# Patient Record
Sex: MALE | Race: WHITE | Employment: OTHER | URBAN - METROPOLITAN AREA
[De-identification: names, ages, dates, MRNs, and addresses within clinical notes are randomized per-mention and may not be internally consistent; named-entity substitution may affect disease eponyms.]

---

## 2021-10-01 ENCOUNTER — HOSPITAL ENCOUNTER (INPATIENT)
Facility: CLINIC | Age: 56
LOS: 31 days | Discharge: HOME | DRG: 177 | End: 2021-11-01
Attending: FAMILY MEDICINE | Admitting: FAMILY MEDICINE
Payer: OTHER GOVERNMENT

## 2021-10-01 ENCOUNTER — APPOINTMENT (OUTPATIENT)
Dept: CT IMAGING | Facility: CLINIC | Age: 56
DRG: 177 | End: 2021-10-01
Attending: FAMILY MEDICINE
Payer: OTHER GOVERNMENT

## 2021-10-01 DIAGNOSIS — I10 BENIGN ESSENTIAL HYPERTENSION: ICD-10-CM

## 2021-10-01 DIAGNOSIS — U07.1 PNEUMONIA DUE TO 2019 NOVEL CORONAVIRUS: ICD-10-CM

## 2021-10-01 DIAGNOSIS — J12.82 PNEUMONIA DUE TO 2019 NOVEL CORONAVIRUS: ICD-10-CM

## 2021-10-01 DIAGNOSIS — J96.01 ACUTE RESPIRATORY FAILURE WITH HYPOXIA (H): ICD-10-CM

## 2021-10-01 DIAGNOSIS — R12 HEART BURN: Primary | ICD-10-CM

## 2021-10-01 DIAGNOSIS — Z87.891 PERSONAL HISTORY OF TOBACCO USE, PRESENTING HAZARDS TO HEALTH: ICD-10-CM

## 2021-10-01 LAB
ABO/RH(D): NORMAL
ALBUMIN SERPL-MCNC: 2.4 G/DL (ref 3.4–5)
ALP SERPL-CCNC: 63 U/L (ref 40–150)
ALT SERPL W P-5'-P-CCNC: 49 U/L (ref 0–70)
ANION GAP SERPL CALCULATED.3IONS-SCNC: 11 MMOL/L (ref 3–14)
APTT PPP: 34 SECONDS (ref 22–38)
AST SERPL W P-5'-P-CCNC: 68 U/L (ref 0–45)
BASOPHILS # BLD MANUAL: 0 10E3/UL (ref 0–0.2)
BASOPHILS NFR BLD MANUAL: 0 %
BILIRUB SERPL-MCNC: 0.5 MG/DL (ref 0.2–1.3)
BUN SERPL-MCNC: 44 MG/DL (ref 7–30)
CALCIUM SERPL-MCNC: 8.6 MG/DL (ref 8.5–10.1)
CHLORIDE BLD-SCNC: 95 MMOL/L (ref 94–109)
CO2 SERPL-SCNC: 23 MMOL/L (ref 20–32)
CREAT SERPL-MCNC: 2.2 MG/DL (ref 0.66–1.25)
CRP SERPL-MCNC: 141 MG/L (ref 0–8)
D DIMER PPP FEU-MCNC: 2.11 UG/ML FEU (ref 0–0.5)
D DIMER PPP FEU-MCNC: 2.19 UG/ML FEU (ref 0–0.5)
EOSINOPHIL # BLD MANUAL: 0 10E3/UL (ref 0–0.7)
EOSINOPHIL NFR BLD MANUAL: 0 %
ERYTHROCYTE [DISTWIDTH] IN BLOOD BY AUTOMATED COUNT: 12.2 % (ref 10–15)
FIBRINOGEN PPP-MCNC: 476 MG/DL (ref 170–490)
GFR SERPL CREATININE-BSD FRML MDRD: 32 ML/MIN/1.73M2
GLUCOSE BLD-MCNC: 96 MG/DL (ref 70–99)
HCT VFR BLD AUTO: 45.6 % (ref 40–53)
HGB BLD-MCNC: 16.3 G/DL (ref 13.3–17.7)
INR PPP: 0.86 (ref 0.85–1.15)
LDH SERPL L TO P-CCNC: 669 U/L (ref 85–227)
LYMPHOCYTES # BLD MANUAL: 0.5 10E3/UL (ref 0.8–5.3)
LYMPHOCYTES NFR BLD MANUAL: 6 %
MCH RBC QN AUTO: 29.6 PG (ref 26.5–33)
MCHC RBC AUTO-ENTMCNC: 35.7 G/DL (ref 31.5–36.5)
MCV RBC AUTO: 83 FL (ref 78–100)
MONOCYTES # BLD MANUAL: 0.2 10E3/UL (ref 0–1.3)
MONOCYTES NFR BLD MANUAL: 2 %
NEUTROPHILS # BLD MANUAL: 7 10E3/UL (ref 1.6–8.3)
NEUTROPHILS NFR BLD MANUAL: 92 %
PLAT MORPH BLD: ABNORMAL
PLATELET # BLD AUTO: 194 10E3/UL (ref 150–450)
POTASSIUM BLD-SCNC: 3.4 MMOL/L (ref 3.4–5.3)
PROT SERPL-MCNC: 7.9 G/DL (ref 6.8–8.8)
RBC # BLD AUTO: 5.5 10E6/UL (ref 4.4–5.9)
RBC MORPH BLD: ABNORMAL
SARS-COV-2 RNA RESP QL NAA+PROBE: POSITIVE
SODIUM SERPL-SCNC: 129 MMOL/L (ref 133–144)
SPECIMEN EXPIRATION DATE: NORMAL
TROPONIN I SERPL-MCNC: <0.015 UG/L (ref 0–0.04)
TROPONIN I SERPL-MCNC: <0.015 UG/L (ref 0–0.04)
WBC # BLD AUTO: 7.6 10E3/UL (ref 4–11)

## 2021-10-01 PROCEDURE — 258N000003 HC RX IP 258 OP 636: Performed by: FAMILY MEDICINE

## 2021-10-01 PROCEDURE — 93010 ELECTROCARDIOGRAM REPORT: CPT | Performed by: FAMILY MEDICINE

## 2021-10-01 PROCEDURE — 85379 FIBRIN DEGRADATION QUANT: CPT | Performed by: FAMILY MEDICINE

## 2021-10-01 PROCEDURE — 250N000011 HC RX IP 250 OP 636: Performed by: FAMILY MEDICINE

## 2021-10-01 PROCEDURE — 71275 CT ANGIOGRAPHY CHEST: CPT

## 2021-10-01 PROCEDURE — 83615 LACTATE (LD) (LDH) ENZYME: CPT | Performed by: FAMILY MEDICINE

## 2021-10-01 PROCEDURE — 85384 FIBRINOGEN ACTIVITY: CPT | Performed by: FAMILY MEDICINE

## 2021-10-01 PROCEDURE — 36415 COLL VENOUS BLD VENIPUNCTURE: CPT | Performed by: FAMILY MEDICINE

## 2021-10-01 PROCEDURE — 93005 ELECTROCARDIOGRAM TRACING: CPT | Performed by: FAMILY MEDICINE

## 2021-10-01 PROCEDURE — 85610 PROTHROMBIN TIME: CPT | Performed by: FAMILY MEDICINE

## 2021-10-01 PROCEDURE — 250N000009 HC RX 250: Performed by: FAMILY MEDICINE

## 2021-10-01 PROCEDURE — 85027 COMPLETE CBC AUTOMATED: CPT | Performed by: FAMILY MEDICINE

## 2021-10-01 PROCEDURE — 87635 SARS-COV-2 COVID-19 AMP PRB: CPT | Performed by: FAMILY MEDICINE

## 2021-10-01 PROCEDURE — 99285 EMERGENCY DEPT VISIT HI MDM: CPT | Mod: 25 | Performed by: FAMILY MEDICINE

## 2021-10-01 PROCEDURE — 96361 HYDRATE IV INFUSION ADD-ON: CPT | Performed by: FAMILY MEDICINE

## 2021-10-01 PROCEDURE — XW033E5 INTRODUCTION OF REMDESIVIR ANTI-INFECTIVE INTO PERIPHERAL VEIN, PERCUTANEOUS APPROACH, NEW TECHNOLOGY GROUP 5: ICD-10-PCS | Performed by: FAMILY MEDICINE

## 2021-10-01 PROCEDURE — 96374 THER/PROPH/DIAG INJ IV PUSH: CPT | Mod: 59 | Performed by: FAMILY MEDICINE

## 2021-10-01 PROCEDURE — 99223 1ST HOSP IP/OBS HIGH 75: CPT | Mod: AI | Performed by: FAMILY MEDICINE

## 2021-10-01 PROCEDURE — 84484 ASSAY OF TROPONIN QUANT: CPT | Performed by: FAMILY MEDICINE

## 2021-10-01 PROCEDURE — 80053 COMPREHEN METABOLIC PANEL: CPT | Performed by: FAMILY MEDICINE

## 2021-10-01 PROCEDURE — 250N000013 HC RX MED GY IP 250 OP 250 PS 637: Performed by: FAMILY MEDICINE

## 2021-10-01 PROCEDURE — 85730 THROMBOPLASTIN TIME PARTIAL: CPT | Performed by: FAMILY MEDICINE

## 2021-10-01 PROCEDURE — 86140 C-REACTIVE PROTEIN: CPT | Performed by: FAMILY MEDICINE

## 2021-10-01 PROCEDURE — 120N000001 HC R&B MED SURG/OB

## 2021-10-01 PROCEDURE — 86901 BLOOD TYPING SEROLOGIC RH(D): CPT | Performed by: FAMILY MEDICINE

## 2021-10-01 PROCEDURE — C9803 HOPD COVID-19 SPEC COLLECT: HCPCS | Performed by: FAMILY MEDICINE

## 2021-10-01 RX ORDER — ACETAMINOPHEN 500 MG
500-1000 TABLET ORAL EVERY 6 HOURS PRN
COMMUNITY
End: 2022-05-26

## 2021-10-01 RX ORDER — DEXAMETHASONE SODIUM PHOSPHATE 10 MG/ML
6 INJECTION, SOLUTION INTRAMUSCULAR; INTRAVENOUS ONCE
Status: COMPLETED | OUTPATIENT
Start: 2021-10-01 | End: 2021-10-01

## 2021-10-01 RX ORDER — PANTOPRAZOLE SODIUM 40 MG/1
40 TABLET, DELAYED RELEASE ORAL EVERY MORNING
Status: DISCONTINUED | OUTPATIENT
Start: 2021-10-02 | End: 2021-11-01 | Stop reason: HOSPADM

## 2021-10-01 RX ORDER — ONDANSETRON 2 MG/ML
4 INJECTION INTRAMUSCULAR; INTRAVENOUS EVERY 6 HOURS PRN
Status: DISCONTINUED | OUTPATIENT
Start: 2021-10-01 | End: 2021-11-01 | Stop reason: HOSPADM

## 2021-10-01 RX ORDER — SODIUM CHLORIDE, SODIUM LACTATE, POTASSIUM CHLORIDE, CALCIUM CHLORIDE 600; 310; 30; 20 MG/100ML; MG/100ML; MG/100ML; MG/100ML
125 INJECTION, SOLUTION INTRAVENOUS CONTINUOUS
Status: DISCONTINUED | OUTPATIENT
Start: 2021-10-01 | End: 2021-10-01

## 2021-10-01 RX ORDER — ONDANSETRON 4 MG/1
4 TABLET, ORALLY DISINTEGRATING ORAL EVERY 6 HOURS PRN
Status: DISCONTINUED | OUTPATIENT
Start: 2021-10-01 | End: 2021-11-01 | Stop reason: HOSPADM

## 2021-10-01 RX ORDER — IOPAMIDOL 755 MG/ML
83 INJECTION, SOLUTION INTRAVASCULAR ONCE
Status: COMPLETED | OUTPATIENT
Start: 2021-10-01 | End: 2021-10-01

## 2021-10-01 RX ORDER — SODIUM CHLORIDE 9 MG/ML
INJECTION, SOLUTION INTRAVENOUS CONTINUOUS
Status: DISCONTINUED | OUTPATIENT
Start: 2021-10-01 | End: 2021-10-02

## 2021-10-01 RX ORDER — ACETAMINOPHEN 325 MG/1
650 TABLET ORAL EVERY 6 HOURS PRN
Status: DISCONTINUED | OUTPATIENT
Start: 2021-10-01 | End: 2021-11-01 | Stop reason: HOSPADM

## 2021-10-01 RX ORDER — LIDOCAINE 40 MG/G
CREAM TOPICAL
Status: DISCONTINUED | OUTPATIENT
Start: 2021-10-01 | End: 2021-11-01 | Stop reason: HOSPADM

## 2021-10-01 RX ORDER — ACETAMINOPHEN 650 MG/1
650 SUPPOSITORY RECTAL EVERY 6 HOURS PRN
Status: DISCONTINUED | OUTPATIENT
Start: 2021-10-01 | End: 2021-11-01 | Stop reason: HOSPADM

## 2021-10-01 RX ADMIN — ENOXAPARIN SODIUM 40 MG: 100 INJECTION SUBCUTANEOUS at 16:58

## 2021-10-01 RX ADMIN — ACETAMINOPHEN 650 MG: 325 TABLET, FILM COATED ORAL at 16:58

## 2021-10-01 RX ADMIN — DEXAMETHASONE SODIUM PHOSPHATE 6 MG: 10 INJECTION, SOLUTION INTRAMUSCULAR; INTRAVENOUS at 12:49

## 2021-10-01 RX ADMIN — REMDESIVIR 200 MG: 100 INJECTION, POWDER, LYOPHILIZED, FOR SOLUTION INTRAVENOUS at 17:51

## 2021-10-01 RX ADMIN — SODIUM CHLORIDE: 9 INJECTION, SOLUTION INTRAVENOUS at 16:57

## 2021-10-01 RX ADMIN — SODIUM CHLORIDE, POTASSIUM CHLORIDE, SODIUM LACTATE AND CALCIUM CHLORIDE 1000 ML: 600; 310; 30; 20 INJECTION, SOLUTION INTRAVENOUS at 12:48

## 2021-10-01 RX ADMIN — ACETAMINOPHEN 650 MG: 325 TABLET, FILM COATED ORAL at 22:25

## 2021-10-01 RX ADMIN — SODIUM CHLORIDE 100 ML: 9 INJECTION, SOLUTION INTRAVENOUS at 14:44

## 2021-10-01 RX ADMIN — SODIUM CHLORIDE 50 ML: 9 INJECTION, SOLUTION INTRAVENOUS at 17:52

## 2021-10-01 RX ADMIN — IOPAMIDOL 83 ML: 755 INJECTION, SOLUTION INTRAVENOUS at 14:44

## 2021-10-01 ASSESSMENT — MIFFLIN-ST. JEOR
SCORE: 1717.57
SCORE: 1681.38

## 2021-10-01 ASSESSMENT — ACTIVITIES OF DAILY LIVING (ADL)
TOILETING_ISSUES: NO
DRESSING/BATHING_DIFFICULTY: NO
PATIENT_/_FAMILY_COMMUNICATION_STYLE: SPOKEN LANGUAGE (ENGLISH OR BILINGUAL)
DIFFICULTY_COMMUNICATING: NO
DIFFICULTY_EATING/SWALLOWING: NO
ADLS_ACUITY_SCORE: 3

## 2021-10-01 NOTE — ED PROVIDER NOTES
"  History     Chief Complaint   Patient presents with     Covid Concern     wife and son tested (+) here today with sx - shortness of breath and difficulty breathing     HPI    Liborio Barajas is a 56 year old male who comes in with concerns for Covid infection.  He became ill 9 days ago.  Couple of days later his wife who is also ill with similar symptoms was Covid tested and was positive.  His son also had similar symptoms and was tested and was Covid positive.  He has been having fevers, muscle aches, headaches, shortness of breath, chest pain, and diarrhea.  He has myalgias.  Today he got home O2 sat monitor but has not had a chance to monitor his sats.  He is not Covid vaccinated.  He has a history of hypertension but ran out of his lisinopril and has not taken it in quite some time.  He quit smoking years ago.  He is not diagnosed with asthma.  He has no other identified chronic medical problems.    Allergies:  Allergies   Allergen Reactions     Nka [No Known Allergies]        Problem List:    Patient Active Problem List    Diagnosis Date Noted     Acute respiratory failure with hypoxia (H) 10/01/2021     Priority: Medium     Pneumonia due to 2019 novel coronavirus 10/01/2021     Priority: Medium        Past Medical History:    No past medical history on file.    Past Surgical History:    Past Surgical History:   Procedure Laterality Date     COLONOSCOPY         Family History:    No family history on file.    Social History:  Marital Status:   [2]  Social History     Tobacco Use     Smoking status: Not on file   Substance Use Topics     Alcohol use: Not on file     Drug use: Not on file        Medications:    IBUPROFEN PO  LISINOPRIL PO  Omeprazole (PRILOSEC PO)      Review of Systems    All other systems are reviewed and are negative    Physical Exam   BP: 118/69  Pulse: 99  Temp: 99.6  F (37.6  C)  Resp: 20  Height: 175.3 cm (5' 9\")  Weight: 90.7 kg (200 lb)  SpO2: (!) 88 %      Physical " Exam    Nursing note and vitals were reviewed.  Constitutional: Awake and alert, adequately nourished and developed appearing 56-year-old in no apparent discomfort, who appears moderately ill, and who answers questions appropriately and cooperates with examination.  Without oxygen he is clearly dyspneic and has difficulty speaking in full sentences.  He improves on 6 L of O2 by nasal cannula.  HEENT: EOMI.   Neck: Freely mobile.  Cardiovascular: Cardiac examination reveals normal heart rate and regular rhythm without murmur.  Pulmonary/Chest: Breathing is unlabored without oxygen but improved with supplemental oxygen therapy.  O2 sats drop below 86% on room air and continue to descend.  The aysha is uncertain because we restarted his oxygen at 6 L.  At 6 L he is at 92%.  Breath sounds are clear and equal bilaterally.  There no retractions, but there are coarse rales present throughout the lungs..  Abdomen: Soft, nontender, no HSM or masses rebound or guarding.  Musculoskeletal: Extremities are warm and well-perfused and without edema  Neurological: Alert, oriented, thought content logical, coherent   Skin: Warm, dry, no rashes.  Psychiatric: Affect congruent.    ED Course        Procedures              EKG Interpretation:      Interpreted by Edwardo Harmon MD  Time reviewed: 12:53  Symptoms at time of EKG: dyspnea   Rhythm: normal sinus   Rate: normal  Axis: normal  Ectopy: none  Conduction: normal  ST Segments/ T Waves: No ST-T wave changes  Q Waves: none  Comparison to prior: No old EKG available    Clinical Impression: normal EKG    Critical Care time:  none               Results for orders placed or performed during the hospital encounter of 10/01/21 (from the past 24 hour(s))   D dimer quantitative   Result Value Ref Range    D-Dimer Quantitative 2.11 (H) 0.00 - 0.50 ug/mL FEU    Narrative    This D-dimer assay is intended for use in conjunction with a clinical pretest probability assessment model to exclude  pulmonary embolism (PE) and deep venous thrombosis (DVT) in outpatients suspected of PE or DVT. The cut-off value is 0.50 ug/mL FEU.   Troponin I   Result Value Ref Range    Troponin I <0.015 0.000 - 0.045 ug/L   INR   Result Value Ref Range    INR 0.86 0.85 - 1.15   Fibrinogen activity   Result Value Ref Range    Fibrinogen Activity 476 170 - 490 mg/dL   Symptomatic COVID-19 Virus (Coronavirus) by PCR Nasopharyngeal    Specimen: Nasopharyngeal; Swab   Result Value Ref Range    SARS CoV2 PCR Positive (A) Negative    Narrative    Testing was performed using the [employer]  SARS-CoV-2 & Influenza A/B Assay on the [employer]  [employer]  System.  This test should be ordered for the detection of SARS-COV-2 in individuals who meet SARS-CoV-2 clinical and/or epidemiological criteria. Test performance is unknown in asymptomatic patients.  This test is for in vitro diagnostic use under the FDA EUA for laboratories certified under CLIA to perform moderate and/or high complexity testing. This test has not been FDA cleared or approved.  A negative test does not rule out the presence of PCR inhibitors in the specimen or target RNA in concentration below the limit of detection for the assay. The possibility of a false negative should be considered if the patient's recent exposure or clinical presentation suggests COVID-19.  [facility] are certified under the Clinical Laboratory Improvement Amendments of 1988 (CLIA-88) as qualified to perform moderate and/or high complexity laboratory testing.   CBC with platelets differential    Narrative    The following orders were created for panel order CBC with platelets differential.  Procedure                               Abnormality         Status                     ---------                               -----------         ------                     CBC with platelets and d...[[id number]]  Normal              Final result               Manual Differential[[id number]]           Abnormal            Final result                 Please view results for these tests on the individual orders.   Comprehensive metabolic panel   Result Value Ref Range    Sodium 129 (L) 133 - 144 mmol/L    Potassium 3.4 3.4 - 5.3 mmol/L    Chloride 95 94 - 109 mmol/L    Carbon Dioxide (CO2) 23 20 - 32 mmol/L    Anion Gap 11 3 - 14 mmol/L    Urea Nitrogen 44 (H) 7 - 30 mg/dL    Creatinine 2.20 (H) 0.66 - 1.25 mg/dL    Calcium 8.6 8.5 - 10.1 mg/dL    Glucose 96 70 - 99 mg/dL    Alkaline Phosphatase 63 40 - 150 U/L    AST 68 (H) 0 - 45 U/L    ALT 49 0 - 70 U/L    Protein Total 7.9 6.8 - 8.8 g/dL    Albumin 2.4 (L) 3.4 - 5.0 g/dL    Bilirubin Total 0.5 0.2 - 1.3 mg/dL    GFR Estimate 32 (L) >60 mL/min/1.73m2   CBC with platelets and differential   Result Value Ref Range    WBC Count 7.6 4.0 - 11.0 10e3/uL    RBC Count 5.50 4.40 - 5.90 10e6/uL    Hemoglobin 16.3 13.3 - 17.7 g/dL    Hematocrit 45.6 40.0 - 53.0 %    MCV 83 78 - 100 fL    MCH 29.6 26.5 - 33.0 pg    MCHC 35.7 31.5 - 36.5 g/dL    RDW 12.2 10.0 - 15.0 %    Platelet Count 194 150 - 450 10e3/uL   Manual Differential   Result Value Ref Range    % Neutrophils 92 %    % Lymphocytes 6 %    % Monocytes 2 %    % Eosinophils 0 %    % Basophils 0 %    Absolute Neutrophils 7.0 1.6 - 8.3 10e3/uL    Absolute Lymphocytes 0.5 (L) 0.8 - 5.3 10e3/uL    Absolute Monocytes 0.2 0.0 - 1.3 10e3/uL    Absolute Eosinophils 0.0 0.0 - 0.7 10e3/uL    Absolute Basophils 0.0 0.0 - 0.2 10e3/uL    RBC Morphology Confirmed RBC Indices     Platelet Assessment  Automated Count Confirmed. Platelet morphology is normal.     Automated Count Confirmed. Platelet morphology is normal.   Troponin I   Result Value Ref Range    Troponin I <0.015 0.000 - 0.045 ug/L   CRP inflammation   Result Value Ref Range    CRP Inflammation 141.0 (H) 0.0 - 8.0 mg/L   ABO and Rh   Result Value Ref Range    ABO/RH(D) O NEG     SPECIMEN EXPIRATION DATE [id number]    CT Chest Pulmonary Embolism w Contrast     Narrative    CT CHEST PULMONARY EMBOLISM WITH CONTRAST October 1, 2021 2:57 PM    CLINICAL HISTORY: Dyspnea, COVID, elevated D-dimer.    TECHNIQUE: CT angiogram chest during arterial phase injection IV  contrast. 2D and 3D MIP reconstructions were performed by the CT  technologist. Dose reduction techniques were used.   CONTRAST: 83 mL Isovue 370.    COMPARISON: None.    FINDINGS:  ANGIOGRAM CHEST: Pulmonary arteries are normal caliber and negative  for pulmonary emboli. Thoracic aorta is negative for dissection.     LUNGS AND PLEURA: Extensive bilateral ground-glass consolidation with  more dense bibasilar consolidation identified involving all lobes of  both lungs. No effusions. No pneumothorax. No central airway  abnormality.    MEDIASTINUM/AXILLAE: Small hiatal hernia. Enlarged subcarinal lymph  node is 1.5 cm series 4 image 72. There are a few other borderline  prominent mediastinal lymph nodes bilaterally. Mildly prominent  bilateral hilar lymph nodes. Small pericardial fluid.    CORONARY ARTERY CALCIFICATION: Mild.    UPPER ABDOMEN: No acute abnormality. Incidental hepatic and renal  cysts not requiring specific follow-up.    MUSCULOSKELETAL: Unremarkable.      Impression    IMPRESSION:  1.  Extensive bilateral ground-glass and patchy regions of dense  consolidation involving all lobes of both lungs worrisome for  multifocal pneumonia versus severe inflammatory disease.  2.  No evidence for pulmonary embolism.  3.  Several prominent thoracic lymph nodes.  4.  Small pericardial fluid.    LANRE BARRON MD         SYSTEM ID:  [id number]   Lactate Dehydrogenase   Result Value Ref Range    Lactate Dehydrogenase 669 (H) 85 - 227 U/L   Partial thromboplastin time   Result Value Ref Range    aPTT 34 22 - 38 Seconds   D dimer quantitative   Result Value Ref Range    D-Dimer Quantitative 2.19 (H) 0.00 - 0.50 ug/mL FEU    Narrative    This D-dimer assay is intended for use in conjunction with a clinical pretest probability  assessment model to exclude pulmonary embolism (PE) and deep venous thrombosis (DVT) in outpatients suspected of PE or DVT. The cut-off value is 0.50 ug/mL FEU.       Medications   lactated ringers BOLUS 1,000 mL (1,000 mLs Intravenous New Bag 10/1/21 5068)     Followed by   lactated ringers infusion (has no administration in time range)   dexamethasone PF (DECADRON) injection 6 mg (6 mg Intravenous Given 10/1/21 1245)       Assessments & Plan (with Medical Decision Making)     56-year-old male not immunized against COVID-19 came in with Covid symptoms and a positive Covid test.  He was hypoxic with sats in the mid 80% range on room air but responded to supplemental O2 at 6 L of O2 by nasal cannula with sats in the mid 90% range.  Laboratory studies were as above with no unexpected findings.  Because of the elevated D-dimer he underwent CT scan.  No PE was noted but there was extensive pneumonia consistent with severe COVID-19 infection.  Dexamethasone therapy was initiated.  Discussed the findings with the patient and need for hospitalization.  Discussed his case with Lucas Melo of the hospital service and they will assume care on admission.    I have reviewed the nursing notes.    I have reviewed the findings, diagnosis, plan and need for follow up with the patient.       New Prescriptions    No medications on file       Final diagnoses:   Acute respiratory failure with hypoxia (H)   Pneumonia due to 2019 novel coronavirus       10/1/2021   [facility] EMERGENCY DEPT     Edwardo Harmon MD  10/01/21 0770

## 2021-10-01 NOTE — ED NOTES
Patient has low sat in triage - wheeled to room and placed on 2L NC - patient is 83% after getting up and moving to stretcher - recovered to 88% on 2 L - increased delivery to 4L - patient is 94% - primary RN in to assess

## 2021-10-01 NOTE — PROGRESS NOTES
"[facility] [facility] ADMISSION NOTE    Patient admitted to room 2211 at approximately 1620 via cart from emergency room. Patient was accompanied by transport tech.     Verbal SBAR report received from RN prior to patient arrival.     Patient ambulated to bed independently. Patient alert and oriented X 3. The patient is not having any pain.  . Admission vital signs: Blood pressure (!) 149/78, pulse 87, temperature 98.2  F (36.8  C), temperature source Oral, resp. rate 20, height 1.753 m (5' 9\"), weight 90.7 kg (200 lb), SpO2 92 %. Patient was oriented to plan of care, call light, bed controls, tv, telephone, bathroom and visiting hours.     Risk Assessment    The following safety risks were identified during admission: none. Yellow risk band applied: NO.     Skin Initial Assessment    This writer admitted this patient and completed a full skin assessment and Hany score in the Adult PCS flowsheet. Appropriate interventions initiated as needed.     Secondary skin check deferred.         Education    Patient has a [place] to Observation order: No  Observation education completed and documented: N/A      Gilma Elam RN    "

## 2021-10-01 NOTE — H&P
[facility]    History and Physical - Hospitalist Service       Date of Admission:  10/1/2021    Assessment & Plan      Liborio Barajas is a 56 year old male admitted on 10/1/2021. He presents with shortness of breath and was found to be Covid positive.      # Confirmed COVID-19 infection    # Acute Hypoxic Respiratory Failure secondary to COVID-19 infection  # Viral Pneumonia secondary to COVID-19 infection     Symptom Onset  September 22, 2021   Date of 1st Positive Test 10/01/21     Vaccination Status Not Vaccinated, but interested/contemplative       - Admit to medical floor with continuous pulse ox, COVID-19 special precautions  - Oxygen: continue current support with nasal cannula at 6 L/min; titrate to keep SpO2 between 90-96%  - Labs: Covid labs ordered  - Imaging: Chest CT--IMPRESSION:  1.  Extensive bilateral ground-glass and patchy regions of dense  consolidation involving all lobes of both lungs worrisome for  multifocal pneumonia versus severe inflammatory disease.  2.  No evidence for pulmonary embolism.  3.  Several prominent thoracic lymph nodes.  4.  Small pericardial fluid  - Breathing treatments: no inhalers needed; avoid nebulizers in favor of MDIs   - IV fluids: Saline 50 cc an hour for 12 hours.  - Antibiotics: not indicated   - COVID-Focused Medications: - DEXAMETHASONE: consider if new O2 requirement or acute on chronic hypoxic respiratory failure and - REMDESIVIR: consider if GFR > 30, AST & ALT < 5x ULN, and at least one of the following   - DVT Prophylaxis: at high risk of thrombotic complications due to COVID-19 (DDimer = 2.11 ug/mL FEU (Ref range: 0.00 - 0.50 ug/mL FEU) ).          - PROPHYLACTIC dosing: lovenox 40mg daily        - consider anticoag on discharge for 30 days & until return to normal mobility    #Hyponatremia  Probably related to volume depletion.  Saline 50 cc an hour for 12 hours.    #Possible acute kidney injury superimposed on chronic kidney  disease  Last creatinine I can find in his records was 1.28 on March 2, 2016.  Creatinine today 2.20 with a BUN of 44  Suspect some volume depletion-use saline 50 cc an hour for 12 hours.    #AST elevation  AST 68-suspect Covid related      #Hypertension  Previously on lisinopril-will not restart due to renal function.    #GERD  Continue omeprazole     Diet:  Regular  DVT Prophylaxis: Enoxaparin (Lovenox) SQ  Neal Catheter: Not present  Central Lines: None  Code Status:  Full            Disposition Plan   Expected discharge: 3-4 days recommended to prior living arrangement once respiratory status is improved..     The patient's care was discussed with the Patient.    Marcelo Edge MD, MD  [facility]  Securely message with the Vocera Web Console (learn more here)  Text page via [employer] Paging/Directory      ______________________________________________________________________    Chief Complaint   Shortness of breath    History is obtained from the patient    History of Present Illness   Liborio Barajas is a 56 year old male who has a history of previously treated hypertension (currently not on meds) and GERD.      He developed symptoms on approximately September 22, 2021.  He has had fevers, myalgias, headaches, shortness of breath, chest pain and diarrhea.  Patient also tells me he had some hallucinations where he awoke from sleep and thought he was laying in the field the bones. He is not Covid vaccinated.  He has history of hypertension but ran out of lisinopril and has not taken it for some time.  He smoked in the remote past.  He does not have asthma or known chronic lung disease.    His wife and son were tested positive for Covid today.  He presented because of increasing shortness of breath.    Patient was evaluated in the emergency room and found to be Covid positive.  His D-dimer was 2.11.  His EKG was normal.  A chest CT was done-see above..  He required 6 L of oxygen and  ER.    Review of Systems    CONSTITUTIONAL: Weak  INTEGUMENTARY/SKIN: NEGATIVE for worrisome rashes, moles or lesions  EYES: NEGATIVE for vision changes or irritation  ENT/MOUTH: NEGATIVE for ear, mouth and throat problems  RESP: Short of breath, cough  BREAST: NEGATIVE for masses, tenderness or discharge  CV: NEGATIVE for , palpitations or peripheral edema-some chest discomforts  GI: Diarrhea  : NEGATIVE for frequency, dysuria, or hematuria  MUSCULOSKELETAL: Myalgia  NEURO: NEGATIVE for weakness, dizziness or paresthesias  ENDOCRINE: NEGATIVE for temperature intolerance, skin/hair changes  HEME: NEGATIVE for bleeding problems  PSYCHIATRIC: NEGATIVE for changes in mood or affect    Past Medical History    I have reviewed this patient's medical history and updated it with pertinent information if needed.   No past medical history on file.    Past Surgical History   I have reviewed this patient's surgical history and updated it with pertinent information if needed.  Past Surgical History:   Procedure Laterality Date     COLONOSCOPY         Social History   I have reviewed this patient's social history and updated it with pertinent information if needed.  Social History     Tobacco Use     Smoking status: Not on file   Substance Use Topics     Alcohol use: Not on file     Drug use: Not on file       Family History     Father  recently of renal failure.  Prior to Admission Medications   Prior to Admission Medications   Prescriptions Last Dose Informant Patient Reported? Taking?   IBUPROFEN PO  Self Yes No   Sig: Take 400 mg by mouth every 6 hours as needed for moderate pain   LISINOPRIL PO  Self Yes No   Sig: Take 10 mg by mouth daily   Omeprazole (PRILOSEC PO)  Self Yes No   Sig: Take 20 mg by mouth every morning      Facility-Administered Medications: None     Allergies   Allergies   Allergen Reactions     Nka [No Known Allergies]        Physical Exam   Vital Signs: Temp: 99.6  F (37.6  C) Temp src: Oral BP:  125/84 Pulse: 88   Resp: 20 SpO2: 93 % O2 Device: Nasal cannula Oxygen Delivery: 6 LPM  Weight: 200 lbs 0 oz    General Appearance: Alert, coughing mildly tachypneic  Eyes: Pupils round reactive to light  HEENT: Oral negative, ears negative  Respiratory: Bibasilar crackles, some expiratory airway noise  Cardiovascular: S1-S2 regular  GI: Soft, benign, nontender, no adenopathy or mass, bowel sounds present  Lymph/Hematologic: No adenopathy  Genitourinary: Normal male  Skin: No rash  Musculoskeletal: No edema  Neurologic: Cranial nerves, orientation, motor and reflexes grossly normal  Psychiatric: Mood is normal    Data   Data reviewed today: I reviewed all medications, new labs and imaging results over the last 24 hours. I personally reviewed lab and CT.    Recent Labs   Lab 10/01/21  1400 10/01/21  1247   WBC 7.6  --    HGB 16.3  --    MCV 83  --      --    *  --    POTASSIUM 3.4  --    CHLORIDE 95  --    CO2 23  --    BUN 44*  --    CR 2.20*  --    ANIONGAP 11  --    JANET 8.6  --    GLC 96  --    ALBUMIN 2.4*  --    PROTTOTAL 7.9  --    BILITOTAL 0.5  --    ALKPHOS 63  --    ALT 49  --    AST 68*  --    TROPONIN  --  <0.015     Recent Results (from the past 24 hour(s))   CT Chest Pulmonary Embolism w Contrast    Narrative    CT CHEST PULMONARY EMBOLISM WITH CONTRAST October 1, 2021 2:57 PM    CLINICAL HISTORY: Dyspnea, COVID, elevated D-dimer.    TECHNIQUE: CT angiogram chest during arterial phase injection IV  contrast. 2D and 3D MIP reconstructions were performed by the CT  technologist. Dose reduction techniques were used.   CONTRAST: 83 mL Isovue 370.    COMPARISON: None.    FINDINGS:  ANGIOGRAM CHEST: Pulmonary arteries are normal caliber and negative  for pulmonary emboli. Thoracic aorta is negative for dissection.     LUNGS AND PLEURA: Extensive bilateral ground-glass consolidation with  more dense bibasilar consolidation identified involving all lobes of  both lungs. No effusions. No  pneumothorax. No central airway  abnormality.    MEDIASTINUM/AXILLAE: Small hiatal hernia. Enlarged subcarinal lymph  node is 1.5 cm series 4 image 72. There are a few other borderline  prominent mediastinal lymph nodes bilaterally. Mildly prominent  bilateral hilar lymph nodes. Small pericardial fluid.    CORONARY ARTERY CALCIFICATION: Mild.    UPPER ABDOMEN: No acute abnormality. Incidental hepatic and renal  cysts not requiring specific follow-up.    MUSCULOSKELETAL: Unremarkable.      Impression    IMPRESSION:  1.  Extensive bilateral ground-glass and patchy regions of dense  consolidation involving all lobes of both lungs worrisome for  multifocal pneumonia versus severe inflammatory disease.  2.  No evidence for pulmonary embolism.  3.  Several prominent thoracic lymph nodes.  4.  Small pericardial fluid.

## 2021-10-02 LAB
ANION GAP SERPL CALCULATED.3IONS-SCNC: 6 MMOL/L (ref 3–14)
BUN SERPL-MCNC: 37 MG/DL (ref 7–30)
CALCIUM SERPL-MCNC: 9 MG/DL (ref 8.5–10.1)
CHLORIDE BLD-SCNC: 104 MMOL/L (ref 94–109)
CO2 SERPL-SCNC: 25 MMOL/L (ref 20–32)
CREAT SERPL-MCNC: 1.29 MG/DL (ref 0.66–1.25)
CRP SERPL-MCNC: 135 MG/L (ref 0–8)
D DIMER PPP FEU-MCNC: 1.62 UG/ML FEU (ref 0–0.5)
ERYTHROCYTE [DISTWIDTH] IN BLOOD BY AUTOMATED COUNT: 12.4 % (ref 10–15)
FIBRINOGEN PPP-MCNC: 852 MG/DL (ref 170–490)
GFR SERPL CREATININE-BSD FRML MDRD: 61 ML/MIN/1.73M2
GLUCOSE BLD-MCNC: 126 MG/DL (ref 70–99)
HCT VFR BLD AUTO: 44.4 % (ref 40–53)
HGB BLD-MCNC: 15.8 G/DL (ref 13.3–17.7)
MCH RBC QN AUTO: 29.7 PG (ref 26.5–33)
MCHC RBC AUTO-ENTMCNC: 35.6 G/DL (ref 31.5–36.5)
MCV RBC AUTO: 84 FL (ref 78–100)
PLATELET # BLD AUTO: 247 10E3/UL (ref 150–450)
POTASSIUM BLD-SCNC: 3.5 MMOL/L (ref 3.4–5.3)
RBC # BLD AUTO: 5.32 10E6/UL (ref 4.4–5.9)
SODIUM SERPL-SCNC: 135 MMOL/L (ref 133–144)
WBC # BLD AUTO: 6.7 10E3/UL (ref 4–11)

## 2021-10-02 PROCEDURE — 250N000009 HC RX 250: Performed by: FAMILY MEDICINE

## 2021-10-02 PROCEDURE — 85027 COMPLETE CBC AUTOMATED: CPT | Performed by: FAMILY MEDICINE

## 2021-10-02 PROCEDURE — 99207 PR NO CHARGE CURBSIDE PS: CPT | Performed by: INTERNAL MEDICINE

## 2021-10-02 PROCEDURE — 36415 COLL VENOUS BLD VENIPUNCTURE: CPT | Performed by: FAMILY MEDICINE

## 2021-10-02 PROCEDURE — 80048 BASIC METABOLIC PNL TOTAL CA: CPT | Performed by: FAMILY MEDICINE

## 2021-10-02 PROCEDURE — 86140 C-REACTIVE PROTEIN: CPT | Performed by: FAMILY MEDICINE

## 2021-10-02 PROCEDURE — 999N000215 HC STATISTIC HFNC ADULT NON-CPAP

## 2021-10-02 PROCEDURE — 258N000003 HC RX IP 258 OP 636: Performed by: FAMILY MEDICINE

## 2021-10-02 PROCEDURE — 85384 FIBRINOGEN ACTIVITY: CPT | Performed by: FAMILY MEDICINE

## 2021-10-02 PROCEDURE — 250N000011 HC RX IP 250 OP 636: Performed by: FAMILY MEDICINE

## 2021-10-02 PROCEDURE — 250N000012 HC RX MED GY IP 250 OP 636 PS 637: Performed by: FAMILY MEDICINE

## 2021-10-02 PROCEDURE — 250N000013 HC RX MED GY IP 250 OP 250 PS 637: Performed by: FAMILY MEDICINE

## 2021-10-02 PROCEDURE — 99233 SBSQ HOSP IP/OBS HIGH 50: CPT | Performed by: FAMILY MEDICINE

## 2021-10-02 PROCEDURE — 85379 FIBRIN DEGRADATION QUANT: CPT | Performed by: FAMILY MEDICINE

## 2021-10-02 PROCEDURE — 200N000001 HC R&B ICU

## 2021-10-02 RX ORDER — MORPHINE SULFATE 2 MG/ML
2 INJECTION, SOLUTION INTRAMUSCULAR; INTRAVENOUS
Status: DISCONTINUED | OUTPATIENT
Start: 2021-10-02 | End: 2021-10-14

## 2021-10-02 RX ORDER — NALOXONE HYDROCHLORIDE 0.4 MG/ML
0.4 INJECTION, SOLUTION INTRAMUSCULAR; INTRAVENOUS; SUBCUTANEOUS
Status: DISCONTINUED | OUTPATIENT
Start: 2021-10-02 | End: 2021-11-01 | Stop reason: HOSPADM

## 2021-10-02 RX ORDER — NALOXONE HYDROCHLORIDE 0.4 MG/ML
0.2 INJECTION, SOLUTION INTRAMUSCULAR; INTRAVENOUS; SUBCUTANEOUS
Status: DISCONTINUED | OUTPATIENT
Start: 2021-10-02 | End: 2021-11-01 | Stop reason: HOSPADM

## 2021-10-02 RX ORDER — MICONAZOLE NITRATE 20 MG/G
CREAM TOPICAL 2 TIMES DAILY
Status: DISCONTINUED | OUTPATIENT
Start: 2021-10-02 | End: 2021-11-01 | Stop reason: HOSPADM

## 2021-10-02 RX ORDER — OXYCODONE HYDROCHLORIDE 5 MG/1
5 TABLET ORAL EVERY 4 HOURS PRN
Status: DISCONTINUED | OUTPATIENT
Start: 2021-10-02 | End: 2021-10-14

## 2021-10-02 RX ADMIN — DEXAMETHASONE 6 MG: 2 TABLET ORAL at 07:55

## 2021-10-02 RX ADMIN — SODIUM CHLORIDE 50 ML: 9 INJECTION, SOLUTION INTRAVENOUS at 19:55

## 2021-10-02 RX ADMIN — PANTOPRAZOLE SODIUM 40 MG: 40 TABLET, DELAYED RELEASE ORAL at 07:55

## 2021-10-02 RX ADMIN — MICONAZOLE NITRATE: 20 CREAM TOPICAL at 19:56

## 2021-10-02 RX ADMIN — ENOXAPARIN SODIUM 40 MG: 100 INJECTION SUBCUTANEOUS at 17:52

## 2021-10-02 RX ADMIN — REMDESIVIR 100 MG: 100 INJECTION, POWDER, LYOPHILIZED, FOR SOLUTION INTRAVENOUS at 19:55

## 2021-10-02 RX ADMIN — OXYCODONE 5 MG: 5 TABLET ORAL at 19:55

## 2021-10-02 RX ADMIN — TOCILIZUMAB 700 MG: 20 INJECTION, SOLUTION, CONCENTRATE INTRAVENOUS at 10:20

## 2021-10-02 RX ADMIN — ACETAMINOPHEN 650 MG: 325 TABLET, FILM COATED ORAL at 05:30

## 2021-10-02 ASSESSMENT — ACTIVITIES OF DAILY LIVING (ADL)
ADLS_ACUITY_SCORE: 5
ADLS_ACUITY_SCORE: 3
ADLS_ACUITY_SCORE: 5
ADLS_ACUITY_SCORE: 3

## 2021-10-02 NOTE — PROGRESS NOTES
Patient has reported a lack of sleep which he relates to profuse sweating during the night, requiring linen changes. He reports that the onset of this began around the same time he started feeling ill. He has remained afebrile tonight.     Increased high flow oxygen from 90% to 100% as patient desats to mid 80's during the night. Updated respiratory therapy with change and will continue to monitor.

## 2021-10-02 NOTE — PROGRESS NOTES
[facility]      Subjective:  Sweats overnight.  Hard to sleep.  Some shortness of breath.  Required increase to high flow 60 L 90% FiO2.    Review of Systems:  CONSTITUTIONAL:weak  INTEGUMENTARY/SKIN: NEGATIVE for worrisome rashes, moles or lesions  EYES: NEGATIVE for vision changes or irritation  ENT/MOUTH: NEGATIVE for ear, mouth and throat problems  RESP:cough, sob  BREAST: NEGATIVE for masses, tenderness or discharge  CV: NEGATIVE for chest pain, palpitations or peripheral edema  GI: NEGATIVE for nausea, abdominal pain, heartburn, or change in bowel habits  : NEGATIVE for frequency, dysuria, or hematuria  MUSCULOSKELETAL:shoulder pain  NEURO: NEGATIVE for weakness, dizziness or paresthesias  ENDOCRINE: NEGATIVE for temperature intolerance, skin/hair changes  HEME: NEGATIVE for bleeding problems  PSYCHIATRIC: NEGATIVE for changes in mood or affect    Physical Exam:  Vitals Were Reviewed    Patient Vitals for the past 16 hrs:   BP Temp Temp src Pulse Resp SpO2 Weight   10/02/21 0231 122/69 97.5  F (36.4  C) Oral 78 24 -- --   10/01/21 2346 -- -- -- -- -- 91 % --   10/01/21 2344 -- -- -- -- -- 90 % --   10/01/21 2341 -- -- -- -- -- (!) 88 % --   10/01/21 2257 120/76 98.2  F (36.8  C) Oral 79 18 90 % --   10/01/21 2211 -- -- -- -- -- 92 % --   10/01/21 2204 127/80 -- -- -- -- -- --   10/01/21 1950 128/81 97.6  F (36.4  C) Oral 91 16 90 % --   10/01/21 1753 -- -- -- -- -- 92 % --   10/01/21 1625 (!) 149/78 98.2  F (36.8  C) Oral 87 20 92 % 87.1 kg (192 lb 0.3 oz)         Intake/Output Summary (Last 24 hours) at 10/2/2021 0711  Last data filed at 10/2/2021 0530  Gross per 24 hour   Intake --   Output 750 ml   Net -750 ml       GENERAL APPEARANCE: mild increase wob  EYES: conjunctiva clear, eyes grossly normal  RESP:normal bs  CV: regular rate and rhythm, normal S1 S2, no S3 or S4 and no murmur, click or rub   ABDOMEN: soft, nontender, no HSM or masses and bowel sounds normal  MS: no clubbing,  cyanosis; no edema  SKIN: clear without significant rashes or lesions    Lab:  Recent Labs   Lab Test 10/02/21  0546 10/01/21  1400    129*   POTASSIUM 3.5 3.4   CHLORIDE 104 95   CO2 25 23   ANIONGAP 6 11   * 96   BUN 37* 44*   CR 1.29* 2.20*   JANET 9.0 8.6     CBC RESULTS:   Recent Labs   Lab Test 10/02/21  0546 10/01/21  1400 10/01/21  1400   WBC 6.7  --  7.6   RBC 5.32  --  5.50   HGB 15.8  --  16.3   HCT 44.4   < > 45.6     --  194    < > = values in this interval not displayed.       Results for orders placed or performed during the hospital encounter of 10/01/21 (from the past 24 hour(s))   D dimer quantitative   Result Value Ref Range    D-Dimer Quantitative 2.11 (H) 0.00 - 0.50 ug/mL FEU    Narrative    This D-dimer assay is intended for use in conjunction with a clinical pretest probability assessment model to exclude pulmonary embolism (PE) and deep venous thrombosis (DVT) in outpatients suspected of PE or DVT. The cut-off value is 0.50 ug/mL FEU.   Troponin I   Result Value Ref Range    Troponin I <0.015 0.000 - 0.045 ug/L   INR   Result Value Ref Range    INR 0.86 0.85 - 1.15   Fibrinogen activity   Result Value Ref Range    Fibrinogen Activity 476 170 - 490 mg/dL   Symptomatic COVID-19 Virus (Coronavirus) by PCR Nasopharyngeal    Specimen: Nasopharyngeal; Swab   Result Value Ref Range    SARS CoV2 PCR Positive (A) Negative    Narrative    Testing was performed using the [employer]  SARS-CoV-2 & Influenza A/B Assay on the [employer]  [employer]  System.  This test should be ordered for the detection of SARS-COV-2 in individuals who meet SARS-CoV-2 clinical and/or epidemiological criteria. Test performance is unknown in asymptomatic patients.  This test is for in vitro diagnostic use under the FDA EUA for laboratories certified under CLIA to perform moderate and/or high complexity testing. This test has not been FDA cleared or approved.  A negative test does not rule out the presence of PCR inhibitors  in the specimen or target RNA in concentration below the limit of detection for the assay. The possibility of a false negative should be considered if the patient's recent exposure or clinical presentation suggests COVID-19.  [facility] are certified under the Clinical Laboratory Improvement Amendments of 1988 (CLIA-88) as qualified to perform moderate and/or high complexity laboratory testing.   CBC with platelets differential    Narrative    The following orders were created for panel order CBC with platelets differential.  Procedure                               Abnormality         Status                     ---------                               -----------         ------                     CBC with platelets and d...[[id number]]  Normal              Final result               Manual Differential[[id number]]          Abnormal            Final result                 Please view results for these tests on the individual orders.   Comprehensive metabolic panel   Result Value Ref Range    Sodium 129 (L) 133 - 144 mmol/L    Potassium 3.4 3.4 - 5.3 mmol/L    Chloride 95 94 - 109 mmol/L    Carbon Dioxide (CO2) 23 20 - 32 mmol/L    Anion Gap 11 3 - 14 mmol/L    Urea Nitrogen 44 (H) 7 - 30 mg/dL    Creatinine 2.20 (H) 0.66 - 1.25 mg/dL    Calcium 8.6 8.5 - 10.1 mg/dL    Glucose 96 70 - 99 mg/dL    Alkaline Phosphatase 63 40 - 150 U/L    AST 68 (H) 0 - 45 U/L    ALT 49 0 - 70 U/L    Protein Total 7.9 6.8 - 8.8 g/dL    Albumin 2.4 (L) 3.4 - 5.0 g/dL    Bilirubin Total 0.5 0.2 - 1.3 mg/dL    GFR Estimate 32 (L) >60 mL/min/1.73m2   CBC with platelets and differential   Result Value Ref Range    WBC Count 7.6 4.0 - 11.0 10e3/uL    RBC Count 5.50 4.40 - 5.90 10e6/uL    Hemoglobin 16.3 13.3 - 17.7 g/dL    Hematocrit 45.6 40.0 - 53.0 %    MCV 83 78 - 100 fL    MCH 29.6 26.5 - 33.0 pg    MCHC 35.7 31.5 - 36.5 g/dL    RDW 12.2 10.0 - 15.0 %    Platelet Count 194 150 - 450 10e3/uL   Manual Differential   Result  Value Ref Range    % Neutrophils 92 %    % Lymphocytes 6 %    % Monocytes 2 %    % Eosinophils 0 %    % Basophils 0 %    Absolute Neutrophils 7.0 1.6 - 8.3 10e3/uL    Absolute Lymphocytes 0.5 (L) 0.8 - 5.3 10e3/uL    Absolute Monocytes 0.2 0.0 - 1.3 10e3/uL    Absolute Eosinophils 0.0 0.0 - 0.7 10e3/uL    Absolute Basophils 0.0 0.0 - 0.2 10e3/uL    RBC Morphology Confirmed RBC Indices     Platelet Assessment  Automated Count Confirmed. Platelet morphology is normal.     Automated Count Confirmed. Platelet morphology is normal.   Troponin I   Result Value Ref Range    Troponin I <0.015 0.000 - 0.045 ug/L   CRP inflammation   Result Value Ref Range    CRP Inflammation 141.0 (H) 0.0 - 8.0 mg/L   ABO and Rh   Result Value Ref Range    ABO/RH(D) O NEG     SPECIMEN EXPIRATION DATE [id number]    CT Chest Pulmonary Embolism w Contrast    Narrative    CT CHEST PULMONARY EMBOLISM WITH CONTRAST October 1, 2021 2:57 PM    CLINICAL HISTORY: Dyspnea, COVID, elevated D-dimer.    TECHNIQUE: CT angiogram chest during arterial phase injection IV  contrast. 2D and 3D MIP reconstructions were performed by the CT  technologist. Dose reduction techniques were used.   CONTRAST: 83 mL Isovue 370.    COMPARISON: None.    FINDINGS:  ANGIOGRAM CHEST: Pulmonary arteries are normal caliber and negative  for pulmonary emboli. Thoracic aorta is negative for dissection.     LUNGS AND PLEURA: Extensive bilateral ground-glass consolidation with  more dense bibasilar consolidation identified involving all lobes of  both lungs. No effusions. No pneumothorax. No central airway  abnormality.    MEDIASTINUM/AXILLAE: Small hiatal hernia. Enlarged subcarinal lymph  node is 1.5 cm series 4 image 72. There are a few other borderline  prominent mediastinal lymph nodes bilaterally. Mildly prominent  bilateral hilar lymph nodes. Small pericardial fluid.    CORONARY ARTERY CALCIFICATION: Mild.    UPPER ABDOMEN: No acute abnormality. Incidental hepatic and  renal  cysts not requiring specific follow-up.    MUSCULOSKELETAL: Unremarkable.      Impression    IMPRESSION:  1.  Extensive bilateral ground-glass and patchy regions of dense  consolidation involving all lobes of both lungs worrisome for  multifocal pneumonia versus severe inflammatory disease.  2.  No evidence for pulmonary embolism.  3.  Several prominent thoracic lymph nodes.  4.  Small pericardial fluid.    LANRE BARRON MD         SYSTEM ID:  [id number]   Lactate Dehydrogenase   Result Value Ref Range    Lactate Dehydrogenase 669 (H) 85 - 227 U/L   Partial thromboplastin time   Result Value Ref Range    aPTT 34 22 - 38 Seconds   D dimer quantitative   Result Value Ref Range    D-Dimer Quantitative 2.19 (H) 0.00 - 0.50 ug/mL FEU    Narrative    This D-dimer assay is intended for use in conjunction with a clinical pretest probability assessment model to exclude pulmonary embolism (PE) and deep venous thrombosis (DVT) in outpatients suspected of PE or DVT. The cut-off value is 0.50 ug/mL FEU.   CBC with platelets   Result Value Ref Range    WBC Count 6.7 4.0 - 11.0 10e3/uL    RBC Count 5.32 4.40 - 5.90 10e6/uL    Hemoglobin 15.8 13.3 - 17.7 g/dL    Hematocrit 44.4 40.0 - 53.0 %    MCV 84 78 - 100 fL    MCH 29.7 26.5 - 33.0 pg    MCHC 35.6 31.5 - 36.5 g/dL    RDW 12.4 10.0 - 15.0 %    Platelet Count 247 150 - 450 10e3/uL   Basic metabolic panel   Result Value Ref Range    Sodium 135 133 - 144 mmol/L    Potassium 3.5 3.4 - 5.3 mmol/L    Chloride 104 94 - 109 mmol/L    Carbon Dioxide (CO2) 25 20 - 32 mmol/L    Anion Gap 6 3 - 14 mmol/L    Urea Nitrogen 37 (H) 7 - 30 mg/dL    Creatinine 1.29 (H) 0.66 - 1.25 mg/dL    Calcium 9.0 8.5 - 10.1 mg/dL    Glucose 126 (H) 70 - 99 mg/dL    GFR Estimate 61 >60 mL/min/1.73m2   Fibrinogen activity   Result Value Ref Range    Fibrinogen Activity 852 (H) 170 - 490 mg/dL   CRP inflammation   Result Value Ref Range    CRP Inflammation 135.0 (H) 0.0 - 8.0 mg/L   D dimer quantitative    Result Value Ref Range    D-Dimer Quantitative 1.62 (H) 0.00 - 0.50 ug/mL FEU    Narrative    This D-dimer assay is intended for use in conjunction with a clinical pretest probability assessment model to exclude pulmonary embolism (PE) and deep venous thrombosis (DVT) in outpatients suspected of PE or DVT. The cut-off value is 0.50 ug/mL FEU.       Assessment and Plan:      Liborio Barajas is a 56 year old male admitted on 10/1/2021. He presents with shortness of breath and was found to be Covid positive.        # Confirmed COVID-19 infection    # Acute Hypoxic Respiratory Failure secondary to COVID-19 infection  # Viral Pneumonia secondary to COVID-19 infection     Symptom Onset  September 22, 2021   Date of 1st Positive Test 10/01/21      Vaccination Status Not Vaccinated, but interested/contemplative         - Admit to medical floor with continuous pulse ox, COVID-19 special precautions  - Oxygen: continue current support with nasal cannula at 6 L/min; titrate to keep SpO2 between 90-96%  - Labs: Covid labs ordered  - Imaging: Chest CT--IMPRESSION:  1.  Extensive bilateral ground-glass and patchy regions of dense  consolidation involving all lobes of both lungs worrisome for  multifocal pneumonia versus severe inflammatory disease.  2.  No evidence for pulmonary embolism.  3.  Several prominent thoracic lymph nodes.  4.  Small pericardial fluid  - Breathing treatments: no inhalers needed; avoid nebulizers in favor of MDIs   - IV fluids: Saline 50 cc an hour for 12 hours-now stopped.  - Antibiotics: not indicated   - COVID-Focused Medications: - DEXAMETHASONE: consider if new O2 requirement or acute on chronic hypoxic respiratory failure and - REMDESIVIR: consider if GFR > 30, AST & ALT < 5x ULN, and at least one of the following   - DVT Prophylaxis: at high risk of thrombotic complications due to COVID-19 (DDimer = 2.11 ug/mL FEU (Ref range: 0.00 - 0.50 ug/mL FEU) ).          - PROPHYLACTIC dosing: lovenox 40mg  daily        - consider anticoag on discharge for 30 days & until return to normal mobility    --135  D-dimer 2.19--1.62  Fibrinogen 476--852  WBC 7.6--6.7     #Hyponatremia  Probably related to volume depletion.    Sodium 129--135 after 600 cc of saline.     #Possible acute kidney injury superimposed on chronic kidney disease  Last creatinine I can find in his records was 1.28 on March 2, 2016.  Creatinine upon admit 2.20 with a BUN of 44  Suspect some volume depletion-used saline 50 cc an hour for 12 hours.    Creatinine 2.20--1.29 with GFR of 61     #AST elevation  AST 68-suspect Covid related        #Hypertension  Previously on lisinopril-will not restart due to renal function.     #GERD  Continue omeprazole        Diet:  Regular  DVT Prophylaxis: Enoxaparin (Lovenox) SQ  Neal Catheter: Not present  Central Lines: None  Code Status:  Full     Plan:  Transfer to ICU-pt is over our typical limits for oxygen use on floor  Will call ID re toci or boricitnab   Ordered MS iv or oxy for pain in shoulder that prevents him from proning much.    8:45 AM  Discussed with wife Chikis   [phone]    8:55 AM   Discussed with ID Dr Whitlock  She is ok with toci or baricitinib depending on upon availability    Pharm has toci and will order.

## 2021-10-02 NOTE — PROGRESS NOTES
Patient resting quietly in bed.  Wearing HFNC @ 60L/100%.  Satting in low 90's at rest.  Sats drop to low 80's when using urinal.  Refusing to lie on side or prone at this time.  Appetite poor, encouraged to rest.  Occasional nonproductive cough.  States when he falls asleep he wakes up sweating profusely.  Is afebrile.  Will continue present plan of care.

## 2021-10-02 NOTE — PROGRESS NOTES
[facility] [facility] TRANSPORT NOTE  Data:   Reason for Transport:  Higher level of care    Liborio Barajas was transported from [facility] via cart at 0900.  Patient was accompanied by Registered Nurse, Nursing Assistant and Respiratory Care Practitioner. Equipment used for transport: Oxygen  High Flow Mask, Cardiac monitor , Pulse oximeter and Blood pressure monitor. Family was aware of reason for transport: yes    Action:  Report: received from Michaela DENISE    Response:  Patient's condition was stable.    Lisa Mello RN

## 2021-10-02 NOTE — PROGRESS NOTES
[facility] TRANSPORT NOTE  Data:   Reason for Transport:  ICU status    Liborio Barajas was transported to ICU  via bed at 0854.  Patient was accompanied by Registered Nurse and Nursing Assistant. Equipment used for transport: Oxygen via Oxymask to High Flow Mask, Cardiac monitor and Pulse oximeter. Family was aware of reason for transport: yes, patient asked for niece to be notified (MD spoke to her) and states he spoke with spouse.    Action:  Report: given to Mariaelena    Response:  Patient's condition when transferred off unit was stable.    Michaela Castaneda RN

## 2021-10-02 NOTE — PROGRESS NOTES
Patient's sats dropped to 84% while coughing.  Assisted to prone position, sats improved to 95 - 97%.  Encouraged to stay proned as long as possible.  Continue present plan of care.

## 2021-10-02 NOTE — PROGRESS NOTES
Lung sounds are clear and diminished. Has occasional nonproductive cough. Switched pt over to oxymask and has been progressively requiring more oxygen tonight. Discussed with RT and obtained an order to initiate high flow oxygen.

## 2021-10-02 NOTE — PROGRESS NOTES
Patient is alert and oriented, up independently.  Oxygen currently at 8L/nc, sat low 90's.  Discussed trying oxymask if needed, patient agreeable.  Patient received first dose of remdesivir.  Normal saline infusing at 50cc/hr.  Poor appetite, ate a few bites of dinner, ensure drink given.

## 2021-10-02 NOTE — PROGRESS NOTES
Patient started on HFNC  60 liters/ min @90% for increased hypoxia.  Patient o2 sats currently 92%.  Will cont to monitor.

## 2021-10-02 NOTE — PROGRESS NOTES
Patient awake and assisted to supine position.  Sats drop with activity to low to mid 80's.  Patient used urinal and applied cream to dori area and sats stayed @ 84%.  Took 10 minutes for sats to recover to 90%.  Remains on HFNC @ 100%/60L.  Has occasional dry cough.  Will monitor closely.

## 2021-10-02 NOTE — PROGRESS NOTES
Infectious Disease Curbside Note  I was called by Dr. Marcelo Edge on 10/2/2021 at 8:49 AM to provide input for Liborio Barajas MRN [id number]. The patient is located at [facility]. The nature of this request for a curbside consultation does not permit me to perform a comprehensive review of health care records, patient/family interview, nor an examination of the patient. I obtained limited patient information from the provider on the phone call and a limited chart review. Dr. Edge had a question about using tocilizumab versus baricitinib for this patient with rapidly progressive respiratory distress from COVID-19 infection. Patient was admitted 10/1/2021 with COVID-19 now he has had progressive hypoxia and needed to be placed on high flow O2 and is being transferred to the ICU. He has a high CRP.     Based on only the information I was provided today, I make the following recommendations to the treating provider/team for their review and consideration: This patient would be a candidate for tocilizumab therapy in combination with the IV remdesivir and also dexamathsone which were started on admission.   If tocilizumab is unavailable due to drug shortage than baricitinib would be an acceptable alternative.     These recommendations are not intended to take the place of the care team's clinical judgement, which should always be utilized to provide the most appropriate care to meet the unique needs of each patient. The recommendations offered were based on the limited scope of information provided as today's date. Should additional guidance be needed or required a formal consultation with infectious diseases is recommended.    Reina Whitlock MD

## 2021-10-03 LAB
ALBUMIN SERPL-MCNC: 2 G/DL (ref 3.4–5)
ALP SERPL-CCNC: 53 U/L (ref 40–150)
ALT SERPL W P-5'-P-CCNC: 43 U/L (ref 0–70)
ANION GAP SERPL CALCULATED.3IONS-SCNC: 7 MMOL/L (ref 3–14)
AST SERPL W P-5'-P-CCNC: 41 U/L (ref 0–45)
BILIRUB DIRECT SERPL-MCNC: 0.2 MG/DL (ref 0–0.2)
BILIRUB SERPL-MCNC: 0.5 MG/DL (ref 0.2–1.3)
BUN SERPL-MCNC: 41 MG/DL (ref 7–30)
CALCIUM SERPL-MCNC: 8.9 MG/DL (ref 8.5–10.1)
CHLORIDE BLD-SCNC: 106 MMOL/L (ref 94–109)
CO2 SERPL-SCNC: 24 MMOL/L (ref 20–32)
CREAT SERPL-MCNC: 1.03 MG/DL (ref 0.66–1.25)
CRP SERPL-MCNC: 55.9 MG/L (ref 0–8)
D DIMER PPP FEU-MCNC: 1.09 UG/ML FEU (ref 0–0.5)
ERYTHROCYTE [DISTWIDTH] IN BLOOD BY AUTOMATED COUNT: 12.5 % (ref 10–15)
FIBRINOGEN PPP-MCNC: 716 MG/DL (ref 170–490)
GFR SERPL CREATININE-BSD FRML MDRD: 80 ML/MIN/1.73M2
GLUCOSE BLD-MCNC: 132 MG/DL (ref 70–99)
HCT VFR BLD AUTO: 42 % (ref 40–53)
HGB BLD-MCNC: 14.7 G/DL (ref 13.3–17.7)
MCH RBC QN AUTO: 29.3 PG (ref 26.5–33)
MCHC RBC AUTO-ENTMCNC: 35 G/DL (ref 31.5–36.5)
MCV RBC AUTO: 84 FL (ref 78–100)
PLATELET # BLD AUTO: 318 10E3/UL (ref 150–450)
POTASSIUM BLD-SCNC: 3.7 MMOL/L (ref 3.4–5.3)
PROT SERPL-MCNC: 6.7 G/DL (ref 6.8–8.8)
RBC # BLD AUTO: 5.02 10E6/UL (ref 4.4–5.9)
SODIUM SERPL-SCNC: 137 MMOL/L (ref 133–144)
WBC # BLD AUTO: 8.9 10E3/UL (ref 4–11)

## 2021-10-03 PROCEDURE — 250N000011 HC RX IP 250 OP 636: Performed by: FAMILY MEDICINE

## 2021-10-03 PROCEDURE — 258N000003 HC RX IP 258 OP 636: Performed by: FAMILY MEDICINE

## 2021-10-03 PROCEDURE — 85384 FIBRINOGEN ACTIVITY: CPT | Performed by: FAMILY MEDICINE

## 2021-10-03 PROCEDURE — 5A09557 ASSISTANCE WITH RESPIRATORY VENTILATION, GREATER THAN 96 CONSECUTIVE HOURS, CONTINUOUS POSITIVE AIRWAY PRESSURE: ICD-10-PCS | Performed by: FAMILY MEDICINE

## 2021-10-03 PROCEDURE — 250N000009 HC RX 250: Performed by: FAMILY MEDICINE

## 2021-10-03 PROCEDURE — 36415 COLL VENOUS BLD VENIPUNCTURE: CPT | Performed by: FAMILY MEDICINE

## 2021-10-03 PROCEDURE — 999N000157 HC STATISTIC RCP TIME EA 10 MIN

## 2021-10-03 PROCEDURE — 99233 SBSQ HOSP IP/OBS HIGH 50: CPT | Performed by: FAMILY MEDICINE

## 2021-10-03 PROCEDURE — 80048 BASIC METABOLIC PNL TOTAL CA: CPT | Performed by: FAMILY MEDICINE

## 2021-10-03 PROCEDURE — 85379 FIBRIN DEGRADATION QUANT: CPT | Performed by: FAMILY MEDICINE

## 2021-10-03 PROCEDURE — 250N000013 HC RX MED GY IP 250 OP 250 PS 637: Performed by: FAMILY MEDICINE

## 2021-10-03 PROCEDURE — 250N000012 HC RX MED GY IP 250 OP 636 PS 637: Performed by: FAMILY MEDICINE

## 2021-10-03 PROCEDURE — 86140 C-REACTIVE PROTEIN: CPT | Performed by: FAMILY MEDICINE

## 2021-10-03 PROCEDURE — 94660 CPAP INITIATION&MGMT: CPT

## 2021-10-03 PROCEDURE — 200N000001 HC R&B ICU

## 2021-10-03 PROCEDURE — 85027 COMPLETE CBC AUTOMATED: CPT | Performed by: FAMILY MEDICINE

## 2021-10-03 PROCEDURE — 82248 BILIRUBIN DIRECT: CPT | Performed by: FAMILY MEDICINE

## 2021-10-03 RX ORDER — CARBOXYMETHYLCELLULOSE SODIUM 5 MG/ML
1 SOLUTION/ DROPS OPHTHALMIC
Status: DISCONTINUED | OUTPATIENT
Start: 2021-10-03 | End: 2021-10-17

## 2021-10-03 RX ADMIN — SODIUM CHLORIDE 50 ML: 9 INJECTION, SOLUTION INTRAVENOUS at 20:06

## 2021-10-03 RX ADMIN — MICONAZOLE NITRATE: 20 CREAM TOPICAL at 20:06

## 2021-10-03 RX ADMIN — OXYCODONE 5 MG: 5 TABLET ORAL at 05:26

## 2021-10-03 RX ADMIN — ENOXAPARIN SODIUM 40 MG: 100 INJECTION SUBCUTANEOUS at 18:07

## 2021-10-03 RX ADMIN — DEXAMETHASONE 6 MG: 2 TABLET ORAL at 09:06

## 2021-10-03 RX ADMIN — PANTOPRAZOLE SODIUM 40 MG: 40 TABLET, DELAYED RELEASE ORAL at 09:06

## 2021-10-03 RX ADMIN — ENOXAPARIN SODIUM 40 MG: 100 INJECTION SUBCUTANEOUS at 09:04

## 2021-10-03 RX ADMIN — OXYCODONE 5 MG: 5 TABLET ORAL at 20:05

## 2021-10-03 RX ADMIN — REMDESIVIR 100 MG: 100 INJECTION, POWDER, LYOPHILIZED, FOR SOLUTION INTRAVENOUS at 20:05

## 2021-10-03 RX ADMIN — MICONAZOLE NITRATE: 20 CREAM TOPICAL at 09:06

## 2021-10-03 RX ADMIN — CARBOXYMETHYLCELLULOSE SODIUM 1 DROP: 5 SOLUTION/ DROPS OPHTHALMIC at 18:08

## 2021-10-03 ASSESSMENT — MIFFLIN-ST. JEOR: SCORE: 1585.38

## 2021-10-03 ASSESSMENT — ACTIVITIES OF DAILY LIVING (ADL)
ADLS_ACUITY_SCORE: 5

## 2021-10-03 NOTE — PROGRESS NOTES
Patient remains prone, sats 88%.  Respiratory therapy called, BiPap applied.  Patient tolerating well.  Sats immediately improved to 99 - 100%.  Will monitor.

## 2021-10-03 NOTE — PLAN OF CARE
Pt proned twice this shift which improved O2 sats. Remains on 100% HFNC. Tires easily and desats w/minimal movement or talking. A&O X4, able to make needs known. Using urinal at the bedside. Oxy PRN to help pt relax while proned. Maria Elenatalita Leslie called and was updated w/pt's permission.   Rafaela Ribeiro RN on 10/3/2021 at 5:39 AM

## 2021-10-03 NOTE — PROGRESS NOTES
Patient lying prone, requesting to change to supine to take meds and fluids.  Assisted to supine.  O2 sats drop to mid 80's with any movement or effort.  RR 33-39, dyspneic at rest.  Assisted back to prone position.  Sats slowly went up to 90 - 91%.  RT and MD consulted, will apply BiPap after proning.  Patient aware.

## 2021-10-03 NOTE — PROGRESS NOTES
[facility]      Subjective:  Feeling weak  desats with movement  proning    Review of Systems:  CONSTITUTIONAL: NEGATIVE for fever, chills, change in weight  INTEGUMENTARY/SKIN: NEGATIVE for worrisome rashes, moles or lesions  EYES: NEGATIVE for vision changes or irritation  ENT/MOUTH: NEGATIVE for ear, mouth and throat problems  RESP:sob ,cough  BREAST: NEGATIVE for masses, tenderness or discharge  CV: NEGATIVE for chest pain, palpitations or peripheral edema  GI: NEGATIVE for nausea, abdominal pain, heartburn, or change in bowel habits  : NEGATIVE for frequency, dysuria, or hematuria  MUSCULOSKELETAL: NEGATIVE for significant arthralgias or myalgia  NEURO: NEGATIVE for weakness, dizziness or paresthesias  ENDOCRINE: NEGATIVE for temperature intolerance, skin/hair changes  HEME: NEGATIVE for bleeding problems  PSYCHIATRIC: NEGATIVE for changes in mood or affect    Physical Exam:  Vitals Were Reviewed    Patient Vitals for the past 16 hrs:   BP Temp Temp src Pulse Resp SpO2 Weight   10/03/21 0524 -- 97  F (36.1  C) Oral 71 30 (!) 88 % 77.5 kg (170 lb 13.7 oz)   10/03/21 0400 104/75 -- Oral 65 24 91 % --   10/03/21 0245 -- -- -- 71 27 92 % --   10/03/21 0215 -- -- -- -- -- 91 % --   10/03/21 0200 98/62 -- -- 68 22 (!) 85 % --   10/03/21 0000 94/69 -- -- 65 28 95 % --   10/02/21 2315 107/62 97.8  F (36.6  C) Oral 67 24 93 % --   10/02/21 2000 135/84 -- -- 78 -- 90 % --   10/02/21 1900 133/82 -- -- 90 -- (!) 86 % --   10/02/21 1800 131/85 -- -- 91 -- (!) 85 % --   10/02/21 1700 114/68 -- -- 79 -- 95 % --   10/02/21 1600 107/62 98.2  F (36.8  C) Oral 69 -- 94 % --   10/02/21 1500 124/78 -- -- 73 -- 97 % --         Intake/Output Summary (Last 24 hours) at 10/3/2021 0641  Last data filed at 10/3/2021 0526  Gross per 24 hour   Intake 270 ml   Output 1125 ml   Net -855 ml       GENERAL APPEARANCE: proned, alert, nad  EYES: conjunctiva clear, eyes grossly normal  RESP: clear , no tachypnea  CV:  regular rate and rhythm, normal S1 S2, no S3 or S4 and no murmur, click or rub   ABDOMEN: soft, nontender, no HSM or masses and bowel sounds normal  MS: no clubbing, cyanosis; no edema  SKIN: clear without significant rashes or lesions    Lab:  Recent Labs   Lab Test 10/03/21  0506 10/02/21  0546    135   POTASSIUM 3.7 3.5   CHLORIDE 106 104   CO2 24 25   ANIONGAP 7 6   * 126*   BUN 41* 37*   CR 1.03 1.29*   JANET 8.9 9.0     CBC RESULTS:   Recent Labs   Lab Test 10/03/21  0506 10/02/21  0546 10/02/21  0546   WBC 8.9  --  6.7   RBC 5.02  --  5.32   HGB 14.7  --  15.8   HCT 42.0   < > 44.4     --  247    < > = values in this interval not displayed.       Results for orders placed or performed during the hospital encounter of 10/01/21 (from the past 24 hour(s))   CBC with platelets   Result Value Ref Range    WBC Count 8.9 4.0 - 11.0 10e3/uL    RBC Count 5.02 4.40 - 5.90 10e6/uL    Hemoglobin 14.7 13.3 - 17.7 g/dL    Hematocrit 42.0 40.0 - 53.0 %    MCV 84 78 - 100 fL    MCH 29.3 26.5 - 33.0 pg    MCHC 35.0 31.5 - 36.5 g/dL    RDW 12.5 10.0 - 15.0 %    Platelet Count 318 150 - 450 10e3/uL   Basic metabolic panel   Result Value Ref Range    Sodium 137 133 - 144 mmol/L    Potassium 3.7 3.4 - 5.3 mmol/L    Chloride 106 94 - 109 mmol/L    Carbon Dioxide (CO2) 24 20 - 32 mmol/L    Anion Gap 7 3 - 14 mmol/L    Urea Nitrogen 41 (H) 7 - 30 mg/dL    Creatinine 1.03 0.66 - 1.25 mg/dL    Calcium 8.9 8.5 - 10.1 mg/dL    Glucose 132 (H) 70 - 99 mg/dL    GFR Estimate 80 >60 mL/min/1.73m2   Fibrinogen activity   Result Value Ref Range    Fibrinogen Activity 716 (H) 170 - 490 mg/dL   CRP inflammation   Result Value Ref Range    CRP Inflammation 55.9 (H) 0.0 - 8.0 mg/L   Hepatic panel   Result Value Ref Range    Bilirubin Total 0.5 0.2 - 1.3 mg/dL    Bilirubin Direct 0.2 0.0 - 0.2 mg/dL    Protein Total 6.7 (L) 6.8 - 8.8 g/dL    Albumin 2.0 (L) 3.4 - 5.0 g/dL    Alkaline Phosphatase 53 40 - 150 U/L    AST 41 0 - 45  U/L    ALT 43 0 - 70 U/L       Assessment and Plan:      Liborio Barajas is a 56 year old male admitted on 10/1/2021. He presents with shortness of breath and was found to be Covid positive.        # Confirmed COVID-19 infection    # Acute Hypoxic Respiratory Failure secondary to COVID-19 infection  # Viral Pneumonia secondary to COVID-19 infection     Symptom Onset  September 22, 2021   Date of 1st Positive Test 10/01/21      Vaccination Status Not Vaccinated, but interested/contemplative         - Admit to medical floor with continuous pulse ox, COVID-19 special precautions  - Oxygen: high flow NC 60% at 60 liters.  - Labs: Covid labs ordered  - Imaging: Chest CT--IMPRESSION:  1.  Extensive bilateral ground-glass and patchy regions of dense  consolidation involving all lobes of both lungs worrisome for  multifocal pneumonia versus severe inflammatory disease.  2.  No evidence for pulmonary embolism.  3.  Several prominent thoracic lymph nodes.  4.  Small pericardial fluid  - Breathing treatments: no inhalers needed; avoid nebulizers in favor of MDIs   - IV fluids: none.  - Antibiotics: not indicated   - COVID-Focused Medications: - DEXAMETHASONE: consider if new O2 requirement or acute on chronic hypoxic respiratory failure and - REMDESIVIR: consider if GFR > 30, AST & ALT < 5x ULN, and at least one of the following   - DVT Prophylaxis: q 12 hour lovenox        - consider anticoag on discharge for 30 days & until return to normal mobility    tocilizimab given 10/2/21.     --135--55.9  D-dimer 2.19--1.62-- pend  Fibrinogen 476--852--716  WBC 7.6--6.7     #Hyponatremia  Probably related to volume depletion.    Sodium 129--135 after 600 cc of saline.     #Possible acute kidney injury superimposed on chronic kidney disease  Last creatinine I can find in his records was 1.28 on March 2, 2016.  Creatinine upon admit 2.20 with a BUN of 44  Suspect some volume depletion-used saline 50 cc an hour for 12  hours.     Creatinine 2.20--1.29 --1.03     #AST elevation  AST 68-suspect Covid related        #Hypertension  Previously on lisinopril-not restarted due to renal function.  Normotensive on October 3-continue to hold lisinopril.     #GERD  Continue omeprazole        Diet:  Regular  DVT Prophylaxis: Enoxaparin (Lovenox) SQ  Neal Catheter: Not present  Central Lines: None  Code Status:  Full     Plan:  Continue current cares, use bipap if necessary.   Intubate if necessary.     Discussed with wife Chikis [phone]

## 2021-10-03 NOTE — PROGRESS NOTES
Briefly removed BiPap to eat and drink and applied HFNC.  Sats hover in the low to mid 80's when on HFNC and drop lower with any movement or exertion in bed.  Was able to eat ice cream and a few bits of a meatball.  Will drink water with encouragement.  Continues with occasional cough.  Also c/o dry, irritated eyes.  PRN eye drops given.  BiPap reapplied at same settings.  Sats low 90's while resting on BiPap.  Will continue with plan of care.

## 2021-10-04 LAB
ALBUMIN SERPL-MCNC: 2 G/DL (ref 3.4–5)
ALP SERPL-CCNC: 53 U/L (ref 40–150)
ALT SERPL W P-5'-P-CCNC: 74 U/L (ref 0–70)
ANION GAP SERPL CALCULATED.3IONS-SCNC: 7 MMOL/L (ref 3–14)
AST SERPL W P-5'-P-CCNC: 80 U/L (ref 0–45)
BILIRUB DIRECT SERPL-MCNC: 0.2 MG/DL (ref 0–0.2)
BILIRUB SERPL-MCNC: 0.5 MG/DL (ref 0.2–1.3)
BUN SERPL-MCNC: 40 MG/DL (ref 7–30)
CALCIUM SERPL-MCNC: 8.2 MG/DL (ref 8.5–10.1)
CHLORIDE BLD-SCNC: 106 MMOL/L (ref 94–109)
CO2 SERPL-SCNC: 26 MMOL/L (ref 20–32)
CREAT SERPL-MCNC: 0.99 MG/DL (ref 0.66–1.25)
CRP SERPL-MCNC: 20.6 MG/L (ref 0–8)
D DIMER PPP FEU-MCNC: 1.44 UG/ML FEU (ref 0–0.5)
ERYTHROCYTE [DISTWIDTH] IN BLOOD BY AUTOMATED COUNT: 12.6 % (ref 10–15)
FIBRINOGEN PPP-MCNC: 650 MG/DL (ref 170–490)
GFR SERPL CREATININE-BSD FRML MDRD: 84 ML/MIN/1.73M2
GLUCOSE BLD-MCNC: 104 MG/DL (ref 70–99)
HCT VFR BLD AUTO: 42.2 % (ref 40–53)
HGB BLD-MCNC: 14.7 G/DL (ref 13.3–17.7)
MCH RBC QN AUTO: 29.5 PG (ref 26.5–33)
MCHC RBC AUTO-ENTMCNC: 34.8 G/DL (ref 31.5–36.5)
MCV RBC AUTO: 85 FL (ref 78–100)
PLATELET # BLD AUTO: 384 10E3/UL (ref 150–450)
POTASSIUM BLD-SCNC: 3.7 MMOL/L (ref 3.4–5.3)
PROT SERPL-MCNC: 6.4 G/DL (ref 6.8–8.8)
RBC # BLD AUTO: 4.98 10E6/UL (ref 4.4–5.9)
SODIUM SERPL-SCNC: 139 MMOL/L (ref 133–144)
WBC # BLD AUTO: 8.7 10E3/UL (ref 4–11)

## 2021-10-04 PROCEDURE — 36415 COLL VENOUS BLD VENIPUNCTURE: CPT | Performed by: FAMILY MEDICINE

## 2021-10-04 PROCEDURE — 250N000009 HC RX 250: Performed by: FAMILY MEDICINE

## 2021-10-04 PROCEDURE — 250N000011 HC RX IP 250 OP 636: Performed by: FAMILY MEDICINE

## 2021-10-04 PROCEDURE — 99207 PR CDG-MDM COMPONENT: MEETS HIGH - UP CODED: CPT | Performed by: INTERNAL MEDICINE

## 2021-10-04 PROCEDURE — 999N000157 HC STATISTIC RCP TIME EA 10 MIN

## 2021-10-04 PROCEDURE — 258N000003 HC RX IP 258 OP 636: Performed by: FAMILY MEDICINE

## 2021-10-04 PROCEDURE — 86140 C-REACTIVE PROTEIN: CPT | Performed by: FAMILY MEDICINE

## 2021-10-04 PROCEDURE — 80048 BASIC METABOLIC PNL TOTAL CA: CPT | Performed by: FAMILY MEDICINE

## 2021-10-04 PROCEDURE — 250N000013 HC RX MED GY IP 250 OP 250 PS 637: Performed by: FAMILY MEDICINE

## 2021-10-04 PROCEDURE — 85384 FIBRINOGEN ACTIVITY: CPT | Performed by: FAMILY MEDICINE

## 2021-10-04 PROCEDURE — 94660 CPAP INITIATION&MGMT: CPT

## 2021-10-04 PROCEDURE — 250N000012 HC RX MED GY IP 250 OP 636 PS 637: Performed by: FAMILY MEDICINE

## 2021-10-04 PROCEDURE — 999N000105 HC STATISTIC NO DOCUMENTATION TO SUPPORT CHARGE

## 2021-10-04 PROCEDURE — 99233 SBSQ HOSP IP/OBS HIGH 50: CPT | Performed by: INTERNAL MEDICINE

## 2021-10-04 PROCEDURE — 85027 COMPLETE CBC AUTOMATED: CPT | Performed by: FAMILY MEDICINE

## 2021-10-04 PROCEDURE — 200N000001 HC R&B ICU

## 2021-10-04 PROCEDURE — 82248 BILIRUBIN DIRECT: CPT | Performed by: FAMILY MEDICINE

## 2021-10-04 PROCEDURE — 85379 FIBRIN DEGRADATION QUANT: CPT | Performed by: FAMILY MEDICINE

## 2021-10-04 RX ADMIN — SODIUM CHLORIDE 100 ML: 9 INJECTION, SOLUTION INTRAVENOUS at 20:48

## 2021-10-04 RX ADMIN — REMDESIVIR 100 MG: 100 INJECTION, POWDER, LYOPHILIZED, FOR SOLUTION INTRAVENOUS at 20:46

## 2021-10-04 RX ADMIN — PANTOPRAZOLE SODIUM 40 MG: 40 TABLET, DELAYED RELEASE ORAL at 08:15

## 2021-10-04 RX ADMIN — ENOXAPARIN SODIUM 40 MG: 100 INJECTION SUBCUTANEOUS at 08:15

## 2021-10-04 RX ADMIN — DEXAMETHASONE 6 MG: 2 TABLET ORAL at 08:15

## 2021-10-04 RX ADMIN — ACETAMINOPHEN 650 MG: 325 TABLET, FILM COATED ORAL at 12:11

## 2021-10-04 RX ADMIN — ENOXAPARIN SODIUM 40 MG: 100 INJECTION SUBCUTANEOUS at 20:51

## 2021-10-04 RX ADMIN — MICONAZOLE NITRATE: 20 CREAM TOPICAL at 20:51

## 2021-10-04 RX ADMIN — MICONAZOLE NITRATE: 20 CREAM TOPICAL at 08:19

## 2021-10-04 ASSESSMENT — ACTIVITIES OF DAILY LIVING (ADL)
ADLS_ACUITY_SCORE: 5

## 2021-10-04 ASSESSMENT — MIFFLIN-ST. JEOR: SCORE: 1610.38

## 2021-10-04 NOTE — PROGRESS NOTES
At dinner time the pt was switched back to hiflo 100%, 60 Lpm and he was frequently destating to around 84%, without eating or drinking the whole time. Bipap placed back on the pt at 65% O2, sats stayed 90% or greater with this on. Pt up to commode with bipap on O2 sats initially dropped to 88%, but recovered quickly.   Pt switched to small bipap face mask, fits better, pt stated its more comfortable.

## 2021-10-04 NOTE — PLAN OF CARE
Maintained sats on bipap at 65%. Sats drops immediately when bipap is removed, or with any movement/talking. HFNC 100% applied with bipap breaks- sats 70-85% on the high-flow. Pt proned for several hours tonight. Using call light appropriately and able to express needs. Using urinal at bedside- anti-fungal cream applied to rash. Mg updated this AM.   Rafaela Ribeiro RN on 10/4/2021 at 6:26 AM

## 2021-10-04 NOTE — PROGRESS NOTES
Pt on HiFlo 100% 60 Lpm at breakfast till 1600. His O2 sats decreased to 86% off and on while eating and talking, but recovered quickly with rest. Pt did not eat very well this shift, he stated that he didn't have much of an appetite and that he did not want to have to go to the bathroom. Writer educated the patient on the importance of a healthy diet and that it is normal to have BM's and that we would help accommodate him.

## 2021-10-04 NOTE — PROGRESS NOTES
[facility]    Hospitalist Progress Note    Date of Service (when I saw the patient): 10/04/2021    Assessment & Plan   Liborio Barajas is a 56 year old male admitted on 10/1/2021. He presents with shortness of breath and was found to be Covid positive.    Confirmed COVID-19 infection    Acute Hypoxic Respiratory Failure secondary to COVID-19 infection  Viral Pneumonia secondary to COVID-19 infection     Symptom Onset September 22, 2021   Date of 1st Positive Test 10/01/21      Vaccination Status Not Vaccinated, but interested/contemplative         - Admit to medical floor with continuous pulse ox, COVID-19 special precautions  - Oxygen: HFNC 100% at 60 liters.  - Labs: Covid labs ordered  - Imaging: Chest CT--IMPRESSION:  1.  Extensive bilateral ground-glass and patchy regions of dense  consolidation involving all lobes of both lungs worrisome for  multifocal pneumonia versus severe inflammatory disease.  2.  No evidence for pulmonary embolism.  3.  Several prominent thoracic lymph nodes.  4.  Small pericardial fluid  - Breathing treatments: no inhalers needed; avoid nebulizers in favor of MDIs   - IV fluids: none.  - Antibiotics: not indicated   - COVID-Focused Medications: - DEXAMETHASONE: consider if new O2 requirement or acute on chronic hypoxic respiratory failure and - REMDESIVIR: consider if GFR > 30, AST & ALT < 5x ULN, and at least one of the following   - DVT Prophylaxis: q 12 hour lovenox        - consider anticoag on discharge for 30 days & until return to normal mobility  5.  Tocilizimab given 10/2/21.     --135--55.9--20.6  D-dimer 2.19--1.62--1.09--1.44  Fibrinogen 476--852--716--650  WBC 7.6--6.7--8.7     Hyponatremia  Probably related to volume depletion.    Sodium 129--135 after 600 cc of saline.  - Resolved: Na 135 on 10/2     Possible acute kidney injury superimposed on chronic kidney disease  Last creatinine I can find in his records was 1.28 on March 2,  2016.  Creatinine upon admit 2.20 with a BUN of 44  Suspect some volume depletion-used saline 50 cc an hour for 12 hours.  - Creatinine 2.20--1.29 --1.03--0.99     Transaminitis  AST 68-suspect Covid related  - ALT 49--43--74  - AST 68--41--80    Hypertension  Previously on lisinopril-not restarted due to renal function.  Normotensive on October 3-continue to hold lisinopril.     GERD  Continue omeprazole    Diet:  Regular  DVT Prophylaxis: Enoxaparin (Lovenox) SQ  Neal Catheter: Not present  Central Lines: None  Code Status:  Full    Disposition: Expected discharge in 3+ days once hypoxia improved.    Alen Andrea    Interval History   The patient is sitting in bed.  He has no acute complaints    -Data reviewed today: I reviewed all new labs and imaging results over the last 24 hours. I personally reviewed no images or EKG's today.    Physical Exam   Temp: 97.9  F (36.6  C) Temp src: Oral BP: 130/83 Pulse: 75   Resp: 30 SpO2: 94 % O2 Device: High Flow Nasal Cannula (HFNC) Oxygen Delivery: 60 LPM  Vitals:    10/01/21 1625 10/03/21 0524 10/04/21 0420   Weight: 87.1 kg (192 lb 0.3 oz) 77.5 kg (170 lb 13.7 oz) 80 kg (176 lb 5.9 oz)     Vital Signs with Ranges  Temp:  [96.8  F (36  C)-98  F (36.7  C)] 97.9  F (36.6  C)  Pulse:  [51-85] 75  Resp:  [18-45] 30  BP: (107-138)/(70-95) 130/83  FiO2 (%):  [60 %-100 %] 100 %  SpO2:  [88 %-99 %] 94 %  I/O last 3 completed shifts:  In: 480 [P.O.:480]  Out: 750 [Urine:750]    Gen: Well nourished, well developed, alert and oriented x 3, no acute distressed  HEENT: Atraumatic, normocephalic; sclera non-injected, anicterric; oral mucosa moist, no lesion, no exudate  Lungs: Diffuse bilateral rales primarily in bases, no wheezes, no rhonchi  Heart: Regular rate, regular rhythm, no gallops, no rubs, no murmurs  GI: Bowel sound normal, no hepatosplenomegaly, no masses, non-tender, non-distended, no guarding, no rebound tenderness  Lymph: No lymphadenopathy, no edema  Skin: No  rashes, no chronic venous stasis     Medications     - MEDICATION INSTRUCTIONS -         remdesivir  100 mg Intravenous Q24H    And     sodium chloride 0.9%  50 mL Intravenous Q24H     dexamethasone  6 mg Oral Daily     enoxaparin ANTICOAGULANT  40 mg Subcutaneous Q12H     influenza recomb quadrivalent PF  0.5 mL Intramuscular Prior to discharge     miconazole   Topical BID     pantoprazole  40 mg Oral QAM     sodium chloride (PF)  3 mL Intracatheter Q8H       Data   Recent Labs   Lab 10/04/21  0628 10/03/21  0506 10/02/21  0546 10/01/21  1400 10/01/21  1400 10/01/21  1247   WBC 8.7 8.9 6.7   < > 7.6  --    HGB 14.7 14.7 15.8   < > 16.3  --    MCV 85 84 84   < > 83  --     318 247   < > 194  --    INR  --   --   --   --   --  0.86    137 135   < > 129*  --    POTASSIUM 3.7 3.7 3.5   < > 3.4  --    CHLORIDE 106 106 104   < > 95  --    CO2 26 24 25   < > 23  --    BUN 40* 41* 37*   < > 44*  --    CR 0.99 1.03 1.29*   < > 2.20*  --    ANIONGAP 7 7 6   < > 11  --    JANET 8.2* 8.9 9.0   < > 8.6  --    * 132* 126*   < > 96  --    ALBUMIN 2.0* 2.0*  --    < > 2.4*  --    PROTTOTAL 6.4* 6.7*  --    < > 7.9  --    BILITOTAL 0.5 0.5  --    < > 0.5  --    ALKPHOS 53 53  --    < > 63  --    ALT 74* 43  --    < > 49  --    AST 80* 41  --    < > 68*  --    TROPONIN  --   --   --   --  <0.015 <0.015    < > = values in this interval not displayed.       No results found for this or any previous visit (from the past 24 hour(s)).

## 2021-10-05 LAB
ALBUMIN SERPL-MCNC: 2.1 G/DL (ref 3.4–5)
ALP SERPL-CCNC: 53 U/L (ref 40–150)
ALT SERPL W P-5'-P-CCNC: 109 U/L (ref 0–70)
ANION GAP SERPL CALCULATED.3IONS-SCNC: 7 MMOL/L (ref 3–14)
AST SERPL W P-5'-P-CCNC: 91 U/L (ref 0–45)
BILIRUB DIRECT SERPL-MCNC: 0.2 MG/DL (ref 0–0.2)
BILIRUB SERPL-MCNC: 0.6 MG/DL (ref 0.2–1.3)
BUN SERPL-MCNC: 33 MG/DL (ref 7–30)
CALCIUM SERPL-MCNC: 8.6 MG/DL (ref 8.5–10.1)
CHLORIDE BLD-SCNC: 109 MMOL/L (ref 94–109)
CO2 SERPL-SCNC: 26 MMOL/L (ref 20–32)
CREAT SERPL-MCNC: 0.94 MG/DL (ref 0.66–1.25)
CRP SERPL-MCNC: 10.4 MG/L (ref 0–8)
D DIMER PPP FEU-MCNC: 1.85 UG/ML FEU (ref 0–0.5)
ERYTHROCYTE [DISTWIDTH] IN BLOOD BY AUTOMATED COUNT: 12.5 % (ref 10–15)
FIBRINOGEN PPP-MCNC: 538 MG/DL (ref 170–490)
GFR SERPL CREATININE-BSD FRML MDRD: 89 ML/MIN/1.73M2
GLUCOSE BLD-MCNC: 96 MG/DL (ref 70–99)
HCT VFR BLD AUTO: 44 % (ref 40–53)
HGB BLD-MCNC: 15.1 G/DL (ref 13.3–17.7)
MCH RBC QN AUTO: 29.4 PG (ref 26.5–33)
MCHC RBC AUTO-ENTMCNC: 34.3 G/DL (ref 31.5–36.5)
MCV RBC AUTO: 86 FL (ref 78–100)
PLATELET # BLD AUTO: 405 10E3/UL (ref 150–450)
POTASSIUM BLD-SCNC: 3.8 MMOL/L (ref 3.4–5.3)
PROT SERPL-MCNC: 6.4 G/DL (ref 6.8–8.8)
RBC # BLD AUTO: 5.14 10E6/UL (ref 4.4–5.9)
SODIUM SERPL-SCNC: 142 MMOL/L (ref 133–144)
WBC # BLD AUTO: 8 10E3/UL (ref 4–11)

## 2021-10-05 PROCEDURE — 999N000157 HC STATISTIC RCP TIME EA 10 MIN

## 2021-10-05 PROCEDURE — 85379 FIBRIN DEGRADATION QUANT: CPT | Performed by: FAMILY MEDICINE

## 2021-10-05 PROCEDURE — 258N000003 HC RX IP 258 OP 636: Performed by: FAMILY MEDICINE

## 2021-10-05 PROCEDURE — 250N000012 HC RX MED GY IP 250 OP 636 PS 637: Performed by: FAMILY MEDICINE

## 2021-10-05 PROCEDURE — 80048 BASIC METABOLIC PNL TOTAL CA: CPT | Performed by: FAMILY MEDICINE

## 2021-10-05 PROCEDURE — 999N000123 HC STATISTIC OXYGEN O2DAILY TECH TIME

## 2021-10-05 PROCEDURE — 85384 FIBRINOGEN ACTIVITY: CPT | Performed by: FAMILY MEDICINE

## 2021-10-05 PROCEDURE — 200N000001 HC R&B ICU

## 2021-10-05 PROCEDURE — 999N000215 HC STATISTIC HFNC ADULT NON-CPAP

## 2021-10-05 PROCEDURE — 99207 PR CDG-MDM COMPONENT: MEETS HIGH - UP CODED: CPT | Performed by: INTERNAL MEDICINE

## 2021-10-05 PROCEDURE — 94660 CPAP INITIATION&MGMT: CPT

## 2021-10-05 PROCEDURE — 250N000011 HC RX IP 250 OP 636: Performed by: FAMILY MEDICINE

## 2021-10-05 PROCEDURE — 85027 COMPLETE CBC AUTOMATED: CPT | Performed by: FAMILY MEDICINE

## 2021-10-05 PROCEDURE — 82248 BILIRUBIN DIRECT: CPT | Performed by: FAMILY MEDICINE

## 2021-10-05 PROCEDURE — 86140 C-REACTIVE PROTEIN: CPT | Performed by: FAMILY MEDICINE

## 2021-10-05 PROCEDURE — 99233 SBSQ HOSP IP/OBS HIGH 50: CPT | Performed by: INTERNAL MEDICINE

## 2021-10-05 PROCEDURE — 250N000013 HC RX MED GY IP 250 OP 250 PS 637: Performed by: FAMILY MEDICINE

## 2021-10-05 PROCEDURE — 250N000009 HC RX 250: Performed by: FAMILY MEDICINE

## 2021-10-05 PROCEDURE — 36415 COLL VENOUS BLD VENIPUNCTURE: CPT | Performed by: FAMILY MEDICINE

## 2021-10-05 RX ADMIN — SODIUM CHLORIDE 50 ML: 9 INJECTION, SOLUTION INTRAVENOUS at 20:09

## 2021-10-05 RX ADMIN — DEXAMETHASONE 6 MG: 2 TABLET ORAL at 08:37

## 2021-10-05 RX ADMIN — ENOXAPARIN SODIUM 40 MG: 100 INJECTION SUBCUTANEOUS at 20:07

## 2021-10-05 RX ADMIN — ENOXAPARIN SODIUM 40 MG: 100 INJECTION SUBCUTANEOUS at 08:37

## 2021-10-05 RX ADMIN — REMDESIVIR 100 MG: 100 INJECTION, POWDER, LYOPHILIZED, FOR SOLUTION INTRAVENOUS at 20:07

## 2021-10-05 RX ADMIN — MICONAZOLE NITRATE: 20 CREAM TOPICAL at 20:09

## 2021-10-05 RX ADMIN — PANTOPRAZOLE SODIUM 40 MG: 40 TABLET, DELAYED RELEASE ORAL at 08:36

## 2021-10-05 RX ADMIN — OXYCODONE 5 MG: 5 TABLET ORAL at 22:56

## 2021-10-05 RX ADMIN — MICONAZOLE NITRATE: 20 CREAM TOPICAL at 08:37

## 2021-10-05 ASSESSMENT — ACTIVITIES OF DAILY LIVING (ADL)
ADLS_ACUITY_SCORE: 5

## 2021-10-05 NOTE — PROGRESS NOTES
Alert and oriented. VSS. Oxygenating well on BiPAP 65%. Oxygenated on HFNC 60L 100% while off BiPAP to take sips of water. Desats to mid-80's when on HFNC for a few minutes, recovers well when placed back on BiPAP. Used urinal independently in bed. Denies pain. Updated family.

## 2021-10-05 NOTE — PROGRESS NOTES
[facility]    Hospitalist Progress Note    Date of Service (when I saw the patient): 10/05/2021    Assessment & Plan   Liborio Barajas is a 56 year old male admitted on 10/1/2021. He presents with shortness of breath and was found to be Covid positive.    Confirmed COVID-19 infection    Acute Hypoxic Respiratory Failure secondary to COVID-19 infection  Viral Pneumonia secondary to COVID-19 infection     Symptom Onset September 22, 2021   Date of 1st Positive Test 10/01/21      Vaccination Status Not Vaccinated, but interested/contemplative         - Admit to medical floor with continuous pulse ox, COVID-19 special precautions  - Oxygen: HFNC 100% at 60 liters.  - Labs: Covid labs ordered  - Imaging: Chest CT--IMPRESSION:  1.  Extensive bilateral ground-glass and patchy regions of dense  consolidation involving all lobes of both lungs worrisome for  multifocal pneumonia versus severe inflammatory disease.  2.  No evidence for pulmonary embolism.  3.  Several prominent thoracic lymph nodes.  4.  Small pericardial fluid  - Breathing treatments: no inhalers needed; avoid nebulizers in favor of MDIs   - IV fluids: none.  - Antibiotics: not indicated   - COVID-Focused Medications: - DEXAMETHASONE: consider if new O2 requirement or acute on chronic hypoxic respiratory failure and - REMDESIVIR: consider if GFR > 30, AST & ALT < 5x ULN, and at least one of the following   - DVT Prophylaxis: q 12 hour lovenox        - consider anticoag on discharge for 30 days & until return to normal mobility  5.  Tocilizimab given 10/2/21.     --135--55.9--20.6--10.4  D-dimer 2.19--1.62--1.09--1.44--1.85  Fibrinogen 476--852--716--650--538  WBC 7.6--6.7--8.7--8.0     Hyponatremia  Probably related to volume depletion.    Sodium 129--135 after 600 cc of saline.  - Resolved: Na 135 on 10/2     Possible acute kidney injury superimposed on chronic kidney disease  Last creatinine I can find in his records was  1.28 on March 2, 2016.  Creatinine upon admit 2.20 with a BUN of 44  Suspect some volume depletion-used saline 50 cc an hour for 12 hours.  - Creatinine 2.20--1.29 --1.03--0.99     Transaminitis  AST 68-suspect Covid related  - ALT 49--43--74--109  - AST 68--41--80--91    Hypertension  Previously on lisinopril-not restarted due to renal function.  Normotensive on October 3-continue to hold lisinopril.     GERD  Continue omeprazole    Diet:  Regular  DVT Prophylaxis: Enoxaparin (Lovenox) SQ  Neal Catheter: Not present  Central Lines: None  Code Status:  Full    Disposition: Expected discharge in 3+ days once hypoxia improved.    Alen Andrea    Interval History   The patient is sitting in bed.  He complains of constipation and malaise    -Data reviewed today: I reviewed all new labs and imaging results over the last 24 hours. I personally reviewed no images or EKG's today.    Physical Exam   Temp: 98.6  F (37  C) Temp src: Axillary BP: (!) 142/90 Pulse: 77   Resp: 25 SpO2: 97 % O2 Device: High Flow Nasal Cannula (HFNC) Oxygen Delivery: 60 LPM  Vitals:    10/01/21 1625 10/03/21 0524 10/04/21 0420   Weight: 87.1 kg (192 lb 0.3 oz) 77.5 kg (170 lb 13.7 oz) 80 kg (176 lb 5.9 oz)     Vital Signs with Ranges  Temp:  [97.9  F (36.6  C)-99.2  F (37.3  C)] 98.6  F (37  C)  Pulse:  [54-82] 77  Resp:  [17-46] 25  BP: (118-144)/(77-94) 142/90  FiO2 (%):  [65 %-100 %] 100 %  SpO2:  [85 %-97 %] 97 %  I/O last 3 completed shifts:  In: 920 [P.O.:920]  Out: 1315 [Urine:1315]    Gen: Well nourished, well developed, alert and oriented x 3, no acute distressed  HEENT: Atraumatic, normocephalic; sclera non-injected, anicterric; oral mucosa moist, no lesion, no exudate  Lungs: Diffuse bilateral rales primarily in bases, no wheezes, no rhonchi  Heart: Regular rate, regular rhythm, no gallops, no rubs, no murmurs  GI: Bowel sound normal, no hepatosplenomegaly, no masses, non-tender, non-distended, no guarding, no rebound  tenderness  Lymph: No lymphadenopathy, no edema  Skin: No rashes, no chronic venous stasis     Medications     - MEDICATION INSTRUCTIONS -         remdesivir  100 mg Intravenous Q24H    And     sodium chloride 0.9%  50 mL Intravenous Q24H     dexamethasone  6 mg Oral Daily     enoxaparin ANTICOAGULANT  40 mg Subcutaneous Q12H     influenza recomb quadrivalent PF  0.5 mL Intramuscular Prior to discharge     miconazole   Topical BID     pantoprazole  40 mg Oral QAM     sodium chloride (PF)  3 mL Intracatheter Q8H       Data   Recent Labs   Lab 10/05/21  0442 10/04/21  0628 10/03/21  0506 10/03/21  0506 10/02/21  0546 10/01/21  1400 10/01/21  1247   0000   WBC 8.0 8.7  --  8.9   < > 7.6  --   --    HGB 15.1 14.7  --  14.7   < > 16.3  --   --    MCV 86 85  --  84   < > 83  --   --     384  --  318   < > 194  --   --    INR  --   --   --   --   --   --  0.86  --     139  --  137   < > 129*  --   --    POTASSIUM 3.8 3.7  --  3.7   < > 3.4  --   --    CHLORIDE 109 106  --  106   < > 95  --   --    CO2 26 26  --  24   < > 23  --   --    BUN 33* 40*  --  41*   < > 44*  --   --    CR 0.94 0.99  --  1.03   < > 2.20*  --   --    ANIONGAP 7 7  --  7   < > 11  --   --    JANET 8.6 8.2*  --  8.9   < > 8.6  --   --    GLC 96 104*  --  132*   < > 96  --   --    ALBUMIN 2.1* 2.0*   < > 2.0*  --  2.4*  --    < >   PROTTOTAL 6.4* 6.4*   < > 6.7*  --  7.9  --    < >   BILITOTAL 0.6 0.5   < > 0.5  --  0.5  --    < >   ALKPHOS 53 53   < > 53  --  63  --    < >   * 74*   < > 43  --  49  --    < >   AST 91* 80*   < > 41  --  68*  --    < >   TROPONIN  --   --   --   --   --  <0.015 <0.015  --     < > = values in this interval not displayed.       No results found for this or any previous visit (from the past 24 hour(s)).

## 2021-10-05 NOTE — PROGRESS NOTES
Pt currently on BiPAP, S/T mode, IPAP 16, EPAP 8, Rate 16, FiO2 65%.  Pt occassionally placed on HFNC, 60 lpm, FiO2 100%. While on HFNC pt desats into the mid 80s.  Breath sounds diminished  RT to monitor and assess as needed.    Kasia Larsen, RRT

## 2021-10-05 NOTE — PLAN OF CARE
Pt stable on hi-flow %/60L & bipap 65-90% during shift - was on bipap for about 45 mins in the afternoon after sats decreased to upper 80s on 100% hiflow, o/w on hi-flow the whole shift.

## 2021-10-06 LAB
ALBUMIN SERPL-MCNC: 2.2 G/DL (ref 3.4–5)
ALP SERPL-CCNC: 61 U/L (ref 40–150)
ALT SERPL W P-5'-P-CCNC: 114 U/L (ref 0–70)
AST SERPL W P-5'-P-CCNC: 74 U/L (ref 0–45)
BILIRUB DIRECT SERPL-MCNC: 0.2 MG/DL (ref 0–0.2)
BILIRUB SERPL-MCNC: 0.5 MG/DL (ref 0.2–1.3)
ERYTHROCYTE [DISTWIDTH] IN BLOOD BY AUTOMATED COUNT: 12.3 % (ref 10–15)
HCT VFR BLD AUTO: 44.3 % (ref 40–53)
HGB BLD-MCNC: 15.1 G/DL (ref 13.3–17.7)
MCH RBC QN AUTO: 29.2 PG (ref 26.5–33)
MCHC RBC AUTO-ENTMCNC: 34.1 G/DL (ref 31.5–36.5)
MCV RBC AUTO: 86 FL (ref 78–100)
PLATELET # BLD AUTO: 431 10E3/UL (ref 150–450)
PROT SERPL-MCNC: 6.2 G/DL (ref 6.8–8.8)
RBC # BLD AUTO: 5.17 10E6/UL (ref 4.4–5.9)
WBC # BLD AUTO: 7.3 10E3/UL (ref 4–11)

## 2021-10-06 PROCEDURE — 250N000013 HC RX MED GY IP 250 OP 250 PS 637: Performed by: FAMILY MEDICINE

## 2021-10-06 PROCEDURE — 99233 SBSQ HOSP IP/OBS HIGH 50: CPT | Performed by: INTERNAL MEDICINE

## 2021-10-06 PROCEDURE — 36415 COLL VENOUS BLD VENIPUNCTURE: CPT | Performed by: FAMILY MEDICINE

## 2021-10-06 PROCEDURE — 85027 COMPLETE CBC AUTOMATED: CPT | Performed by: FAMILY MEDICINE

## 2021-10-06 PROCEDURE — 250N000011 HC RX IP 250 OP 636: Performed by: FAMILY MEDICINE

## 2021-10-06 PROCEDURE — 82040 ASSAY OF SERUM ALBUMIN: CPT | Performed by: FAMILY MEDICINE

## 2021-10-06 PROCEDURE — 999N000157 HC STATISTIC RCP TIME EA 10 MIN

## 2021-10-06 PROCEDURE — 200N000001 HC R&B ICU

## 2021-10-06 PROCEDURE — 99207 PR CDG-MDM COMPONENT: MEETS HIGH - UP CODED: CPT | Performed by: INTERNAL MEDICINE

## 2021-10-06 PROCEDURE — 250N000012 HC RX MED GY IP 250 OP 636 PS 637: Performed by: FAMILY MEDICINE

## 2021-10-06 RX ADMIN — ENOXAPARIN SODIUM 40 MG: 100 INJECTION SUBCUTANEOUS at 08:28

## 2021-10-06 RX ADMIN — PANTOPRAZOLE SODIUM 40 MG: 40 TABLET, DELAYED RELEASE ORAL at 08:25

## 2021-10-06 RX ADMIN — DEXAMETHASONE 6 MG: 2 TABLET ORAL at 08:25

## 2021-10-06 RX ADMIN — MICONAZOLE NITRATE: 20 CREAM TOPICAL at 08:29

## 2021-10-06 RX ADMIN — OXYCODONE 5 MG: 5 TABLET ORAL at 17:47

## 2021-10-06 RX ADMIN — ENOXAPARIN SODIUM 40 MG: 100 INJECTION SUBCUTANEOUS at 19:42

## 2021-10-06 RX ADMIN — MICONAZOLE NITRATE: 20 CREAM TOPICAL at 19:43

## 2021-10-06 ASSESSMENT — ACTIVITIES OF DAILY LIVING (ADL)
ADLS_ACUITY_SCORE: 5
ADLS_ACUITY_SCORE: 7
ADLS_ACUITY_SCORE: 5

## 2021-10-06 ASSESSMENT — MIFFLIN-ST. JEOR: SCORE: 1595.38

## 2021-10-06 NOTE — PROGRESS NOTES
Pt has remained on HiFlo NC at 60Lpm and 100% FiO2, with O2 sat mainly in the low to mid 90%. Unable to titrate the FiO2 down d/t he occasionally drops to the mid 80% for his O2 sats, with encouragement to take deep breaths, his O2 sats recover quickly. Pt is able to move independently in bed, but requires encouragement to do so. Planned to have pt prone after lunch, but he stated that the pillows are too uncomfortable, will attempt again after dinner. He stated that his family may be bringing in smaller/softer pillows for him. He received 1 prn oxy for 7/10 back pain prior to dinner.

## 2021-10-06 NOTE — PROGRESS NOTES
Pt remains on HFNC, 60 lpm, FiO2 100%. Pt maintaining saturation in the mid 90's.  Breath sounds diminished.  RT to monitor and assess as needed.     Kasia Larsen, RRT

## 2021-10-06 NOTE — PLAN OF CARE
Switched back and forth between bipap and HFNC. Up to commode which wore him out, took a while for sats to return to normal limits. Refused proning until 0600 this AM. Sats quickly improved to 100% when proned. No appetite, sipping water w/prompting.   Rafaela Ribeiro, RN on 10/6/2021 at 6:30 AM

## 2021-10-06 NOTE — PROGRESS NOTES
[facility]    Hospitalist Progress Note    Date of Service (when I saw the patient): 10/06/2021    Assessment & Plan   Liborio Barajas is a 56 year old male admitted on 10/1/2021. He presents with shortness of breath and was found to be Covid positive.    Confirmed COVID-19 infection    Acute Hypoxic Respiratory Failure secondary to COVID-19 infection  Viral Pneumonia secondary to COVID-19 infection     Symptom Onset September 22, 2021   Date of 1st Positive Test 10/01/21      Vaccination Status Not Vaccinated, but interested/contemplative         - Admit to medical floor with continuous pulse ox, COVID-19 special precautions.  Transferred to ICU on 10/2 due to need for HFNC.  Stable on HFNC.  - Oxygen: HFNC 100% at 60 liters.  - Labs: Covid labs ordered  - Imaging: Chest CT--IMPRESSION:  1.  Extensive bilateral ground-glass and patchy regions of dense  consolidation involving all lobes of both lungs worrisome for  multifocal pneumonia versus severe inflammatory disease.  2.  No evidence for pulmonary embolism.  3.  Several prominent thoracic lymph nodes.  4.  Small pericardial fluid  - Breathing treatments: no inhalers needed; avoid nebulizers in favor of MDIs   - IV fluids: none.  - Antibiotics: not indicated   - COVID-Focused Medications: - DEXAMETHASONE: consider if new O2 requirement or acute on chronic hypoxic respiratory failure and - REMDESIVIR: consider if GFR > 30, AST & ALT < 5x ULN, and at least one of the following   - DVT Prophylaxis: q 12 hour lovenox        - consider anticoag on discharge for 30 days & until return to normal mobility  5.  Tocilizimab given 10/2/21.     --135--55.9--20.6--10.4  D-dimer 2.19--1.62--1.09--1.44--1.85  Fibrinogen 476--852--716--650--538  WBC 7.6--6.7--8.7--8.0--7.3     Hyponatremia  Probably related to volume depletion.    Sodium 129--135 after 600 cc of saline.  - Resolved: Na 135 on 10/2     Possible acute kidney injury superimposed on  chronic kidney disease  Last creatinine I can find in his records was 1.28 on March 2, 2016.  Creatinine upon admit 2.20 with a BUN of 44  Suspect some volume depletion-used saline 50 cc an hour for 12 hours.  - Creatinine 2.20--1.29 --1.03--0.99     Transaminitis  AST 68-suspect Covid related  - ALT 49--43--74--109--14  - AST 68--41--80--91--74  - Continue to follow    Hypertension  Previously on lisinopril-not restarted due to renal function.  Normotensive on October 3-continue to hold lisinopril.     GERD  Continue omeprazole    Diet:  Regular  DVT Prophylaxis: Enoxaparin (Lovenox) SQ  Neal Catheter: Not present  Central Lines: None  Code Status:  Full    Disposition: Expected discharge in 3+ days once hypoxia improved.    Alen Andrea    Interval History   The patient is sitting in bed.  He complains of malaise but has no acute complaints.    -Data reviewed today: I reviewed all new labs and imaging results over the last 24 hours. I personally reviewed no images or EKG's today.    Physical Exam   Temp: 97.7  F (36.5  C) Temp src: Oral BP: 132/87 Pulse: 87   Resp: (!) 31 SpO2: 91 % O2 Device: High Flow Nasal Cannula (HFNC) Oxygen Delivery: 60 LPM  Vitals:    10/03/21 0524 10/04/21 0420 10/06/21 0559   Weight: 77.5 kg (170 lb 13.7 oz) 80 kg (176 lb 5.9 oz) 78.5 kg (173 lb 1 oz)     Vital Signs with Ranges  Temp:  [97.4  F (36.3  C)-99  F (37.2  C)] 97.7  F (36.5  C)  Pulse:  [51-95] 87  Resp:  [16-37] 31  BP: (123-149)/(74-99) 132/87  FiO2 (%):  [70 %-100 %] 100 %  SpO2:  [85 %-100 %] 91 %  I/O last 3 completed shifts:  In: 600 [P.O.:600]  Out: 1350 [Urine:1350]    Gen: Well nourished, well developed, alert and oriented x 3, no acute distressed  HEENT: Atraumatic, normocephalic; sclera non-injected, anicterric; oral mucosa moist, no lesion, no exudate  Lungs: Diffuse bilateral rales primarily in bases, no wheezes, no rhonchi  Heart: Regular rate, regular rhythm, no gallops, no rubs, no murmurs  GI: Bowel  sound normal, no hepatosplenomegaly, no masses, non-tender, non-distended, no guarding, no rebound tenderness  Lymph: No lymphadenopathy, no edema  Skin: No rashes, no chronic venous stasis     Medications     - MEDICATION INSTRUCTIONS -         dexamethasone  6 mg Oral Daily     enoxaparin ANTICOAGULANT  40 mg Subcutaneous Q12H     influenza recomb quadrivalent PF  0.5 mL Intramuscular Prior to discharge     miconazole   Topical BID     pantoprazole  40 mg Oral QAM     sodium chloride (PF)  3 mL Intracatheter Q8H       Data   Recent Labs   Lab 10/06/21  0459 10/05/21  0442 10/04/21  0628 10/04/21  0628 10/03/21  0506 10/03/21  0506 10/02/21  0546 10/01/21  1400 10/01/21  1247   0000   WBC 7.3 8.0  --  8.7   < > 8.9   < > 7.6  --   --    HGB 15.1 15.1  --  14.7   < > 14.7   < > 16.3  --   --    MCV 86 86  --  85   < > 84   < > 83  --   --     405  --  384   < > 318   < > 194  --   --    INR  --   --   --   --   --   --   --   --  0.86  --    NA  --  142  --  139  --  137   < > 129*  --   --    POTASSIUM  --  3.8  --  3.7  --  3.7   < > 3.4  --   --    CHLORIDE  --  109  --  106  --  106   < > 95  --   --    CO2  --  26  --  26  --  24   < > 23  --   --    BUN  --  33*  --  40*  --  41*   < > 44*  --   --    CR  --  0.94  --  0.99  --  1.03   < > 2.20*  --   --    ANIONGAP  --  7  --  7  --  7   < > 11  --   --    JANTE  --  8.6  --  8.2*  --  8.9   < > 8.6  --   --    GLC  --  96  --  104*  --  132*   < > 96  --   --    ALBUMIN 2.2* 2.1*   < > 2.0*   < > 2.0*  --  2.4*  --    < >   PROTTOTAL 6.2* 6.4*   < > 6.4*   < > 6.7*  --  7.9  --    < >   BILITOTAL 0.5 0.6   < > 0.5   < > 0.5  --  0.5  --    < >   ALKPHOS 61 53   < > 53   < > 53  --  63  --    < >   * 109*   < > 74*   < > 43  --  49  --    < >   AST 74* 91*   < > 80*   < > 41  --  68*  --    < >   TROPONIN  --   --   --   --   --   --   --  <0.015 <0.015  --     < > = values in this interval not displayed.       No results found for this or any  previous visit (from the past 24 hour(s)).

## 2021-10-07 LAB
ALBUMIN SERPL-MCNC: 2.1 G/DL (ref 3.4–5)
ALP SERPL-CCNC: 65 U/L (ref 40–150)
ALT SERPL W P-5'-P-CCNC: 123 U/L (ref 0–70)
ANION GAP SERPL CALCULATED.3IONS-SCNC: 7 MMOL/L (ref 3–14)
AST SERPL W P-5'-P-CCNC: 76 U/L (ref 0–45)
BILIRUB SERPL-MCNC: 0.7 MG/DL (ref 0.2–1.3)
BUN SERPL-MCNC: 25 MG/DL (ref 7–30)
CALCIUM SERPL-MCNC: 8.4 MG/DL (ref 8.5–10.1)
CHLORIDE BLD-SCNC: 107 MMOL/L (ref 94–109)
CO2 SERPL-SCNC: 23 MMOL/L (ref 20–32)
CREAT SERPL-MCNC: 0.85 MG/DL (ref 0.66–1.25)
ERYTHROCYTE [DISTWIDTH] IN BLOOD BY AUTOMATED COUNT: 12.3 % (ref 10–15)
GFR SERPL CREATININE-BSD FRML MDRD: >90 ML/MIN/1.73M2
GLUCOSE BLD-MCNC: 97 MG/DL (ref 70–99)
HCT VFR BLD AUTO: 46.6 % (ref 40–53)
HGB BLD-MCNC: 15.7 G/DL (ref 13.3–17.7)
MCH RBC QN AUTO: 29.2 PG (ref 26.5–33)
MCHC RBC AUTO-ENTMCNC: 33.7 G/DL (ref 31.5–36.5)
MCV RBC AUTO: 87 FL (ref 78–100)
PLATELET # BLD AUTO: 472 10E3/UL (ref 150–450)
POTASSIUM BLD-SCNC: 3.7 MMOL/L (ref 3.4–5.3)
PROT SERPL-MCNC: 5.9 G/DL (ref 6.8–8.8)
RBC # BLD AUTO: 5.38 10E6/UL (ref 4.4–5.9)
SODIUM SERPL-SCNC: 137 MMOL/L (ref 133–144)
WBC # BLD AUTO: 8.8 10E3/UL (ref 4–11)

## 2021-10-07 PROCEDURE — 94660 CPAP INITIATION&MGMT: CPT

## 2021-10-07 PROCEDURE — 99232 SBSQ HOSP IP/OBS MODERATE 35: CPT | Performed by: INTERNAL MEDICINE

## 2021-10-07 PROCEDURE — 250N000011 HC RX IP 250 OP 636: Performed by: FAMILY MEDICINE

## 2021-10-07 PROCEDURE — 250N000013 HC RX MED GY IP 250 OP 250 PS 637: Performed by: FAMILY MEDICINE

## 2021-10-07 PROCEDURE — 999N000157 HC STATISTIC RCP TIME EA 10 MIN

## 2021-10-07 PROCEDURE — 200N000001 HC R&B ICU

## 2021-10-07 PROCEDURE — 250N000012 HC RX MED GY IP 250 OP 636 PS 637: Performed by: FAMILY MEDICINE

## 2021-10-07 PROCEDURE — 80053 COMPREHEN METABOLIC PANEL: CPT | Performed by: INTERNAL MEDICINE

## 2021-10-07 PROCEDURE — 36415 COLL VENOUS BLD VENIPUNCTURE: CPT | Performed by: FAMILY MEDICINE

## 2021-10-07 PROCEDURE — 85027 COMPLETE CBC AUTOMATED: CPT | Performed by: FAMILY MEDICINE

## 2021-10-07 RX ADMIN — ENOXAPARIN SODIUM 40 MG: 100 INJECTION SUBCUTANEOUS at 08:34

## 2021-10-07 RX ADMIN — OXYCODONE 5 MG: 5 TABLET ORAL at 21:16

## 2021-10-07 RX ADMIN — MICONAZOLE NITRATE: 20 CREAM TOPICAL at 20:00

## 2021-10-07 RX ADMIN — DEXAMETHASONE 6 MG: 2 TABLET ORAL at 08:35

## 2021-10-07 RX ADMIN — MICONAZOLE NITRATE: 20 CREAM TOPICAL at 08:35

## 2021-10-07 RX ADMIN — MORPHINE SULFATE 2 MG: 2 INJECTION, SOLUTION INTRAMUSCULAR; INTRAVENOUS at 21:15

## 2021-10-07 RX ADMIN — PANTOPRAZOLE SODIUM 40 MG: 40 TABLET, DELAYED RELEASE ORAL at 08:34

## 2021-10-07 RX ADMIN — ENOXAPARIN SODIUM 40 MG: 100 INJECTION SUBCUTANEOUS at 21:14

## 2021-10-07 ASSESSMENT — MIFFLIN-ST. JEOR: SCORE: 1592.38

## 2021-10-07 ASSESSMENT — ACTIVITIES OF DAILY LIVING (ADL)
ADLS_ACUITY_SCORE: 7
ADLS_ACUITY_SCORE: 6
ADLS_ACUITY_SCORE: 8
ADLS_ACUITY_SCORE: 6
ADLS_ACUITY_SCORE: 7
ADLS_ACUITY_SCORE: 9

## 2021-10-07 NOTE — PLAN OF CARE
Remains on HFNC @ 100%, 60L.  Sats in low 90's at rest.  Appetite poor.  Assisted to prone position, had to turn over once to catch breath with his sats dropping to the 80's.  Encouraged to breath slowly and deep.  Retrying proning at this time.  Sat 93% while proning.  Breath sounds diminished, occasional cough.  Will continue present plan of care.

## 2021-10-07 NOTE — PLAN OF CARE
Continues at present settings to HF NC, 100% FiO2 and 60 LPM flow, maintaining saturations in the mid 90's.  Plan of care unchanged, continue to monitor.

## 2021-10-07 NOTE — PROGRESS NOTES
Proned times 2 hours, tolerated well.  Assisted to supine per request.  Sats drop to 80% with repositioning, takes approximately 10 minutes for patient to recover to high 80's-90%.  Will encourage more proning throughout day.

## 2021-10-07 NOTE — PROGRESS NOTES
"Proned for 2 hours this afternoon.  Remains on HFNC at same settings with sats low to mid 90's when proned and 88 - 90 when supine.  Expressed feelings of frustruation re: illness.  Feels like there should be someone in the room at all times trying to cure him.  \"Maybe I should be somewhere else for this.\"   Also stated he missed his family, asking about his son's condition who is also hospitalized.  Allowed patient to vent feelings, reassured him that Covid is a long, day to day process and we are doing everything we can for him.  Reinforced that proning is the best thing he can do right now for his breathing and we would do anything possible to make this comfortable for him.  Encouraged patient to call his son, which he did.  Also face-timed with spouse.  Will continue with present plan and encouragement.  "

## 2021-10-07 NOTE — PROGRESS NOTES
[facility]    Hospitalist Progress Note    Date of Service (when I saw the patient): 10/07/2021    Assessment & Plan   Liborio Barajas is a 56 year old male admitted on 10/1/2021. He presents with shortness of breath and was found to be Covid positive.    Confirmed COVID-19 infection    Acute Hypoxic Respiratory Failure secondary to COVID-19 infection  Viral Pneumonia secondary to COVID-19 infection     Symptom Onset September 22, 2021   Date of 1st Positive Test 10/01/21      Vaccination Status Not Vaccinated, but interested/contemplative         - Admit to medical floor with continuous pulse ox, COVID-19 special precautions.  Transferred to ICU on 10/2 due to need for HFNC.    - Oxygen: HFNC 100% at 60 liters, occasionally needing BiPAP to recover after exertion.   - encouraged prone position as tolerated  - Labs: Covid labs ordered  - Imaging: Chest CT--IMPRESSION:  1.  Extensive bilateral ground-glass and patchy regions of dense  consolidation involving all lobes of both lungs worrisome for  multifocal pneumonia versus severe inflammatory disease.  2.  No evidence for pulmonary embolism.  3.  Several prominent thoracic lymph nodes.  4.  Small pericardial fluid  Breathing treatments: no inhalers needed; avoid nebulizers in favor of MDIs   IV fluids: none.  Antibiotics: not indicated   COVID-Focused Medications:    - DEXAMETHASONE: started 6 mg daily 10/1    - REMDESIVIR: Started 10/1 and completed 10/5   - Tocilizumab: given 10/2/21  - DVT Prophylaxis: q 12 hour lovenox        - consider anticoag on discharge for 30 days & until return to normal mobility     --135--55.9--20.6--10.4  D-dimer 2.19--1.62--1.09--1.44--1.85  Fibrinogen 476--852--716--650--538  WBC 7.6--6.7--8.7--8.0--7.3     Hyponatremia  Probably related to volume depletion.    Sodium 129--135 after 600 cc of saline.  - Resolved: Na 135 on 10/2     Possible acute kidney injury superimposed on chronic kidney  "disease  Last creatinine I can find in his records was 1.28 on March 2, 2016.  Creatinine upon admit 2.20 with a BUN of 44  Suspect some volume depletion-used saline 50 cc an hour for 12 hours.  - Creatinine 2.20--1.29 --1.03--0.99     Transaminitis  AST 68-suspect Covid related  - ALT 49--43--74--109--14  - AST 68--41--80--91--74  - Continue to follow    Hypertension  Previously on lisinopril-not restarted due to renal function.  Normotensive on October 3-continue to hold lisinopril.     GERD  Continue omeprazole    Diet:  Regular  DVT Prophylaxis: Enoxaparin (Lovenox) SQ  Neal Catheter: Not present  Central Lines: None  Code Status:  Full      Discussion: Patient requiring near maximal noninvasive respiratory support.  Seems to have plateaued clinically.  Hopefully will turn the corner soon.    Disposition Plan   Expected discharge: Not yet able to determine.    Attestation:  Total time: 30 minutes    Richi Garza MD  Hospital Medicine        Interval History   Gets very short of breath with exertion.  Feels \"not too bad\" at rest.  No chest pain.  No nausea vomiting.  No diarrhea.    Physical Exam   Temp:  [97.7  F (36.5  C)-98.6  F (37  C)] 98  F (36.7  C)  Pulse:  [] 88  Resp:  [9-70] 9  BP: (112-150)/(69-98) 125/83  FiO2 (%):  [75 %-100 %] 100 %  SpO2:  [82 %-97 %] 93 %    Weights:   Vitals:    10/04/21 0420 10/06/21 0559 10/07/21 0430   Weight: 80 kg (176 lb 5.9 oz) 78.5 kg (173 lb 1 oz) 78.2 kg (172 lb 6.4 oz)    Body mass index is 25.46 kg/m .    Constitutional: Alert and oriented, moderately ill-appearing, mild to moderate increased work of breathing at rest  CV: Regular, no edema  Respiratory: Diffuse fine crackles bilaterally right greater than left CTA bilaterally  GI: Soft, non-tender, bowel sounds present  Skin: Warm and dry    Data   Recent Labs   Lab 10/07/21  0530 10/06/21  0459 10/05/21  0442 10/05/21  0442 10/04/21  0628 10/04/21  0628 10/02/21  0546 10/01/21  1400 10/01/21  1247   WBC 8.8 " 7.3  --  8.0   < > 8.7   < > 7.6  --    HGB 15.7 15.1  --  15.1   < > 14.7   < > 16.3  --    MCV 87 86  --  86   < > 85   < > 83  --    * 431  --  405   < > 384   < > 194  --    INR  --   --   --   --   --   --   --   --  0.86     --   --  142  --  139   < > 129*  --    POTASSIUM 3.7  --   --  3.8  --  3.7   < > 3.4  --    CHLORIDE 107  --   --  109  --  106   < > 95  --    CO2 23  --   --  26  --  26   < > 23  --    BUN 25  --   --  33*  --  40*   < > 44*  --    CR 0.85  --   --  0.94  --  0.99   < > 2.20*  --    ANIONGAP 7  --   --  7  --  7   < > 11  --    JANET 8.4*  --   --  8.6  --  8.2*   < > 8.6  --    GLC 97  --   --  96  --  104*   < > 96  --    ALBUMIN 2.1* 2.2*   < > 2.1*   < > 2.0*   < > 2.4*  --    PROTTOTAL 5.9* 6.2*   < > 6.4*   < > 6.4*   < > 7.9  --    BILITOTAL 0.7 0.5   < > 0.6   < > 0.5   < > 0.5  --    ALKPHOS 65 61   < > 53   < > 53   < > 63  --    * 114*   < > 109*   < > 74*   < > 49  --    AST 76* 74*   < > 91*   < > 80*   < > 68*  --    TROPONIN  --   --   --   --   --   --   --  <0.015 <0.015    < > = values in this interval not displayed.       Recent Labs   Lab 10/07/21  0530 10/05/21  0442 10/04/21  0628 10/03/21  0506 10/02/21  0546 10/01/21  1400   GLC 97 96 104* 132* 126* 96        Unresulted Labs Ordered in the Past 30 Days of this Admission     No orders found from 9/1/2021 to 10/2/2021.           Imaging: No results found for this or any previous visit (from the past 24 hour(s)).     I reviewed all new labs and imaging results over the last 24 hours. I personally reviewed no images or EKG's today.    Medications     - MEDICATION INSTRUCTIONS -         dexamethasone  6 mg Oral Daily     enoxaparin ANTICOAGULANT  40 mg Subcutaneous Q12H     influenza recomb quadrivalent PF  0.5 mL Intramuscular Prior to discharge     miconazole   Topical BID     pantoprazole  40 mg Oral QAM     sodium chloride (PF)  3 mL Intracatheter Q8H   Reviewed    Richi Garza MD  [facility]  Medicine

## 2021-10-08 LAB
ANION GAP SERPL CALCULATED.3IONS-SCNC: 6 MMOL/L (ref 3–14)
BUN SERPL-MCNC: 25 MG/DL (ref 7–30)
CALCIUM SERPL-MCNC: 8.2 MG/DL (ref 8.5–10.1)
CHLORIDE BLD-SCNC: 107 MMOL/L (ref 94–109)
CO2 SERPL-SCNC: 26 MMOL/L (ref 20–32)
CREAT SERPL-MCNC: 0.95 MG/DL (ref 0.66–1.25)
D DIMER PPP FEU-MCNC: 3.52 UG/ML FEU (ref 0–0.5)
ERYTHROCYTE [DISTWIDTH] IN BLOOD BY AUTOMATED COUNT: 12.1 % (ref 10–15)
GFR SERPL CREATININE-BSD FRML MDRD: 88 ML/MIN/1.73M2
GLUCOSE BLD-MCNC: 96 MG/DL (ref 70–99)
HCT VFR BLD AUTO: 44.9 % (ref 40–53)
HGB BLD-MCNC: 15.3 G/DL (ref 13.3–17.7)
MCH RBC QN AUTO: 29.3 PG (ref 26.5–33)
MCHC RBC AUTO-ENTMCNC: 34.1 G/DL (ref 31.5–36.5)
MCV RBC AUTO: 86 FL (ref 78–100)
PLATELET # BLD AUTO: 462 10E3/UL (ref 150–450)
POTASSIUM BLD-SCNC: 3.9 MMOL/L (ref 3.4–5.3)
RBC # BLD AUTO: 5.23 10E6/UL (ref 4.4–5.9)
SODIUM SERPL-SCNC: 139 MMOL/L (ref 133–144)
WBC # BLD AUTO: 11.7 10E3/UL (ref 4–11)

## 2021-10-08 PROCEDURE — 99232 SBSQ HOSP IP/OBS MODERATE 35: CPT | Performed by: INTERNAL MEDICINE

## 2021-10-08 PROCEDURE — 85379 FIBRIN DEGRADATION QUANT: CPT | Performed by: INTERNAL MEDICINE

## 2021-10-08 PROCEDURE — 250N000013 HC RX MED GY IP 250 OP 250 PS 637: Performed by: FAMILY MEDICINE

## 2021-10-08 PROCEDURE — 250N000013 HC RX MED GY IP 250 OP 250 PS 637: Performed by: INTERNAL MEDICINE

## 2021-10-08 PROCEDURE — 85027 COMPLETE CBC AUTOMATED: CPT | Performed by: FAMILY MEDICINE

## 2021-10-08 PROCEDURE — 200N000001 HC R&B ICU

## 2021-10-08 PROCEDURE — 250N000012 HC RX MED GY IP 250 OP 636 PS 637: Performed by: FAMILY MEDICINE

## 2021-10-08 PROCEDURE — 36415 COLL VENOUS BLD VENIPUNCTURE: CPT | Performed by: INTERNAL MEDICINE

## 2021-10-08 PROCEDURE — 80048 BASIC METABOLIC PNL TOTAL CA: CPT | Performed by: INTERNAL MEDICINE

## 2021-10-08 PROCEDURE — 250N000011 HC RX IP 250 OP 636: Performed by: FAMILY MEDICINE

## 2021-10-08 RX ORDER — MULTIPLE VITAMINS W/ MINERALS TAB 9MG-400MCG
1 TAB ORAL DAILY
Status: DISCONTINUED | OUTPATIENT
Start: 2021-10-08 | End: 2021-10-18 | Stop reason: ALTCHOICE

## 2021-10-08 RX ORDER — LISINOPRIL 5 MG/1
5 TABLET ORAL DAILY
Status: DISCONTINUED | OUTPATIENT
Start: 2021-10-08 | End: 2021-10-21

## 2021-10-08 RX ADMIN — OXYCODONE 5 MG: 5 TABLET ORAL at 03:56

## 2021-10-08 RX ADMIN — PANTOPRAZOLE SODIUM 40 MG: 40 TABLET, DELAYED RELEASE ORAL at 07:56

## 2021-10-08 RX ADMIN — Medication 1 TABLET: at 15:16

## 2021-10-08 RX ADMIN — MORPHINE SULFATE 2 MG: 2 INJECTION, SOLUTION INTRAMUSCULAR; INTRAVENOUS at 03:55

## 2021-10-08 RX ADMIN — MICONAZOLE NITRATE: 20 CREAM TOPICAL at 08:00

## 2021-10-08 RX ADMIN — ENOXAPARIN SODIUM 40 MG: 100 INJECTION SUBCUTANEOUS at 19:59

## 2021-10-08 RX ADMIN — LISINOPRIL 5 MG: 5 TABLET ORAL at 20:00

## 2021-10-08 RX ADMIN — ENOXAPARIN SODIUM 40 MG: 100 INJECTION SUBCUTANEOUS at 07:56

## 2021-10-08 RX ADMIN — DEXAMETHASONE 6 MG: 2 TABLET ORAL at 07:56

## 2021-10-08 RX ADMIN — OXYCODONE 5 MG: 5 TABLET ORAL at 20:00

## 2021-10-08 ASSESSMENT — ACTIVITIES OF DAILY LIVING (ADL)
ADLS_ACUITY_SCORE: 8

## 2021-10-08 ASSESSMENT — MIFFLIN-ST. JEOR: SCORE: 1626.38

## 2021-10-08 NOTE — PROGRESS NOTES
Pt had talked with day nurse on a plan to take Morphine IV with his other med's when he was ready to go to sleep in which he would ly prone with the bed flat.  Pt's sat's remained 90-94% on HFNC 100%/60 L, he slept for over 4 hr's. Pt was surprised/pleased he slept that long.  At about 0130 he had turned to his back lying flat, his sat's dropped into the upper 70's to 80's.  Once his head of the bed was elevated to 35 degree's his sat's returned to the low 90's.  Pt agreed to go back to prone around 03-04 after he gets Oxycodone and Morphine IV.  Will continue to monitor close for changes.

## 2021-10-08 NOTE — PLAN OF CARE
End Of Shift Note    Situation: 57 yo male in Resp Distress with COVID 19    Plan: Resp Support, Steroids    Subjective/Objective: VSS, Afebrile. Denies pain. Increased WOB and dim lung sounds. Currently on HFNC 60L 100% FiO2. Pt appears to have more energy today. He self prones and is using the IS to 750mL.

## 2021-10-08 NOTE — PROGRESS NOTES
[facility] [place]    Hospitalist Progress Note  Date of Service (when I saw the patient): 10/08/2021    Assessment & Plan   Liborio Barajas is a 56 year old male admitted on 10/1/2021. He presents with shortness of breath and was found to be Covid positive.    Confirmed COVID-19 infection    Acute Hypoxic Respiratory Failure secondary to COVID-19 infection  Viral Pneumonia secondary to COVID-19 infection     Symptom Onset September 22, 2021   Date of 1st Positive Test 10/01/21      Vaccination Status Not Vaccinated, but interested/contemplative         - Admit to medical floor with continuous pulse ox, COVID-19 special precautions.  Transferred to ICU on 10/2 due to need for HFNC.    - Oxygen: HFNC 100% at 60 liters, occasionally needing BiPAP to recover after exertion.   - encouraged prone position as tolerated and patient doing a few hours per day  - Labs: Covid labs ordered  - Imaging: Chest CT--IMPRESSION:  1.  Extensive bilateral ground-glass and patchy regions of dense  consolidation involving all lobes of both lungs worrisome for  multifocal pneumonia versus severe inflammatory disease.  2.  No evidence for pulmonary embolism.  3.  Several prominent thoracic lymph nodes.  4.  Small pericardial fluid  Breathing treatments: no inhalers needed; avoid nebulizers in favor of MDIs   IV fluids: none.  Antibiotics: not indicated   COVID-Focused Medications:    - DEXAMETHASONE: started 6 mg daily 10/1    - REMDESIVIR: Started 10/1 and completed 10/5   - Tocilizumab: given 10/2/21  - DVT Prophylaxis: q 12 hour lovenox        - consider anticoag on discharge for 30 days & until return to normal mobility     --135--55.9--20.6--10.4  D-dimer 2.19--1.62--1.09--1.44--1.85  Fibrinogen 476--852--716--650--538  WBC 7.6--6.7--8.7--8.0--7.3--8.8--11.7   - follow WBC and CRP as patient at increased risk for bacterial or opportunistic infection     Hyponatremia  Probably related to volume  "depletion.    Sodium 129--135 after 600 cc of saline.  - Resolved: Na 135 on 10/2     Possible acute kidney injury superimposed on chronic kidney disease  Last creatinine I can find in his records was 1.28 on March 2, 2016.  Creatinine upon admit 2.20 with a BUN of 44  Suspect some volume depletion-used saline 50 cc an hour for 12 hours.  - Creatinine 2.20--1.29 --1.03--0.99     Transaminitis  AST 68-suspect Covid related  - ALT 49--43--74--109--14  - AST 68--41--80--91--74  - Continue to follow    Hypertension  Previously on lisinopril-not restarted due to renal function.   - resume lisinopril at half dose     GERD  Continue omeprazole    Malnutrition:    - Level of malnutrition: Severe   - Based on: weight loss, reduced intake     Diet:  Regular  DVT Prophylaxis: Enoxaparin (Lovenox) SQ  Neal Catheter: Not present  Central Lines: None  Code Status:  Full    Discussion: O2 needs are unchanged but clinically appears a little improved today.  Increasing white count is concern this patient is at increased risk of bacterial superinfection or opportunistic infection.  Will check procalcitonin tomorrow.  We will continue to follow CRP and white blood cell count.  Continue ICU    Disposition Plan   Expected discharge: 10/13/2021     Attestation:  Total time: 25 minutes    Richi Garza MD  [facility] Medicine        Interval History   Feels \"good\".  Is very short of breath with a little bit of exertion but does not feel short of breath at rest.  No chest pain.  He tolerated being prone a couple of hours last night and again hours today per his report.  Appetite is okay.  No diarrhea.    Physical Exam   Temp:  [97.8  F (36.6  C)-98.5  F (36.9  C)] 98  F (36.7  C)  Pulse:  [] 76  Resp:  [8-79] 26  BP: (116-148)/() 132/76  FiO2 (%):  [100 %] 100 %  SpO2:  [77 %-94 %] 94 %    Weights:   Vitals:    10/06/21 0559 10/07/21 0430 10/08/21 0500   Weight: 78.5 kg (173 lb 1 oz) 78.2 kg (172 lb 6.4 oz) 81.6 kg (179 lb 14.3 " oz)    Body mass index is 26.57 kg/m .    Constitutional: Alert and oriented x4, appears a little flushed otherwise nontoxic and in no acute distress at rest  CV: Regular, no edema  Respiratory: Mild occasional fine crackle scattered in the bases otherwise CTA bilaterally  GI: Soft, non-tender, bowel sounds normal  Skin: Warm and dry    Data   Recent Labs   Lab 10/08/21  0530 10/07/21  0530 10/06/21  0459 10/05/21  0442 10/05/21  0442   WBC 11.7* 8.8 7.3   < > 8.0   HGB 15.3 15.7 15.1   < > 15.1   MCV 86 87 86   < > 86   * 472* 431   < > 405    137  --   --  142   POTASSIUM 3.9 3.7  --   --  3.8   CHLORIDE 107 107  --   --  109   CO2 26 23  --   --  26   BUN 25 25  --   --  33*   CR 0.95 0.85  --   --  0.94   ANIONGAP 6 7  --   --  7   JANET 8.2* 8.4*  --   --  8.6   GLC 96 97  --   --  96   ALBUMIN  --  2.1* 2.2*   < > 2.1*   PROTTOTAL  --  5.9* 6.2*   < > 6.4*   BILITOTAL  --  0.7 0.5   < > 0.6   ALKPHOS  --  65 61   < > 53   ALT  --  123* 114*   < > 109*   AST  --  76* 74*   < > 91*    < > = values in this interval not displayed.       Recent Labs   Lab 10/08/21  0530 10/07/21  0530 10/05/21  0442 10/04/21  0628 10/03/21  0506 10/02/21  0546   GLC 96 97 96 104* 132* 126*        Unresulted Labs Ordered in the Past 30 Days of this Admission     No orders found from 9/1/2021 to 10/2/2021.           Imaging: No results found for this or any previous visit (from the past 24 hour(s)).     I reviewed all new labs and imaging results over the last 24 hours. I personally reviewed no images or EKG's today.    Medications     - MEDICATION INSTRUCTIONS -         dexamethasone  6 mg Oral Daily     enoxaparin ANTICOAGULANT  40 mg Subcutaneous Q12H     influenza recomb quadrivalent PF  0.5 mL Intramuscular Prior to discharge     miconazole   Topical BID     multivitamin w/minerals  1 tablet Oral Daily     pantoprazole  40 mg Oral QAM     sodium chloride (PF)  3 mL Intracatheter Q8H   Reviewed    Richi Garza,  [facility]

## 2021-10-08 NOTE — PROGRESS NOTES
"CLINICAL NUTRITION SERVICES - ASSESSMENT NOTE     Nutrition Prescription    RECOMMENDATIONS FOR MDs/PROVIDERS TO ORDER:  -Recommend daily MVITM due to poor oral intake and weight loss over the last week. RD to order, but MD to sign per staff recommendation.    Malnutrition Status:    Severe malnutrition in the context of acute illness.    Recommendations already ordered by Registered Dietitian (RD):  -Daily weights.  -Ensure Enlive any flavor w/ breakfast daily.    Future/Additional Recommendations:  -Continue to monitor and encourage oral intake.  -Add/adjust ONS as tolerated.     REASON FOR ASSESSMENT  Liborio Barajas is a/an 56 year old male assessed by the dietitian for [facility]    NUTRITION HISTORY  Obtained from electronic medical record:  -Admitted 10/1/21 w/ COVID-19. Transferred to ICU 10/2 d/t need for HFNC. Currently on HFNC 60 LPM but occasionally needing BiPAP to recover after exertion. Encouraged prone position as tolerated.  -PMH: HTN, GERD.  -GI: last BM 10/6/21.      RD's multiple attempts to reach patient via telephone were unsuccessful. Spoke to RN earlier this morning and the phone was placed next to patient. Will re-attempt at a later date.      CURRENT NUTRITION ORDERS  Diet: Orders Placed This Encounter      Combination Diet Regular Diet Adult      Intake/Tolerance:   -Poor appetite. Averaging 0-25% of meals per flowsheets. Noted patient was given an Ensure evening of admit after eating bites of dinner, but amount taken is unknown.  -RD to add Ensure to breakfast tray daily.    LABS  Labs reviewed    MEDICATIONS  Dexamethasone  Pantoprazole      ANTHROPOMETRICS  Height: 175.3 cm (5' 9\")  Most Recent Weight: 81.6 kg (179 lb 14.3 oz)    IBW: 72.7 kg (112% IBW)  BMI: 26.57 kg/m2 Overweight BMI 25-29.9  Weight History:   Wt Readings from Last 10 Encounters:   10/08/21 81.6 kg (179 lb 14.3 oz)   06/02/14 87.1 kg (192 lb)   -No additional information in care everywhere.    Current encounter:  Vitals:    " 10/01/21 1141 10/01/21 1625 10/03/21 0524 10/04/21 0420   Weight: 90.7 kg (200 lb) 87.1 kg (192 lb 0.3 oz) admit weight on med/surg. 77.5 kg (170 lb 13.7 oz) obtained in the ICU. 80 kg (176 lb 5.9 oz)    10/06/21 0559 10/07/21 0430 10/08/21 0500   Weight: 78.5 kg (173 lb 1 oz) 78.2 kg (172 lb 6.4 oz) 81.6 kg (179 lb 14.3 oz)   -Weight is trending down. He has lost 13 lb (7% body weight) in the last 7 days, which is clinically significant. Noted bedscale differences between med/surg and ICU may be playing a role in drastic weight changes, but patient is also not eating well and is hypermetabolic secondary to COVID-19 infection.  -Net IO Since Admission: -3,615 mL [10/08/21 0823]      Dosing Weight: 78 kg (lowest weight since admit)    ASSESSED NUTRITION NEEDS  Estimated Energy Needs: 1440-2918 kcals/day (30 - 35 kcals/kg )  Justification: Increased needs  Estimated Protein Needs:  grams protein/day (1.2 - 1.5 grams of pro/kg)  Justification: Hypercatabolism with critical illness  Estimated Fluid Needs: (1 mL/kcal)   Justification: Per provider pending fluid status    PHYSICAL FINDINGS  Unable to assess due to isolation precautions secondary to COVID-19 infection. RDs not entering rooms to preserve PPE and minimize exposure, per nutrition department guidelines.      MALNUTRITION  % Intake: </= 50% for >/= 5 days (severe)  % Weight Loss: > 2% in 1 week (severe)  Subcutaneous Fat Loss: Unable to assess  Muscle Loss: Unable to assess  Fluid Accumulation/Edema: None noted  Malnutrition Diagnosis: Severe malnutrition in the context of acute illness.    NUTRITION DIAGNOSIS  Inadequate oral intake related to poor appetite secondary to acute illness as evidenced by chart review indicating patient is eating 0-25% of meals over the last 7 days and weight loss of 13 lb (7% body weight) in the last 7 days.      INTERVENTIONS  Implementation  Nutrition Education: Unable to complete due to unable to reach patient via  telephone at this time.     Collaboration with other providers: patient plan of care discussed during IDT rounds this morning.    Medical food supplement therapy    Multivitamin/mineral supplement therapy: RD recommending daily MVITM due to poor oral intake and weight loss over the last week. RD to order, but MD to sign per staff recommendation.      Goals  Patient to consume 50-75% of nutritionally adequate meal trays TID, or the equivalent with supplements/snacks.     Monitoring/Evaluation  Progress toward goals will be monitored and evaluated per protocol.    Bertha Velez RDN, LD  Clinical Dietitian  Office (Monday-Friday): [phone]  Weekend/Holiday Pager: [phone]

## 2021-10-09 ENCOUNTER — APPOINTMENT (OUTPATIENT)
Dept: GENERAL RADIOLOGY | Facility: CLINIC | Age: 56
DRG: 177 | End: 2021-10-09
Attending: INTERNAL MEDICINE
Payer: OTHER GOVERNMENT

## 2021-10-09 LAB
BASOPHILS # BLD AUTO: 0 10E3/UL (ref 0–0.2)
BASOPHILS NFR BLD AUTO: 0 %
CRP SERPL-MCNC: 5.2 MG/L (ref 0–8)
EOSINOPHIL # BLD AUTO: 0.3 10E3/UL (ref 0–0.7)
EOSINOPHIL NFR BLD AUTO: 3 %
ERYTHROCYTE [DISTWIDTH] IN BLOOD BY AUTOMATED COUNT: 12.3 % (ref 10–15)
HCT VFR BLD AUTO: 45.3 % (ref 40–53)
HGB BLD-MCNC: 15.6 G/DL (ref 13.3–17.7)
IMM GRANULOCYTES # BLD: 0.1 10E3/UL
IMM GRANULOCYTES NFR BLD: 1 %
LYMPHOCYTES # BLD AUTO: 1.1 10E3/UL (ref 0.8–5.3)
LYMPHOCYTES NFR BLD AUTO: 9 %
MCH RBC QN AUTO: 29.4 PG (ref 26.5–33)
MCHC RBC AUTO-ENTMCNC: 34.4 G/DL (ref 31.5–36.5)
MCV RBC AUTO: 86 FL (ref 78–100)
MONOCYTES # BLD AUTO: 0.7 10E3/UL (ref 0–1.3)
MONOCYTES NFR BLD AUTO: 7 %
MRSA DNA SPEC QL NAA+PROBE: NEGATIVE
NEUTROPHILS # BLD AUTO: 9.1 10E3/UL (ref 1.6–8.3)
NEUTROPHILS NFR BLD AUTO: 80 %
PLATELET # BLD AUTO: 412 10E3/UL (ref 150–450)
PROCALCITONIN SERPL-MCNC: <0.05 NG/ML
RBC # BLD AUTO: 5.3 10E6/UL (ref 4.4–5.9)
SA TARGET DNA: NEGATIVE
WBC # BLD AUTO: 11.3 10E3/UL (ref 4–11)
WBC # BLD AUTO: ABNORMAL 10*3/UL

## 2021-10-09 PROCEDURE — 99233 SBSQ HOSP IP/OBS HIGH 50: CPT | Performed by: INTERNAL MEDICINE

## 2021-10-09 PROCEDURE — 250N000012 HC RX MED GY IP 250 OP 636 PS 637: Performed by: FAMILY MEDICINE

## 2021-10-09 PROCEDURE — 87040 BLOOD CULTURE FOR BACTERIA: CPT | Performed by: INTERNAL MEDICINE

## 2021-10-09 PROCEDURE — 250N000013 HC RX MED GY IP 250 OP 250 PS 637: Performed by: INTERNAL MEDICINE

## 2021-10-09 PROCEDURE — 36415 COLL VENOUS BLD VENIPUNCTURE: CPT | Performed by: INTERNAL MEDICINE

## 2021-10-09 PROCEDURE — 71045 X-RAY EXAM CHEST 1 VIEW: CPT

## 2021-10-09 PROCEDURE — 87641 MR-STAPH DNA AMP PROBE: CPT | Performed by: INTERNAL MEDICINE

## 2021-10-09 PROCEDURE — 94660 CPAP INITIATION&MGMT: CPT

## 2021-10-09 PROCEDURE — 250N000011 HC RX IP 250 OP 636: Performed by: FAMILY MEDICINE

## 2021-10-09 PROCEDURE — 250N000013 HC RX MED GY IP 250 OP 250 PS 637: Performed by: FAMILY MEDICINE

## 2021-10-09 PROCEDURE — 999N000157 HC STATISTIC RCP TIME EA 10 MIN

## 2021-10-09 PROCEDURE — 999N000215 HC STATISTIC HFNC ADULT NON-CPAP

## 2021-10-09 PROCEDURE — 86140 C-REACTIVE PROTEIN: CPT | Performed by: INTERNAL MEDICINE

## 2021-10-09 PROCEDURE — 200N000001 HC R&B ICU

## 2021-10-09 PROCEDURE — 87640 STAPH A DNA AMP PROBE: CPT | Performed by: INTERNAL MEDICINE

## 2021-10-09 PROCEDURE — 85025 COMPLETE CBC W/AUTO DIFF WBC: CPT | Performed by: FAMILY MEDICINE

## 2021-10-09 PROCEDURE — 999N000105 HC STATISTIC NO DOCUMENTATION TO SUPPORT CHARGE

## 2021-10-09 PROCEDURE — 84145 PROCALCITONIN (PCT): CPT | Performed by: INTERNAL MEDICINE

## 2021-10-09 RX ADMIN — ENOXAPARIN SODIUM 40 MG: 100 INJECTION SUBCUTANEOUS at 07:48

## 2021-10-09 RX ADMIN — OXYCODONE 5 MG: 5 TABLET ORAL at 06:43

## 2021-10-09 RX ADMIN — ENOXAPARIN SODIUM 40 MG: 100 INJECTION SUBCUTANEOUS at 19:48

## 2021-10-09 RX ADMIN — LISINOPRIL 5 MG: 5 TABLET ORAL at 19:48

## 2021-10-09 RX ADMIN — PANTOPRAZOLE SODIUM 40 MG: 40 TABLET, DELAYED RELEASE ORAL at 07:48

## 2021-10-09 RX ADMIN — ACETAMINOPHEN 650 MG: 325 TABLET, FILM COATED ORAL at 07:47

## 2021-10-09 RX ADMIN — OXYCODONE 5 MG: 5 TABLET ORAL at 23:56

## 2021-10-09 RX ADMIN — DEXAMETHASONE 6 MG: 2 TABLET ORAL at 07:48

## 2021-10-09 RX ADMIN — Medication 1 TABLET: at 07:48

## 2021-10-09 RX ADMIN — MORPHINE SULFATE 2 MG: 2 INJECTION, SOLUTION INTRAMUSCULAR; INTRAVENOUS at 20:17

## 2021-10-09 RX ADMIN — OXYCODONE 5 MG: 5 TABLET ORAL at 00:35

## 2021-10-09 RX ADMIN — MORPHINE SULFATE 2 MG: 2 INJECTION, SOLUTION INTRAMUSCULAR; INTRAVENOUS at 23:56

## 2021-10-09 ASSESSMENT — ACTIVITIES OF DAILY LIVING (ADL)
ADLS_ACUITY_SCORE: 8
ADLS_ACUITY_SCORE: 10
ADLS_ACUITY_SCORE: 8
ADLS_ACUITY_SCORE: 8
ADLS_ACUITY_SCORE: 9
ADLS_ACUITY_SCORE: 9

## 2021-10-09 ASSESSMENT — MIFFLIN-ST. JEOR
SCORE: 1636.38
SCORE: 1633.38

## 2021-10-09 NOTE — PROGRESS NOTES
[facility]    Hospitalist Progress Note  Date of Service (when I saw the patient): 10/09/2021    Assessment & Plan   Liborio Barajas is a 56 year old male admitted on 10/1/2021. He presents with shortness of breath and was found to be Covid positive.    Confirmed COVID-19 infection    Acute Hypoxic Respiratory Failure secondary to COVID-19 infection  Viral Pneumonia secondary to COVID-19 infection     Symptom Onset September 22, 2021   Date of 1st Positive Test 10/01/21      Vaccination Status Not Vaccinated, but interested/contemplative         - Admit to medical floor with continuous pulse ox, COVID-19 special precautions.  Transferred to ICU on 10/2 due to need for HFNC.    - Oxygen: HFNC 100% at 60 liters, occasionally needing BiPAP to recover after exertion.   - encouraged prone position as tolerated and patient doing a few hours per day  - Labs: Covid labs ordered  - Imaging: Chest CT--IMPRESSION:  1.  Extensive bilateral ground-glass and patchy regions of dense  consolidation involving all lobes of both lungs worrisome for  multifocal pneumonia versus severe inflammatory disease.  2.  No evidence for pulmonary embolism.  3.  Several prominent thoracic lymph nodes.  4.  Small pericardial fluid  Breathing treatments: no inhalers needed; avoid nebulizers in favor of MDIs   IV fluids: none.  Antibiotics: not indicated   COVID-Focused Medications:    - DEXAMETHASONE: started 6 mg daily 10/1    - REMDESIVIR: Started 10/1 and completed 10/5   - Tocilizumab: given 10/2/21  - DVT Prophylaxis: q 12 hour lovenox        - consider anticoag on discharge for 30 days & until return to normal mobility     --135--55.9--20.6--10.4 -- 5.2  D-dimer 2.19--1.62--1.09--1.44--1.85  Fibrinogen 476--852--716--650--538  WBC 7.6--6.7--8.7--8.0--7.3--8.8--11.7--11.3   - following WBC and CRP as patient at increased risk for bacterial or opportunistic infection   - procalcitonin today  Addendum:  "procalcitonin < 0.05. No fever. CXR with increased bibasilar infiltrates compared to admission CT.  Curbside discussion with ID given his prior tocilizumab treatment makes him more susceptible for secondary infection and appears clinically a little worse today. Overall, my suspicion of new infection is not high. ID suggested to check blood cultures and consider CT chest tomorrow if not improving.      Hyponatremia    Probably related to volume depletion.    Sodium 129--135 after 600 cc of saline.  - Resolved: Na 135 on 10/2     Possible acute kidney injury superimposed on chronic kidney disease    Last creatinine I can find in his records was 1.28 on March 2, 2016.  Creatinine upon admit 2.20 with a BUN of 44  Suspect some volume depletion-used saline 50 cc an hour for 12 hours.  - Creatinine 2.20--1.29 --1.03--0.99 Resolved 10/4     Transaminitis    Suspect Covid related. ALT trending up. Alk phos and bili normal.   - ALT 49--43--74--109--114--123  - AST 68--41--80--91--74--76  - Continue to follow    Hypertension  Previously on lisinopril-not restarted due to renal function.   - resume lisinopril at half dose     GERD  Continue omeprazole    Malnutrition:    - Level of malnutrition: Severe   - Based on: weight loss, reduced intake     Diet:  Regular  DVT Prophylaxis: Enoxaparin (Lovenox) SQ  Neal Catheter: Not present  Central Lines: None  Code Status:  Full    Discussion: A little worse clinically today than yesterday. Discussed with wife by phone this afternoon.    Disposition Plan   Expected discharge: not yet determined. Continue ICU for severe hypoxic respiratory failure    Attestation:  Total time: 45 minutes over two visits    Richi Garza MD  [facility]        Interval History   Did not have a good night.  Complains of poor sleep.  Required going back on BiPAP.  Now feeling \"calm\" and wants to sleep this morning.  Having some cough but not severe.  Denies increased shortness of breath.  Denies " chest pain.  Complains of his night being interrupted by staff.    Physical Exam   Temp:  [97.7  F (36.5  C)-98.2  F (36.8  C)] 98.2  F (36.8  C)  Pulse:  [] 96  Resp:  [8-30] 28  BP: (110-147)/() 127/86  FiO2 (%):  [75 %-100 %] 80 %  SpO2:  [84 %-94 %] 93 %    Weights:   Vitals:    10/08/21 0500 10/09/21 0600 10/09/21 0747   Weight: 81.6 kg (179 lb 14.3 oz) 82.3 kg (181 lb 7 oz) 82.6 kg (182 lb 1.6 oz)    Body mass index is 26.89 kg/m .    Constitutional: alert and oriented, mild toxic appearing, mild increased work of breathing   CV: Regular, no edema  Respiratory: bibasilar coarse crackles and scattered rhonchi, no wheezes  GI: Soft, non-tender, bowel sounds normal  Skin: Warm and dry    Data   Recent Labs   Lab 10/09/21  0550 10/08/21  0530 10/07/21  0530 10/06/21  0459 10/06/21  0459 10/05/21  0442 10/05/21  0442   WBC 11.3* 11.7* 8.8   < > 7.3   < > 8.0   HGB 15.6 15.3 15.7   < > 15.1   < > 15.1   MCV 86 86 87   < > 86   < > 86    462* 472*   < > 431   < > 405   NA  --  139 137  --   --   --  142   POTASSIUM  --  3.9 3.7  --   --   --  3.8   CHLORIDE  --  107 107  --   --   --  109   CO2  --  26 23  --   --   --  26   BUN  --  25 25  --   --   --  33*   CR  --  0.95 0.85  --   --   --  0.94   ANIONGAP  --  6 7  --   --   --  7   JANET  --  8.2* 8.4*  --   --   --  8.6   GLC  --  96 97  --   --   --  96   ALBUMIN  --   --  2.1*  --  2.2*   < > 2.1*   PROTTOTAL  --   --  5.9*  --  6.2*   < > 6.4*   BILITOTAL  --   --  0.7  --  0.5   < > 0.6   ALKPHOS  --   --  65  --  61   < > 53   ALT  --   --  123*  --  114*   < > 109*   AST  --   --  76*  --  74*   < > 91*    < > = values in this interval not displayed.       Recent Labs   Lab 10/08/21  0530 10/07/21  0530 10/05/21  0442 10/04/21  0628 10/03/21  0506   GLC 96 97 96 104* 132*        Unresulted Labs Ordered in the Past 30 Days of this Admission     Date and Time Order Name Status Description    10/9/2021 12:01 AM Procalcitonin In process             Imaging: No results found for this or any previous visit (from the past 24 hour(s)).     I reviewed all new labs and imaging results over the last 24 hours. I personally reviewed the chest x-ray image(s) showing diffuse bilateral infiltrates with sparing of apices, no effusions.    Medications     - MEDICATION INSTRUCTIONS -         dexamethasone  6 mg Oral Daily     enoxaparin ANTICOAGULANT  40 mg Subcutaneous Q12H     influenza recomb quadrivalent PF  0.5 mL Intramuscular Prior to discharge     lisinopril  5 mg Oral Daily     miconazole   Topical BID     multivitamin w/minerals  1 tablet Oral Daily     pantoprazole  40 mg Oral QAM     sodium chloride (PF)  3 mL Intracatheter Q8H   Reviewed joby Garza MD  [facility]

## 2021-10-09 NOTE — PROGRESS NOTES
VSS. Pt has been self proning since 2030 after taking Oxycodone 5 mg po for back discomfort while he is prone. Will continue to monitor.

## 2021-10-09 NOTE — PROGRESS NOTES
Patient remains on bipap-16/8-FI02-80% at this time, respiratory tx completed assessments.Patient alittle less anxious than reported earlier,will continue to watch for increased oxygen needs.

## 2021-10-09 NOTE — PROGRESS NOTES
"Pt had asked for a break from BIPAP 75% and placed on HFNC 100%/60 liters. After about 20 \" pt asking to go back on BIPAP 75% for easier work of breathing. Pt repositioned in bed for comfort. VSS. Pt will try to sleep again. Will continue to monitor.  "

## 2021-10-09 NOTE — PROGRESS NOTES
Pt restless, uncomfortable in bed, cannot stay prone. O2 sats low 80's, RR increased. Pt placed on BIPAP 75%. Pt not happy about going on BIPAP, but is willing to try it for respiratory improvement. Pt also offered and took  Oxycodone 5 mg po for discomforts and to help tolerate BIPAP 75%. Will continue to monitor.

## 2021-10-09 NOTE — PROGRESS NOTES
"Pt put on call light stating \"No one helps me, no one comes in here to see .\" Pt asking to take off BIPAP again, \"I can't take it anymore!\" BIPAP removed and HFNC 100%/60L applied. Sats immediately starting to decline, with pt saying \"Put me back on the machine.\" Pt given oxycodone 5 mg po again for his back pain. Pt RR 40-60's. Pt encouraged to slow down his breathing. Plan to discuss with MD pt'sanxiety and continued O2 needs.  "

## 2021-10-09 NOTE — PROGRESS NOTES
Patient off Bipap since 12noon and remains on Hi flow oxygen at 100% and 60L , patient asking to stay on the hiflow oxygen for now.   Patient tolerates so far. Oxygen saturations 90% on this and patient does'nt appear in any distress.instructed patient to call nurse if feeling S O A.

## 2021-10-10 ENCOUNTER — APPOINTMENT (OUTPATIENT)
Dept: CT IMAGING | Facility: CLINIC | Age: 56
DRG: 177 | End: 2021-10-10
Attending: INTERNAL MEDICINE
Payer: OTHER GOVERNMENT

## 2021-10-10 LAB
ALBUMIN SERPL-MCNC: 2.3 G/DL (ref 3.4–5)
ALP SERPL-CCNC: 79 U/L (ref 40–150)
ALT SERPL W P-5'-P-CCNC: 104 U/L (ref 0–70)
ANION GAP SERPL CALCULATED.3IONS-SCNC: 5 MMOL/L (ref 3–14)
AST SERPL W P-5'-P-CCNC: 44 U/L (ref 0–45)
BILIRUB SERPL-MCNC: 0.5 MG/DL (ref 0.2–1.3)
BUN SERPL-MCNC: 26 MG/DL (ref 7–30)
CALCIUM SERPL-MCNC: 8.8 MG/DL (ref 8.5–10.1)
CHLORIDE BLD-SCNC: 107 MMOL/L (ref 94–109)
CO2 SERPL-SCNC: 28 MMOL/L (ref 20–32)
CREAT SERPL-MCNC: 1.05 MG/DL (ref 0.66–1.25)
CRP SERPL-MCNC: 5.4 MG/L (ref 0–8)
D DIMER PPP FEU-MCNC: 5.9 UG/ML FEU (ref 0–0.5)
ERYTHROCYTE [DISTWIDTH] IN BLOOD BY AUTOMATED COUNT: 12.6 % (ref 10–15)
GFR SERPL CREATININE-BSD FRML MDRD: 78 ML/MIN/1.73M2
GLUCOSE BLD-MCNC: 93 MG/DL (ref 70–99)
HCT VFR BLD AUTO: 44.8 % (ref 40–53)
HGB BLD-MCNC: 15.4 G/DL (ref 13.3–17.7)
MCH RBC QN AUTO: 29.6 PG (ref 26.5–33)
MCHC RBC AUTO-ENTMCNC: 34.4 G/DL (ref 31.5–36.5)
MCV RBC AUTO: 86 FL (ref 78–100)
NT-PROBNP SERPL-MCNC: 65 PG/ML (ref 0–900)
PLATELET # BLD AUTO: 386 10E3/UL (ref 150–450)
POTASSIUM BLD-SCNC: 4.1 MMOL/L (ref 3.4–5.3)
PROT SERPL-MCNC: 6.2 G/DL (ref 6.8–8.8)
RBC # BLD AUTO: 5.2 10E6/UL (ref 4.4–5.9)
SODIUM SERPL-SCNC: 140 MMOL/L (ref 133–144)
TROPONIN I SERPL-MCNC: <0.015 UG/L (ref 0–0.04)
WBC # BLD AUTO: 12.1 10E3/UL (ref 4–11)

## 2021-10-10 PROCEDURE — 200N000001 HC R&B ICU

## 2021-10-10 PROCEDURE — 999N000157 HC STATISTIC RCP TIME EA 10 MIN

## 2021-10-10 PROCEDURE — 85379 FIBRIN DEGRADATION QUANT: CPT | Performed by: INTERNAL MEDICINE

## 2021-10-10 PROCEDURE — 999N000215 HC STATISTIC HFNC ADULT NON-CPAP

## 2021-10-10 PROCEDURE — 94660 CPAP INITIATION&MGMT: CPT

## 2021-10-10 PROCEDURE — 250N000009 HC RX 250: Performed by: INTERNAL MEDICINE

## 2021-10-10 PROCEDURE — 83880 ASSAY OF NATRIURETIC PEPTIDE: CPT | Performed by: INTERNAL MEDICINE

## 2021-10-10 PROCEDURE — 999N000105 HC STATISTIC NO DOCUMENTATION TO SUPPORT CHARGE

## 2021-10-10 PROCEDURE — 250N000013 HC RX MED GY IP 250 OP 250 PS 637: Performed by: INTERNAL MEDICINE

## 2021-10-10 PROCEDURE — 999N000185 HC STATISTIC TRANSPORT TIME EA 15 MIN

## 2021-10-10 PROCEDURE — 84484 ASSAY OF TROPONIN QUANT: CPT | Performed by: INTERNAL MEDICINE

## 2021-10-10 PROCEDURE — 36415 COLL VENOUS BLD VENIPUNCTURE: CPT | Performed by: INTERNAL MEDICINE

## 2021-10-10 PROCEDURE — 85027 COMPLETE CBC AUTOMATED: CPT | Performed by: FAMILY MEDICINE

## 2021-10-10 PROCEDURE — 250N000011 HC RX IP 250 OP 636: Performed by: INTERNAL MEDICINE

## 2021-10-10 PROCEDURE — 250N000013 HC RX MED GY IP 250 OP 250 PS 637: Performed by: FAMILY MEDICINE

## 2021-10-10 PROCEDURE — 82040 ASSAY OF SERUM ALBUMIN: CPT | Performed by: INTERNAL MEDICINE

## 2021-10-10 PROCEDURE — 71275 CT ANGIOGRAPHY CHEST: CPT

## 2021-10-10 PROCEDURE — 250N000011 HC RX IP 250 OP 636: Performed by: FAMILY MEDICINE

## 2021-10-10 PROCEDURE — 86140 C-REACTIVE PROTEIN: CPT | Performed by: INTERNAL MEDICINE

## 2021-10-10 PROCEDURE — 250N000012 HC RX MED GY IP 250 OP 636 PS 637: Performed by: FAMILY MEDICINE

## 2021-10-10 PROCEDURE — 99233 SBSQ HOSP IP/OBS HIGH 50: CPT | Performed by: INTERNAL MEDICINE

## 2021-10-10 RX ORDER — IOPAMIDOL 755 MG/ML
78 INJECTION, SOLUTION INTRAVASCULAR ONCE
Status: COMPLETED | OUTPATIENT
Start: 2021-10-10 | End: 2021-10-10

## 2021-10-10 RX ADMIN — ENOXAPARIN SODIUM 40 MG: 100 INJECTION SUBCUTANEOUS at 19:21

## 2021-10-10 RX ADMIN — PANTOPRAZOLE SODIUM 40 MG: 40 TABLET, DELAYED RELEASE ORAL at 08:06

## 2021-10-10 RX ADMIN — OXYCODONE 5 MG: 5 TABLET ORAL at 06:23

## 2021-10-10 RX ADMIN — ENOXAPARIN SODIUM 40 MG: 100 INJECTION SUBCUTANEOUS at 08:06

## 2021-10-10 RX ADMIN — MORPHINE SULFATE 2 MG: 2 INJECTION, SOLUTION INTRAMUSCULAR; INTRAVENOUS at 19:35

## 2021-10-10 RX ADMIN — Medication 1 TABLET: at 08:06

## 2021-10-10 RX ADMIN — SODIUM CHLORIDE 100 ML: 9 INJECTION, SOLUTION INTRAVENOUS at 15:14

## 2021-10-10 RX ADMIN — DEXAMETHASONE 6 MG: 2 TABLET ORAL at 08:06

## 2021-10-10 RX ADMIN — IOPAMIDOL 78 ML: 755 INJECTION, SOLUTION INTRAVENOUS at 15:21

## 2021-10-10 RX ADMIN — LISINOPRIL 5 MG: 5 TABLET ORAL at 19:21

## 2021-10-10 ASSESSMENT — MIFFLIN-ST. JEOR: SCORE: 1623.38

## 2021-10-10 ASSESSMENT — ACTIVITIES OF DAILY LIVING (ADL)
ADLS_ACUITY_SCORE: 7
ADLS_ACUITY_SCORE: 9
ADLS_ACUITY_SCORE: 7
ADLS_ACUITY_SCORE: 7

## 2021-10-10 NOTE — PROGRESS NOTES
Patient has remained on BIPAP overnight with sats 90-94%.  Patient has been visually rounded on hourly and appeared to sleep well overnight.

## 2021-10-10 NOTE — PROVIDER NOTIFICATION
Patient removed mask while NST was in room and wanted to be placed on the HFNC. NST advised patient to leave mask on, but he began to have a panic attach.  Sats on room air were as low as 42 % before BIPAP mask was able to be placed back on him.  He is also refusing to wear the thick skin barrier over his nose as it bothers him despite his nose bridge being red/purple.  Did attempt to educate him on this and make adjustments, but patient continues to refuse it.  Thin skin barrier remains in place however.

## 2021-10-10 NOTE — PROGRESS NOTES
Patient unable to continue to maintain sats on HFNC, given morphine for his work of breathing and placed back on BIPAP.  Sats prior to BIPAP were 78-84%, once on BIPAP sats 90-94%.  Discussed plan of care for the evening and patient had no additional needs at this time.

## 2021-10-10 NOTE — PROGRESS NOTES
Current BiPAP parameters 16/8 and 75%. Patient offered 's mask, declined at this time. Continue to monitor.

## 2021-10-10 NOTE — PROGRESS NOTES
[facility]    Hospitalist Progress Note  Date of Service (when I saw the patient): 10/10/2021    Assessment & Plan   Liborio Barajas is a 56 year old male admitted on 10/1/2021. He presents with shortness of breath and was found to be Covid positive.    Confirmed COVID-19 infection    Acute Hypoxic Respiratory Failure secondary to COVID-19 infection  Viral Pneumonia secondary to COVID-19 infection     Symptom Onset September 22, 2021   Date of 1st Positive Test 10/01/21      Vaccination Status Not Vaccinated, but interested/contemplative         - Admit to medical floor with continuous pulse ox, COVID-19 special precautions.  Transferred to ICU on 10/2 due to need for HFNC.    - Oxygen: Needing BiPAP more consistently 10/10.   - encouraged prone position as tolerated and patient doing a few hours per day  - Labs: Covid labs ordered  - Imaging: Chest CT--IMPRESSION:  1.  Extensive bilateral ground-glass and patchy regions of dense  consolidation involving all lobes of both lungs worrisome for  multifocal pneumonia versus severe inflammatory disease.  2.  No evidence for pulmonary embolism.  3.  Several prominent thoracic lymph nodes.  4.  Small pericardial fluid  Breathing treatments: no inhalers needed; avoid nebulizers in favor of MDIs   IV fluids: none.  Antibiotics: not indicated   COVID-Focused Medications:    - DEXAMETHASONE: started 6 mg daily 10/1    - REMDESIVIR: Started 10/1 and completed 10/5   - Tocilizumab: given 10/2/21  - DVT Prophylaxis: q 12 hour lovenox        - consider anticoag on discharge for 30 days & until return to normal mobility     --135--55.9--20.6--10.4 -- 5.2--5.4  D-dimer 2.19--1.62--1.09--1.44--1.85--3.52--5.90  Fibrinogen 476--852--716--650--538  WBC 7.6--6.7--8.7--8.0--7.3--8.8--11.7--11.3--12.1  Procalcitonin 10/9 <0.05  Blood culture 10/9 NGTD  Nares swab negative for MRSA or SA by PCR    D-dimer trending markedly up and WBC still a little up,  otherwise inflammatory markers are improved.    - chest CT today  Addendum: CT chest negative for PE. Some areas of infiltrate are more fibrotic appearing and some areas are improved from admission. No lobar appearing infiltrates.     No clear evidence of infection at this point.      Hyponatremia    Probably related to volume depletion.    Sodium 129--135 after 600 cc of saline.  - Resolved: Na 135 on 10/2     Possible acute kidney injury superimposed on chronic kidney disease    Last creatinine I can find in his records was 1.28 on March 2, 2016.  Creatinine upon admit 2.20 with a BUN of 44  Suspect some volume depletion-used saline 50 cc an hour for 12 hours.  - Creatinine 2.20--1.29 --1.03--0.99 Resolved 10/4     Transaminitis    Suspect Covid related. ALT trending up. Alk phos and bili normal.   - ALT 49--43--74--109--114--123  - AST 68--41--80--91--74--76  - Continue to follow    Hypertension  Previously on lisinopril-not restarted due to renal function.   - resume lisinopril at half dose     GERD  Continue omeprazole    Malnutrition:  - Level of malnutrition: Severe   - Based on: weight loss, reduced intake     Diet:  Regular  DVT Prophylaxis: Enoxaparin (Lovenox) SQ  Neal Catheter: Not present  Central Lines: None  Code Status:  Full      Discussion: Patient's oxygen needs are to slow it up otherwise he appears stable.  Discussed with niece by phone and questions answered    Disposition Plan   Expected discharge: Uncertain     Attestation:  Total time: 45 minutes    Richi Garza MD  Hospital Medicine        Interval History   Feels better. BiPAP mask is not quite fitting right and different ones can be tried otherwise no complaints.  Denies any chest pain.  Denies any leg pain.  No nausea vomiting or diarrhea.    Physical Exam   Temp:  [97.7  F (36.5  C)-98.3  F (36.8  C)] 98.3  F (36.8  C)  Pulse:  [] 85  Resp:  [12-54] 20  BP: (108-139)/() 118/32  FiO2 (%):  [80 %-100 %] 100 %  SpO2:  [42 %-96  %] 93 %    Weights:   Vitals:    10/09/21 0600 10/09/21 0747 10/10/21 0623   Weight: 82.3 kg (181 lb 7 oz) 82.6 kg (182 lb 1.6 oz) 81.3 kg (179 lb 3.7 oz)    Body mass index is 26.47 kg/m .    Constitutional: Alert and oriented x3, nontoxic appearing, cooperative with BiPAP mask  CV: Regular, no edema, no jugular venous distention.  Good dorsalis pedis pulses bilaterally  Respiratory: Coarse crackles and a few rhonchi more in the bases than apices bilaterally  GI: Soft, non-tender, bowel sounds normal  Skin: Warm and dry  Musculoskeletal: No calf tenderness or swelling    Data   Recent Labs   Lab 10/10/21  0534 10/09/21  0550 10/08/21  0530 10/07/21  0530 10/07/21  0530   WBC 12.1* 11.3* 11.7*   < > 8.8   HGB 15.4 15.6 15.3   < > 15.7   MCV 86 86 86   < > 87    412 462*   < > 472*     --  139  --  137   POTASSIUM 4.1  --  3.9  --  3.7   CHLORIDE 107  --  107  --  107   CO2 28  --  26  --  23   BUN 26  --  25  --  25   CR 1.05  --  0.95  --  0.85   ANIONGAP 5  --  6  --  7   JANET 8.8  --  8.2*  --  8.4*   GLC 93  --  96  --  97   ALBUMIN 2.3*  --   --   --  2.1*   PROTTOTAL 6.2*  --   --   --  5.9*   BILITOTAL 0.5  --   --   --  0.7   ALKPHOS 79  --   --   --  65   *  --   --   --  123*   AST 44  --   --   --  76*   TROPONIN <0.015  --   --   --   --     < > = values in this interval not displayed.       Recent Labs   Lab 10/10/21  0534 10/08/21  0530 10/07/21  0530 10/05/21  0442 10/04/21  0628   GLC 93 96 97 96 104*        Unresulted Labs Ordered in the Past 30 Days of this Admission     Date and Time Order Name Status Description    10/9/2021  6:04 PM Blood Culture Arm, Right In process     10/9/2021  6:04 PM Blood Culture Arm, Left In process            Imaging:   Recent Results (from the past 24 hour(s))   XR Chest Port 1 View    Narrative    EXAM: XR CHEST PORT 1 VIEW  LOCATION: [facility]  DATE/TIME: 10/9/2021 12:18 PM    INDICATION: No pneumonia with increasing  hypoxemia.  COMPARISON: Chest CTA 10/1/2021      Impression    IMPRESSION: Heart size magnified in AP projection. Increasing mid and lower lung consolidative opacities with some apical sparing. Findings compatible with worsening pneumonia. No pneumothorax nor gross pleural effusion.        I reviewed all new labs and imaging results over the last 24 hours. I personally reviewed the chest CT image(s) showing No PE, diffuse bilateral infiltrates that compared to CT on admission show a few areas in the bases that do seem a little better in some areas seen little more like there are fibrotic but no definitive lobar infiltrate, no effusions.    Medications     - MEDICATION INSTRUCTIONS -         enoxaparin ANTICOAGULANT  40 mg Subcutaneous Q12H     influenza recomb quadrivalent PF  0.5 mL Intramuscular Prior to discharge     lisinopril  5 mg Oral Daily     miconazole   Topical BID     multivitamin w/minerals  1 tablet Oral Daily     pantoprazole  40 mg Oral QAM     sodium chloride (PF)  3 mL Intracatheter Q8H   Reviewed joby Garza MD  [facility]

## 2021-10-11 ENCOUNTER — APPOINTMENT (OUTPATIENT)
Dept: CT IMAGING | Facility: CLINIC | Age: 56
DRG: 177 | End: 2021-10-11
Attending: INTERNAL MEDICINE
Payer: OTHER GOVERNMENT

## 2021-10-11 LAB
ANION GAP SERPL CALCULATED.3IONS-SCNC: 5 MMOL/L (ref 3–14)
BUN SERPL-MCNC: 27 MG/DL (ref 7–30)
CALCIUM SERPL-MCNC: 8.6 MG/DL (ref 8.5–10.1)
CHLORIDE BLD-SCNC: 108 MMOL/L (ref 94–109)
CO2 SERPL-SCNC: 26 MMOL/L (ref 20–32)
CREAT SERPL-MCNC: 1.15 MG/DL (ref 0.66–1.25)
CRP SERPL-MCNC: <2.9 MG/L (ref 0–8)
D DIMER PPP FEU-MCNC: 3.89 UG/ML FEU (ref 0–0.5)
ERYTHROCYTE [DISTWIDTH] IN BLOOD BY AUTOMATED COUNT: 12.5 % (ref 10–15)
ERYTHROCYTE [DISTWIDTH] IN BLOOD BY AUTOMATED COUNT: 12.5 % (ref 10–15)
FIBRINOGEN PPP-MCNC: 417 MG/DL (ref 170–490)
GFR SERPL CREATININE-BSD FRML MDRD: 70 ML/MIN/1.73M2
GLUCOSE BLD-MCNC: 155 MG/DL (ref 70–99)
HCT VFR BLD AUTO: 42.4 % (ref 40–53)
HCT VFR BLD AUTO: 44.8 % (ref 40–53)
HGB BLD-MCNC: 14.4 G/DL (ref 13.3–17.7)
HGB BLD-MCNC: 15 G/DL (ref 13.3–17.7)
INR PPP: 1.06 (ref 0.85–1.15)
MCH RBC QN AUTO: 29.2 PG (ref 26.5–33)
MCH RBC QN AUTO: 29.4 PG (ref 26.5–33)
MCHC RBC AUTO-ENTMCNC: 33.5 G/DL (ref 31.5–36.5)
MCHC RBC AUTO-ENTMCNC: 34 G/DL (ref 31.5–36.5)
MCV RBC AUTO: 87 FL (ref 78–100)
MCV RBC AUTO: 87 FL (ref 78–100)
PLATELET # BLD AUTO: 326 10E3/UL (ref 150–450)
PLATELET # BLD AUTO: 334 10E3/UL (ref 150–450)
POTASSIUM BLD-SCNC: 4 MMOL/L (ref 3.4–5.3)
RBC # BLD AUTO: 4.89 10E6/UL (ref 4.4–5.9)
RBC # BLD AUTO: 5.13 10E6/UL (ref 4.4–5.9)
SODIUM SERPL-SCNC: 139 MMOL/L (ref 133–144)
WBC # BLD AUTO: 10.5 10E3/UL (ref 4–11)
WBC # BLD AUTO: 10.8 10E3/UL (ref 4–11)

## 2021-10-11 PROCEDURE — 80048 BASIC METABOLIC PNL TOTAL CA: CPT | Performed by: INTERNAL MEDICINE

## 2021-10-11 PROCEDURE — 200N000001 HC R&B ICU

## 2021-10-11 PROCEDURE — 36415 COLL VENOUS BLD VENIPUNCTURE: CPT | Performed by: FAMILY MEDICINE

## 2021-10-11 PROCEDURE — 999N000157 HC STATISTIC RCP TIME EA 10 MIN

## 2021-10-11 PROCEDURE — 250N000011 HC RX IP 250 OP 636: Performed by: INTERNAL MEDICINE

## 2021-10-11 PROCEDURE — 94660 CPAP INITIATION&MGMT: CPT

## 2021-10-11 PROCEDURE — 99233 SBSQ HOSP IP/OBS HIGH 50: CPT | Performed by: INTERNAL MEDICINE

## 2021-10-11 PROCEDURE — 85384 FIBRINOGEN ACTIVITY: CPT | Performed by: INTERNAL MEDICINE

## 2021-10-11 PROCEDURE — 85027 COMPLETE CBC AUTOMATED: CPT | Performed by: INTERNAL MEDICINE

## 2021-10-11 PROCEDURE — 999N000123 HC STATISTIC OXYGEN O2DAILY TECH TIME

## 2021-10-11 PROCEDURE — 250N000009 HC RX 250: Performed by: INTERNAL MEDICINE

## 2021-10-11 PROCEDURE — 250N000011 HC RX IP 250 OP 636: Performed by: FAMILY MEDICINE

## 2021-10-11 PROCEDURE — 86140 C-REACTIVE PROTEIN: CPT | Performed by: INTERNAL MEDICINE

## 2021-10-11 PROCEDURE — 82248 BILIRUBIN DIRECT: CPT | Performed by: INTERNAL MEDICINE

## 2021-10-11 PROCEDURE — 85730 THROMBOPLASTIN TIME PARTIAL: CPT | Performed by: INTERNAL MEDICINE

## 2021-10-11 PROCEDURE — 250N000013 HC RX MED GY IP 250 OP 250 PS 637: Performed by: FAMILY MEDICINE

## 2021-10-11 PROCEDURE — 36415 COLL VENOUS BLD VENIPUNCTURE: CPT | Performed by: INTERNAL MEDICINE

## 2021-10-11 PROCEDURE — 85379 FIBRIN DEGRADATION QUANT: CPT | Performed by: INTERNAL MEDICINE

## 2021-10-11 PROCEDURE — 999N000105 HC STATISTIC NO DOCUMENTATION TO SUPPORT CHARGE

## 2021-10-11 PROCEDURE — 85027 COMPLETE CBC AUTOMATED: CPT | Performed by: FAMILY MEDICINE

## 2021-10-11 PROCEDURE — 75635 CT ANGIO ABDOMINAL ARTERIES: CPT

## 2021-10-11 PROCEDURE — 250N000013 HC RX MED GY IP 250 OP 250 PS 637: Performed by: INTERNAL MEDICINE

## 2021-10-11 PROCEDURE — 999N000185 HC STATISTIC TRANSPORT TIME EA 15 MIN

## 2021-10-11 PROCEDURE — 999N000215 HC STATISTIC HFNC ADULT NON-CPAP

## 2021-10-11 PROCEDURE — 85610 PROTHROMBIN TIME: CPT | Performed by: INTERNAL MEDICINE

## 2021-10-11 RX ORDER — HEPARIN SODIUM 10000 [USP'U]/100ML
0-5000 INJECTION, SOLUTION INTRAVENOUS CONTINUOUS
Status: DISCONTINUED | OUTPATIENT
Start: 2021-10-11 | End: 2021-10-11

## 2021-10-11 RX ORDER — IOPAMIDOL 755 MG/ML
100 INJECTION, SOLUTION INTRAVASCULAR ONCE
Status: COMPLETED | OUTPATIENT
Start: 2021-10-11 | End: 2021-10-11

## 2021-10-11 RX ADMIN — IOPAMIDOL 100 ML: 755 INJECTION, SOLUTION INTRAVENOUS at 20:09

## 2021-10-11 RX ADMIN — PANTOPRAZOLE SODIUM 40 MG: 40 TABLET, DELAYED RELEASE ORAL at 08:06

## 2021-10-11 RX ADMIN — MORPHINE SULFATE 2 MG: 2 INJECTION, SOLUTION INTRAMUSCULAR; INTRAVENOUS at 00:04

## 2021-10-11 RX ADMIN — LISINOPRIL 5 MG: 5 TABLET ORAL at 19:53

## 2021-10-11 RX ADMIN — MICONAZOLE NITRATE: 20 CREAM TOPICAL at 19:53

## 2021-10-11 RX ADMIN — MORPHINE SULFATE 2 MG: 2 INJECTION, SOLUTION INTRAMUSCULAR; INTRAVENOUS at 21:18

## 2021-10-11 RX ADMIN — OXYCODONE 5 MG: 5 TABLET ORAL at 06:08

## 2021-10-11 RX ADMIN — SODIUM CHLORIDE 100 ML: 9 INJECTION, SOLUTION INTRAVENOUS at 20:09

## 2021-10-11 RX ADMIN — ENOXAPARIN SODIUM 40 MG: 100 INJECTION SUBCUTANEOUS at 19:53

## 2021-10-11 RX ADMIN — ARGATROBAN 1 MCG/KG/MIN: 50 INJECTION INTRAVENOUS at 23:49

## 2021-10-11 RX ADMIN — ENOXAPARIN SODIUM 40 MG: 100 INJECTION SUBCUTANEOUS at 08:05

## 2021-10-11 RX ADMIN — OXYCODONE 5 MG: 5 TABLET ORAL at 00:04

## 2021-10-11 RX ADMIN — Medication 1 TABLET: at 08:05

## 2021-10-11 ASSESSMENT — MIFFLIN-ST. JEOR: SCORE: 1614.38

## 2021-10-11 ASSESSMENT — ACTIVITIES OF DAILY LIVING (ADL)
ADLS_ACUITY_SCORE: 7

## 2021-10-11 NOTE — PROGRESS NOTES
Patient assisted up to commode with HFNC, tolerated well.  Became anxious when back in bed and put BiPap on.  Very anxious about hockey mask position.  Reassured that it was on correctly and he was getting air.  Very talkative today, stated he feels better and happy about being able to have a visitor soon.

## 2021-10-11 NOTE — PROGRESS NOTES
Patient slept well overnight on BIPAP with sats maintained >90%.  Anxiety and work of breathing controlled with oxycodone and morphine.  No other needs expressed this am.

## 2021-10-11 NOTE — PROGRESS NOTES
Patient c/o right great toe pain that radiates to foot.  Stated it started last night.  Has a nonraised, red rash on top of foot.  Right great toe appears normal, no swelling or redness of toe.  Foot is cool, unable to palpate pedal pulse.  Obtained dopler, pulse audible with dopler.  Dr. Garza here and aware.

## 2021-10-11 NOTE — PROGRESS NOTES
"Patient has been taking morphine and oxycodone PRN to help with his work of breathing.  He has wore BIPAP thus far overnight just taking breaks on HFNC to take medication and get drinks. Patient continues to decline thicker nose barrier as he feels the leak and \"flapping\" cause him anxiety.  Will try facemask on BIPAP to see if he can tolerate that while eliminating pressure on the bridge of his nose.  Patient not sure he will be able to tolerate this, but he will try,  RT currently in the room assisting patient.   "

## 2021-10-11 NOTE — PLAN OF CARE
AO x 4, denies pain, Lung sounds dim, requiring BiPAP to maintain O2 saturations. Chest CT completed today. BM x 1, voiding in urinal. Assist x 1 for pivot to commode.

## 2021-10-12 LAB
ALBUMIN SERPL-MCNC: 2.2 G/DL (ref 3.4–5)
ALP SERPL-CCNC: 79 U/L (ref 40–150)
ALT SERPL W P-5'-P-CCNC: 103 U/L (ref 0–70)
ANION GAP SERPL CALCULATED.3IONS-SCNC: 3 MMOL/L (ref 3–14)
APTT PPP: 33 SECONDS (ref 22–38)
APTT PPP: 49 SECONDS (ref 22–38)
APTT PPP: 56 SECONDS (ref 22–38)
AST SERPL W P-5'-P-CCNC: 46 U/L (ref 0–45)
BILIRUB DIRECT SERPL-MCNC: 0.1 MG/DL (ref 0–0.2)
BILIRUB SERPL-MCNC: 0.4 MG/DL (ref 0.2–1.3)
BUN SERPL-MCNC: 24 MG/DL (ref 7–30)
CALCIUM SERPL-MCNC: 7.9 MG/DL (ref 8.5–10.1)
CHLORIDE BLD-SCNC: 106 MMOL/L (ref 94–109)
CO2 SERPL-SCNC: 30 MMOL/L (ref 20–32)
CREAT SERPL-MCNC: 1.07 MG/DL (ref 0.66–1.25)
D DIMER PPP FEU-MCNC: 3.67 UG/ML FEU (ref 0–0.5)
ERYTHROCYTE [DISTWIDTH] IN BLOOD BY AUTOMATED COUNT: 12.6 % (ref 10–15)
GFR SERPL CREATININE-BSD FRML MDRD: 76 ML/MIN/1.73M2
GLUCOSE BLD-MCNC: 97 MG/DL (ref 70–99)
HCT VFR BLD AUTO: 42.7 % (ref 40–53)
HGB BLD-MCNC: 14.7 G/DL (ref 13.3–17.7)
LACTATE SERPL-SCNC: 0.8 MMOL/L (ref 0.7–2)
MCH RBC QN AUTO: 29.7 PG (ref 26.5–33)
MCHC RBC AUTO-ENTMCNC: 34.4 G/DL (ref 31.5–36.5)
MCV RBC AUTO: 86 FL (ref 78–100)
PLATELET # BLD AUTO: 308 10E3/UL (ref 150–450)
POTASSIUM BLD-SCNC: 4.2 MMOL/L (ref 3.4–5.3)
PROCALCITONIN SERPL-MCNC: <0.05 NG/ML
PROT SERPL-MCNC: 5.9 G/DL (ref 6.8–8.8)
RBC # BLD AUTO: 4.95 10E6/UL (ref 4.4–5.9)
SODIUM SERPL-SCNC: 139 MMOL/L (ref 133–144)
WBC # BLD AUTO: 9.6 10E3/UL (ref 4–11)

## 2021-10-12 PROCEDURE — 999N000105 HC STATISTIC NO DOCUMENTATION TO SUPPORT CHARGE

## 2021-10-12 PROCEDURE — 80048 BASIC METABOLIC PNL TOTAL CA: CPT | Performed by: INTERNAL MEDICINE

## 2021-10-12 PROCEDURE — 83605 ASSAY OF LACTIC ACID: CPT | Performed by: HOSPITALIST

## 2021-10-12 PROCEDURE — 94660 CPAP INITIATION&MGMT: CPT

## 2021-10-12 PROCEDURE — 999N000157 HC STATISTIC RCP TIME EA 10 MIN

## 2021-10-12 PROCEDURE — 200N000001 HC R&B ICU

## 2021-10-12 PROCEDURE — 84145 PROCALCITONIN (PCT): CPT | Performed by: INTERNAL MEDICINE

## 2021-10-12 PROCEDURE — 85379 FIBRIN DEGRADATION QUANT: CPT | Performed by: INTERNAL MEDICINE

## 2021-10-12 PROCEDURE — 99233 SBSQ HOSP IP/OBS HIGH 50: CPT | Performed by: HOSPITALIST

## 2021-10-12 PROCEDURE — 36415 COLL VENOUS BLD VENIPUNCTURE: CPT | Performed by: FAMILY MEDICINE

## 2021-10-12 PROCEDURE — 250N000011 HC RX IP 250 OP 636: Performed by: HOSPITALIST

## 2021-10-12 PROCEDURE — 85730 THROMBOPLASTIN TIME PARTIAL: CPT | Performed by: INTERNAL MEDICINE

## 2021-10-12 PROCEDURE — 36415 COLL VENOUS BLD VENIPUNCTURE: CPT | Performed by: HOSPITALIST

## 2021-10-12 PROCEDURE — 85730 THROMBOPLASTIN TIME PARTIAL: CPT | Performed by: HOSPITALIST

## 2021-10-12 PROCEDURE — 250N000013 HC RX MED GY IP 250 OP 250 PS 637: Performed by: FAMILY MEDICINE

## 2021-10-12 PROCEDURE — 250N000011 HC RX IP 250 OP 636: Performed by: FAMILY MEDICINE

## 2021-10-12 PROCEDURE — 999N000215 HC STATISTIC HFNC ADULT NON-CPAP

## 2021-10-12 PROCEDURE — 85027 COMPLETE CBC AUTOMATED: CPT | Performed by: FAMILY MEDICINE

## 2021-10-12 PROCEDURE — 250N000013 HC RX MED GY IP 250 OP 250 PS 637: Performed by: INTERNAL MEDICINE

## 2021-10-12 PROCEDURE — 999N000123 HC STATISTIC OXYGEN O2DAILY TECH TIME

## 2021-10-12 RX ADMIN — MORPHINE SULFATE 2 MG: 2 INJECTION, SOLUTION INTRAMUSCULAR; INTRAVENOUS at 20:28

## 2021-10-12 RX ADMIN — MICONAZOLE NITRATE: 20 CREAM TOPICAL at 08:51

## 2021-10-12 RX ADMIN — MORPHINE SULFATE 2 MG: 2 INJECTION, SOLUTION INTRAMUSCULAR; INTRAVENOUS at 06:46

## 2021-10-12 RX ADMIN — ARGATROBAN 1 MCG/KG/MIN: 50 INJECTION INTRAVENOUS at 06:24

## 2021-10-12 RX ADMIN — ARGATROBAN 1 MCG/KG/MIN: 50 INJECTION INTRAVENOUS at 16:44

## 2021-10-12 RX ADMIN — PANTOPRAZOLE SODIUM 40 MG: 40 TABLET, DELAYED RELEASE ORAL at 08:51

## 2021-10-12 RX ADMIN — OXYCODONE 5 MG: 5 TABLET ORAL at 10:43

## 2021-10-12 RX ADMIN — MORPHINE SULFATE 2 MG: 2 INJECTION, SOLUTION INTRAMUSCULAR; INTRAVENOUS at 03:25

## 2021-10-12 RX ADMIN — Medication 1 TABLET: at 08:51

## 2021-10-12 RX ADMIN — MORPHINE SULFATE 2 MG: 2 INJECTION, SOLUTION INTRAMUSCULAR; INTRAVENOUS at 00:28

## 2021-10-12 ASSESSMENT — ACTIVITIES OF DAILY LIVING (ADL)
ADLS_ACUITY_SCORE: 5
ADLS_ACUITY_SCORE: 7
ADLS_ACUITY_SCORE: 9
ADLS_ACUITY_SCORE: 9
ADLS_ACUITY_SCORE: 7
ADLS_ACUITY_SCORE: 9

## 2021-10-12 ASSESSMENT — MIFFLIN-ST. JEOR: SCORE: 1609.38

## 2021-10-12 NOTE — PLAN OF CARE
Neuro: Oriented x3.Follows commands appropriately. Patient able to make needs known.   CV: S.Tachy/NSR. Denies CP. VSS WNL  Respiratory: Remains on Bipap at 80% FiO2/HFNC 95%-100% O2 sats 90%. Morphine given for air hunger.  : urinal, Adequate UOP.   Pain: Denies any pain  Activity: Prone half of the shift.  Lines: Argatroban  Procedure: CTA abdomen, legs completed, MD aware of results.  Family: Niece Updated via phone

## 2021-10-12 NOTE — PROGRESS NOTES
[facility]    Hospitalist Progress Note  Date of Service (when I saw the patient): 10/12/2021    Assessment & Plan   Liborio Barajas is a 56 year old male admitted on 10/1/2021. He presents with shortness of breath and was found to be Covid positive.    Confirmed COVID-19 infection    Acute Hypoxic Respiratory Failure secondary to COVID-19 infection  Viral Pneumonia secondary to COVID-19 infection     Symptom Onset September 22, 2021   Date of 1st Positive Test 10/01/21      Vaccination Status Not Vaccinated, but interested/contemplative     Given that patient has been symptomatic for greater than 20 days now, will discontinue isolation precautions given that patient's oxygenation is trending towards improvement   -Transferred to ICU on 10/2 due to need for HFNC.    - Oxygen: Continue BiPAP 16/8 FIO2 0.8 alternating with HFNC. Seems to be stabilizing last few days, tolerating more HFNC.    - encouraged prone position as tolerated and patient doing a few hours per day   - Would like patient to mobilize more, will consult PT and OT.  - Labs: Covid labs ordered  - Imaging: Chest CT 10/10 with continued multifocal/reticular/GGO PNA as would be expected  Breathing treatments: no inhalers needed; avoid nebulizers in favor of MDIs   IV fluids: none.  Antibiotics: not indicated   COVID-Focused Medications:    - DEXAMETHASONE: started 6 mg daily 10/1 and completed 10 days on 10/10   - REMDESIVIR: Started 10/1 and completed 10/5   - Tocilizumab: given 10/2/21  - DVT Prophylaxis: on therapeutic anticoagulation as of 10/11/21     --135--55.9--20.6--10.4 -- 5.2--5.4 ? <2.9  D-dimer 2.19--1.62--1.09--1.44--1.85--3.52--5.90 ? 3.67  Fibrinogen 476--852--716--650--538 ? 417  WBC 7.6--6.7--8.7--8.0--7.3--8.8--11.7--11.3--12.1  Procalcitonin 10/9 <0.05  Blood culture 10/9 NGTD  Nares swab negative for MRSA or SA by PCR    Infrarenal Aortic Thrombus with thromboembolic obstruction of right  tibial-peroneal trunk  R foot pain and diminished RLE pulses noted 10/11. CT revealed infrarenal aortic wall adhered clot and apparent thromboembolic obstruction of right tibial-peroneal trunk with distal reconstitution of tibial and peroneal arteries. Was discussed with vascular surgery [facility]: COVID-related arterial thrombectomies frequently reclot, so surgery is not recommended. ALso, increased heparin resistance in these pts, so anticoagulation with argobactran is recommended. Symptoms seem to be stable today, pulses are difficult to palpate though - RN to try with doppler   - continue argobatran infusion started 10/11/21, pharmD monitoring     Hyponatremia   Present on admission. Probably related to volume depletion. Sodium 129 ? 600 cc of saline ? 135. Resolved     Possible acute kidney injury superimposed on chronic kidney disease stage 2    Last creatinine in his records was 1.28 on March 2, 2016.  Creatinine upon admit 2.20 with a BUN of 44  Suspect some volume depletion-used saline 50 cc an hour for 12 hours.  - Creatinine 2.20--1.29 --1.03--0.99 Resolved 10/4.     Transaminitis    Suspect Covid related. ALT trending up. Alk phos and bili normal.   - ALT 49--43--74--109--114--123 -- 103  - AST 68--41--80--91--74--76--46  - Continue to follow intermittently.     Hypertension  Previously on lisinopril   - resumed lisinopril at half dose     GERD  Continue omeprazole    Malnutrition:  - Level of malnutrition: Severe   - Based on: weight loss, reduced intake     Diet:  Regular  DVT Prophylaxis: Enoxaparin (Lovenox) SQ  Neal Catheter: Not present  Central Lines: None  Code Status:  Full              Discussion: Respiratory function improving but now has aortic clot    Disposition Plan   Expected discharge: 4-7 days    Attestation:  Reviewed CT scan, labs, and nursing notes. Discussed with RN at bedside.     Total time: 40 minutes with 30 minutes spent with coordination of care and counseling reviewing plan of  care with patient and also nurse at bedside, also discussed with care management and physical therapy regarding need for mobilization, also discussed with wife Chikis via telephone.        Interval History   Patient reports he is afraid to fall asleep on the high flow nasal cannula but so far has been actually able to do okay with this.  He reports that the BiPAP did dry his eyes out    Physical Exam   Temp:  [97.2  F (36.2  C)-98.9  F (37.2  C)] 98.2  F (36.8  C)  Pulse:  [] 92  Resp:  [10-51] 22  BP: ()/(60-99) 137/76  FiO2 (%):  [80 %-100 %] 95 %  SpO2:  [78 %-98 %] 92 %    Weights:   Vitals:    10/10/21 0623 10/11/21 0608 10/12/21 0400   Weight: 81.3 kg (179 lb 3.7 oz) 80.4 kg (177 lb 4 oz) 79.9 kg (176 lb 2.4 oz)    Body mass index is 26.01 kg/m .    Constitutional: alert and oriented, tired but otherwise nontoxic  CV: Regular, tachy, good wrist pulses bilaterally, DP on right is not palpable, DP ln left is present  Respiratory: Faint crackles bilaterally at the bases  GI: Soft, non-tender, bowel sounds normal  Skin: Warm and dry  Musculoskeletal: R foot remains cool to touch.     Data   Recent Labs   Lab 10/12/21  0234 10/11/21  2342 10/11/21  1844 10/11/21  0525 10/10/21  0534 10/10/21  0534   WBC 9.6 10.5  --  10.8   < > 12.1*   HGB 14.7 14.4  --  15.0   < > 15.4   MCV 86 87  --  87   < > 86    326  --  334   < > 386   INR  --   --  1.06  --   --   --      --  139  --   --  140   POTASSIUM 4.2  --  4.0  --   --  4.1   CHLORIDE 106  --  108  --   --  107   CO2 30  --  26  --   --  28   BUN 24  --  27  --   --  26   CR 1.07  --  1.15  --   --  1.05   ANIONGAP 3  --  5  --   --  5   JANET 7.9*  --  8.6  --   --  8.8   GLC 97  --  155*  --   --  93   ALBUMIN  --  2.2*  --   --   --  2.3*   PROTTOTAL  --  5.9*  --   --   --  6.2*   BILITOTAL  --  0.4  --   --   --  0.5   ALKPHOS  --  79  --   --   --  79   ALT  --  103*  --   --   --  104*   AST  --  46*  --   --   --  44   TROPONIN  --   --    --   --   --  <0.015    < > = values in this interval not displayed.       Recent Labs   Lab 10/12/21  0234 10/11/21  1844 10/10/21  0534 10/08/21  0530 10/07/21  0530   GLC 97 155* 93 96 97        Unresulted Labs Ordered in the Past 30 Days of this Admission     Date and Time Order Name Status Description    10/12/2021 12:01 AM Procalcitonin In process     10/9/2021  6:04 PM Blood Culture Arm, Right Preliminary     10/9/2021  6:04 PM Blood Culture Arm, Left Preliminary            I reviewed all new labs and imaging results over the last 24 hours. I personally reviewed abdominal CT from last night with marked aortic clot    Medications     argatroban 1 mcg/kg/min (10/12/21 0624)     - MEDICATION INSTRUCTIONS -       - MEDICATION INSTRUCTIONS -         influenza recomb quadrivalent PF  0.5 mL Intramuscular Prior to discharge     lisinopril  5 mg Oral Daily     miconazole   Topical BID     multivitamin w/minerals  1 tablet Oral Daily     pantoprazole  40 mg Oral QAM     sodium chloride (PF)  3 mL Intracatheter Q8H   Reviewed meds

## 2021-10-12 NOTE — PHARMACY-ANTICOAGULATION SERVICE
Argatroban Dosing Note:    Argatroban continues at 1mcg/kg/min.  This AM aPTT = 56 seconds in goal range of 44-88 sec.  Continue current infusion, recheck aPTT tomorrow AM.    Lacho PeresD

## 2021-10-12 NOTE — PROGRESS NOTES
[facility]    Hospitalist Progress Note  Date of Service (when I saw the patient): 10/11/2021    Assessment & Plan   Liborio Barajas is a 56 year old male admitted on 10/1/2021. He presents with shortness of breath and was found to be Covid positive.    Confirmed COVID-19 infection    Acute Hypoxic Respiratory Failure secondary to COVID-19 infection  Viral Pneumonia secondary to COVID-19 infection     Symptom Onset September 22, 2021   Date of 1st Positive Test 10/01/21      Vaccination Status Not Vaccinated, but interested/contemplative         - Admit to medical floor with continuous pulse ox, COVID-19 special precautions.  Transferred to ICU on 10/2 due to need for HFNC.    - Oxygen: Needing BiPAP more consistently 10/10.   - encouraged prone position as tolerated and patient doing a few hours per day  - Labs: Covid labs ordered  - Imaging: Chest CT--IMPRESSION:  1.  Extensive bilateral ground-glass and patchy regions of dense  consolidation involving all lobes of both lungs worrisome for  multifocal pneumonia versus severe inflammatory disease.  2.  No evidence for pulmonary embolism.  3.  Several prominent thoracic lymph nodes.  4.  Small pericardial fluid  Breathing treatments: no inhalers needed; avoid nebulizers in favor of MDIs   IV fluids: none.  Antibiotics: not indicated   COVID-Focused Medications:    - DEXAMETHASONE: started 6 mg daily 10/1    - REMDESIVIR: Started 10/1 and completed 10/5   - Tocilizumab: given 10/2/21  - DVT Prophylaxis: q 12 hour lovenox        - consider anticoag on discharge for 30 days & until return to normal mobility     --135--55.9--20.6--10.4 -- 5.2--5.4  D-dimer 2.19--1.62--1.09--1.44--1.85--3.52--5.90  Fibrinogen 476--852--716--650--538  WBC 7.6--6.7--8.7--8.0--7.3--8.8--11.7--11.3--12.1  Procalcitonin 10/9 <0.05  Blood culture 10/9 NGTD  Nares swab negative for MRSA or SA by PCR      D-dimer trending markedly up. CT chest negative for PE.  Overall, extensive reticular and GGO infiltrates progressed from admission (as would be expected)     No clear evidence of infection at this point. Seems to be improving.    Right foot pain, cool    Possible vascular compromise right lower extremity. Nursing bedside doppler does show right DP blood flow.    - check renal function given IV dye load yesterday and if creatnine ok, CT angio abdomen/pelvis with lower extremity runoff.   Addendum: CT shows infrarenal aortic wall adhered clot and apparent thromboembolic obstruction of right tibial-peroneal trunk with distal reconstitution of tibial and peroneal arteries.    - discussed with vascular surgery [facility]: COVID-related arterial thrombectomies frequently reclot, so surgery is not recommended. ALso, increased heparin resistance in these pts, so anticoagulation with argobactran is recommended. Discussed with pharmacy.    - start argobatran infusion     Hyponatremia    Probably related to volume depletion.    Sodium 129--135 after 600 cc of saline.  - Resolved: Na 135 on 10/2     Possible acute kidney injury superimposed on chronic kidney disease    Last creatinine I can find in his records was 1.28 on March 2, 2016.  Creatinine upon admit 2.20 with a BUN of 44  Suspect some volume depletion-used saline 50 cc an hour for 12 hours.  - Creatinine 2.20--1.29 --1.03--0.99 Resolved 10/4     Transaminitis    Suspect Covid related. ALT trending up. Alk phos and bili normal.   - ALT 49--43--74--109--114--123  - AST 68--41--80--91--74--76  - Continue to follow    Hypertension  Previously on lisinopril-not restarted due to renal function.   - resume lisinopril at half dose     GERD  Continue omeprazole    Malnutrition:  - Level of malnutrition: Severe   - Based on: weight loss, reduced intake     Diet:  Regular  DVT Prophylaxis: Enoxaparin (Lovenox) SQ  Neal Catheter: Not present  Central Lines: None  Code Status:  Full              Discussion: Respiratory function improving.  Right foot an issue.     Disposition Plan   Expected discharge: 4-7 days    Attestation:  Total time: 60 minutes over two visits    Richi aGrza MD  Hospital Medicine        Interval History   Feels better. Gets tired on HFNC after a while and likes BiPAP. C/o right foot pain into right big toe and c/o pain tip of left big toe and is worried about gout.     Physical Exam   Temp:  [97.7  F (36.5  C)-98.9  F (37.2  C)] 98.9  F (37.2  C)  Pulse:  [] 88  Resp:  [13-51] 25  BP: ()/(60-99) 116/82  FiO2 (%):  [75 %-100 %] 80 %  SpO2:  [82 %-98 %] 93 %    Weights:   Vitals:    10/09/21 0747 10/10/21 0623 10/11/21 0608   Weight: 82.6 kg (182 lb 1.6 oz) 81.3 kg (179 lb 3.7 oz) 80.4 kg (177 lb 4 oz)    Body mass index is 26.18 kg/m .    Constitutional: alert and oriented, tired but otherwise nontoxic  CV: Regular, tachy, good wrist pulses bilaterally, DP on right is not palpable, DP ln left is present  Respiratory: medium crackles in bases right > left and a few mild scattered crackles throughout posterior lung fields   GI: Soft, non-tender, bowel sounds normal  Skin: Warm and dry  Musculoskeletal: right toes cool, decreased capillary refill, sensation grossly intact and moves foot and toes normally, no joint inflammation. Left toes warm, cap refill is a little sluggish     Data   Recent Labs   Lab 10/11/21  1844 10/11/21  0525 10/10/21  0534 10/09/21  0550 10/08/21  0530 10/08/21  0530 10/07/21  0530 10/07/21  0530   WBC  --  10.8 12.1* 11.3*   < > 11.7*   < > 8.8   HGB  --  15.0 15.4 15.6   < > 15.3   < > 15.7   MCV  --  87 86 86   < > 86   < > 87   PLT  --  334 386 412   < > 462*   < > 472*   INR 1.06  --   --   --   --   --   --   --      --  140  --   --  139   < > 137   POTASSIUM 4.0  --  4.1  --   --  3.9   < > 3.7   CHLORIDE 108  --  107  --   --  107   < > 107   CO2 26  --  28  --   --  26   < > 23   BUN 27  --  26  --   --  25   < > 25   CR 1.15  --  1.05  --   --  0.95   < > 0.85   ANIONGAP 5  --   5  --   --  6   < > 7   JANET 8.6  --  8.8  --   --  8.2*   < > 8.4*   *  --  93  --   --  96   < > 97   ALBUMIN  --   --  2.3*  --   --   --   --  2.1*   PROTTOTAL  --   --  6.2*  --   --   --   --  5.9*   BILITOTAL  --   --  0.5  --   --   --   --  0.7   ALKPHOS  --   --  79  --   --   --   --  65   ALT  --   --  104*  --   --   --   --  123*   AST  --   --  44  --   --   --   --  76*   TROPONIN  --   --  <0.015  --   --   --   --   --     < > = values in this interval not displayed.       Recent Labs   Lab 10/11/21  1844 10/10/21  0534 10/08/21  0530 10/07/21  0530 10/05/21  0442   * 93 96 97 96        Unresulted Labs Ordered in the Past 30 Days of this Admission     Date and Time Order Name Status Description    10/9/2021  6:04 PM Blood Culture Arm, Right Preliminary     10/9/2021  6:04 PM Blood Culture Arm, Left Preliminary            I reviewed all new labs and imaging results over the last 24 hours. I personally reviewed the chest CT image(s) showing progression of infiltrates from admission, no evidence of heart failure, reviewed and discussed with radiologist.    Medications     - MEDICATION INSTRUCTIONS -       - MEDICATION INSTRUCTIONS -         influenza recomb quadrivalent PF  0.5 mL Intramuscular Prior to discharge     lisinopril  5 mg Oral Daily     miconazole   Topical BID     multivitamin w/minerals  1 tablet Oral Daily     pantoprazole  40 mg Oral QAM     sodium chloride (PF)  3 mL Intracatheter Q8H   Reviewed meds    Richi Garza MD  [facility] Medicine

## 2021-10-13 LAB
ANION GAP SERPL CALCULATED.3IONS-SCNC: 4 MMOL/L (ref 3–14)
APTT PPP: 47 SECONDS (ref 22–38)
BUN SERPL-MCNC: 22 MG/DL (ref 7–30)
CALCIUM SERPL-MCNC: 8.6 MG/DL (ref 8.5–10.1)
CHLORIDE BLD-SCNC: 106 MMOL/L (ref 94–109)
CO2 SERPL-SCNC: 28 MMOL/L (ref 20–32)
CREAT SERPL-MCNC: 0.97 MG/DL (ref 0.66–1.25)
D DIMER PPP FEU-MCNC: 3.79 UG/ML FEU (ref 0–0.5)
ERYTHROCYTE [DISTWIDTH] IN BLOOD BY AUTOMATED COUNT: 12.5 % (ref 10–15)
GFR SERPL CREATININE-BSD FRML MDRD: 86 ML/MIN/1.73M2
GLUCOSE BLD-MCNC: 107 MG/DL (ref 70–99)
HCT VFR BLD AUTO: 45.3 % (ref 40–53)
HGB BLD-MCNC: 15 G/DL (ref 13.3–17.7)
MCH RBC QN AUTO: 29.2 PG (ref 26.5–33)
MCHC RBC AUTO-ENTMCNC: 33.1 G/DL (ref 31.5–36.5)
MCV RBC AUTO: 88 FL (ref 78–100)
PLATELET # BLD AUTO: 279 10E3/UL (ref 150–450)
POTASSIUM BLD-SCNC: 4.4 MMOL/L (ref 3.4–5.3)
RBC # BLD AUTO: 5.13 10E6/UL (ref 4.4–5.9)
SODIUM SERPL-SCNC: 138 MMOL/L (ref 133–144)
WBC # BLD AUTO: 6.8 10E3/UL (ref 4–11)

## 2021-10-13 PROCEDURE — 85379 FIBRIN DEGRADATION QUANT: CPT | Performed by: INTERNAL MEDICINE

## 2021-10-13 PROCEDURE — 250N000013 HC RX MED GY IP 250 OP 250 PS 637: Performed by: INTERNAL MEDICINE

## 2021-10-13 PROCEDURE — 99233 SBSQ HOSP IP/OBS HIGH 50: CPT | Performed by: HOSPITALIST

## 2021-10-13 PROCEDURE — 94660 CPAP INITIATION&MGMT: CPT

## 2021-10-13 PROCEDURE — 250N000013 HC RX MED GY IP 250 OP 250 PS 637: Performed by: HOSPITALIST

## 2021-10-13 PROCEDURE — 85014 HEMATOCRIT: CPT | Performed by: FAMILY MEDICINE

## 2021-10-13 PROCEDURE — 999N000157 HC STATISTIC RCP TIME EA 10 MIN

## 2021-10-13 PROCEDURE — 36415 COLL VENOUS BLD VENIPUNCTURE: CPT | Performed by: INTERNAL MEDICINE

## 2021-10-13 PROCEDURE — 250N000013 HC RX MED GY IP 250 OP 250 PS 637: Performed by: FAMILY MEDICINE

## 2021-10-13 PROCEDURE — 250N000011 HC RX IP 250 OP 636: Performed by: HOSPITALIST

## 2021-10-13 PROCEDURE — 200N000001 HC R&B ICU

## 2021-10-13 PROCEDURE — 250N000011 HC RX IP 250 OP 636: Performed by: FAMILY MEDICINE

## 2021-10-13 PROCEDURE — 80048 BASIC METABOLIC PNL TOTAL CA: CPT | Performed by: HOSPITALIST

## 2021-10-13 PROCEDURE — 85730 THROMBOPLASTIN TIME PARTIAL: CPT | Performed by: INTERNAL MEDICINE

## 2021-10-13 RX ORDER — LORAZEPAM 0.5 MG/1
0.5 TABLET ORAL EVERY 4 HOURS PRN
Status: DISCONTINUED | OUTPATIENT
Start: 2021-10-13 | End: 2021-10-15

## 2021-10-13 RX ORDER — CITALOPRAM HYDROBROMIDE 20 MG/1
20 TABLET ORAL DAILY
Status: DISCONTINUED | OUTPATIENT
Start: 2021-10-13 | End: 2021-11-01 | Stop reason: HOSPADM

## 2021-10-13 RX ADMIN — MORPHINE SULFATE 2 MG: 2 INJECTION, SOLUTION INTRAMUSCULAR; INTRAVENOUS at 19:34

## 2021-10-13 RX ADMIN — OXYCODONE 5 MG: 5 TABLET ORAL at 00:47

## 2021-10-13 RX ADMIN — MICONAZOLE NITRATE: 20 CREAM TOPICAL at 08:33

## 2021-10-13 RX ADMIN — LORAZEPAM 0.5 MG: 0.5 TABLET ORAL at 19:11

## 2021-10-13 RX ADMIN — LORAZEPAM 0.5 MG: 0.5 TABLET ORAL at 09:51

## 2021-10-13 RX ADMIN — ARGATROBAN 1 MCG/KG/MIN: 50 INJECTION INTRAVENOUS at 13:23

## 2021-10-13 RX ADMIN — CITALOPRAM HYDROBROMIDE 20 MG: 20 TABLET ORAL at 09:51

## 2021-10-13 RX ADMIN — MORPHINE SULFATE 2 MG: 2 INJECTION, SOLUTION INTRAMUSCULAR; INTRAVENOUS at 04:22

## 2021-10-13 RX ADMIN — LORAZEPAM 0.5 MG: 0.5 TABLET ORAL at 14:39

## 2021-10-13 RX ADMIN — ARGATROBAN 1 MCG/KG/MIN: 50 INJECTION INTRAVENOUS at 21:40

## 2021-10-13 RX ADMIN — OXYCODONE 5 MG: 5 TABLET ORAL at 04:20

## 2021-10-13 RX ADMIN — Medication 1 TABLET: at 08:32

## 2021-10-13 RX ADMIN — PANTOPRAZOLE SODIUM 40 MG: 40 TABLET, DELAYED RELEASE ORAL at 08:32

## 2021-10-13 RX ADMIN — LISINOPRIL 5 MG: 5 TABLET ORAL at 19:34

## 2021-10-13 RX ADMIN — ARGATROBAN 1 MCG/KG/MIN: 50 INJECTION INTRAVENOUS at 02:39

## 2021-10-13 ASSESSMENT — ACTIVITIES OF DAILY LIVING (ADL)
ADLS_ACUITY_SCORE: 5
ADLS_ACUITY_SCORE: 7
ADLS_ACUITY_SCORE: 5
ADLS_ACUITY_SCORE: 5

## 2021-10-13 ASSESSMENT — MIFFLIN-ST. JEOR: SCORE: 1626.38

## 2021-10-13 NOTE — PROGRESS NOTES
RT called to adjust and evaluate BIPAP mask/machine. BIPAP settings changed to 16/10 80%. Pt assisted to bed, states he is feeling better now. Will continue to monitor.

## 2021-10-13 NOTE — PLAN OF CARE
"Patient able to sleep for 2 hours after ativan given.  Remains on HFNC 60L 78% with oxygen sats 93%.  Patient sating \"I feel calmer\".  Remains sitting up in the recliner.    "

## 2021-10-13 NOTE — PLAN OF CARE
"Patient has been on HFNC 60L 77-78% since 1107am.  Has been sitting up in the recliner since breakfast.  At 1500 patient asking for the \"bedpan\", bed side commode placed next to the patient and patient started having a \"panic attack about not being able to breathe when on the commode\".  Ativan was given, patient's son in room talk to him, patient given different exercises to help with his anxiety.  Patient did eventually sit on the commode, and quickly got off stating \"I can't breathe\".  Patient's son talking patient down during the panic attack. 1507- Oxygen sats 75%, increased the HFNC to 60L 100%.  1515-patient now sitting back in the recliner stating \"I'm feeling relaxed from that med\".  Remains on HFNC  60L 100% and oxygen sats 92%.  "

## 2021-10-13 NOTE — PHARMACY-ANTICOAGULATION SERVICE
Argatroban Dosing Note:     Argatroban continues at 1mcg/kg/min.  This AM aPTT = 47seconds which is  in goal range of 44-88 sec.  Continue current infusion, recheck aPTT tomorrow AM.     Lila Boyle, LachoD

## 2021-10-13 NOTE — PROGRESS NOTES
[facility]    Hospitalist Progress Note  Date of Service (when I saw the patient): 10/13/2021    Assessment & Plan   Liborio Barajas is a 56 year old male admitted on 10/1/2021. He presents with shortness of breath and was found to be Covid positive.    Confirmed COVID-19 infection    Acute Hypoxic Respiratory Failure secondary to COVID-19 infection  Viral Pneumonia secondary to COVID-19 infection     Symptom Onset September 22, 2021   Date of 1st Positive Test 10/01/21      Vaccination Status Not Vaccinated, but interested/contemplative     discontinued isolation precautions 10/12   -Transferred to ICU on 10/2 due to need for HFNC.    - Oxygen: Continue BiPAP 16/8 FIO2 0.8 alternating with HFNC. Seems to be stabilizing last few days, tolerating more HFNC. 10/12 was down to 15L for most of the day but overnight back on bipap   - encouraged prone position as tolerated and patient doing a few hours per day   - Would like patient to mobilize more, will consult PT and OT.  - Labs: Trending D Dimer, CBC, Bmp daily with his clot. CRP & fibrinogen previously noramlized  - Imaging: Chest CT 10/10 with continued multifocal/reticular/GGO PNA as would be expected  Breathing treatments: no inhalers needed; avoid nebulizers in favor of MDIs   IV fluids: none.  Antibiotics: not indicated   COVID-Focused Medications:    - DEXAMETHASONE: started 6 mg daily 10/1 and completed 10 days on 10/10   - REMDESIVIR: Started 10/1 and completed 10/5   - Tocilizumab: given 10/2/21  - DVT Prophylaxis: on therapeutic anticoagulation as of 10/11/21     --135--55.9--20.6--10.4 -- 5.2--5.4 ? <2.9  D-dimer 2.19--1.62--1.09--1.44--1.85--3.52--5.90 ? 3.67 ? 3.79  Fibrinogen 476--852--716--650--538 ? 417  WBC 7.6--6.7--8.7--8.0--7.3--8.8--11.7--11.3--12.1  Procalcitonin 10/9 <0.05  Blood culture 10/9 NGTD  Nares swab negative for MRSA or SA by PCR    Infrarenal Aortic Thrombus with thromboembolic obstruction of right  tibial-peroneal trunk  R foot pain and diminished RLE pulses noted 10/11. CT revealed infrarenal aortic wall adhered clot and apparent thromboembolic obstruction of right tibial-peroneal trunk with distal reconstitution of tibial and peroneal arteries. Was discussed with vascular surgery [facility]: COVID-related arterial thrombectomies frequently reclot, so surgery is not recommended. ALso, increased heparin resistance in these pts, so anticoagulation with argobactran is recommended. Symptoms seem to be stable today, pulses remain palpable   - continue argobatran infusion started 10/11/21, pharmD monitoring   - If this remains stable, could transition over to a oral DOAC    Anxiety  Patient extremely fearful of being short of breath; this is limiting his ability to work with therapies etc.  -Lorazepam 0.5 mg every 4 hours as needed  -Also initiate citalopram 20 although acknowledge that this will take some time to see effects from.     Hyponatremia   Present on admission. Probably related to volume depletion. Sodium 129 ? 600 cc of saline ? 135. Resolved     Possible acute kidney injury superimposed on chronic kidney disease stage 2    Last creatinine in his records was 1.28 on March 2, 2016.  Creatinine upon admit 2.20 with a BUN of 44  Suspect some volume depletion-used saline 50 cc an hour for 12 hours.  - Creatinine 2.20--1.29 --1.03--0.99 Resolved 10/4.     Transaminitis    Suspect Covid related. ALT trending up. Alk phos and bili normal.   - ALT 49--43--74--109--114--123 -- 103  - AST 68--41--80--91--74--76--46  - Continue to follow intermittently - will check tomorrow    Hypertension  Previously on lisinopril   - resumed lisinopril at half dose     GERD  Continue omeprazole    Malnutrition:  - Level of malnutrition: Severe   - Based on: weight loss, reduced intake     Diet:  Regular  DVT Prophylaxis: Enoxaparin (Lovenox) SQ  Neal Catheter: Not present  Central Lines: None  Code Status:  Full               Discussion: Respiratory function improving but now has aortic clot    Disposition Plan   Expected discharge: 4-7 days    Attestation:  Reviewed labs and nursing notes. Discussed with RN at bedside.     Total time: 40 minutes with 25 minutes spent with coordination of care and counseling reviewing plan of care with patient and also RN and also Care manager. Also will discuss with wife via telephone later this morning.       Lucas Melo MD  [facility] Medicine Service            Interval History   Nurse reports that patient was able to be on regular oxygen during the day yesterday but then was placed back on BiPAP overnight.  He has quite bit of anxiety and is worried that he will be able to get enough air if he is using anything besides the BiPAP mask.    Physical Exam   Temp:  [97.4  F (36.3  C)-98.1  F (36.7  C)] 97.4  F (36.3  C)  Pulse:  [] 85  Resp:  [15-31] 22  BP: ()/(64-96) 127/83  FiO2 (%):  [65 %-100 %] 100 %  SpO2:  [75 %-98 %] 92 %    Weights:   Vitals:    10/11/21 0608 10/12/21 0400 10/13/21 0600   Weight: 80.4 kg (177 lb 4 oz) 79.9 kg (176 lb 2.4 oz) 81.6 kg (179 lb 14.3 oz)    Body mass index is 26.57 kg/m .    Constitutional: alert and oriented, tired but otherwise nontoxic  CV: Regular, tachy, good wrist pulses bilaterally, DP on right is not palpable, DP ln left is present  Respiratory: Faint crackles bilaterally at the bases  GI: Soft, non-tender, bowel sounds normal  Skin: Warm and dry  Musculoskeletal: R foot remains cool to touch.     Data   Recent Labs   Lab 10/13/21  0508 10/12/21  0234 10/11/21  2342 10/11/21  1844 10/11/21  0525 10/10/21  0534   WBC 6.8 9.6 10.5  --    < > 12.1*   HGB 15.0 14.7 14.4  --    < > 15.4   MCV 88 86 87  --    < > 86    308 326  --    < > 386   INR  --   --   --  1.06  --   --     139  --  139  --  140   POTASSIUM 4.4 4.2  --  4.0  --  4.1   CHLORIDE 106 106  --  108  --  107   CO2 28 30  --  26  --  28   BUN 22 24  --  27  --  26    CR 0.97 1.07  --  1.15  --  1.05   ANIONGAP 4 3  --  5  --  5   JANET 8.6 7.9*  --  8.6  --  8.8   * 97  --  155*  --  93   ALBUMIN  --   --  2.2*  --   --  2.3*   PROTTOTAL  --   --  5.9*  --   --  6.2*   BILITOTAL  --   --  0.4  --   --  0.5   ALKPHOS  --   --  79  --   --  79   ALT  --   --  103*  --   --  104*   AST  --   --  46*  --   --  44   TROPONIN  --   --   --   --   --  <0.015    < > = values in this interval not displayed.       Recent Labs   Lab 10/13/21  0508 10/12/21  0234 10/11/21  1844 10/10/21  0534 10/08/21  0530 10/07/21  0530   * 97 155* 93 96 97        Unresulted Labs Ordered in the Past 30 Days of this Admission     Date and Time Order Name Status Description    10/9/2021  6:04 PM Blood Culture Arm, Right Preliminary     10/9/2021  6:04 PM Blood Culture Arm, Left Preliminary            I reviewed all new labs and imaging results over the last 24 hours. I personally reviewed no new imaging today.     Medications     argatroban 1 mcg/kg/min (10/13/21 0900)     - MEDICATION INSTRUCTIONS -       - MEDICATION INSTRUCTIONS -         citalopram  20 mg Oral Daily     influenza recomb quadrivalent PF  0.5 mL Intramuscular Prior to discharge     lisinopril  5 mg Oral Daily     miconazole   Topical BID     multivitamin w/minerals  1 tablet Oral Daily     pantoprazole  40 mg Oral QAM     sodium chloride (PF)  3 mL Intracatheter Q8H   Reviewed meds

## 2021-10-13 NOTE — PROGRESS NOTES
Pt has been on HFNC since 0000 at 80% oxygen/60 L, pt has maintained his sat's >89% at rest but with any activity, especially when getting up to the bedside commode, he de-sat's into the 60's.  Once he returns to his bed, does some deep breathing his sat's improve some.  With his anxiety it's sometimes  difficult to relax.  Pt has had Morphine IV and Oxycodone for air hunger, he also has chronic back pain in which the Oxycodone helps.  Will continue to monitor close for changes.

## 2021-10-13 NOTE — PROGRESS NOTES
Per pharmacy pt's morning aPTT was 47 in which the goal range is 44-88.  Will continue to run the Argatroban at 1mcg/kg/min, pt's weight the dose is calculated at is 80.4 kg=80.4mcg/min or 4.82 ml/hr. Next lab draw will be Thursday morning.  Will continue to monitor close for changes.

## 2021-10-13 NOTE — PROGRESS NOTES
Pt sitting in chair with 15 liters oximyzer cannula on. Pt needing to change his underwear, and this activity, with help, caused him to become very SOA with sats in the 60's, RR 50's. Pt placed on BIPAP which he agreed would be best as he was so severely SOA.

## 2021-10-14 LAB
ALBUMIN SERPL-MCNC: 2.3 G/DL (ref 3.4–5)
ALP SERPL-CCNC: 91 U/L (ref 40–150)
ALT SERPL W P-5'-P-CCNC: 106 U/L (ref 0–70)
ANION GAP SERPL CALCULATED.3IONS-SCNC: 6 MMOL/L (ref 3–14)
APTT PPP: 51 SECONDS (ref 22–38)
AST SERPL W P-5'-P-CCNC: 53 U/L (ref 0–45)
BILIRUB DIRECT SERPL-MCNC: 0.2 MG/DL (ref 0–0.2)
BILIRUB SERPL-MCNC: 0.5 MG/DL (ref 0.2–1.3)
BUN SERPL-MCNC: 19 MG/DL (ref 7–30)
CALCIUM SERPL-MCNC: 8.4 MG/DL (ref 8.5–10.1)
CHLORIDE BLD-SCNC: 106 MMOL/L (ref 94–109)
CO2 SERPL-SCNC: 26 MMOL/L (ref 20–32)
CREAT SERPL-MCNC: 0.89 MG/DL (ref 0.66–1.25)
D DIMER PPP FEU-MCNC: 3.29 UG/ML FEU (ref 0–0.5)
ERYTHROCYTE [DISTWIDTH] IN BLOOD BY AUTOMATED COUNT: 12.3 % (ref 10–15)
GFR SERPL CREATININE-BSD FRML MDRD: >90 ML/MIN/1.73M2
GLUCOSE BLD-MCNC: 101 MG/DL (ref 70–99)
HCT VFR BLD AUTO: 44.1 % (ref 40–53)
HGB BLD-MCNC: 15 G/DL (ref 13.3–17.7)
LACTATE SERPL-SCNC: 0.9 MMOL/L (ref 0.7–2)
MCH RBC QN AUTO: 29.2 PG (ref 26.5–33)
MCHC RBC AUTO-ENTMCNC: 34 G/DL (ref 31.5–36.5)
MCV RBC AUTO: 86 FL (ref 78–100)
PLATELET # BLD AUTO: 261 10E3/UL (ref 150–450)
POTASSIUM BLD-SCNC: 4.4 MMOL/L (ref 3.4–5.3)
PROT SERPL-MCNC: 6.1 G/DL (ref 6.8–8.8)
RBC # BLD AUTO: 5.14 10E6/UL (ref 4.4–5.9)
SODIUM SERPL-SCNC: 138 MMOL/L (ref 133–144)
WBC # BLD AUTO: 6.9 10E3/UL (ref 4–11)

## 2021-10-14 PROCEDURE — 85027 COMPLETE CBC AUTOMATED: CPT | Performed by: FAMILY MEDICINE

## 2021-10-14 PROCEDURE — 250N000013 HC RX MED GY IP 250 OP 250 PS 637: Performed by: HOSPITALIST

## 2021-10-14 PROCEDURE — 250N000013 HC RX MED GY IP 250 OP 250 PS 637: Performed by: INTERNAL MEDICINE

## 2021-10-14 PROCEDURE — 200N000001 HC R&B ICU

## 2021-10-14 PROCEDURE — 85730 THROMBOPLASTIN TIME PARTIAL: CPT | Performed by: INTERNAL MEDICINE

## 2021-10-14 PROCEDURE — 250N000011 HC RX IP 250 OP 636: Performed by: FAMILY MEDICINE

## 2021-10-14 PROCEDURE — 99232 SBSQ HOSP IP/OBS MODERATE 35: CPT | Performed by: INTERNAL MEDICINE

## 2021-10-14 PROCEDURE — 250N000011 HC RX IP 250 OP 636: Performed by: HOSPITALIST

## 2021-10-14 PROCEDURE — 250N000013 HC RX MED GY IP 250 OP 250 PS 637: Performed by: FAMILY MEDICINE

## 2021-10-14 PROCEDURE — 80048 BASIC METABOLIC PNL TOTAL CA: CPT | Performed by: HOSPITALIST

## 2021-10-14 PROCEDURE — 83605 ASSAY OF LACTIC ACID: CPT | Performed by: INTERNAL MEDICINE

## 2021-10-14 PROCEDURE — 82248 BILIRUBIN DIRECT: CPT | Performed by: HOSPITALIST

## 2021-10-14 PROCEDURE — 85379 FIBRIN DEGRADATION QUANT: CPT | Performed by: INTERNAL MEDICINE

## 2021-10-14 PROCEDURE — 36415 COLL VENOUS BLD VENIPUNCTURE: CPT | Performed by: HOSPITALIST

## 2021-10-14 PROCEDURE — 999N000157 HC STATISTIC RCP TIME EA 10 MIN

## 2021-10-14 PROCEDURE — 36415 COLL VENOUS BLD VENIPUNCTURE: CPT | Performed by: INTERNAL MEDICINE

## 2021-10-14 PROCEDURE — 94660 CPAP INITIATION&MGMT: CPT

## 2021-10-14 RX ORDER — MORPHINE SULFATE 2 MG/ML
2 INJECTION, SOLUTION INTRAMUSCULAR; INTRAVENOUS
Status: DISCONTINUED | OUTPATIENT
Start: 2021-10-14 | End: 2021-10-20

## 2021-10-14 RX ORDER — AMOXICILLIN 250 MG
1 CAPSULE ORAL AT BEDTIME
Status: DISCONTINUED | OUTPATIENT
Start: 2021-10-14 | End: 2021-10-23

## 2021-10-14 RX ADMIN — MORPHINE SULFATE 2 MG: 2 INJECTION, SOLUTION INTRAMUSCULAR; INTRAVENOUS at 02:25

## 2021-10-14 RX ADMIN — Medication 5 MG: at 20:58

## 2021-10-14 RX ADMIN — CITALOPRAM HYDROBROMIDE 20 MG: 20 TABLET ORAL at 08:16

## 2021-10-14 RX ADMIN — PANTOPRAZOLE SODIUM 40 MG: 40 TABLET, DELAYED RELEASE ORAL at 08:16

## 2021-10-14 RX ADMIN — LISINOPRIL 5 MG: 5 TABLET ORAL at 19:21

## 2021-10-14 RX ADMIN — MICONAZOLE NITRATE: 20 CREAM TOPICAL at 19:21

## 2021-10-14 RX ADMIN — ARGATROBAN 1 MCG/KG/MIN: 50 INJECTION INTRAVENOUS at 08:12

## 2021-10-14 RX ADMIN — Medication 1 TABLET: at 08:16

## 2021-10-14 RX ADMIN — ARGATROBAN 1 MCG/KG/MIN: 50 INJECTION INTRAVENOUS at 18:40

## 2021-10-14 RX ADMIN — OXYCODONE 5 MG: 5 TABLET ORAL at 05:59

## 2021-10-14 RX ADMIN — LORAZEPAM 0.5 MG: 0.5 TABLET ORAL at 02:25

## 2021-10-14 RX ADMIN — MORPHINE SULFATE 2 MG: 2 INJECTION, SOLUTION INTRAMUSCULAR; INTRAVENOUS at 05:59

## 2021-10-14 ASSESSMENT — ACTIVITIES OF DAILY LIVING (ADL)
ADLS_ACUITY_SCORE: 9
ADLS_ACUITY_SCORE: 9
ADLS_ACUITY_SCORE: 5
ADLS_ACUITY_SCORE: 9

## 2021-10-14 ASSESSMENT — MIFFLIN-ST. JEOR: SCORE: 1610.38

## 2021-10-14 NOTE — PLAN OF CARE
Patient remains HFNC 70% 60lpm O2 sats 90-94%. Patient has been proning most of the shift.With any activity O2 sats are dropping to 70% , SOB and tachypnea present.Relaxation, pursed lip breathing , morphine  are helpful to recover.Patient reported R leg pain, PRN Oxy given with some relief.Patient had couple episodes of increased anxiety, PRN ativan given with the relief.

## 2021-10-14 NOTE — PHARMACY-ANTICOAGULATION SERVICE
Argatroban Dosing Note:    Argatroban infusion continues at 1mcg/kg/min.  The aPTT this AM=51 seconds, within goal range of 44-88 sec.  Continue current rate and recheck aPTT tomorrow AM.    Lacho PeresD

## 2021-10-14 NOTE — PROGRESS NOTES
[facility]    Hospitalist Progress Note  Date of Service (when I saw the patient): 10/14/2021    Assessment & Plan   Liborio Barajas is a 56 year old male admitted on 10/1/2021. He presents with shortness of breath and was found to be Covid positive.    Confirmed COVID-19 infection    Acute Hypoxic Respiratory Failure secondary to COVID-19 infection  Viral Pneumonia secondary to COVID-19 infection     Symptom Onset September 22, 2021   Date of 1st Positive Test 10/01/21      Vaccination Status Not Vaccinated, but interested/contemplative     discontinued isolation precautions 10/12   -Transferred to ICU on 10/2 due to need for HFNC.    - Oxygen: Continue BiPAP 16/8 FIO2 0.8 alternating with HFNC. Seems to be stabilizing last few days, tolerating more HFNC. 10/12 was down to 15L for most of the day but overnight back on bipap   - encouraged prone position as tolerated and patient doing a few hours per day   - Would like patient to mobilize more, will consult PT and OT.  - Labs: Trending D Dimer, CBC, Bmp daily with his clot. CRP & fibrinogen previously noramlized  - Imaging: Chest CT 10/10 with continued multifocal/reticular/GGO PNA as would be expected  Breathing treatments: no inhalers needed; avoid nebulizers in favor of MDIs   IV fluids: none.  Antibiotics: not indicated   COVID-Focused Medications:    - DEXAMETHASONE: started 6 mg daily 10/1 and completed 10 days on 10/10   - REMDESIVIR: Started 10/1 and completed 10/5   - Tocilizumab: given 10/2/21  - DVT Prophylaxis: on therapeutic anticoagulation as of 10/11/21     --135--55.9--20.6--10.4 -- 5.2--5.4 ? <2.9  D-dimer 2.19--1.62--1.09--1.44--1.85--3.52--5.90 ? 3.67 ? 3.79 -> 3.29  Fibrinogen 476--852--716--650--538 ? 417  WBC 7.6--6.7--8.7--8.0--7.3--8.8--11.7--11.3--12.1  Procalcitonin 10/9 <0.05  Blood culture 10/9 NGTD  Nares swab negative for MRSA or SA by PCR    Infrarenal Aortic Thrombus with thromboembolic obstruction of  right tibial-peroneal trunk    Right foot pain and diminished RLE pulses noted 10/11. CT revealed infrarenal aortic wall adhered clot and apparent thromboembolic obstruction of right tibial-peroneal trunk with distal reconstitution of tibial and peroneal arteries. Was discussed with vascular surgery [facility]: COVID-related arterial thrombectomies frequently reclot, so surgery is not recommended. ALso, increased heparin resistance in these pts, so anticoagulation with argobactran is recommended.       Perfusion appears a little improved from 10/11 but not normalized.    - continue argatroban infusion started 10/11/21, pharmD monitoring   - If this remains stable, could transition over to a oral DOAC soon    Anxiety    Patient extremely fearful of being short of breath; this is limiting his ability to work with therapies etc. Suspect due to dyspnea.   - low dose oral oxycodone prn dyspnea  - Initiated citalopram 20 this admission although acknowledge that this will take some time to see effects from.     Hyponatremia   Present on admission. Probably related to volume depletion. Sodium 129 ? 600 cc of saline ? 135. Resolved     Possible acute kidney injury superimposed on chronic kidney disease stage 2    Last creatinine in his records was 1.28 on March 2, 2016.  Creatinine upon admit 2.20 with a BUN of 44  Suspect some volume depletion-used saline 50 cc an hour for 12 hours.  - Creatinine 2.20--1.29 --1.03--0.99 Resolved 10/4.     Transaminitis    Suspect Covid related. ALT trending up. Alk phos and bili normal.   - ALT 49--43--74--109--114--123 -- 103  - AST 68--41--80--91--74--76--46  - Continue to follow intermittently - will check tomorrow    Hypertension  Previously on lisinopril   - resumed lisinopril at half dose     GERD  Continue omeprazole    Malnutrition:  - Level of malnutrition: Severe   - Based on: weight loss, reduced intake     Diet:  Regular  DVT Prophylaxis: Enoxaparin (Lovenox) SQ  Neal Catheter: Not  present  Central Lines: None  Code Status:  Full      Discussion: respiratory status is stable but still needing HFNC and alternating BiPAP.     Disposition Plan   Expected discharge: continue ICU, discharge not year clear. Updated his wife by phone.     Attestation:  Total time: 35 minutes    Richi Garza MD  [facility] Medicine      Interval History   C/o anxiety overnight, woke up and felt he wasn't getting enough air (though he was). Feels meds from yesterday making him groggy today. No foot pain. No chest pain.     Physical Exam   Temp:  [97.7  F (36.5  C)-98.3  F (36.8  C)] 98.3  F (36.8  C)  Pulse:  [] 121  Resp:  [14-38] 24  BP: ()/(63-98) 103/66  FiO2 (%):  [70 %-100 %] 95 %  SpO2:  [71 %-97 %] 90 %    Weights:   Vitals:    10/12/21 0400 10/13/21 0600 10/14/21 0609   Weight: 79.9 kg (176 lb 2.4 oz) 81.6 kg (179 lb 14.3 oz) 80 kg (176 lb 5.9 oz)    Body mass index is 26.05 kg/m .    Constitutional: alert and oriented, mild anxious appearing, nontoxic  CV: Regular, no edema  Respiratory: a few right base crackles otherwise clear to auscultation bilaterally  GI: Soft, non-tender  Skin: Warm and dry  Musculoskeletal: right foot: toes without lesions, cap refill is mild-mod sluggish, toes are cool but foot is warm at midfoot, DP pulse intermittently palpable. Left foot warm.     Data   Recent Labs   Lab 10/14/21  0529 10/13/21  0508 10/12/21  0234 10/11/21  2342 10/11/21  1844 10/11/21  1844 10/11/21  0525 10/10/21  0534 10/10/21  0534   WBC 6.9 6.8 9.6 10.5   < >  --    < >   < > 12.1*   HGB 15.0 15.0 14.7 14.4   < >  --    < >   < > 15.4   MCV 86 88 86 87   < >  --    < >   < > 86    279 308 326   < >  --    < >   < > 386   INR  --   --   --   --   --  1.06  --   --   --     138 139  --    < > 139  --   --  140   POTASSIUM 4.4 4.4 4.2  --    < > 4.0  --   --  4.1   CHLORIDE 106 106 106  --    < > 108  --   --  107   CO2 26 28 30  --    < > 26  --   --  28   BUN 19 22 24  --    < > 27  --    --  26   CR 0.89 0.97 1.07  --    < > 1.15  --   --  1.05   ANIONGAP 6 4 3  --    < > 5  --   --  5   JANET 8.4* 8.6 7.9*  --    < > 8.6  --   --  8.8   * 107* 97  --    < > 155*  --   --  93   ALBUMIN 2.3*  --   --  2.2*  --   --   --    < > 2.3*   PROTTOTAL 6.1*  --   --  5.9*  --   --   --    < > 6.2*   BILITOTAL 0.5  --   --  0.4  --   --   --    < > 0.5   ALKPHOS 91  --   --  79  --   --   --    < > 79   *  --   --  103*  --   --   --    < > 104*   AST 53*  --   --  46*  --   --   --    < > 44   TROPONIN  --   --   --   --   --   --   --   --  <0.015    < > = values in this interval not displayed.       Recent Labs   Lab 10/14/21  0529 10/13/21  0508 10/12/21  0234 10/11/21  1844 10/10/21  0534 10/08/21  0530   * 107* 97 155* 93 96        Unresulted Labs Ordered in the Past 30 Days of this Admission     Date and Time Order Name Status Description    10/9/2021  6:04 PM Blood Culture Arm, Right Preliminary     10/9/2021  6:04 PM Blood Culture Arm, Left Preliminary            Imaging: No results found for this or any previous visit (from the past 24 hour(s)).     I reviewed all new labs and imaging results over the last 24 hours. I personally reviewed no images or EKG's today.    Medications     argatroban 1 mcg/kg/min (10/14/21 0812)     - MEDICATION INSTRUCTIONS -       - MEDICATION INSTRUCTIONS -         citalopram  20 mg Oral Daily     influenza recomb quadrivalent PF  0.5 mL Intramuscular Prior to discharge     lisinopril  5 mg Oral Daily     miconazole   Topical BID     multivitamin w/minerals  1 tablet Oral Daily     pantoprazole  40 mg Oral QAM     sodium chloride (PF)  3 mL Intracatheter Q8H   Reviewed aburees     Richi Garza MD  [facility] Medicine

## 2021-10-14 NOTE — PROGRESS NOTES
Pt sat up in the chair throughout the evening, remains on HFNC 60L/95%. Wife present at dinner time which seemed to boost patients mood. Pt now resting in bed, call light within reach.

## 2021-10-14 NOTE — PLAN OF CARE
Pt stable during shift - sats 86-88% on 70%/60L.  Sats decreased to 67% while using commode - recovered in about 5 mins.  Hi-flow increased to 95%/60L at one point due to sats 85% on 80%/60L.  Complained of feeling like he has been taking too much pain & anxiety medication so trying to not take as much.  Encouraged him to let staff know if he needs oxycodone which is not as strong as ativan/morphine & can still help w/ air hunger.  Heart rates in the 140s when moving.  Up in chair all of shift.

## 2021-10-15 LAB
ANION GAP SERPL CALCULATED.3IONS-SCNC: 5 MMOL/L (ref 3–14)
APTT PPP: 57 SECONDS (ref 22–38)
BACTERIA BLD CULT: NO GROWTH
BACTERIA BLD CULT: NO GROWTH
BUN SERPL-MCNC: 17 MG/DL (ref 7–30)
CALCIUM SERPL-MCNC: 8.6 MG/DL (ref 8.5–10.1)
CHLORIDE BLD-SCNC: 104 MMOL/L (ref 94–109)
CO2 SERPL-SCNC: 27 MMOL/L (ref 20–32)
CREAT SERPL-MCNC: 0.94 MG/DL (ref 0.66–1.25)
D DIMER PPP FEU-MCNC: 2.56 UG/ML FEU (ref 0–0.5)
ERYTHROCYTE [DISTWIDTH] IN BLOOD BY AUTOMATED COUNT: 12.8 % (ref 10–15)
GFR SERPL CREATININE-BSD FRML MDRD: 89 ML/MIN/1.73M2
GLUCOSE BLD-MCNC: 97 MG/DL (ref 70–99)
HCT VFR BLD AUTO: 43.8 % (ref 40–53)
HGB BLD-MCNC: 14.7 G/DL (ref 13.3–17.7)
MCH RBC QN AUTO: 29.3 PG (ref 26.5–33)
MCHC RBC AUTO-ENTMCNC: 33.6 G/DL (ref 31.5–36.5)
MCV RBC AUTO: 87 FL (ref 78–100)
PLATELET # BLD AUTO: 223 10E3/UL (ref 150–450)
POTASSIUM BLD-SCNC: 4.4 MMOL/L (ref 3.4–5.3)
RBC # BLD AUTO: 5.01 10E6/UL (ref 4.4–5.9)
SODIUM SERPL-SCNC: 136 MMOL/L (ref 133–144)
WBC # BLD AUTO: 7 10E3/UL (ref 4–11)

## 2021-10-15 PROCEDURE — 999N000215 HC STATISTIC HFNC ADULT NON-CPAP

## 2021-10-15 PROCEDURE — 85379 FIBRIN DEGRADATION QUANT: CPT | Performed by: INTERNAL MEDICINE

## 2021-10-15 PROCEDURE — 250N000013 HC RX MED GY IP 250 OP 250 PS 637: Performed by: FAMILY MEDICINE

## 2021-10-15 PROCEDURE — 36415 COLL VENOUS BLD VENIPUNCTURE: CPT | Performed by: INTERNAL MEDICINE

## 2021-10-15 PROCEDURE — 85027 COMPLETE CBC AUTOMATED: CPT | Performed by: FAMILY MEDICINE

## 2021-10-15 PROCEDURE — 999N000105 HC STATISTIC NO DOCUMENTATION TO SUPPORT CHARGE

## 2021-10-15 PROCEDURE — 250N000013 HC RX MED GY IP 250 OP 250 PS 637: Performed by: INTERNAL MEDICINE

## 2021-10-15 PROCEDURE — 200N000001 HC R&B ICU

## 2021-10-15 PROCEDURE — 80048 BASIC METABOLIC PNL TOTAL CA: CPT | Performed by: HOSPITALIST

## 2021-10-15 PROCEDURE — 999N000157 HC STATISTIC RCP TIME EA 10 MIN

## 2021-10-15 PROCEDURE — 999N000123 HC STATISTIC OXYGEN O2DAILY TECH TIME

## 2021-10-15 PROCEDURE — 94660 CPAP INITIATION&MGMT: CPT

## 2021-10-15 PROCEDURE — 250N000011 HC RX IP 250 OP 636: Performed by: HOSPITALIST

## 2021-10-15 PROCEDURE — 99233 SBSQ HOSP IP/OBS HIGH 50: CPT | Performed by: INTERNAL MEDICINE

## 2021-10-15 PROCEDURE — 250N000013 HC RX MED GY IP 250 OP 250 PS 637: Performed by: HOSPITALIST

## 2021-10-15 PROCEDURE — 85730 THROMBOPLASTIN TIME PARTIAL: CPT | Performed by: INTERNAL MEDICINE

## 2021-10-15 RX ORDER — LORAZEPAM 0.5 MG/1
.5-1 TABLET ORAL
Status: DISCONTINUED | OUTPATIENT
Start: 2021-10-15 | End: 2021-10-22

## 2021-10-15 RX ADMIN — ARGATROBAN 1 MCG/KG/MIN: 50 INJECTION INTRAVENOUS at 04:27

## 2021-10-15 RX ADMIN — Medication 5 MG: at 06:53

## 2021-10-15 RX ADMIN — LISINOPRIL 5 MG: 5 TABLET ORAL at 21:44

## 2021-10-15 RX ADMIN — PANTOPRAZOLE SODIUM 40 MG: 40 TABLET, DELAYED RELEASE ORAL at 11:49

## 2021-10-15 RX ADMIN — Medication 5 MG: at 11:48

## 2021-10-15 RX ADMIN — ARGATROBAN 1 MCG/KG/MIN: 50 INJECTION INTRAVENOUS at 15:54

## 2021-10-15 RX ADMIN — LORAZEPAM 1 MG: 0.5 TABLET ORAL at 18:32

## 2021-10-15 RX ADMIN — Medication 1 TABLET: at 11:49

## 2021-10-15 RX ADMIN — Medication 5 MG: at 15:15

## 2021-10-15 RX ADMIN — CITALOPRAM HYDROBROMIDE 20 MG: 20 TABLET ORAL at 11:49

## 2021-10-15 RX ADMIN — LORAZEPAM 0.5 MG: 0.5 TABLET ORAL at 03:05

## 2021-10-15 ASSESSMENT — MIFFLIN-ST. JEOR: SCORE: 1633.38

## 2021-10-15 ASSESSMENT — ACTIVITIES OF DAILY LIVING (ADL)
ADLS_ACUITY_SCORE: 7
ADLS_ACUITY_SCORE: 9
ADLS_ACUITY_SCORE: 7
ADLS_ACUITY_SCORE: 7

## 2021-10-15 NOTE — PLAN OF CARE
Pt desats quickly to mid 60s when exerting minimal energy such as standing when on hiflow 95% 60L.  Able to tolerate hiflow 95%/60L when sitting at rest and eating.  Pt up to chair most of shift - used commode x2.  Very anxious to move to commode as it will take him about 1h to psych himself up to move due to anxiety.  Pt agreed to move to chair to bed to prone at 1830 this evening, gave him plenty of time to psych himself up to do so.  Explained to him he needs to prone to see if it can improve his tachypnea/desaturating/anxiety.  Proned as discussed at 1830 - pt prefers to wear bipap while proning, sats immediately at 95% when proned & tolerating well.

## 2021-10-15 NOTE — SIGNIFICANT EVENT
Pt transitioned from bipap 90% to hi-flow 95%/60L - sats 90% at rest.  Up to commode - sats dipped to 54%, only recovered to 65% before bipap 100% reapplied.  Pt extremities purple/blue.  Sitting in chair sats up to 89% on 100% fio2 - tachypneic & tachycardic.  Dr. Garza notified.

## 2021-10-15 NOTE — PLAN OF CARE
Alternated between high flow and bipap. Encouraged to prone but pt refused, proning education provided and pt verbalized understanding. Attempted to use commode but became too hypoxic, bed pan used instead. Using call light appropriately and makes needs known.   Rafaela Ribeiro RN on 10/15/2021 at 6:38 AM

## 2021-10-15 NOTE — PHARMACY-ANTICOAGULATION SERVICE
Argatroban Dosing Note:     Argatroban infusion continues at 1mcg/kg/min.  The aPTT this AM=57 seconds, within goal range of 44-88 sec.  Continue current rate and recheck aPTT tomorrow AM.     Lacho PeresD

## 2021-10-15 NOTE — PROGRESS NOTES
[facility]    Hospitalist Progress Note  Date of Service (when I saw the patient): 10/15/2021    Assessment & Plan   Liborio Barajas is a 56 year old male admitted on 10/1/2021. He presents with shortness of breath and was found to be Covid positive.    Confirmed COVID-19 infection    Acute Hypoxic Respiratory Failure secondary to COVID-19 infection  Viral Pneumonia secondary to COVID-19 infection     Symptom Onset September 22, 2021   Date of 1st Positive Test 10/01/21      Vaccination Status Not Vaccinated, but interested/contemplative     discontinued isolation precautions 10/12   -Transferred to ICU on 10/2 due to need for HFNC.    - Oxygen: Continue BiPAP 16/8 FIO2 0.8 alternating with HFNC. Seems to be stabilizing last few days, tolerating more HFNC. 10/12 was down to 15L for most of the day but overnight back on bipap. Patient does better with proning overnight and this was reinforced with him again 10/15.   - Labs: Trending D Dimer, CBC, Bmp daily with his clot. CRP & fibrinogen previously noramlized  - Imaging: Chest CT 10/10 with continued multifocal/reticular/GGO PNA as would be expected  Breathing treatments: no inhalers needed; avoid nebulizers in favor of MDIs   IV fluids: none.  Antibiotics: not indicated   COVID-Focused Medications:    - DEXAMETHASONE: started 6 mg daily 10/1 and completed 10 days on 10/10   - REMDESIVIR: Started 10/1 and completed 10/5   - Tocilizumab: given 10/2/21  - DVT Prophylaxis: on therapeutic anticoagulation as of 10/11/21     --135--55.9--20.6--10.4 -- 5.2--5.4 ? <2.9  D-dimer 2.19--1.62--1.09--1.44--1.85--3.52--5.90 ? 3.67 ? 3.79 -> 3.29 -> 2.56  Fibrinogen 476--852--716--650--538 ? 417  WBC 7.6--6.7--8.7--8.0--7.3--8.8--11.7--11.3--12.1  Procalcitonin 10/9 <0.05  Blood culture 10/9 NG  Nares swab negative for MRSA or SA by PCR    Infrarenal Aortic Thrombus with thromboembolic obstruction of right tibial-peroneal trunk    Right foot pain  and diminished RLE pulses noted 10/11. CT revealed infrarenal aortic wall adhered clot and apparent thromboembolic obstruction of right tibial-peroneal trunk with distal reconstitution of tibial and peroneal arteries. Was discussed with vascular surgery [facility]: COVID-related arterial thrombectomies frequently reclot, so surgery is not recommended. ALso, increased heparin resistance in these pts, so anticoagulation with argobactran is recommended.       Perfusion appears a little improved from 10/11 but not normalized.    - continue argatroban infusion started 10/11/21, pharmD monitoring   - transition to apixaban in the morning, will use starting dose of 10 mg BID for the first week    Anxiety    Patient extremely fearful of being short of breath; this is limiting his ability to work with therapies etc. Suspect due to dyspnea.   - low dose oral oxycodone prn dyspnea  - Initiated citalopram 20 this admission although acknowledge that this will take some time to see effects from.     Hyponatremia   Present on admission. Probably related to volume depletion. Sodium 129 ? 600 cc of saline ? 135. Resolved     Possible acute kidney injury superimposed on chronic kidney disease stage 2    Last creatinine in his records was 1.28 on March 2, 2016.  Creatinine upon admit 2.20 with a BUN of 44  Suspect some volume depletion-used saline 50 cc an hour for 12 hours.  - Creatinine 2.20--1.29 --1.03--0.99 Resolved 10/4.     Transaminitis    Suspect Covid related. ALT trending up. Alk phos and bili normal.   - ALT 49--43--74--109--114--123 -- 103  - AST 68--41--80--91--74--76--46  - Continue to follow intermittently - will check tomorrow    Hypertension  Previously on lisinopril   - resumed lisinopril at half dose     GERD  Continue omeprazole    Malnutrition:  - Level of malnutrition: Severe   - Based on: weight loss, reduced intake     Diet:  Regular  DVT Prophylaxis: Enoxaparin (Lovenox) SQ  Neal Catheter: Not present  Central  Lines: None  Code Status:  Full      Discussion: Overall, respiratory status appears the same and is somewhat tenuous. Very poor exercise tolerance with prolonged recovery.  Would benefit from proning at night and this was discussed at length with the patient.  If he is in bed for resting today, encouraged him to do some prone positioning there to.    Disposition Plan   Expected discharge: 10/21/2021   recommended to transitional care unit once O2 use less than 4 liters/minute.    Attestation:  Total time: 40 minutes    Richi Garza MD  Hospital Medicine        Interval History   Very short of breath with exertion - was up to commode a few minutes before my visit and is still very short of breath from that. Did not prone overnight. Slept poorly per nursing though patient feels he got some sleep.  Complains of getting anxious.  Oxycodone helps some.  Lorazepam makes him sleepy.  Complains of feeling constipated earlier but then did have bowel movements.  Complains of some pain in the right foot again between the big toe ray and the second toe.  Tips of the toes feel little numb.    Physical Exam   Temp:  [98.1  F (36.7  C)-98.5  F (36.9  C)] 98.1  F (36.7  C)  Pulse:  [] 108  Resp:  [18-54] 18  BP: ()/(65-86) 114/77  FiO2 (%):  [80 %-100 %] 90 %  SpO2:  [84 %-96 %] 93 %    Weights:   Vitals:    10/13/21 0600 10/14/21 0609 10/15/21 0653   Weight: 81.6 kg (179 lb 14.3 oz) 80 kg (176 lb 5.9 oz) 82.3 kg (181 lb 7 oz)    Body mass index is 26.79 kg/m .    Constitutional: Alert and oriented x3, appears very dyspneic with respiratory rate 40 otherwise no acute distress.  No tripod breathing or pursed lip breathing.  Does not appear toxic.  CV: Regular, no edema, JVP appears normal  Respiratory: Rare scattered crackles the bases right more than left, otherwise CTA bilaterally  GI: Soft, non-tender, bowel sounds normal  Skin: Warm and dry  Musculoskeletal: Subjective sensory changes in the toes on the right but  does have light touch intact.  Distal foot is cool.  Capillary reflux is a bit sluggish.  Can move foot normally.  Dorsalis pedis pulses not palpable.  Left foot is a little bit cool but not as much.  No perceived numbness in the toes on the left.    Data   Recent Labs   Lab 10/15/21  0617 10/14/21  0529 10/13/21  0508 10/12/21  0234 10/11/21  2342 10/11/21  1844 10/11/21  0525 10/10/21  0534 10/10/21  0534   WBC 7.0 6.9 6.8   < > 10.5  --    < >   < > 12.1*   HGB 14.7 15.0 15.0   < > 14.4  --    < >   < > 15.4   MCV 87 86 88   < > 87  --    < >   < > 86    261 279   < > 326  --    < >   < > 386   INR  --   --   --   --   --  1.06  --   --   --     138 138   < >  --  139  --   --  140   POTASSIUM 4.4 4.4 4.4   < >  --  4.0  --   --  4.1   CHLORIDE 104 106 106   < >  --  108  --   --  107   CO2 27 26 28   < >  --  26  --   --  28   BUN 17 19 22   < >  --  27  --   --  26   CR 0.94 0.89 0.97   < >  --  1.15  --   --  1.05   ANIONGAP 5 6 4   < >  --  5  --   --  5   JANET 8.6 8.4* 8.6   < >  --  8.6  --   --  8.8   GLC 97 101* 107*   < >  --  155*  --   --  93   ALBUMIN  --  2.3*  --   --  2.2*  --   --    < > 2.3*   PROTTOTAL  --  6.1*  --   --  5.9*  --   --    < > 6.2*   BILITOTAL  --  0.5  --   --  0.4  --   --    < > 0.5   ALKPHOS  --  91  --   --  79  --   --    < > 79   ALT  --  106*  --   --  103*  --   --    < > 104*   AST  --  53*  --   --  46*  --   --    < > 44   TROPONIN  --   --   --   --   --   --   --   --  <0.015    < > = values in this interval not displayed.       Recent Labs   Lab 10/15/21  0617 10/14/21  0529 10/13/21  0508 10/12/21  0234 10/11/21  1844 10/10/21  0534   GLC 97 101* 107* 97 155* 93        Unresulted Labs Ordered in the Past 30 Days of this Admission     No orders found from 9/1/2021 to 10/2/2021.           Imaging: No results found for this or any previous visit (from the past 24 hour(s)).     I reviewed all new labs and imaging results over the last 24 hours. I  personally reviewed no images or EKG's today.    Medications     argatroban 1 mcg/kg/min (10/15/21 5190)     - MEDICATION INSTRUCTIONS -       - MEDICATION INSTRUCTIONS -         citalopram  20 mg Oral Daily     influenza recomb quadrivalent PF  0.5 mL Intramuscular Prior to discharge     lisinopril  5 mg Oral Daily     miconazole   Topical BID     multivitamin w/minerals  1 tablet Oral Daily     pantoprazole  40 mg Oral QAM     senna-docusate  1 tablet Oral At Bedtime     sodium chloride (PF)  3 mL Intracatheter Q8H   Occasions reviewed    Richi Garza MD  [facility] Medicine

## 2021-10-16 LAB
ANION GAP SERPL CALCULATED.3IONS-SCNC: 5 MMOL/L (ref 3–14)
APTT PPP: 59 SECONDS (ref 22–38)
BUN SERPL-MCNC: 18 MG/DL (ref 7–30)
CALCIUM SERPL-MCNC: 8.5 MG/DL (ref 8.5–10.1)
CHLORIDE BLD-SCNC: 104 MMOL/L (ref 94–109)
CO2 SERPL-SCNC: 27 MMOL/L (ref 20–32)
CREAT SERPL-MCNC: 0.81 MG/DL (ref 0.66–1.25)
D DIMER PPP FEU-MCNC: 1.96 UG/ML FEU (ref 0–0.5)
ERYTHROCYTE [DISTWIDTH] IN BLOOD BY AUTOMATED COUNT: 12.7 % (ref 10–15)
GFR SERPL CREATININE-BSD FRML MDRD: >90 ML/MIN/1.73M2
GLUCOSE BLD-MCNC: 94 MG/DL (ref 70–99)
HCT VFR BLD AUTO: 43.3 % (ref 40–53)
HGB BLD-MCNC: 14.8 G/DL (ref 13.3–17.7)
LACTATE SERPL-SCNC: 1 MMOL/L (ref 0.7–2)
MCH RBC QN AUTO: 29.7 PG (ref 26.5–33)
MCHC RBC AUTO-ENTMCNC: 34.2 G/DL (ref 31.5–36.5)
MCV RBC AUTO: 87 FL (ref 78–100)
PLATELET # BLD AUTO: 207 10E3/UL (ref 150–450)
POTASSIUM BLD-SCNC: 4.3 MMOL/L (ref 3.4–5.3)
RBC # BLD AUTO: 4.98 10E6/UL (ref 4.4–5.9)
SODIUM SERPL-SCNC: 136 MMOL/L (ref 133–144)
WBC # BLD AUTO: 6.9 10E3/UL (ref 4–11)

## 2021-10-16 PROCEDURE — 250N000011 HC RX IP 250 OP 636: Performed by: INTERNAL MEDICINE

## 2021-10-16 PROCEDURE — 94660 CPAP INITIATION&MGMT: CPT

## 2021-10-16 PROCEDURE — 250N000013 HC RX MED GY IP 250 OP 250 PS 637: Performed by: FAMILY MEDICINE

## 2021-10-16 PROCEDURE — 85379 FIBRIN DEGRADATION QUANT: CPT | Performed by: INTERNAL MEDICINE

## 2021-10-16 PROCEDURE — 80048 BASIC METABOLIC PNL TOTAL CA: CPT | Performed by: HOSPITALIST

## 2021-10-16 PROCEDURE — 250N000013 HC RX MED GY IP 250 OP 250 PS 637: Performed by: INTERNAL MEDICINE

## 2021-10-16 PROCEDURE — 250N000013 HC RX MED GY IP 250 OP 250 PS 637: Performed by: HOSPITALIST

## 2021-10-16 PROCEDURE — 999N000215 HC STATISTIC HFNC ADULT NON-CPAP

## 2021-10-16 PROCEDURE — 999N000157 HC STATISTIC RCP TIME EA 10 MIN

## 2021-10-16 PROCEDURE — 200N000001 HC R&B ICU

## 2021-10-16 PROCEDURE — 99233 SBSQ HOSP IP/OBS HIGH 50: CPT | Performed by: INTERNAL MEDICINE

## 2021-10-16 PROCEDURE — 85730 THROMBOPLASTIN TIME PARTIAL: CPT | Performed by: INTERNAL MEDICINE

## 2021-10-16 PROCEDURE — 36415 COLL VENOUS BLD VENIPUNCTURE: CPT | Performed by: HOSPITALIST

## 2021-10-16 PROCEDURE — 83605 ASSAY OF LACTIC ACID: CPT | Performed by: INTERNAL MEDICINE

## 2021-10-16 PROCEDURE — 85027 COMPLETE CBC AUTOMATED: CPT | Performed by: FAMILY MEDICINE

## 2021-10-16 RX ADMIN — ARGATROBAN 1 MCG/KG/MIN: 50 INJECTION INTRAVENOUS at 02:32

## 2021-10-16 RX ADMIN — CITALOPRAM HYDROBROMIDE 20 MG: 20 TABLET ORAL at 08:56

## 2021-10-16 RX ADMIN — Medication 5 MG: at 13:39

## 2021-10-16 RX ADMIN — LISINOPRIL 5 MG: 5 TABLET ORAL at 21:50

## 2021-10-16 RX ADMIN — APIXABAN 10 MG: 5 TABLET, FILM COATED ORAL at 21:50

## 2021-10-16 RX ADMIN — LORAZEPAM 1 MG: 0.5 TABLET ORAL at 00:55

## 2021-10-16 RX ADMIN — PANTOPRAZOLE SODIUM 40 MG: 40 TABLET, DELAYED RELEASE ORAL at 08:56

## 2021-10-16 RX ADMIN — Medication 5 MG: at 08:56

## 2021-10-16 RX ADMIN — Medication 5 MG: at 21:56

## 2021-10-16 RX ADMIN — APIXABAN 10 MG: 5 TABLET, FILM COATED ORAL at 08:56

## 2021-10-16 RX ADMIN — Medication 5 MG: at 18:05

## 2021-10-16 RX ADMIN — LORAZEPAM 1 MG: 0.5 TABLET ORAL at 21:50

## 2021-10-16 RX ADMIN — Medication 1 TABLET: at 08:56

## 2021-10-16 ASSESSMENT — ACTIVITIES OF DAILY LIVING (ADL)
ADLS_ACUITY_SCORE: 7
ADLS_ACUITY_SCORE: 9
ADLS_ACUITY_SCORE: 9

## 2021-10-16 NOTE — PROGRESS NOTES
[facility]    Hospitalist Progress Note  Date of Service (when I saw the patient): 10/16/2021    Assessment & Plan   Liborio Barajas is a 56 year old male admitted on 10/1/2021. He presents with shortness of breath and was found to be Covid positive.    Confirmed COVID-19 infection    Acute Hypoxic Respiratory Failure secondary to COVID-19 infection  Viral Pneumonia secondary to COVID-19 infection     Symptom Onset September 22, 2021   Date of 1st Positive Test 10/01/21      Vaccination Status Not Vaccinated, but interested/contemplative     discontinued isolation precautions 10/12   -Transferred to ICU on 10/2 due to need for HFNC.    - Oxygen: Continue BiPAP 16/8 FIO2 0.8 alternating with HFNC. Seems to be stabilizing last few days, tolerating more HFNC. 10/12 was down to 15L for most of the day but overnight back on bipap. Patient does better with proning overnight. He did prone part of last night. SpO2 better today but still on high O2 support as noted.   - Labs: Trending D Dimer, CBC, Bmp daily with his clot. CRP & fibrinogen previously noramlized. D-dimer normalizing gradually.  - Imaging: Chest CT 10/10 with continued multifocal/reticular/GGO PNA as would be expected  Breathing treatments: no inhalers needed; avoid nebulizers in favor of MDIs   IV fluids: none.  Antibiotics: not indicated   COVID-Focused Medications:    - DEXAMETHASONE: started 6 mg daily 10/1 and completed 10 days on 10/10   - REMDESIVIR: Started 10/1 and completed 10/5   - Tocilizumab: given 10/2/21  - DVT Prophylaxis: on therapeutic anticoagulation as of 10/11/21     --135--55.9--20.6--10.4 -- 5.2--5.4 ? <2.9  D-dimer 2.19--1.62--1.09--1.44--1.85--3.52--5.90 ? 3.67 ? 3.79 -> 3.29 -> 2.56 -> 1.96  Fibrinogen 476--852--716--650--538 ? 417  WBC 7.6--6.7--8.7--8.0--7.3--8.8--11.7--11.3--12.1  Procalcitonin 10/9 <0.05  Blood culture 10/9 NG  Nares swab negative for MRSA or SA by PCR    Infrarenal Aortic Thrombus  with thromboembolic obstruction of right tibial-peroneal trunk    Right foot pain and diminished RLE pulses noted 10/11. CT revealed infrarenal aortic wall adhered clot and apparent thromboembolic obstruction of right tibial-peroneal trunk with distal reconstitution of tibial and peroneal arteries. Was discussed with vascular surgery [facility]: COVID-related arterial thrombectomies frequently reclot, so surgery is not recommended. ALso, increased heparin resistance in these pts, so anticoagulation with argobactran is recommended.       More right foot pain but has tenderness and sensation, cap refill better though not normal. DP pulse not dopplerable but overall clinically appears to have some blood flow.    - continue argatroban infusion started 10/11 and ended 10/16   - transitioned to apixaban 10 mg BID today for the first week    Anxiety    Patient extremely fearful of being short of breath; this is limiting his ability to work with therapies etc. Suspect due to dyspnea.   - low dose oral oxycodone prn dyspnea  - Initiated citalopram 20 this admission although acknowledge that this will take some time to see effects from.     Hyponatremia   Present on admission. Probably related to volume depletion. Sodium 129 ? 600 cc of saline ? 135. Resolved     Possible acute kidney injury superimposed on chronic kidney disease stage 2    Last creatinine in his records was 1.28 on March 2, 2016.  Creatinine upon admit 2.20 with a BUN of 44  Suspect some volume depletion-used saline 50 cc an hour for 12 hours.  - Creatinine 2.20--1.29 --1.03--0.99 Resolved 10/4.     Transaminitis    Suspect Covid related. ALT trending up. Alk phos and bili normal.   - ALT 49--43--74--109--114--123 -- 103  - AST 68--41--80--91--74--76--46  - Continue to follow intermittently - will check tomorrow    Hypertension  Previously on lisinopril   - resumed lisinopril at half dose     GERD  Continue omeprazole    Malnutrition:  - Level of malnutrition:  Severe   - Based on: weight loss, reduced intake     Diet:  Regular  DVT Prophylaxis: Enoxaparin (Lovenox) SQ  Neal Catheter: Not present  Central Lines: None  Code Status:  Full      Discussion: Respiratory status about the same. Patient otherwise appears to be feeling better with improved mentation. Discussed with wife at bedside at length. Need to improve nutrition. Consider high res CT chest in the future 1-2 weeks to eval for fibrosis if not improving. Fibrosis would be evidence of irreversible lung damage, and we are hoping that this may be less likely having not been intubated.     Disposition Plan   Expected discharge:Not yeat clear  Attestation:  Total time: 40 minutes with > 50% counseling patient and wife on clinical course.     Richi Garza MD  [facility]        Interval History   Feels a little better. Did do prone position quite a bit last night. Still easily winded. C/o right foot pain is worse in first ray (points to it). No calf or leg pain. No chest pain. Food does not taste good.    Physical Exam   Temp:  [97.4  F (36.3  C)-98.9  F (37.2  C)] 98.8  F (37.1  C)  Pulse:  [] 111  Resp:  [19-38] 19  BP: (103-139)/(76-96) 111/76  FiO2 (%):  [95 %-100 %] 100 %  SpO2:  [87 %-98 %] 91 %    Weights:   Vitals:    10/13/21 0600 10/14/21 0609 10/15/21 0653   Weight: 81.6 kg (179 lb 14.3 oz) 80 kg (176 lb 5.9 oz) 82.3 kg (181 lb 7 oz)    Body mass index is 26.79 kg/m .    Constitutional: alert, ox3, mild increased work of breathing at rest, otherwise nontoxic  CV: Regular, no edema.   Respiratory: coarse bibasilar crackles otherwise CTA bilaterally  GI: Soft, non-tender, bowel sounds normal  Skin: Warm and dry    Data   Recent Labs   Lab 10/16/21  0555 10/15/21  0617 10/14/21  0529 10/12/21  0234 10/11/21  2342 10/11/21  1844 10/11/21  0525 10/10/21  0534 10/10/21  0534   WBC 6.9 7.0 6.9   < > 10.5  --    < >   < > 12.1*   HGB 14.8 14.7 15.0   < > 14.4  --    < >   < > 15.4   MCV 87 87 86   < >  87  --    < >   < > 86    223 261   < > 326  --    < >   < > 386   INR  --   --   --   --   --  1.06  --   --   --     136 138   < >  --  139  --   --  140   POTASSIUM 4.3 4.4 4.4   < >  --  4.0  --   --  4.1   CHLORIDE 104 104 106   < >  --  108  --   --  107   CO2 27 27 26   < >  --  26  --   --  28   BUN 18 17 19   < >  --  27  --   --  26   CR 0.81 0.94 0.89   < >  --  1.15  --   --  1.05   ANIONGAP 5 5 6   < >  --  5  --   --  5   JANET 8.5 8.6 8.4*   < >  --  8.6  --   --  8.8   GLC 94 97 101*   < >  --  155*  --   --  93   ALBUMIN  --   --  2.3*  --  2.2*  --   --    < > 2.3*   PROTTOTAL  --   --  6.1*  --  5.9*  --   --    < > 6.2*   BILITOTAL  --   --  0.5  --  0.4  --   --    < > 0.5   ALKPHOS  --   --  91  --  79  --   --    < > 79   ALT  --   --  106*  --  103*  --   --    < > 104*   AST  --   --  53*  --  46*  --   --    < > 44   TROPONIN  --   --   --   --   --   --   --   --  <0.015    < > = values in this interval not displayed.       Recent Labs   Lab 10/16/21  0555 10/15/21  0617 10/14/21  0529 10/13/21  0508 10/12/21  0234 10/11/21  1844   GLC 94 97 101* 107* 97 155*        Unresulted Labs Ordered in the Past 30 Days of this Admission     No orders found from 9/1/2021 to 10/2/2021.           Imaging: No results found for this or any previous visit (from the past 24 hour(s)).     I reviewed all new labs and imaging results over the last 24 hours. I personally reviewed no images or EKG's today.    Medications     - MEDICATION INSTRUCTIONS -       - MEDICATION INSTRUCTIONS -         apixaban ANTICOAGULANT  10 mg Oral BID    Followed by     [START ON 10/23/2021] apixaban ANTICOAGULANT  5 mg Oral BID     citalopram  20 mg Oral Daily     influenza recomb quadrivalent PF  0.5 mL Intramuscular Prior to discharge     lisinopril  5 mg Oral Daily     miconazole   Topical BID     multivitamin w/minerals  1 tablet Oral Daily     pantoprazole  40 mg Oral QAM     senna-docusate  1 tablet Oral At  Bedtime     sodium chloride (PF)  3 mL Intracatheter Q8H           Richi Garza MD  [facility]

## 2021-10-16 NOTE — SIGNIFICANT EVENT
"Pt has increased right foot pain from yesterday - painful when right toes lightly touched which is a new occurrence & pt received oxy about 1h ago.  States he feels like a \"hammer hit my foot.\"  Was not able to palpate DP or PT pulses bilaterally.  Could only doppler PT pulses bilaterally - not able to doppler DP pulses.  Left foot somewhat uncomfortable - cap refill <3 secs bilaterally, feet warm & flesh/pink colored.  Toenails dusky.  Dr. Garza updated.  "

## 2021-10-16 NOTE — PROGRESS NOTES
Pt has been resting/sleeping since earlier given Ativan for anxiety. Interactions with pt tried to be minimal and grouped when necessary, to provide for adequate rest and reduce interruptions with his sleep needs.

## 2021-10-16 NOTE — PROGRESS NOTES
Pt has been resting on BIPAP !00% and trying to stay in the prone position, after receiving Ativan earlier. Will continue to monitor.

## 2021-10-16 NOTE — PHARMACY-ANTICOAGULATION SERVICE
Argatroban Dosing Note:     Argatroban infusion continues at 1mcg/kg/min.  The aPTT this AM=59 seconds, within goal range of 44-88 sec.  Continue current rate.  Plan is to discontinue Argatroban at 0800 and transition to Apixiban.     Lila Boyle, PharmD

## 2021-10-16 NOTE — PROGRESS NOTES
Pt triggering sepsis protocol due to increased RR. Pt states he is feeling anxious, not quite a panic attack, but would like some medication. Ativan 1 mg po given for anxiety. Pt continues to wear BIPAP 100% keeping O2 sats mid 90's. Will continue to monitor.

## 2021-10-16 NOTE — PROGRESS NOTES
Pt asks to have a break from BIPAP. HFNC applied at 60 L/100%. Pt O2 sats stabilizing to low/mid 90's at rest.

## 2021-10-16 NOTE — CONSULTS
"Care Management Note:    Pt is Covid recovered on 10-12-21.    SW reviewed MD noted, which states, \"Expected discharge:  10/21/2021  recommended to transitional care unit once O2 use less than 4 liters/minute.\"  Per IDT rounds, MD states pt is not medically stable for PT/OT assessments yet & requests that CM not discuss discharge planning with pt yet.      Review of EMR also shows pt lacks medical insurance.  Renae Haynes, Patient , [phone], notified & will be in contact with pt/family early next week to discuss options.    Care Management team to follow for discharge plans.    SERA Crump on 10/16/2021 at 1:12 PM                     "

## 2021-10-17 LAB
ANION GAP SERPL CALCULATED.3IONS-SCNC: 5 MMOL/L (ref 3–14)
BASE EXCESS BLDV CALC-SCNC: 4.8 MMOL/L (ref -7.7–1.9)
BUN SERPL-MCNC: 16 MG/DL (ref 7–30)
CALCIUM SERPL-MCNC: 8.2 MG/DL (ref 8.5–10.1)
CHLORIDE BLD-SCNC: 103 MMOL/L (ref 94–109)
CO2 SERPL-SCNC: 27 MMOL/L (ref 20–32)
CREAT SERPL-MCNC: 0.72 MG/DL (ref 0.66–1.25)
CRP SERPL-MCNC: 9.6 MG/L (ref 0–8)
D DIMER PPP FEU-MCNC: 2.1 UG/ML FEU (ref 0–0.5)
ERYTHROCYTE [DISTWIDTH] IN BLOOD BY AUTOMATED COUNT: 12.6 % (ref 10–15)
GFR SERPL CREATININE-BSD FRML MDRD: >90 ML/MIN/1.73M2
GLUCOSE BLD-MCNC: 91 MG/DL (ref 70–99)
HCO3 BLDV-SCNC: 31 MMOL/L (ref 21–28)
HCT VFR BLD AUTO: 41.1 % (ref 40–53)
HGB BLD-MCNC: 13.7 G/DL (ref 13.3–17.7)
HOLD SPECIMEN: NORMAL
HOLD SPECIMEN: NORMAL
LACTATE SERPL-SCNC: 0.8 MMOL/L (ref 0.7–2)
MCH RBC QN AUTO: 28.9 PG (ref 26.5–33)
MCHC RBC AUTO-ENTMCNC: 33.3 G/DL (ref 31.5–36.5)
MCV RBC AUTO: 87 FL (ref 78–100)
O2/TOTAL GAS SETTING VFR VENT: 90 %
PCO2 BLDV: 48 MM HG (ref 40–50)
PH BLDV: 7.41 [PH] (ref 7.32–7.43)
PLATELET # BLD AUTO: 197 10E3/UL (ref 150–450)
PO2 BLDV: 48 MM HG (ref 25–47)
POTASSIUM BLD-SCNC: 4.2 MMOL/L (ref 3.4–5.3)
RBC # BLD AUTO: 4.74 10E6/UL (ref 4.4–5.9)
SODIUM SERPL-SCNC: 135 MMOL/L (ref 133–144)
WBC # BLD AUTO: 8 10E3/UL (ref 4–11)

## 2021-10-17 PROCEDURE — 80048 BASIC METABOLIC PNL TOTAL CA: CPT | Performed by: FAMILY MEDICINE

## 2021-10-17 PROCEDURE — 36415 COLL VENOUS BLD VENIPUNCTURE: CPT | Performed by: INTERNAL MEDICINE

## 2021-10-17 PROCEDURE — 85027 COMPLETE CBC AUTOMATED: CPT | Performed by: FAMILY MEDICINE

## 2021-10-17 PROCEDURE — 99291 CRITICAL CARE FIRST HOUR: CPT | Performed by: FAMILY MEDICINE

## 2021-10-17 PROCEDURE — 999N000105 HC STATISTIC NO DOCUMENTATION TO SUPPORT CHARGE

## 2021-10-17 PROCEDURE — 999N000157 HC STATISTIC RCP TIME EA 10 MIN

## 2021-10-17 PROCEDURE — 250N000013 HC RX MED GY IP 250 OP 250 PS 637: Performed by: FAMILY MEDICINE

## 2021-10-17 PROCEDURE — 85379 FIBRIN DEGRADATION QUANT: CPT | Performed by: FAMILY MEDICINE

## 2021-10-17 PROCEDURE — 94660 CPAP INITIATION&MGMT: CPT

## 2021-10-17 PROCEDURE — 82803 BLOOD GASES ANY COMBINATION: CPT | Performed by: FAMILY MEDICINE

## 2021-10-17 PROCEDURE — 36415 COLL VENOUS BLD VENIPUNCTURE: CPT | Performed by: FAMILY MEDICINE

## 2021-10-17 PROCEDURE — 250N000013 HC RX MED GY IP 250 OP 250 PS 637: Performed by: INTERNAL MEDICINE

## 2021-10-17 PROCEDURE — 86140 C-REACTIVE PROTEIN: CPT | Performed by: FAMILY MEDICINE

## 2021-10-17 PROCEDURE — 250N000013 HC RX MED GY IP 250 OP 250 PS 637: Performed by: HOSPITALIST

## 2021-10-17 PROCEDURE — 200N000001 HC R&B ICU

## 2021-10-17 PROCEDURE — 83605 ASSAY OF LACTIC ACID: CPT | Performed by: INTERNAL MEDICINE

## 2021-10-17 RX ORDER — CARBOXYMETHYLCELLULOSE SODIUM 5 MG/ML
1 SOLUTION/ DROPS OPHTHALMIC
Status: DISCONTINUED | OUTPATIENT
Start: 2021-10-17 | End: 2021-11-01 | Stop reason: HOSPADM

## 2021-10-17 RX ORDER — LORAZEPAM 0.5 MG/1
0.5 TABLET ORAL EVERY 4 HOURS PRN
Status: DISCONTINUED | OUTPATIENT
Start: 2021-10-17 | End: 2021-10-19

## 2021-10-17 RX ADMIN — Medication 5 MG: at 09:18

## 2021-10-17 RX ADMIN — Medication 5 MG: at 15:44

## 2021-10-17 RX ADMIN — Medication 5 MG: at 01:12

## 2021-10-17 RX ADMIN — APIXABAN 10 MG: 5 TABLET, FILM COATED ORAL at 09:18

## 2021-10-17 RX ADMIN — LORAZEPAM 1 MG: 0.5 TABLET ORAL at 21:30

## 2021-10-17 RX ADMIN — LORAZEPAM 0.5 MG: 0.5 TABLET ORAL at 17:35

## 2021-10-17 RX ADMIN — LISINOPRIL 5 MG: 5 TABLET ORAL at 19:44

## 2021-10-17 RX ADMIN — PANTOPRAZOLE SODIUM 40 MG: 40 TABLET, DELAYED RELEASE ORAL at 09:18

## 2021-10-17 RX ADMIN — Medication 5 MG: at 19:45

## 2021-10-17 RX ADMIN — CITALOPRAM HYDROBROMIDE 20 MG: 20 TABLET ORAL at 09:19

## 2021-10-17 RX ADMIN — APIXABAN 10 MG: 5 TABLET, FILM COATED ORAL at 19:44

## 2021-10-17 RX ADMIN — Medication 1 TABLET: at 09:19

## 2021-10-17 ASSESSMENT — ACTIVITIES OF DAILY LIVING (ADL)
ADLS_ACUITY_SCORE: 9

## 2021-10-17 ASSESSMENT — MIFFLIN-ST. JEOR: SCORE: 1581.38

## 2021-10-17 NOTE — PROGRESS NOTES
Patient has been resting in bed with eyes closed. Rates R toe pain 2/10. Elevated feet on pillow to off load heels. Denies heel pain. Will continue to be on BiPAP for the remainder of the noc shift.

## 2021-10-17 NOTE — PROGRESS NOTES
[facility] Hospitalist Progress Note           Assessment & Plan        Liborio Barajas is a 56 year old male admitted on 10/1/2021. He presents with shortness of breath and was found to be Covid positive.     Confirmed COVID-19 infection    Acute Hypoxic Respiratory Failure secondary to COVID-19 infection  Viral Pneumonia secondary to COVID-19 infection     Symptom Onset September 22, 2021   Date of 1st Positive Test 10/01/21      Vaccination Status Not Vaccinated, but interested/contemplative     discontinued isolation precautions 10/12   -Transferred to ICU on 10/2 due to need for HFNC.    - Oxygen: Continue BiPAP 16/8 FIO2 0.8 alternating with HFNC. Seems to be stabilizing last few days, tolerating more HFNC.  Patient does better with proning overnight, strongly encouraged to continue proning as much as able.     - Labs: Trending D Dimer, CBC, Bmp daily with his clot. CRP & fibrinogen previously noramlized. D-dimer normalizing gradually.  - Imaging: Chest CT 10/10 with continued multifocal/reticular/GGO PNA as would be expected  Breathing treatments: no inhalers needed; avoid nebulizers in favor of MDIs   IV fluids: none.  Antibiotics: not indicated   COVID-Focused Medications:    - DEXAMETHASONE: started 6 mg daily 10/1 and completed 10 days on 10/10, reassess if CRP climbing    - REMDESIVIR: Started 10/1 and completed 10/5   - Tocilizumab: given 10/2/21  - DVT Prophylaxis: on therapeutic anticoagulation as of 10/11/21     --135--55.9--20.6--10.4 -- 5.2--5.4 ? <2.9 --> 9.6  D-dimer 2.19--1.62--1.09--1.44--1.85--3.52--5.90 ? 3.67 ? 3.79 -> 3.29 -> 2.56 -> 1.96 --> 2.1  Fibrinogen 476--852--716--650--538 ? 417  WBC 7.6--6.7--8.7--8.0--7.3--8.8--11.7--11.3--12.1  Procalcitonin 10/9 <0.05  Blood culture 10/9 NG  Nares swab negative for MRSA or SA by PCR     Infrarenal Aortic Thrombus with thromboembolic obstruction of right tibial-peroneal trunk    Right foot pain and diminished RLE pulses noted 10/11. CT  revealed infrarenal aortic wall adhered clot and apparent thromboembolic obstruction of right tibial-peroneal trunk with distal reconstitution of tibial and peroneal arteries. Was discussed with vascular surgery [facility]: COVID-related arterial thrombectomies frequently reclot, so surgery is not recommended. ALso, increased heparin resistance in these pts, so anticoagulation with argobactran is recommended.       More right foot pain but has tenderness and sensation, cap refill better though not normal. DP pulse not dopplerable but overall clinically appears to have some blood flow.    - argatroban infusion started 10/11 and ended 10/16   - transitioned to apixaban, starting with 10 mg BID for the first week starting 10/17 then 5 mg twice daily.       Anxiety    Patient extremely fearful of being short of breath; this is limiting his ability to work with therapies etc. Suspect due to dyspnea.   - low dose oral oxycodone prn dyspnea  - Initiated citalopram 20 this admission although acknowledge that this will take some time to see effects from.  - ordered some prn ativan 10/17.       Hyponatremia   Present on admission. Probably related to volume depletion. Sodium 129 ? 600 cc of saline ? 135. Resolved     Possible acute kidney injury superimposed on chronic kidney disease stage 2    Last creatinine in his records was 1.28 on March 2, 2016.  Creatinine upon admit 2.20 with a BUN of 44  Suspect some volume depletion-used saline 50 cc an hour for 12 hours.  - Creatinine 2.20--1.29 --1.03--0.99 Resolved 10/4.      Transaminitis    Suspect Covid related. ALT trending up. Alk phos and bili normal.   - ALT 49--43--74--109--114--123 -- 103  - AST 68--41--80--91--74--76--46  - Continue to follow intermittently - will check tomorrow     Hypertension  Previously on lisinopril   - resumed lisinopril at half dose     GERD  Continue omeprazole     Malnutrition:  - Level of malnutrition: Severe   - Based on: weight loss, reduced  intake      Diet:  Regular  DVT Prophylaxis: Enoxaparin (Lovenox) SQ  Neal Catheter: Not present  Central Lines: None  Code Status:  Full      Diet  Orders Placed This Encounter      Combination Diet Regular Diet Adult            Disposition  Continue ICU cares, anticipate at least 2-3 more days ICU and at least 7 more days inpatient              Interval History:   No new concerns.  Patient anxious at times, but overall improving.  Feels like breathing is getting better.  Has been mostly off BIPAP now at least. No other changes.    Foot feeling at least better.  No new or worsening symptoms.    No other pain             Review of Systems:    ROS: 10 point ROS neg other than the symptoms noted above in the HPI.           Medications:   Current active medications and PTA medications reviewed, see medication list for details.            Physical Exam:   Vitals were reviewed  Patient Vitals for the past 24 hrs:   BP Temp Temp src Pulse Resp SpO2 Weight   10/17/21 0800 114/72 -- -- 93 (!) 36 95 % --   10/17/21 0655 -- -- -- 105 21 (!) 85 % --   10/17/21 0649 -- 97.9  F (36.6  C) Axillary -- -- -- 77.1 kg (169 lb 15.6 oz)   10/17/21 0630 114/84 -- -- 80 26 96 % --   10/17/21 0622 116/77 98.1  F (36.7  C) Axillary 76 (!) 31 95 % --   10/17/21 0600 106/75 -- -- 74 19 96 % --   10/17/21 0430 -- 98.1  F (36.7  C) Axillary 102 25 90 % --   10/17/21 0400 106/76 -- -- 80 25 -- --   10/17/21 0200 103/72 -- -- 79 30 93 % --   10/17/21 0150 -- -- -- -- -- 95 % --   10/17/21 0145 -- -- -- -- -- 95 % --   10/17/21 0135 -- -- -- -- -- 92 % --   10/17/21 0130 -- -- -- -- -- 92 % --   10/17/21 0125 -- -- -- -- -- 90 % --   10/17/21 0120 -- -- -- -- -- 91 % --   10/17/21 0115 -- -- -- -- -- (!) 82 % --   10/17/21 0110 -- -- -- -- -- (!) 78 % --   10/17/21 0105 -- -- -- -- -- (!) 79 % --   10/17/21 0100 -- -- -- 96 (!) 37 (!) 84 % --   10/17/21 0050 -- -- -- -- -- 94 % --   10/17/21 0045 -- -- -- -- -- 94 % --   10/17/21 0000 106/76  98.4  F (36.9  C) Axillary 75 28 95 % --   10/16/21 2200 127/73 -- -- 90 (!) 37 93 % --   10/16/21 2000 112/77 98.2  F (36.8  C) Axillary 100 26 97 % --   10/16/21 1923 -- -- -- 113 (!) 41 90 % --   10/16/21 1800 -- -- -- 110 (!) 41 (!) 89 % --   10/16/21 1618 -- -- -- 89 (!) 34 96 % --   10/16/21 1600 (!) 144/84 96.8  F (36  C) Axillary 110 (!) 52 (!) 86 % --   10/16/21 1400 -- -- -- 103 (!) 38 91 % --   10/16/21 1200 -- 98  F (36.7  C) Axillary 110 (!) 32 90 % --   10/16/21 1100 -- -- -- 94 (!) 32 96 % --   10/16/21 1000 -- -- -- 105 (!) 36 (!) 87 % --   10/16/21 0855 111/76 -- -- 111 19 91 % --       Temperatures:  Current - Temp: 97.9  F (36.6  C); Max - Temp  Av.9  F (36.6  C)  Min: 96.8  F (36  C)  Max: 98.4  F (36.9  C)  Respiration range: Resp  Av.7  Min: 19  Max: 52  Pulse range: Pulse  Av  Min: 74  Max: 113  Blood pressure range: Systolic (24hrs), Av , Min:103 , Max:144   ; Diastolic (24hrs), Av, Min:72, Max:84    Pulse oximetry range: SpO2  Av.9 %  Min: 78 %  Max: 97 %  I/O last 3 completed shifts:  In: 795.34 [P.O.:780; I.V.:15.34]  Out: 1125 [Urine:1125]    Intake/Output Summary (Last 24 hours) at 10/17/2021 0847  Last data filed at 10/17/2021 0559  Gross per 24 hour   Intake 780 ml   Output 1125 ml   Net -345 ml     EXAM:  General: awake and alert, NAD, oriented x 3  Head: normocephalic  Neck: unremarkable, no lymphadenopathy   HEENT: oropharynx pink and moist    Heart: Regular rate and rhythm, no murmurs, rubs, or gallops  Lungs: slightly coarse breath sounds bilaterally, no distress on high flow oxygen   Abdomen: soft, non-tender, no masses or organomegaly  Extremities: no edema in lower extremities   Skin unremarkable.               Data:     Results for orders placed or performed during the hospital encounter of 10/01/21 (from the past 24 hour(s))   Care Management / Social Work IP Consult    Narrative    Vanessa Parker LSW     10/16/2021  1:12 PM  Care  "Management Note:    Pt is Covid recovered on 10-12-21.    SW reviewed MD noted, which states, \"Expected discharge:    10/21/2021  recommended to transitional care unit once O2 use   less than 4 liters/minute.\"  Per IDT rounds, MD states pt is not   medically stable for PT/OT assessments yet & requests that CM not   discuss discharge planning with pt yet.      Review of EMR also shows pt lacks medical insurance.  Renae Haynes, Patient , [phone], notified   & will be in contact with pt/family early next week to discuss   options.    Care Management team to follow for discharge plans.    SERA Crump on 10/16/2021 at 1:12 PM                      Extra Tube    Narrative    The following orders were created for panel order Extra Tube.  Procedure                               Abnormality         Status                     ---------                               -----------         ------                     Extra Green Top (Lithium...[[id number]]                      Final result                 Please view results for these tests on the individual orders.   Extra Green Top (Lithium Heparin) Tube   Result Value Ref Range    Hold Specimen JI    CBC with platelets   Result Value Ref Range    WBC Count 8.0 4.0 - 11.0 10e3/uL    RBC Count 4.74 4.40 - 5.90 10e6/uL    Hemoglobin 13.7 13.3 - 17.7 g/dL    Hematocrit 41.1 40.0 - 53.0 %    MCV 87 78 - 100 fL    MCH 28.9 26.5 - 33.0 pg    MCHC 33.3 31.5 - 36.5 g/dL    RDW 12.6 10.0 - 15.0 %    Platelet Count 197 150 - 450 10e3/uL   Lactic Acid STAT   Result Value Ref Range    Lactic Acid 0.8 0.7 - 2.0 mmol/L   Basic metabolic panel   Result Value Ref Range    Sodium 135 133 - 144 mmol/L    Potassium 4.2 3.4 - 5.3 mmol/L    Chloride 103 94 - 109 mmol/L    Carbon Dioxide (CO2) 27 20 - 32 mmol/L    Anion Gap 5 3 - 14 mmol/L    Urea Nitrogen 16 7 - 30 mg/dL    Creatinine 0.72 0.66 - 1.25 mg/dL    Calcium 8.2 (L) 8.5 - 10.1 mg/dL    Glucose 91 " 70 - 99 mg/dL    GFR Estimate >90 >60 mL/min/1.73m2   CRP inflammation   Result Value Ref Range    CRP Inflammation 9.6 (H) 0.0 - 8.0 mg/L   D dimer quantitative   Result Value Ref Range    D-Dimer Quantitative 2.10 (H) 0.00 - 0.50 ug/mL FEU    Narrative    This D-dimer assay is intended for use in conjunction with a clinical pretest probability assessment model to exclude pulmonary embolism (PE) and deep venous thrombosis (DVT) in outpatients suspected of PE or DVT. The cut-off value is 0.50 ug/mL FEU.   Blood gas venous   Result Value Ref Range    pH Venous 7.41 7.32 - 7.43    pCO2 Venous 48 40 - 50 mm Hg    pO2 Venous 48 (H) 25 - 47 mm Hg    Bicarbonate Venous 31 (H) 21 - 28 mmol/L    Base Excess/Deficit (+/-) 4.8 (H) -7.7 - 1.9 mmol/L    FIO2 90    Extra Tube    Narrative    The following orders were created for panel order Extra Tube.  Procedure                               Abnormality         Status                     ---------                               -----------         ------                     Extra Green Top (Lithium...[[id number]]                      In process                   Please view results for these tests on the individual orders.           Attestation:  I have reviewed today's vital signs, notes, medications, labs and imaging.  Amount of time spent in direct patient care providing critical care: 35 minutes.     Go Zimmer MD, MD

## 2021-10-17 NOTE — PLAN OF CARE
Pt somewhat improved from yesterday.  He was able to wear hi-flow 100%/60L all shift, would desat to 60s when up to commode & recovered within a few minutes on hi-flow.  Not as tachypneic or anxious about getting to commode today.  Seemed to be in better spirits & less anxious in general, & less fixation/anxiety about bowel movements.  Pt proned for 2h on hi-flow in afternoon w/ no problems.    Main complaint is right foot/big toe pain that improves w/ prn medications - no worsening of bilateral foot circulatory exam during shift.

## 2021-10-17 NOTE — PROGRESS NOTES
Charge nurse reported that patient not wanting to be on BiPAP (full face mask). Patient was put on HFNC 100% 60L. Oxygen saturation dropped to the mid 70's. Writer explained the purpose and need for using the BiPAP. Patient did eventually allow writer to put the BiPAP back on at 90%. Current oxygen saturation is 95%.   C/O R toe pain 10/10. Patient given oxycodone 5 mg PO. Will give dose of ativan if patient can not tolerate the mask.

## 2021-10-17 NOTE — PLAN OF CARE
Pt was able to be off hi-flow until after lunch, had anxious episode previously noted in chart, then was able to sleep proned for 2h on hiflow 100%/60L w/ sats at 96%.  Pt stated he felt better & less anxious after proning - was able to go to chair w/ sats only dropping into mid-80s w/ recovering to 90% in a few minutes.  Pt less tachypneic & anxious when transferring.  PRN ativan given to pt (wife agreed this was a good plan that Dr. Zimmer ordered) to help him continue to be less anxious tonight.

## 2021-10-17 NOTE — SIGNIFICANT EVENT
"Pt on hi-flow watching the football game at 100%/60L - started getting tired/tachypneic/anxious & sats decreasing to upper 80s so requested to go on bipap.  After about 10 mins pt became very anxious on bipap (he usually tolerates well) & stating it was \"too hot\" and wanted off bipap on hi-flow.  Put back on hi-flow & told pt it was time to prone as his breathing was worsening as it has in the past.    Pt started supining - sats decreased to upper 50s quickly as he got very anxious & stated, \"I can't do this!  I have to get up!\"  Pt started sitting up on edge of bed but told pt he had to go supine now due to decreased oxygenation & intolerance to bipap.  I kindly but forcefully made him supine - worked on calm breathing, visualizing the improvements he has made the last few days, visualizing that supining works for him & he has done it well the last few days & he can work through & do it now when he really needs it, & visualizing the [employer] just winning in overtime.    Pt gradually calmed down & sats increased to 94% on hi-flow 100%/50L - stated he felt better & less anxious & more comfortable.  Wife in room - instructed pt to try & prone at least 2 hours if he can as he has done before, but put his light on if he needs to get off stomach sooner.  Call light placed right next to him to decrease anxiety in case wife steps away from bedside.  Event discussed w/ Dr. Zimmer.  "

## 2021-10-18 ENCOUNTER — APPOINTMENT (OUTPATIENT)
Dept: GENERAL RADIOLOGY | Facility: CLINIC | Age: 56
DRG: 177 | End: 2021-10-18
Attending: FAMILY MEDICINE
Payer: OTHER GOVERNMENT

## 2021-10-18 LAB
ALBUMIN SERPL-MCNC: 2.2 G/DL (ref 3.4–5)
ALP SERPL-CCNC: 89 U/L (ref 40–150)
ALT SERPL W P-5'-P-CCNC: 45 U/L (ref 0–70)
ANION GAP SERPL CALCULATED.3IONS-SCNC: 5 MMOL/L (ref 3–14)
AST SERPL W P-5'-P-CCNC: 34 U/L (ref 0–45)
BILIRUB SERPL-MCNC: 0.5 MG/DL (ref 0.2–1.3)
BUN SERPL-MCNC: 15 MG/DL (ref 7–30)
CALCIUM SERPL-MCNC: 8.3 MG/DL (ref 8.5–10.1)
CHLORIDE BLD-SCNC: 104 MMOL/L (ref 94–109)
CO2 SERPL-SCNC: 27 MMOL/L (ref 20–32)
CREAT SERPL-MCNC: 0.65 MG/DL (ref 0.66–1.25)
CRP SERPL-MCNC: 8 MG/L (ref 0–8)
ERYTHROCYTE [DISTWIDTH] IN BLOOD BY AUTOMATED COUNT: 12.5 % (ref 10–15)
GFR SERPL CREATININE-BSD FRML MDRD: >90 ML/MIN/1.73M2
GLUCOSE BLD-MCNC: 110 MG/DL (ref 70–99)
GLUCOSE BLDC GLUCOMTR-MCNC: 111 MG/DL (ref 70–99)
HCT VFR BLD AUTO: 41 % (ref 40–53)
HGB BLD-MCNC: 13.9 G/DL (ref 13.3–17.7)
LACTATE SERPL-SCNC: 1.1 MMOL/L (ref 0.7–2)
MAGNESIUM SERPL-MCNC: 2.2 MG/DL (ref 1.6–2.3)
MCH RBC QN AUTO: 29.1 PG (ref 26.5–33)
MCHC RBC AUTO-ENTMCNC: 33.9 G/DL (ref 31.5–36.5)
MCV RBC AUTO: 86 FL (ref 78–100)
PHOSPHATE SERPL-MCNC: 2.9 MG/DL (ref 2.5–4.5)
PLATELET # BLD AUTO: 190 10E3/UL (ref 150–450)
POTASSIUM BLD-SCNC: 3.9 MMOL/L (ref 3.4–5.3)
PROT SERPL-MCNC: 5.8 G/DL (ref 6.8–8.8)
RBC # BLD AUTO: 4.77 10E6/UL (ref 4.4–5.9)
SODIUM SERPL-SCNC: 136 MMOL/L (ref 133–144)
TRIGL SERPL-MCNC: 110 MG/DL
WBC # BLD AUTO: 10 10E3/UL (ref 4–11)

## 2021-10-18 PROCEDURE — 71045 X-RAY EXAM CHEST 1 VIEW: CPT

## 2021-10-18 PROCEDURE — 84478 ASSAY OF TRIGLYCERIDES: CPT | Performed by: FAMILY MEDICINE

## 2021-10-18 PROCEDURE — 99291 CRITICAL CARE FIRST HOUR: CPT | Performed by: FAMILY MEDICINE

## 2021-10-18 PROCEDURE — 272N000580 HC KIT, 4 OR 5FR DUAL LUMEN POWER PICC

## 2021-10-18 PROCEDURE — 80053 COMPREHEN METABOLIC PANEL: CPT | Performed by: FAMILY MEDICINE

## 2021-10-18 PROCEDURE — 999N000065 XR CHEST PORT 1 VIEW

## 2021-10-18 PROCEDURE — 86140 C-REACTIVE PROTEIN: CPT | Performed by: FAMILY MEDICINE

## 2021-10-18 PROCEDURE — 83735 ASSAY OF MAGNESIUM: CPT | Performed by: FAMILY MEDICINE

## 2021-10-18 PROCEDURE — 36569 INSJ PICC 5 YR+ W/O IMAGING: CPT

## 2021-10-18 PROCEDURE — 200N000001 HC R&B ICU

## 2021-10-18 PROCEDURE — 85027 COMPLETE CBC AUTOMATED: CPT | Performed by: FAMILY MEDICINE

## 2021-10-18 PROCEDURE — 36415 COLL VENOUS BLD VENIPUNCTURE: CPT | Performed by: FAMILY MEDICINE

## 2021-10-18 PROCEDURE — G0463 HOSPITAL OUTPT CLINIC VISIT: HCPCS | Mod: 25

## 2021-10-18 PROCEDURE — 999N000157 HC STATISTIC RCP TIME EA 10 MIN

## 2021-10-18 PROCEDURE — 84100 ASSAY OF PHOSPHORUS: CPT | Performed by: FAMILY MEDICINE

## 2021-10-18 PROCEDURE — 250N000013 HC RX MED GY IP 250 OP 250 PS 637: Performed by: FAMILY MEDICINE

## 2021-10-18 PROCEDURE — 94660 CPAP INITIATION&MGMT: CPT

## 2021-10-18 PROCEDURE — 250N000013 HC RX MED GY IP 250 OP 250 PS 637: Performed by: HOSPITALIST

## 2021-10-18 PROCEDURE — 250N000013 HC RX MED GY IP 250 OP 250 PS 637: Performed by: INTERNAL MEDICINE

## 2021-10-18 PROCEDURE — 999N000105 HC STATISTIC NO DOCUMENTATION TO SUPPORT CHARGE

## 2021-10-18 PROCEDURE — 250N000011 HC RX IP 250 OP 636: Performed by: INTERNAL MEDICINE

## 2021-10-18 PROCEDURE — 83605 ASSAY OF LACTIC ACID: CPT | Performed by: FAMILY MEDICINE

## 2021-10-18 RX ORDER — DEXTROSE MONOHYDRATE 100 MG/ML
INJECTION, SOLUTION INTRAVENOUS CONTINUOUS PRN
Status: DISCONTINUED | OUTPATIENT
Start: 2021-10-18 | End: 2021-11-01 | Stop reason: HOSPADM

## 2021-10-18 RX ORDER — LIDOCAINE 40 MG/G
CREAM TOPICAL
Status: ACTIVE | OUTPATIENT
Start: 2021-10-18 | End: 2021-10-21

## 2021-10-18 RX ADMIN — ACETAMINOPHEN 650 MG: 325 TABLET, FILM COATED ORAL at 17:46

## 2021-10-18 RX ADMIN — SENNOSIDES AND DOCUSATE SODIUM 1 TABLET: 50; 8.6 TABLET ORAL at 20:03

## 2021-10-18 RX ADMIN — APIXABAN 10 MG: 5 TABLET, FILM COATED ORAL at 07:37

## 2021-10-18 RX ADMIN — PANTOPRAZOLE SODIUM 40 MG: 40 TABLET, DELAYED RELEASE ORAL at 07:37

## 2021-10-18 RX ADMIN — MORPHINE SULFATE 2 MG: 2 INJECTION, SOLUTION INTRAMUSCULAR; INTRAVENOUS at 11:08

## 2021-10-18 RX ADMIN — LORAZEPAM 0.5 MG: 0.5 TABLET ORAL at 03:30

## 2021-10-18 RX ADMIN — CITALOPRAM HYDROBROMIDE 20 MG: 20 TABLET ORAL at 07:37

## 2021-10-18 RX ADMIN — LISINOPRIL 5 MG: 5 TABLET ORAL at 20:02

## 2021-10-18 RX ADMIN — Medication 1 TABLET: at 07:37

## 2021-10-18 RX ADMIN — MORPHINE SULFATE 2 MG: 2 INJECTION, SOLUTION INTRAMUSCULAR; INTRAVENOUS at 20:44

## 2021-10-18 RX ADMIN — LORAZEPAM 0.5 MG: 0.5 TABLET ORAL at 10:50

## 2021-10-18 RX ADMIN — LORAZEPAM 0.5 MG: 0.5 TABLET ORAL at 17:47

## 2021-10-18 RX ADMIN — APIXABAN 10 MG: 5 TABLET, FILM COATED ORAL at 20:02

## 2021-10-18 ASSESSMENT — ACTIVITIES OF DAILY LIVING (ADL)
ADLS_ACUITY_SCORE: 10
ADLS_ACUITY_SCORE: 10
ADLS_ACUITY_SCORE: 9
ADLS_ACUITY_SCORE: 10
ADLS_ACUITY_SCORE: 10
ADLS_ACUITY_SCORE: 9
ADLS_ACUITY_SCORE: 10
ADLS_ACUITY_SCORE: 9
ADLS_ACUITY_SCORE: 10
ADLS_ACUITY_SCORE: 9
ADLS_ACUITY_SCORE: 10

## 2021-10-18 NOTE — PLAN OF CARE
Patient currently up in chair, has also been up to bedside commode x1 and had a large stool.  O2 sats drop significantly with activity and he also had a couple of random episodes where he'd wake up struggling to breathe and O2 sats would plummet even into the 50s.  With coaching and time he is able to recover back up to the 90s.  During one of these episodes I did give him a dose of IV morphine which he thinks helped.  Has been on HFNC much of the day but also spent an hour here and there on BiPAP.  Denied pain today when asked until wife got here this evening, then started c/o pain in right great toe 2/2 DVT for which I gave him Tylenol and an Ativan which has helped take the edge off.  TPN ordered, PICC STAT called but stated they are swamped and will call back later this evening to give a time frame for placement.

## 2021-10-18 NOTE — PROGRESS NOTES
Patient had been on BIPAP from 1049-9819. Started having increased anxiety.  Ativan give and patient assisted into prone position and back on HFNC 60 L 100%.  Sats were 67%-77% while turning with HFNC in place.  However recovered within 5 minutes without additional interventions. Currently 97% while in prone position.  Reassurance provided to patient.

## 2021-10-18 NOTE — PROGRESS NOTES
[facility]           Assessment & Plan           Liborio Barajas is a 56 year old male admitted on 10/1/2021. He presents with shortness of breath and was found to be Covid positive.     Confirmed COVID-19 infection    Acute Hypoxic Respiratory Failure secondary to COVID-19 infection  Viral Pneumonia secondary to COVID-19 infection     Symptom Onset September 22, 2021   Date of 1st Positive Test 10/01/21      Vaccination Status Not Vaccinated, but interested/contemplative     discontinued isolation precautions 10/12   -Transferred to ICU on 10/2 due to need for HFNC.    - Oxygen: Continue BiPAP 16/8 FIO2 0.8 alternating with HFNC. Seems to be stabilizing last few days, tolerating more HFNC but overall minimal change past few days.  Patient does better with proning overnight, strongly encouraged to continue proning as much as able.     - Labs: Trending D Dimer, CBC, Bmp daily with his clot. CRP & fibrinogen previously noramlized. D-dimer normalizing gradually.  - Imaging: Chest CT 10/10 with continued multifocal/reticular/GGO PNA as would be expected.  Chest x-ray 10/18 unchanged.    Breathing treatments: no inhalers needed; avoid nebulizers in favor of MDIs   IV fluids: none.  Antibiotics: not indicated   COVID-Focused Medications:    - DEXAMETHASONE: started 6 mg daily 10/1 and completed 10 days on 10/10, reassess if CRP climbing    - REMDESIVIR: Started 10/1 and completed 10/5   - Tocilizumab: given 10/2/21  - DVT Prophylaxis: on therapeutic anticoagulation as of 10/11/21     --135--55.9--20.6--10.4 -- 5.2--5.4 ? <2.9 --> 9.6 -- 8  D-dimer 2.19--1.62--1.09--1.44--1.85--3.52--5.90 ? 3.67 ? 3.79 -> 3.29 -> 2.56 -> 1.96 --> 2.1   Fibrinogen 476--852--716--650--538 ? 417  WBC 7.6--6.7--8.7--8.0--7.3--8.8--11.7--11.3--12.1 -- normal   Procalcitonin 10/9 <0.05  Blood culture 10/9 NG  Nares swab negative for MRSA or SA by PCR     Infrarenal Aortic Thrombus with thromboembolic  obstruction of right tibial-peroneal trunk    Right foot pain and diminished RLE pulses noted 10/11. CT revealed infrarenal aortic wall adhered clot and apparent thromboembolic obstruction of right tibial-peroneal trunk with distal reconstitution of tibial and peroneal arteries. Was discussed with vascular surgery [facility]: COVID-related arterial thrombectomies frequently reclot, so surgery is not recommended. ALso, increased heparin resistance in these pts, so anticoagulation with argobactran is recommended.       More right foot pain but has tenderness and sensation, cap refill better though not normal. DP pulse not dopplerable but overall clinically appears to have some blood flow.    - argatroban infusion started 10/11 and ended 10/16   - transitioned to apixaban, starting with 10 mg BID for the first week starting 10/17 then 5 mg twice daily.       Anxiety    Patient extremely fearful of being short of breath; this is limiting his ability to work with therapies etc. Suspect due to dyspnea.   - low dose oral oxycodone prn dyspnea  - Initiated citalopram 20 this admission although acknowledge that this will take some time to see effects from.  - ordered some prn ativan 10/17 and seems improved with this.       Hyponatremia   Present on admission. Probably related to volume depletion. Sodium 129 ? 600 cc of saline ? 135. Resolved     Possible acute kidney injury superimposed on chronic kidney disease stage 2    Last creatinine in his records was 1.28 on March 2, 2016.  Creatinine upon admit 2.20 with a BUN of 44  Suspect some volume depletion-used saline 50 cc an hour for 12 hours.  - Creatinine 2.20--1.29 --1.03--0.99 Resolved 10/4.      Transaminitis    Suspect Covid related. ALT trending up. Alk phos and bili normal.   - ALT 49--43--74--109--114--123 -- 103  - AST 68--41--80--91--74--76--46  - Continue to follow intermittently - resolved 10/18.      Hypertension  Previously on lisinopril   - resumed lisinopril at  half dose     GERD  Continue omeprazole     Malnutrition:  - Level of malnutrition: Severe   - Based on: weight loss, reduced intake - see nutrition notes - notable weight loss and intake remained poor.  Patient does not want and would not tolerate NG tube given anxiety and on high flow oxygen so placed PICC and started on TPN 10/18     Diet:  Regular  DVT Prophylaxis: Enoxaparin (Lovenox) SQ  Neal Catheter: Not present  Central Lines: None  Code Status:  Full    Diet  Orders Placed This Encounter      Combination Diet Regular Diet Adult              Disposition  Continue ICU cares, anticipate at least 5 more days inpatient.              Interval History:   Feels about the same.  Foot mildly improved, less painful.  No other changes.  No new concerns.  Continues to need intermittent BIPAP/high flow.  Intake slightly improved but still poor.  No new symptoms or changes.    No pain             Review of Systems:    ROS: 10 point ROS neg other than the symptoms noted above in the HPI.           Medications:   Current active medications and PTA medications reviewed, see medication list for details.            Physical Exam:   Vitals were reviewed  Patient Vitals for the past 24 hrs:   BP Temp Temp src Pulse Resp SpO2   10/18/21 1600 (!) 123/93 -- -- 108 (!) 34 93 %   10/18/21 1400 121/80 -- -- 102 (!) 33 95 %   10/18/21 1200 126/88 -- -- 89 (!) 37 97 %   10/18/21 0800 125/82 -- -- 84 30 97 %   10/18/21 0725 -- 98.1  F (36.7  C) Axillary -- -- --   10/18/21 0600 121/86 -- -- 86 30 95 %   10/18/21 0500 -- -- -- 98 (!) 43 92 %   10/18/21 0400 116/89 98.4  F (36.9  C) Axillary 85 29 96 %   10/18/21 0200 104/70 -- -- 101 (!) 33 (!) 89 %   10/18/21 0000 95/50 98.6  F (37  C) Axillary 114 21 (!) 86 %   10/17/21 2345 -- -- -- 81 (!) 31 90 %   10/17/21 2200 112/79 -- -- 85 21 92 %   10/17/21 2000 129/85 -- -- 102 (!) 35 94 %   10/17/21 1945 -- 98.4  F (36.9  C) Axillary 105 (!) 41 94 %       Temperatures:  Current - Temp: 98.1   F (36.7  C); Max - Temp  Av.4  F (36.9  C)  Min: 98.1  F (36.7  C)  Max: 98.6  F (37  C)  Respiration range: Resp  Av.2  Min: 21  Max: 43  Pulse range: Pulse  Av.4  Min: 81  Max: 114  Blood pressure range: Systolic (24hrs), Av , Min:95 , Max:129   ; Diastolic (24hrs), Av, Min:50, Max:93    Pulse oximetry range: SpO2  Av.1 %  Min: 86 %  Max: 97 %  I/O last 3 completed shifts:  In: -   Out: 850 [Urine:850]    Intake/Output Summary (Last 24 hours) at 10/18/2021 181  Last data filed at 10/18/2021 0500  Gross per 24 hour   Intake --   Output 650 ml   Net -650 ml     EXAM:  General: awake and alert, NAD, oriented x 3  Head: normocephalic  Neck: unremarkable, no lymphadenopathy   HEENT: oropharynx pink and moist    Heart: Regular rate and rhythm, no murmurs, rubs, or gallops  Lungs: clear to auscultation bilaterally with good air movement throughout  Abdomen: soft, non-tender, no masses or organomegaly  Extremities: no edema in lower extremities right foot unchanged, warm with decent capillary refill but no palpable pedal pulse on exam still.    Skin unremarkable.               Data:     Results for orders placed or performed during the hospital encounter of 10/01/21 (from the past 24 hour(s))   CBC with platelets   Result Value Ref Range    WBC Count 10.0 4.0 - 11.0 10e3/uL    RBC Count 4.77 4.40 - 5.90 10e6/uL    Hemoglobin 13.9 13.3 - 17.7 g/dL    Hematocrit 41.0 40.0 - 53.0 %    MCV 86 78 - 100 fL    MCH 29.1 26.5 - 33.0 pg    MCHC 33.9 31.5 - 36.5 g/dL    RDW 12.5 10.0 - 15.0 %    Platelet Count 190 150 - 450 10e3/uL   Comprehensive metabolic panel   Result Value Ref Range    Sodium 136 133 - 144 mmol/L    Potassium 3.9 3.4 - 5.3 mmol/L    Chloride 104 94 - 109 mmol/L    Carbon Dioxide (CO2) 27 20 - 32 mmol/L    Anion Gap 5 3 - 14 mmol/L    Urea Nitrogen 15 7 - 30 mg/dL    Creatinine 0.65 (L) 0.66 - 1.25 mg/dL    Calcium 8.3 (L) 8.5 - 10.1 mg/dL    Glucose 110 (H) 70 - 99 mg/dL     Alkaline Phosphatase 89 40 - 150 U/L    AST 34 0 - 45 U/L    ALT 45 0 - 70 U/L    Protein Total 5.8 (L) 6.8 - 8.8 g/dL    Albumin 2.2 (L) 3.4 - 5.0 g/dL    Bilirubin Total 0.5 0.2 - 1.3 mg/dL    GFR Estimate >90 >60 mL/min/1.73m2   CRP inflammation   Result Value Ref Range    CRP Inflammation 8.0 0.0 - 8.0 mg/L   Magnesium   Result Value Ref Range    Magnesium 2.2 1.6 - 2.3 mg/dL   Phosphorus   Result Value Ref Range    Phosphorus 2.9 2.5 - 4.5 mg/dL   Triglycerides   Result Value Ref Range    Triglycerides 110 <150 mg/dL   XR Chest Port 1 View    Narrative    XR CHEST PORT 1 VIEW 10/18/2021 8:47 AM    HISTORY: Persistent hypoxia, COVID-19, please compare to previous,  rule out other contributing etiology    COMPARISON: October 9, 2021    FINDINGS: There are extensive bilateral infiltrates, essentially  unchanged since the previous chest x-ray. No definite pleural  effusions. Normal heart size.    YANELY CHUN MD         SYSTEM ID:  [id number]   Lactic Acid STAT   Result Value Ref Range    Lactic Acid 1.1 0.7 - 2.0 mmol/L           Attestation:  I have reviewed today's vital signs, notes, medications, labs and imaging.  Amount of time spent in direct patient care providing critical care: 45 minutes.     Go Zimmer MD, MD

## 2021-10-18 NOTE — CONSULTS
CLINICAL NUTRITION SERVICES - REASSESSMENT NOTE     Nutrition Prescription    RECOMMENDATIONS FOR MDs/PROVIDERS TO ORDER:  -Vascular consult for TPN access.  -Consult for Pharmacy/Nutrition to start and manage TPN.  -Baseline labs: Mag, Phos and TG labs. RD to order, but MD to sign per staff recommendation.     Malnutrition Status:    Severe malnutrition in the context of acute illness.    Recommendations already ordered by Registered Dietitian (RD):  -Ensure Enlive BID (strawberry) w/ breakfast and supper.  -Small portions.    Future/Additional Recommendations:  -Continue to monitor and encourage oral intake.  -Magic Cup as ordered.    TPN recommendations:  -Obtain cental access. Verify ok to use.  -Replace Mg and Phos if < WNL. Recheck lytes and ensure they are WNL prior to starting TPN. Patient is at-risk for refeeding syndrome.    1) At 2000 tonight, start TPN (Clinimix E, D15 AA5) via PICC @ 40 mL/hr x 24 hrs. Run 20% lipids @ 20.8 mL/hr x 12 hrs, 5 days/week. This provides 960 mL, 1039 kcal (13 kcal/kg, 66% needs), 144 g dextrose, 48 g AA (0.6 g pro/kg, 51% needs), and 250 mL of 20% IV lipids (34% kcal from fat). GIR = 1.3 mg/kg/min    2) Recheck lytes. If WNL, advance TPN to 65 mL/hr x 24 hrs. Continue lipids as ordered. This provides 1560 mL, 1465 kcal (19 kcal/kg, 94% needs), 234 g dextrose, 78 g AA (1.0 g pro/kg, 83% needs), and 250 mL of 20% IV lipids (24% kcal from fat). GIR = 2.1 mg/kg/min    3) Recheck lytes. If WNL, advance TPN to goal of 90 mL/hr x 24 hrs. Run 20% lipids @ 20.8 mL/hr x 12 hrs. This provides 2160 mL, 1891 kcal (24 kcal/kg, 100% needs), 324 g dextrose, 108 g AA (1.4 g pro/kg, 100% needs), and 250 mL of 20% IV lipids (19% kcal from fat). GIR = 2.9 mg/kg/min    -If TG lab >350 mg/dL likely will reduce lipids to 3 days/week.  -Recheck TG lab the day after lipids start (likely tomorrow at 1400).    If enteral nutrition is within plan of care, recommend the following:  -Ensure K+, Mag and  "Phos are WNL prior to initiating nutrition support. Patient is at-risk for refeeding syndrome.    1) Jevity 1.5 via NG at 20 mL/hr x 24 hrs. If K+, Mag and Phos are WNL, advance by 10 mL per hr q 6 to goal of 65 mL/hr x 24 hrs.   -Do not advance if labs are < WNL. Hold at current rate and replace electrolytes. When labs are WNL, may advance as above. At goal rate, formula provides total volume of 1560 mL, 2340 kcal (25 kcal/kg, 100% needs), 99 gm Pro (1.3 gm/kg, 100% needs), 336 gm CHO, 78 gm fat, 32 gm fiber, and 1185 mL free water per DW of 78 kg.    2) Free water flushes of 30 mL q 4 hrs. Total fluid (free water + flushes) = 1365 mL per day.     3) HOB should be elevated at least 30-40 degrees.    -RD/MD to adjust free water flushes per MD recommendation pending fluid status.       Received MD consult for poor oral intake.     Discussed plan of care w/ CAMELIA Horan and Dr. Zimmer. It is recommended to initiate TPN ASAP given weight loss and poor intake. RN and MD in agreement. Awaiting consults to be placed.    EVALUATION OF THE PROGRESS TOWARD GOALS   Diet: Orders Placed This Encounter      Combination Diet Regular Diet Adult      Snacks/Supplements:   -Ensure Enlive w/ breakfast  -Magic Cup between meals    Intake:    -25-50% of meals per flowsheets. This appears to be a little improved compared to 0-25% for the first week of admission.  -Food does not taste good per MD note 10/16.  -Met w/ patient at bedside, however he was on BiPAP so we mostly used \"thumbs up\" or \"thumbs down\" to communicate (yes or no, respectively). He reports eating 50% of meals, but understands he's not eating enough. His appetite is better here than before admission. He thinks small portions will be helpful. He likes the Magic Cup. He likes the Ensure and is willing to increase to BID w/ breakfast and supper (likes the strawberry flavor). He would rather not have a feeding tube, but understands if it came down to it. I then discussed TPN " and he seemed more agreeable to this.      NEW FINDINGS   -Discontinued isolation precautions 10/12. Strongly encouraged to continue proning as much as possible.     -Per MD note 10/16, team will consider high res CT chest in the future 1-2 weeks to evaluate for fibrosis if not improving.     -Chest x-ray this morning.    -O2: mostly off BiPAP and using HFNC. On BiPAP as of 0600 this morning.    -labs: Na 136 (WNL), K+ 3.9 (WNL) Bilirubin total 0.5 (WNL), Alk Phos 89 (WNL), ALT 45 (WNL), AST 34 (WNL), CRP inflammation 8.0 (WNL), . No Mag or Phos since admit.    -meds: decadron completed 10/10/21. Senna-docusate at bedtime added 10/14/21.    -weights: 10/1 (admit): 87.1 kg (192 lb 0.3 oz). 10/8: 81.6 kg (179 lb 14.3 oz). 10/11: 80.4 kg (177 lb 4 oz). 10/15: 82.3 kg (181 lb 7 oz). Overall, weight continues to trend down. Patient has lost 4.2 kg (5% body weight) in the last 7 days, which is clinically significant. He's lost 10 kg (11% body weight) since admit (LOS day 17), which is also clinically significant. Weight loss is multifactorial - hypermetabolism w/ critical illness, poor oral intake, and fluid shifts. Adjusted estimated needs in anticipation of starting TPN.  Vitals:    10/09/21 0747 10/10/21 0623 10/11/21 0608 10/12/21 0400   Weight: 82.6 kg (182 lb 1.6 oz) 81.3 kg (179 lb 3.7 oz) 80.4 kg (177 lb 4 oz) 79.9 kg (176 lb 2.4 oz)    10/13/21 0600 10/14/21 0609 10/15/21 0653 10/17/21 0649   Weight: 81.6 kg (179 lb 14.3 oz) 80 kg (176 lb 5.9 oz) 82.3 kg (181 lb 7 oz) 77.1 kg (169 lb 15.6 oz)     Dosing Weight: 78 kg (10/7/21)     ASSESSED NUTRITION NEEDS  Estimated Energy Needs: 1419-8853 kcals/day (20-25 kcals/kg )  Justification: TPN  Estimated Protein Needs:  grams protein/day (1.2 - 1.5 grams of pro/kg)  Justification: Hypercatabolism with critical illness  Estimated Fluid Needs: (1 mL/kcal)   Justification: Per provider pending fluid status      MALNUTRITION (10/18/21)  % Intake: </= 50% for >/=  5 days (severe)  % Weight Loss: > 2% in 1 week (severe)  Subcutaneous Fat Loss: None observed, although likely present due as seen by weight loss above.  Muscle Loss: Temporal:  mild and Dorsal hand:  Mild. Additional findings are likely given weight loss as above.  Fluid Accumulation/Edema: None noted  Malnutrition Diagnosis: Severe malnutrition in the context of acute illness.    Previous Goals   Patient to consume 50-75% of nutritionally adequate meal trays TID, or the equivalent with supplements/snacks.  Evaluation: Not met    Previous Nutrition Diagnosis  Inadequate oral intake related to poor appetite secondary to acute illness as evidenced by chart review indicating patient is eating 0-25% of meals over the last 7 days and weight loss of 13 lb (7% body weight) in the last 7 days.  Evaluation: Improving, although not by much.    CURRENT NUTRITION DIAGNOSIS  Inadequate oral intake related to poor appetite secondary to acute illness, and dislike of food options as evidenced by chart review indicating patient is eating 25-50% of meals over the last 7 days, patient report of poor appetite, and weight loss of 4.2 kg (5% body weight) in the last 7 days now recommending TPN to help meet estimated needs.    INTERVENTIONS  Implementation  Collaboration with other providers: IDNISHANT conrad, CAMELIA Horan, and Dr. Zimmer.    Medical food supplement therapy  Modify composition of meals/snacks  Parenteral Nutrition/IV Fluids - Initiate TPN and lipids within the next 24 hrs.     Goals  Total avg nutritional intake to meet a minimum of 20 kcal/kg and 1.2 g PRO/kg daily (per dosing wt 78 kg).    Monitoring/Evaluation  Progress toward goals will be monitored and evaluated per protocol.    Bertha Velez RDN, LD  Clinical Dietitian  Office (Monday-Friday): [phone]  Weekend/Holiday Pager: [phone]

## 2021-10-18 NOTE — PROGRESS NOTES
Care Management:    The Patient was admitted with COVID-19, he is COVID recovered 10/12/21.  The Patient is in ICU on BIPAP.       The Patient does not have health insurance.  Per CIARAN Hobson Note:    Acct ID: [id number] DOS 10/1/2021 FC called spouse and screened Pt for MNsure. Pt is self employed, and spouse is employed FT. They are over monthly and yearly eligibility limits for [place] and [place]. FC to send them nikita care manny- they may be close to the limit of eligibility- but he may not have any more income this year depending on his timeline for recovery.   Please bill this to Covid HRSA fund- Pt is uninsured and is not eligible for MNsure programs.  FC to close    Care Management will continue to monitor through daily chart reviews and Interdisciplinary Rounds.     Plan:  KWADWO Ash RN, Care Coordinator [phone]

## 2021-10-19 LAB
ALBUMIN SERPL-MCNC: 2.3 G/DL (ref 3.4–5)
ALP SERPL-CCNC: 94 U/L (ref 40–150)
ALT SERPL W P-5'-P-CCNC: 41 U/L (ref 0–70)
ANION GAP SERPL CALCULATED.3IONS-SCNC: 4 MMOL/L (ref 3–14)
AST SERPL W P-5'-P-CCNC: 32 U/L (ref 0–45)
BASE EXCESS BLDV CALC-SCNC: 4.5 MMOL/L (ref -7.7–1.9)
BILIRUB DIRECT SERPL-MCNC: 0.1 MG/DL (ref 0–0.2)
BILIRUB SERPL-MCNC: 0.5 MG/DL (ref 0.2–1.3)
BUN SERPL-MCNC: 13 MG/DL (ref 7–30)
CALCIUM SERPL-MCNC: 8.5 MG/DL (ref 8.5–10.1)
CHLORIDE BLD-SCNC: 104 MMOL/L (ref 94–109)
CO2 SERPL-SCNC: 29 MMOL/L (ref 20–32)
CREAT SERPL-MCNC: 0.66 MG/DL (ref 0.66–1.25)
CRP SERPL-MCNC: 10.6 MG/L (ref 0–8)
ERYTHROCYTE [DISTWIDTH] IN BLOOD BY AUTOMATED COUNT: 12.6 % (ref 10–15)
GFR SERPL CREATININE-BSD FRML MDRD: >90 ML/MIN/1.73M2
GLUCOSE BLD-MCNC: 134 MG/DL (ref 70–99)
GLUCOSE BLDC GLUCOMTR-MCNC: 111 MG/DL (ref 70–99)
GLUCOSE BLDC GLUCOMTR-MCNC: 133 MG/DL (ref 70–99)
GLUCOSE BLDC GLUCOMTR-MCNC: 139 MG/DL (ref 70–99)
HCO3 BLDV-SCNC: 31 MMOL/L (ref 21–28)
HCT VFR BLD AUTO: 40.9 % (ref 40–53)
HGB BLD-MCNC: 14.1 G/DL (ref 13.3–17.7)
INR PPP: 1.3 (ref 0.85–1.15)
LACTATE SERPL-SCNC: 1.4 MMOL/L (ref 0.7–2)
MAGNESIUM SERPL-MCNC: 2.3 MG/DL (ref 1.6–2.3)
MCH RBC QN AUTO: 29.6 PG (ref 26.5–33)
MCHC RBC AUTO-ENTMCNC: 34.5 G/DL (ref 31.5–36.5)
MCV RBC AUTO: 86 FL (ref 78–100)
O2/TOTAL GAS SETTING VFR VENT: 100 %
PCO2 BLDV: 50 MM HG (ref 40–50)
PH BLDV: 7.39 [PH] (ref 7.32–7.43)
PHOSPHATE SERPL-MCNC: 3.1 MG/DL (ref 2.5–4.5)
PLATELET # BLD AUTO: 192 10E3/UL (ref 150–450)
PO2 BLDV: 36 MM HG (ref 25–47)
POTASSIUM BLD-SCNC: 4 MMOL/L (ref 3.4–5.3)
PREALB SERPL IA-MCNC: 17 MG/DL (ref 15–45)
PROT SERPL-MCNC: 6.4 G/DL (ref 6.8–8.8)
RBC # BLD AUTO: 4.76 10E6/UL (ref 4.4–5.9)
SODIUM SERPL-SCNC: 137 MMOL/L (ref 133–144)
TRIGL SERPL-MCNC: 112 MG/DL
WBC # BLD AUTO: 12.4 10E3/UL (ref 4–11)

## 2021-10-19 PROCEDURE — 83735 ASSAY OF MAGNESIUM: CPT | Performed by: FAMILY MEDICINE

## 2021-10-19 PROCEDURE — 82248 BILIRUBIN DIRECT: CPT | Performed by: FAMILY MEDICINE

## 2021-10-19 PROCEDURE — 250N000009 HC RX 250: Performed by: FAMILY MEDICINE

## 2021-10-19 PROCEDURE — 83605 ASSAY OF LACTIC ACID: CPT | Performed by: FAMILY MEDICINE

## 2021-10-19 PROCEDURE — 85610 PROTHROMBIN TIME: CPT | Performed by: FAMILY MEDICINE

## 2021-10-19 PROCEDURE — 999N000105 HC STATISTIC NO DOCUMENTATION TO SUPPORT CHARGE

## 2021-10-19 PROCEDURE — 999N000123 HC STATISTIC OXYGEN O2DAILY TECH TIME

## 2021-10-19 PROCEDURE — 84134 ASSAY OF PREALBUMIN: CPT | Performed by: FAMILY MEDICINE

## 2021-10-19 PROCEDURE — 84100 ASSAY OF PHOSPHORUS: CPT | Performed by: FAMILY MEDICINE

## 2021-10-19 PROCEDURE — 250N000013 HC RX MED GY IP 250 OP 250 PS 637: Performed by: INTERNAL MEDICINE

## 2021-10-19 PROCEDURE — 250N000013 HC RX MED GY IP 250 OP 250 PS 637: Performed by: FAMILY MEDICINE

## 2021-10-19 PROCEDURE — 250N000013 HC RX MED GY IP 250 OP 250 PS 637: Performed by: HOSPITALIST

## 2021-10-19 PROCEDURE — 999N000215 HC STATISTIC HFNC ADULT NON-CPAP

## 2021-10-19 PROCEDURE — 84478 ASSAY OF TRIGLYCERIDES: CPT | Performed by: FAMILY MEDICINE

## 2021-10-19 PROCEDURE — 82803 BLOOD GASES ANY COMBINATION: CPT | Performed by: INTERNAL MEDICINE

## 2021-10-19 PROCEDURE — 94660 CPAP INITIATION&MGMT: CPT

## 2021-10-19 PROCEDURE — 200N000001 HC R&B ICU

## 2021-10-19 PROCEDURE — 99291 CRITICAL CARE FIRST HOUR: CPT | Performed by: FAMILY MEDICINE

## 2021-10-19 PROCEDURE — 85027 COMPLETE CBC AUTOMATED: CPT | Performed by: FAMILY MEDICINE

## 2021-10-19 PROCEDURE — 86140 C-REACTIVE PROTEIN: CPT | Performed by: FAMILY MEDICINE

## 2021-10-19 PROCEDURE — 250N000011 HC RX IP 250 OP 636: Performed by: INTERNAL MEDICINE

## 2021-10-19 PROCEDURE — 82040 ASSAY OF SERUM ALBUMIN: CPT | Performed by: FAMILY MEDICINE

## 2021-10-19 PROCEDURE — 999N000157 HC STATISTIC RCP TIME EA 10 MIN

## 2021-10-19 PROCEDURE — 250N000011 HC RX IP 250 OP 636: Performed by: FAMILY MEDICINE

## 2021-10-19 RX ORDER — ALBUTEROL SULFATE 90 UG/1
2 AEROSOL, METERED RESPIRATORY (INHALATION)
Status: DISCONTINUED | OUTPATIENT
Start: 2021-10-19 | End: 2021-11-01 | Stop reason: HOSPADM

## 2021-10-19 RX ORDER — LORAZEPAM 2 MG/ML
0.5 INJECTION INTRAMUSCULAR EVERY 4 HOURS PRN
Status: DISCONTINUED | OUTPATIENT
Start: 2021-10-19 | End: 2021-10-22

## 2021-10-19 RX ADMIN — Medication: at 07:53

## 2021-10-19 RX ADMIN — MORPHINE SULFATE 2 MG: 2 INJECTION, SOLUTION INTRAMUSCULAR; INTRAVENOUS at 02:03

## 2021-10-19 RX ADMIN — ACETAMINOPHEN 650 MG: 325 TABLET, FILM COATED ORAL at 20:43

## 2021-10-19 RX ADMIN — CITALOPRAM HYDROBROMIDE 20 MG: 20 TABLET ORAL at 07:52

## 2021-10-19 RX ADMIN — ALBUTEROL SULFATE 2 PUFF: 90 AEROSOL, METERED RESPIRATORY (INHALATION) at 18:22

## 2021-10-19 RX ADMIN — PANTOPRAZOLE SODIUM 40 MG: 40 TABLET, DELAYED RELEASE ORAL at 07:52

## 2021-10-19 RX ADMIN — ACETAMINOPHEN 650 MG: 325 TABLET, FILM COATED ORAL at 07:52

## 2021-10-19 RX ADMIN — APIXABAN 10 MG: 5 TABLET, FILM COATED ORAL at 20:49

## 2021-10-19 RX ADMIN — MORPHINE SULFATE 2 MG: 2 INJECTION, SOLUTION INTRAMUSCULAR; INTRAVENOUS at 03:31

## 2021-10-19 RX ADMIN — ASCORBIC ACID, VITAMIN A PALMITATE, CHOLECALCIFEROL, THIAMINE HYDROCHLORIDE, RIBOFLAVIN-5 PHOSPHATE SODIUM, PYRIDOXINE HYDROCHLORIDE, NIACINAMIDE, DEXPANTHENOL, ALPHA-TOCOPHEROL ACETATE, VITAMIN K1, FOLIC ACID, BIOTIN, CYANOCOBALAMIN: 200; 3300; 200; 6; 3.6; 6; 40; 15; 10; 150; 600; 60; 5 INJECTION, SOLUTION INTRAVENOUS at 21:07

## 2021-10-19 RX ADMIN — SENNOSIDES AND DOCUSATE SODIUM 1 TABLET: 50; 8.6 TABLET ORAL at 21:37

## 2021-10-19 RX ADMIN — LISINOPRIL 5 MG: 5 TABLET ORAL at 20:51

## 2021-10-19 RX ADMIN — LORAZEPAM 0.5 MG: 0.5 TABLET ORAL at 07:52

## 2021-10-19 RX ADMIN — MORPHINE SULFATE 2 MG: 2 INJECTION, SOLUTION INTRAMUSCULAR; INTRAVENOUS at 18:22

## 2021-10-19 RX ADMIN — LORAZEPAM 0.5 MG: 2 INJECTION INTRAMUSCULAR; INTRAVENOUS at 15:46

## 2021-10-19 RX ADMIN — LORAZEPAM 0.5 MG: 2 INJECTION INTRAMUSCULAR; INTRAVENOUS at 21:37

## 2021-10-19 RX ADMIN — ASCORBIC ACID, VITAMIN A PALMITATE, CHOLECALCIFEROL, THIAMINE HYDROCHLORIDE, RIBOFLAVIN-5 PHOSPHATE SODIUM, PYRIDOXINE HYDROCHLORIDE, NIACINAMIDE, DEXPANTHENOL, ALPHA-TOCOPHEROL ACETATE, VITAMIN K1, FOLIC ACID, BIOTIN, CYANOCOBALAMIN: 200; 3300; 200; 6; 3.6; 6; 40; 15; 10; 150; 600; 60; 5 INJECTION, SOLUTION INTRAVENOUS at 00:00

## 2021-10-19 RX ADMIN — ALBUTEROL SULFATE 2 PUFF: 90 AEROSOL, METERED RESPIRATORY (INHALATION) at 14:25

## 2021-10-19 RX ADMIN — MORPHINE SULFATE 2 MG: 2 INJECTION, SOLUTION INTRAMUSCULAR; INTRAVENOUS at 15:46

## 2021-10-19 RX ADMIN — I.V. FAT EMULSION 250 ML: 20 EMULSION INTRAVENOUS at 00:00

## 2021-10-19 RX ADMIN — I.V. FAT EMULSION 250 ML: 20 EMULSION INTRAVENOUS at 21:36

## 2021-10-19 RX ADMIN — ALBUTEROL SULFATE 2 PUFF: 90 AEROSOL, METERED RESPIRATORY (INHALATION) at 20:49

## 2021-10-19 RX ADMIN — APIXABAN 10 MG: 5 TABLET, FILM COATED ORAL at 07:52

## 2021-10-19 ASSESSMENT — ACTIVITIES OF DAILY LIVING (ADL)
ADLS_ACUITY_SCORE: 10

## 2021-10-19 ASSESSMENT — MIFFLIN-ST. JEOR: SCORE: 1604.38

## 2021-10-19 NOTE — PROGRESS NOTES
"TPN and Lipids started at this time. Accucheck is 111. Pt continues on BIPAP 90%, to keep his sats above 89%. Pt tried HFNC 95% for a \"break\", but desats to the 60's quickly, requiring BIPAP 90% to be reapplied.  "

## 2021-10-19 NOTE — PLAN OF CARE
Patient has remained status quo with no real change noted in days.  Continues to vacillate between BiPAP at 16/10 and 100% FiO2 and HFNC @ 60L and 95%.  O2 sats continue to tank into 50s-60s at times although I did notice he seemed to tolerate activity a little better today dropping into 70s mostly and less often to the 50s.  Has been up in chair, complete bed bath done, linen changed.  Had a BM this morning.  TPN running, has had little appetite today.  Voiding adequate amounts.

## 2021-10-19 NOTE — PROGRESS NOTES
"Care Management Note:    SW received notification from Renae Haynes, Patient , who shared the following information, \"Financial Counselor called spouse and screened pt for MNsure.  Pt is self employed, and spouse is employed FT. They are over monthly and yearly eligibility limits for MA and [place]. Financial Counselor to send them nikita care application- they may be close to the limit of eligibility- but he may not have any more income this year depending on his timeline for recovery.  Please bill this to Covid HRSA fund- Pt is uninsured and is not eligible for MNsure programs.\"      Unfortunately, pt will not have insurance coverage for TCU cares, home care or home oxygen at time of discharge.    SW notified that pt will have MNSure insurance coverage on 1-1-2022.  ARCELIA spoke with pt's wife, Chikis, who informed this writer with this information.  ARCELIA explained that with lack of current insurance, there may be discharge needs that care management team will need to assist with cares as needed.      Care Management team to follow for discharge plans as needed.    SERA Deras  Care Transitions   Tele: [phone]      "

## 2021-10-19 NOTE — PROCEDURES
[facility]  Procedure Note         PICC      Liborio Barajas  MRN# [id number]   October 18, 2021, 9:46 PM Indication: Medication administration           Pause for the cause: Consent for catheter placement procedure signed  Time out completed  Patient ID's verified using two distinct indicators  All necessary equipment is present  Site marked if extremity to be used has been predetermined   Type of line to be used: PICC   Full barrier precautions used: Yes   Skin preparation: Chloraprep   Date of insertion: October 18, 2021, 9:46 PM   Device type: Double lumen, valved, 5.0   Catheter brand: [employer]   Lot number: [id number]   Insertion location: Right basilic vein   Method of placement: Venipuncture  MST  Ultrasound   Number of attempts: With ultrasound: 1   Without ultrasound: 0   Difficulty threading: No   Midline IV device: Dressing dry and intact   Arm circumference: Adults 15 cm   Midline extremity circumference: 32 cm   Internal length: 39 cm   Midline visible catheter length: 4 cm   Total catheter length: 43 cm   Tip termination: CAJ   Method of verification: Chest x-ray   Midline patency post placement: Positive blood return  Flushes without difficulty  Saline locked   Line flush: Line flush documented on the eMAR   Placement verified by: Registered Nurse   Catheter placed by: Brian Brooke   Discontinuation form initiated: No   Patient tolerance: Tolerated well   PICC Insertion Education Complete: Yes      Summary:  This procedure was performed without difficulty and he tolerated the procedure well with no immediate complications.  Initial CXR noted tip in RA, retracted 3 cm per MD recommendations awaiting 2nd xray results.      Recorded by Brian Brooke    Attestation:  This patient has been seen and evaluated by Brian mckeon RN.

## 2021-10-19 NOTE — PROGRESS NOTES
Pt continues very SOA with any activity. Pt has been on BIPAP 90%, most of the evening to keep his O2 sats in the 90's. PICC here and placed a line, awaiting confirmation from Radiologist.  Other VSS. Will continue to monitor.

## 2021-10-19 NOTE — PROGRESS NOTES
Verified with Mesha WILLIAMSON that picc line placement is essential in spite of recent hx of arterial thrombus.

## 2021-10-19 NOTE — PROGRESS NOTES
Pt unable to come off of BIPAP this shift due to SOA and low sats while on HFNC. Pt's BIPAP now at 99%, keeping pt's sats low 90's. TPN and Lipids infusing as ordered.

## 2021-10-19 NOTE — PROGRESS NOTES
Patient is anxious, SOB, Tachypneic,cant explain the reason for anxiety. PRN ativan given, Morphine given

## 2021-10-19 NOTE — DISCHARGE INSTRUCTIONS
Discharge Instructions: Caring for Your Central Line  You are going home with a central line. It s also called a central venous access device (CVAD) or central venous catheter (CVC). A small, soft tube (catheter) has been put in a vein that leads to your heart. This provides medicine during your treatment. It's taken out when you no longer need it. At home, you need to take care of your central line to keep it working. A central line has a high infection risk. So you must take extra care washing your hands and preventing the spread of germs. This sheet will help you remember what to do at home.   Understanding your role    A nurse or other healthcare provider will teach you and your caregivers how to care for the central line. Before leaving the hospital, make sure you understand what to do at home, how long you may need the central line, and when to have a follow-up visit.    You will likely be told to flush the central line with saline or heparin solution. You may also be told to change the catheter s injection caps and change the bandage (dressing). Or, a nurse may do this for you during a follow-up visit. Only do these things if you re told to do so. Follow the instructions you were given.    Protecting the central line  If the central line gets damaged, it won t work right and could raise your chance of infection. Call your healthcare team right away if any damage occurs. To protect the central line at home:     Prevent infection. Use good hand hygiene by following the guidelines on this sheet. Don t touch the catheter or dressing unless you need to. And always clean your hands before and after you come in contact with any part of the central line. Your caregivers, family members, and any visitors should use good hand hygiene, too.    Keep the central line dry. The catheter and dressing must stay dry. Don t take baths, go swimming, use a hot tub, or do other activities that could get the central line wet.  Take a sponge bath to avoid getting the central line wet, unless your healthcare provider tells you otherwise. Ask your provider about the best way to keep the line dry when bathing or showering. If the dressing does get wet, change it only if you have been shown how. Otherwise, call your healthcare team right away for help. Have clean or sterile gloves on hand if you will be changing the dressings over the catheter.    Don't damage the catheter. Don t use any sharp or pointy objects around the catheter. This includes scissors, pins, knives, razors, or anything else that could cut it or put a hole in it (puncture it). Also, don t let anything pull or rub on the catheter, such as clothing.    Watch for signs of problems. Pay attention to how much of the catheter sticks out from your skin. If this changes at all, let your healthcare provider know. Also watch for cracks, leaks, or other damage. If the dressing becomes dirty, loose, or wet, change it (if you have been instructed to). Or call your healthcare team right away.    Don't lower your chest below your waist. This includes bending at the waist to do things like tying your shoes. When your chest is below your waist, especially for a long time, the catheter s internal tip could slip out of place in the vein.    Tell your healthcare team if you vomit or have severe coughing. This can also make the catheter slip out of place.  Risk for blood clot  If a blood clot forms it can block blood flow through the vein where the catheter is placed. Signs of a blood clot include pain or swelling in your neck, face, chest or arm. If you have any of these symptoms, call your healthcare provider right away. You may need an ultrasound exam to find the blood clot. You may also be treated with a blood thinner.     Prevent infection with good hand hygiene  A central line can let germs into your body. This can lead to serious and sometimes deadly infections. To prevent infection, it s  very important that you, your caregivers, and others around you use good hand hygiene. This means washing your hands well with soap and water, and cleaning them with alcohol-based hand gel as directed. Never touch the central line or dressing without first using one of these methods.   To wash your hands with soap and water:  1. Wet your hands with clean water. (Don't use hot water. It can cause skin irritation when you wash your hands often.)  2. Apply enough soap to cover the entire surface of your hands, including your fingers.  3. Rub your hands together briskly for at least 15 seconds. Make sure to rub the front and back of each hand up to the wrist, your fingers and fingernails, between the fingers, and each thumb.  4. Rinse your hands with clean water.  5. Dry your hands completely with a new, unused paper towel. Don t use a cloth towel or other reusable towel. These can harbor germs.  6. Use the paper towel to turn off the faucet, then throw it away. If you re in a bathroom, also use a paper towel to open the door instead of touching the handle.  When you don t have access to soap and water: Use alcohol-based hand gel to clean your hands. The gel should have at least 60% alcohol. Follow the instructions on the package. Your healthcare team can answer any questions you have about when to use hand gel, or when it s better to wash with soap and water.   When to call your healthcare provider  Call your healthcare provider right away if you have any of the following:    Pain or burning in your shoulder, chest, back, arm, or leg    Fever of 100.4  F ( 38.0 C) or higher    Chills    Signs of infection at the catheter site (pain, redness, drainage, burning, or stinging)    Coughing, wheezing, or shortness of breath    A racing or irregular heartbeat    Muscle stiffness or trouble moving    Gurgling noises coming from the catheter    The catheter falls out, breaks, cracks, leaks, or has other damage  [employer] last  reviewed this educational content on 6/1/2019 2000-2021 [employer]. All rights reserved. This information is not intended as a substitute for professional medical care. Always follow your healthcare professional's instructions.        Peripherally Inserted Central Catheter (PICC)     A PICC may have more than one channel. This means that different fluids or medicines can be given at once. Keep in mind that your PICC should be accessed only by trained medical professionals.   You need a peripherally inserted central catheter (PICC) for your treatment. A catheter is a small, soft tube. The catheter is inserted into a vein in your arm. It is then moved through your vein until the tip sits in the large vein (vena cava) near your heart. A PICC is often used when treatment requires you to have medicine or nutrition for weeks or months. When you no longer need the PICC, your healthcare provider will remove it. Your skin will then heal. This article tells you more about a PICC and how a healthcare provider places it in your body.   Why do I need a PICC?  A PICC takes the place of a standard IV (intravenous) line. A standard IV needs to be changed every few days. Since the PICC can stay in place longer, you may have fewer needle sticks during your treatment. There is less damage to the small veins where an IV would normally be inserted. Your healthcare provider can give you more details about why you need the PICC.   Getting a PICC  A short procedure is done to place the PICC in your body. This will be done in your hospital room, the radiology department, or somewhere else in the hospital. It may vary slightly from the steps described here. Your healthcare team can tell you what to expect. In general, during PICC placement:     You re fully covered with a large sterile sheet (drape). This lowers risk for infection. Only the spot where the PICC will be placed is exposed. The skin here is cleaned with an  antiseptic solution.    Ultrasound images may be viewed on a video screen. These are used to help find the best vein to use.    The area where the PICC will be inserted is numbed with a shot of local anesthetic. This decreases discomfort during the PICC placement.    After the local anesthetic takes effect, the catheter is gently passed into the vein. It s moved along until the tip is in the vena cava, close to the heart.    The other end of the catheter sticks out a few inches from your skin. It may be loosely attached to the skin with stitches (sutures) or a securement device.    The provider will flush the catheter with saline solution to clear it. The solution may include heparin, which prevents blood clots.    An X-ray or other imaging test is done. This confirms the catheter s position and checks for problems.  Risks and complications  As with any procedure, getting a PICC has certain risks. These include:    Infection    Bleeding problems    An irregular heartbeat    Injury to the vein or to lymph ducts near the vein    Inflammation of the vein (phlebitis)    Clots or air bubbles in the bloodstream    Blockage of the blood vessel where the catheter is inserted    Clots in the vein that may travel to the lung (pulmonary embolism)    Nerve injury    Accidental insertion into an artery instead of a vein    Catheter not positioned correctly or the catheter may move or migrate from its correct position  Aimee last reviewed this educational content on 2/1/2020 2000-2021 [employer]. All rights reserved. This information is not intended as a substitute for professional medical care. Always follow your healthcare professional's instructions.        PICC Line Care  PICC stands for peripherally inserted central catheter. This is a short-term (temporary) tube that is used instead of a regular IV (intravenous) line.    Reasons for using a PICC line  A PICC line may be used because:     It reduces the  discomfort of putting in a new IV every time one is needed.    Medicine or nutrition needs to be given over a period of weeks or even months.    A PICC can stay in place longer than an IV, so it reduces needle sticks.    It reduces damage to small veins, where an IV is normally inserted.    A PICC may have more than one channel, so different fluids or medicines can be given at the same time.    A PICC line allows for home therapy.  Your PICC will need some care to keep it clean and working. This care includes:     Changing the bandage (dressing)    Flushing the catheter with fluids    Changing the cap on the end of the catheter  A nurse or other healthcare provider will teach you how to do each of these things.     Home care  The following are general care guidelines that will help you care for your PICC line at home:     You can use your arm. But avoid any activity that causes mild pain.    Don't pick at it or pull on the tubing.    Don t lift anything heavier than 10 pounds with the arm on the side of the PICC line.    When you bathe or shower, tape plastic wrap over the site to keep it dry.    Don't put the PICC site under water. No swimming or hot tubs. If the dressing gets wet, change it right away.    Always wash your hands with soap and clean, running water before and after touching any part of your PICC.    Don't allow the tubing to hang freely. Make sure to keep the tubing covered and secured to your arm to prevent the PICC line from being pulled out by accident.  The following tips will help you with dressing changes:     Change the dressing over the site as directed. This is usually once a week. Change it sooner if the dressing gets wet or soiled. Check the dressing daily.    You or a family member may be able to do the dressing change at home. Or you may be instructed to return to the office or clinic for dressing changes.    Sterile technique must be used for PICC dressing change. If your dressing is  changed at home, be sure you or your family member knows sterile dressing technique. Call your healthcare provider for instructions if you need them.  Follow-up care  Follow up as advised by your healthcare provider..   When to seek medical advice  Call your healthcare provider right away if any of these occur:     Fever of 100.4 F (38 C) or higher    Drainage from the PICC site    Swelling or bulging around the PICC site    Bleeding from the PICC site    Skin pulls away from the PICC site    Redness, warmth, or pus at the PICC site    Tubing breaks, splits, or leaks    More exposed tubing (tubing seems longer) or the tubing is pulled out completely    Medicine or fluids don't drain from the bag into your PICC  [employer] last reviewed this educational content on 9/1/2019 2000-2021 [employer]. All rights reserved. This information is not intended as a substitute for professional medical care. Always follow your healthcare professional's instructions.

## 2021-10-19 NOTE — CONSULTS
CLINICAL NUTRITION SERVICES - BRIEF NOTE     Nutrition Prescription    RECOMMENDATIONS FOR MDs/PROVIDERS TO ORDER:  -At midnight tonight, PharmD to advance TPN rate to 65 mL/hr x 24 hrs. Continue lipids as ordered. This provides 1560 mL, 1465 kcal (19 kcal/kg, 94% needs), 234 g dextrose, 78 g AA (1.0 g pro/kg, 83% needs), and 250 mL of 20% IV lipids (24% kcal from fat). GIR = 2.1 mg/kg/min      Recommendations already ordered by Registered Dietitian (RD):  -Recheck TG lab today at 1400.    Future/Additional Recommendations:  -Continue to monitor and encourage oral intake.     Received consult yesterday afternoon for Pharmacy/Nutrtition to start and manage TPN.    NEW FINDINGS   -10/18: PICC line placed, TPN initiated at midnight, 40 mL/hr x 24 hrs, and 20% lipids 250 mL x 12 hrs, 5 days/week (M-F). This provides 960 mL, 1039 kcal (13 kcal/kg, 66% needs), 144 g dextrose, 48 g AA (0.6 g pro/kg, 51% needs), and 250 mL of 20% IV lipids (34% kcal from fat). GIR = 1.3 mg/kg/min.    -labs: Na 137 (WNL), K+ 4.0 (WNL), Mag 2.3 (WNL), Phos 3.1 (WNL), Bilirubin direct 0.1 (WNL), .  yesterday.      INTERVENTIONS  Implementation  Collaboration with other providers: PharmD. Anticipate plan of care will be discussed in IDT rounds later this morning.    Parenteral Nutrition - advance as above.      Monitoring/Evaluation  Progress toward goals will be monitored and evaluated per protocol.      Bertha Velez RDN, LD  Clinical Dietitian  Office (Monday-Friday): [phone]  Weekend/Holiday Pager: [phone]

## 2021-10-20 LAB
ANION GAP SERPL CALCULATED.3IONS-SCNC: 5 MMOL/L (ref 3–14)
BUN SERPL-MCNC: 14 MG/DL (ref 7–30)
CALCIUM SERPL-MCNC: 8.7 MG/DL (ref 8.5–10.1)
CHLORIDE BLD-SCNC: 103 MMOL/L (ref 94–109)
CO2 SERPL-SCNC: 30 MMOL/L (ref 20–32)
CREAT SERPL-MCNC: 0.64 MG/DL (ref 0.66–1.25)
CRP SERPL-MCNC: 11.8 MG/L (ref 0–8)
ERYTHROCYTE [DISTWIDTH] IN BLOOD BY AUTOMATED COUNT: 12.8 % (ref 10–15)
GFR SERPL CREATININE-BSD FRML MDRD: >90 ML/MIN/1.73M2
GLUCOSE BLD-MCNC: 149 MG/DL (ref 70–99)
GLUCOSE BLDC GLUCOMTR-MCNC: 132 MG/DL (ref 70–99)
GLUCOSE BLDC GLUCOMTR-MCNC: 156 MG/DL (ref 70–99)
GLUCOSE BLDC GLUCOMTR-MCNC: 163 MG/DL (ref 70–99)
GLUCOSE BLDC GLUCOMTR-MCNC: 202 MG/DL (ref 70–99)
HCT VFR BLD AUTO: 39.5 % (ref 40–53)
HGB BLD-MCNC: 13.5 G/DL (ref 13.3–17.7)
LACTATE SERPL-SCNC: 1.4 MMOL/L (ref 0.7–2)
MAGNESIUM SERPL-MCNC: 2.3 MG/DL (ref 1.6–2.3)
MCH RBC QN AUTO: 29.7 PG (ref 26.5–33)
MCHC RBC AUTO-ENTMCNC: 34.2 G/DL (ref 31.5–36.5)
MCV RBC AUTO: 87 FL (ref 78–100)
PHOSPHATE SERPL-MCNC: 3.6 MG/DL (ref 2.5–4.5)
PLATELET # BLD AUTO: 198 10E3/UL (ref 150–450)
POTASSIUM BLD-SCNC: 4 MMOL/L (ref 3.4–5.3)
RBC # BLD AUTO: 4.55 10E6/UL (ref 4.4–5.9)
SODIUM SERPL-SCNC: 138 MMOL/L (ref 133–144)
WBC # BLD AUTO: 11.4 10E3/UL (ref 4–11)

## 2021-10-20 PROCEDURE — 83605 ASSAY OF LACTIC ACID: CPT

## 2021-10-20 PROCEDURE — 999N000157 HC STATISTIC RCP TIME EA 10 MIN

## 2021-10-20 PROCEDURE — 250N000009 HC RX 250: Performed by: FAMILY MEDICINE

## 2021-10-20 PROCEDURE — 84100 ASSAY OF PHOSPHORUS: CPT | Performed by: FAMILY MEDICINE

## 2021-10-20 PROCEDURE — 85027 COMPLETE CBC AUTOMATED: CPT | Performed by: FAMILY MEDICINE

## 2021-10-20 PROCEDURE — 250N000013 HC RX MED GY IP 250 OP 250 PS 637

## 2021-10-20 PROCEDURE — 86140 C-REACTIVE PROTEIN: CPT | Performed by: FAMILY MEDICINE

## 2021-10-20 PROCEDURE — 250N000011 HC RX IP 250 OP 636

## 2021-10-20 PROCEDURE — 200N000001 HC R&B ICU

## 2021-10-20 PROCEDURE — 999N000105 HC STATISTIC NO DOCUMENTATION TO SUPPORT CHARGE

## 2021-10-20 PROCEDURE — 250N000013 HC RX MED GY IP 250 OP 250 PS 637: Performed by: FAMILY MEDICINE

## 2021-10-20 PROCEDURE — 80048 BASIC METABOLIC PNL TOTAL CA: CPT | Performed by: FAMILY MEDICINE

## 2021-10-20 PROCEDURE — 250N000013 HC RX MED GY IP 250 OP 250 PS 637: Performed by: INTERNAL MEDICINE

## 2021-10-20 PROCEDURE — 99291 CRITICAL CARE FIRST HOUR: CPT

## 2021-10-20 PROCEDURE — 83735 ASSAY OF MAGNESIUM: CPT | Performed by: FAMILY MEDICINE

## 2021-10-20 PROCEDURE — 250N000011 HC RX IP 250 OP 636: Performed by: INTERNAL MEDICINE

## 2021-10-20 PROCEDURE — 250N000009 HC RX 250

## 2021-10-20 PROCEDURE — 94660 CPAP INITIATION&MGMT: CPT

## 2021-10-20 PROCEDURE — 250N000011 HC RX IP 250 OP 636: Performed by: FAMILY MEDICINE

## 2021-10-20 PROCEDURE — 250N000013 HC RX MED GY IP 250 OP 250 PS 637: Performed by: HOSPITALIST

## 2021-10-20 RX ORDER — MORPHINE SULFATE 20 MG/ML
5 SOLUTION ORAL
Status: DISCONTINUED | OUTPATIENT
Start: 2021-10-20 | End: 2021-10-26

## 2021-10-20 RX ORDER — METHYLPREDNISOLONE SODIUM SUCCINATE 125 MG/2ML
60 INJECTION, POWDER, LYOPHILIZED, FOR SOLUTION INTRAMUSCULAR; INTRAVENOUS EVERY 24 HOURS
Status: DISCONTINUED | OUTPATIENT
Start: 2021-10-20 | End: 2021-10-29

## 2021-10-20 RX ADMIN — APIXABAN 10 MG: 5 TABLET, FILM COATED ORAL at 08:54

## 2021-10-20 RX ADMIN — PANTOPRAZOLE SODIUM 40 MG: 40 TABLET, DELAYED RELEASE ORAL at 08:54

## 2021-10-20 RX ADMIN — CITALOPRAM HYDROBROMIDE 20 MG: 20 TABLET ORAL at 08:54

## 2021-10-20 RX ADMIN — Medication 5 MG: at 14:59

## 2021-10-20 RX ADMIN — Medication 5 MG: at 17:31

## 2021-10-20 RX ADMIN — ALBUTEROL SULFATE 2 PUFF: 90 AEROSOL, METERED RESPIRATORY (INHALATION) at 09:41

## 2021-10-20 RX ADMIN — LORAZEPAM 0.5 MG: 2 INJECTION INTRAMUSCULAR; INTRAVENOUS at 17:35

## 2021-10-20 RX ADMIN — Medication 5 MG: at 20:49

## 2021-10-20 RX ADMIN — METHYLPREDNISOLONE SODIUM SUCCINATE 62.5 MG: 125 INJECTION, POWDER, FOR SOLUTION INTRAMUSCULAR; INTRAVENOUS at 14:39

## 2021-10-20 RX ADMIN — I.V. FAT EMULSION 250 ML: 20 EMULSION INTRAVENOUS at 20:34

## 2021-10-20 RX ADMIN — ALBUTEROL SULFATE 2 PUFF: 90 AEROSOL, METERED RESPIRATORY (INHALATION) at 14:32

## 2021-10-20 RX ADMIN — ASCORBIC ACID, VITAMIN A PALMITATE, CHOLECALCIFEROL, THIAMINE HYDROCHLORIDE, RIBOFLAVIN-5 PHOSPHATE SODIUM, PYRIDOXINE HYDROCHLORIDE, NIACINAMIDE, DEXPANTHENOL, ALPHA-TOCOPHEROL ACETATE, VITAMIN K1, FOLIC ACID, BIOTIN, CYANOCOBALAMIN: 200; 3300; 200; 6; 3.6; 6; 40; 15; 10; 150; 600; 60; 5 INJECTION, SOLUTION INTRAVENOUS at 20:35

## 2021-10-20 RX ADMIN — SENNOSIDES AND DOCUSATE SODIUM 1 TABLET: 50; 8.6 TABLET ORAL at 20:36

## 2021-10-20 RX ADMIN — LORAZEPAM 0.5 MG: 2 INJECTION INTRAMUSCULAR; INTRAVENOUS at 09:38

## 2021-10-20 RX ADMIN — APIXABAN 10 MG: 5 TABLET, FILM COATED ORAL at 20:35

## 2021-10-20 RX ADMIN — LORAZEPAM 0.5 MG: 2 INJECTION INTRAMUSCULAR; INTRAVENOUS at 13:30

## 2021-10-20 RX ADMIN — MORPHINE SULFATE 2 MG: 2 INJECTION, SOLUTION INTRAMUSCULAR; INTRAVENOUS at 04:42

## 2021-10-20 RX ADMIN — LISINOPRIL 5 MG: 5 TABLET ORAL at 20:36

## 2021-10-20 ASSESSMENT — ACTIVITIES OF DAILY LIVING (ADL)
ADLS_ACUITY_SCORE: 10

## 2021-10-20 ASSESSMENT — MIFFLIN-ST. JEOR: SCORE: 1630.38

## 2021-10-20 NOTE — PROGRESS NOTES
Pt up in chair visiting with daughter most of the evening on HFNC 100 %. Pt assisted to bed and requests BIPAP 99 %.   Pt self proning himself now and will try to sleep.

## 2021-10-20 NOTE — PROGRESS NOTES
Pt on BIPAP 100% most of the night with a few breaks, placed on HFNC 100%/60L briefly. Pt asks to go back on BIPAP after a few minutes as he feels SOA and O2 sats decrease to the 60's. Morphine 2 mg IVP given for air hunger, which pt states helps. TPN 65 ml/hr and Lipids 20 ml/hr continue via PICC line.

## 2021-10-20 NOTE — PROGRESS NOTES
CLINICAL NUTRITION SERVICES - BRIEF NOTE     Nutrition Prescription    RECOMMENDATIONS FOR MDs/PROVIDERS TO ORDER:  -At 2000 tonight, PharmD to advance TPN to goal of 90 mL/hr x 24 hrs. Lipids as ordered. This provides 2160 mL, 1891 kcal (24 kcal/kg, 100% needs), 324 g dextrose, 108 g AA (1.4 g pro/kg, 100% needs), and 250 mL of 20% IV lipids (19% kcal from fat). GIR = 2.9 mg/kg/min      Recommendations already ordered by Registered Dietitian (RD):  None    Future/Additional Recommendations:  -Continue to monitor and encourage oral intake.         NEW FINDINGS   -10/19: TPN advanced last night to 65 mL/hr x 24 hrs, and lipids as ordered. This provides 1560 mL, 1465 kcal (19 kcal/kg, 94% needs), 234 g dextrose, 78 g AA (1.0 g pro/kg, 83% needs), and 250 mL of 20% IV lipids (24% kcal from fat). GIR = 2.1 mg/kg/min.    -labs: Na 138 (WNL), K+ 4.0 (WNL), Mag 2.3 (WNL), Phos 3.6 (WNL), Bilirubin direct 0.1 (WNL),  and 149, and CRP inflammation 11.8 (H). TG re-check 112 yesterday.    -oral intake: 25% of breakfast and supper, 0% of lunch yesterday.      INTERVENTIONS  Implementation  Collaboration with other providers: IDT Michael conrad.    Parenteral Nutrition - advance as above.      Monitoring/Evaluation  Progress toward goals will be monitored and evaluated per protocol.    Bertha Velez RDN, LD  Clinical Dietitian  Office (Monday-Friday): [phone]  Weekend/Holiday Pager: [phone]

## 2021-10-20 NOTE — PLAN OF CARE
Pt only able to stay on hi-flow 100%/60L for 15-30mins before he became tachypneic, anxious, & sats started decreasing to mid-80s.  Pt on bipap, tolerating well, rest of shift at 90% fio2.  Pt more anxious today - gave prn ativan & morphine w/ some relief.  Still eating just a few bites w/ meals - encouraging to drink more, but po intake overall still poor.  Pt up in chair most of shift - did not want to prone today due to difficulties last night but did do well laying on left side w/ bipap on at 90% w/ sats 97% when sleeping.

## 2021-10-20 NOTE — PROGRESS NOTES
[facility] Hospitalist Progress Note           Assessment & Plan        Liborio Barajas is a 56 year old male admitted on 10/1/2021. He presented with shortness of breath and was found to be COVID positive.     Confirmed COVID-19 infection    Acute Hypoxic Respiratory Failure secondary to COVID-19 infection  Viral Pneumonia secondary to COVID-19 infection     Symptom Onset 9/22/2021    Date of 1st Positive Test 10/01/2021      Vaccination Status Not Vaccinated, but interested/contemplative        Initial CT chest 10/1/2021 demonstrated extensive bilateral groundglass consolidation consistent with ammonia, and was negative for pulmonary embolism.  The patient required transfer to intensive care unit immediately after admission, where he has remained.  For more than a week, the patient has been alternating between HFNC oxygen and BiPAP.  We have not been able to make any significant reductions in HFNC flow or in FiO2.  Currently he is on HFNC 60 L/min and FiO2 1.0.  BiPAP settings are 16/10 cm, rate 18, FiO2 0.90.  He is currently on a large, full facemask BiPAP, which he finds to be very uncomfortable.  His as needed morphine and lorazepam helped briefly, but the effect wears off quickly, and he is once again dyspneic and uncomfortable.  Cough is mild, and is nonproductive.  He has no respiratory chest pain.  He remains afebrile.  - Oxygen: Continue HFNC 60 L/min and FiO2 1.0, alternating with BiPAP 16/10 cm, rate 18, FiO2 0.90.  He is able to prone with minimal assistance, which does at least temporarily improve oxygenation.  We will continue to encourage proning 1 hour out of every 3.  - Labs: Markers of severity were followed earlier in the hospital stay, with trend toward improvement.  Daily monitoring has been discontinued, though CRP 10/20/2021 was minimally elevated at 11.8.  - Imaging: Chest CT 10/10/2021 continue to demonstrate extensive bilateral infiltrates, and was again negative for pulmonary embolism.  " With continued multifocal/reticular/GGO PNA as would be expected.  Chest x-ray 10/18/2021 again showed extensive patchy opacities throughout both lungs.  Breathing treatments: Albuterol HFA 2 puffs every 4 hours while awake was started on 10/19/2021 due to wheezing.  Now that the patient is recovered COVID-19 status, nebulized medications could be used if needed.    IV fluids: None indicated.  Antibiotics: Not indicated.   COVID-Focused Medications:    - Dexamethasone was given 10/1 - 10/10/2021.   - Remdesivir was given 10/1 - 10/5/2021.   - Tocilizumab 700 mg IV given 10/2/2021.  - DVT Prophylaxis: on apixaban for treatment of aortic and right lower extremity thrombus, see below.    Discussion: The patient's eldest son was present in the room for my discussion with the patient today.  We discussed that he seems to be \"stuck\" on very high oxygen needs and the ongoing need for BiPAP, none of which is changed much in the last 2 weeks.  We discussed that a group of patients has been identified which will develop inflammatory pneumonitis after recovery from COVID-19 pneumonia.  I wonder if Mr. Barajas might be in that group.  The patient, his son, and I discussed the risks and benefits attendant to resumption of corticosteroids for the treatment of inflammatory pneumonitis.  Both expressed their agreement.  Therefore, will begin methylprednisolone 60 mg IV every 24 hours starting this afternoon, and monitor response.     Infrarenal Aortic Thrombus with thromboembolic obstruction of right tibial-peroneal trunk    Right foot pain and diminished RLE pulses noted 10/11/2021. CTA abdomen and pelvis revealed infrarenal aortic wall adherent clot, and apparent thromboembolic obstruction of right tibial-peroneal trunk with distal reconstitution of tibial and peroneal arteries.  Findings were discussed with vascular surgery [facility], who advised that COVID-related arterial thrombectomies frequently reclot, so surgery was not " recommended, and that increased heparin resistance was seen in these patients, so anticoagulation with argatroban was recommended.  Argatroban infusion given 10/11 - 10/16/2021, after which the patient was transitioned to apixaban, starting at 10 mg p.o. twice daily for the first week (first dose 10/17/2021), then 5 mg daily.     - right foot continuing to slowly improve, cap refill better though not normal. Dorsal pedal pulses palpable 10/20.      Anxiety    Patient extremely fearful of being short of breath; this is limiting his ability to work with therapies etc. he is receiving morphine 2 mg IV every 2 hours as needed, and lorazepam 0.5 mg IV every 4 hours as needed for treatment of dyspnea related anxiety.  He is reportedly too dyspneic to use either medication in oral form.  -Continue present morphine and lorazepam IV as needed, changing to the oral route as soon as his dyspnea remits him to do so.  I discussed how a morphine sulfate concentrated solution could be administered quickly, with a minimal interruption and BiPAP support.  He would like to give that a try.  -We initiated citalopram 20 mg every day  On 10/13/2021, knowing that this will take some time to reach effect.      Hyponatremia   Present on admission. Probably related to volume depletion.  Sodium normalized without specific therapy.  -Resolved.     Acute kidney injury on admission  GFR on admission was 32.  The patient was probably volume depleted on admission.  With volume correction, will function has been normal since approximately 10/5/2021.   -Resolved.     Transaminitis  Mild elevation of AST and ALT were noted on admission, likely due to COVID-19 infection and/or remdesivir.  Transaminases normalized as of 10/18/2021. -Resolved.    Hypertension  Managed prior to admission with lisinopril 10 mg daily.  Lisinopril has been resumed here at 5 mg daily.  Blood pressure control has been generally good.  -Continue lisinopril  unchanged.     GERD  -Continue omeprazole     Malnutrition:  - Level of malnutrition: Severe   - Based on: weight loss, reduced intake - see nutrition notes - notable weight loss and intake remained poor.  Patient declined placement of NG feeding tube, and BiPAP/HF NC fit and seal would be compromised with an NG tube.  A PICC line was placed, and TPN started on 10/18/2021.  Because there are no good outcome data for the use of TPN in a critically ill population, I would like to continue efforts either at increasing oral intake or seeing if he will allow placement of a small bore NG feeding tube.     DVT Prophylaxis: Apixaban.   Neal Catheter: Not present  Central Lines: PICC placed 10/18/2021 for TPN.  Code Status:  Full         Diet  TPN started 10/18/2021.  See discussion above.      Disposition  Continues to be severely ill in the ICU.  No improvements in oxygen needs have been seen in more than a week.  Improvement in oxygenation from this point, if it occurs, may take additional days or weeks.            Interval History:   Very hard to get a verbal history from him wearing full facemask BiPAP.  However, he does report dyspnea and mask discomfort.  He says the cough is not severe and is nonproductive.  He has no respiratory chest pain.             Review of Systems:    ROS: 10 point ROS neg other than the symptoms noted above in the HPI.           Medications:   Current active medications and PTA medications reviewed, see medication list for details.            Physical Exam:   Vitals were reviewed  Patient Vitals for the past 24 hrs:   BP Temp Temp src Pulse Resp SpO2 Weight   10/20/21 1340 -- -- -- 112 25 91 % --   10/20/21 1300 -- -- -- 85 (!) 75 97 % --   10/20/21 1244 -- -- -- 108 (!) 36 90 % --   10/20/21 1240 -- -- -- 113 (!) 43 (!) 77 % --   10/20/21 1229 -- -- -- -- -- (!) 87 % --   10/20/21 1200 (!) 147/95 -- -- 111 (!) 45 97 % --   10/20/21 1158 (!) 147/95 98.3  F (36.8  C) Axillary 118 (!) 39 98 %  --   10/20/21 0900 -- -- -- 92 30 92 % --   10/20/21 0837 (!) 127/95 -- -- -- -- 92 % --   10/20/21 0818 -- 97.7  F (36.5  C) Axillary -- -- -- --   10/20/21 0800 -- -- -- 106 (!) 32 95 % --   10/20/21 0500 -- -- -- 96 (!) 38 94 % --   10/20/21 0457 -- -- -- -- (!) 36 -- --   10/20/21 0448 130/80 98.4  F (36.9  C) Axillary -- (!) 45 93 % 82 kg (180 lb 12.4 oz)   10/20/21 0445 -- -- -- 98 (!) 47 (!) 69 % --   10/20/21 0400 -- -- -- 89 29 98 % --   10/20/21 0349 -- -- -- 90 -- -- --   10/20/21 0200 -- -- -- 102 (!) 35 97 % --   10/19/21 2333 124/85 97.8  F (36.6  C) Axillary 96 (!) 37 98 % --   10/19/21 2045 124/87 97.5  F (36.4  C) Oral 114 -- 94 % --   10/19/21 2043 -- -- -- -- -- 96 % --   10/19/21 2018 -- -- -- (!) 122 -- -- --   10/19/21 1845 -- -- -- 112 (!) 44 90 % --   10/19/21 1842 120/85 98  F (36.7  C) Oral 112 19 91 % --   10/19/21 1822 -- -- -- -- (!) 38 -- --   10/19/21 153 -- -- -- 102 (!) 36 94 % --   10/19/21 1527 -- -- -- 114 (!) 33 (!) 71 % --       Temperatures:  Current - Temp: 97.7  F (36.5  C); Max - Temp  Av.9  F (36.6  C)  Min: 97.7  F (36.5  C)  Max: 98.1  F (36.7  C)  Respiration range: Resp  Av.3  Min: 25  Max: 51  Pulse range: Pulse  Av.9  Min: 85  Max: 118  Blood pressure range: Systolic (24hrs), Av , Min:121 , Max:124   ; Diastolic (24hrs), Av, Min:74, Max:93    Pulse oximetry range: SpO2  Av.1 %  Min: 82 %  Max: 96 %  I/O last 3 completed shifts:  In: 2303.21 [P.O.:540]  Out: 1775 [Urine:1775]    GENERAL: Pleasant man, seated in a recliner on BiPAP.  He looks a little anxious and uncomfortable.  EYES: Eyes grossly normal to inspection.  HENT: Exam deferred due to the presence of a BiPAP mask.  NECK: Trachea midline, no stridor.    RESP: No accessory muscle use.  Lungs clear throughout on inspiration and expiration.  Expiration not prolonged, no wheeze.  CV: Regular rate and rhythm, non-tachycardic.  Normal S1 S2, no murmur or extra sound.  No lower  extremity edema.  ABDOMEN: Soft, non-tender, no guarding.  Liver and spleen not enlarged, no masses palpable.  Bowel sounds positive.  MS: No bony deformities noted.  No red or inflamed joints.  SKIN: Warm and dry, no rashes.  NEURO: Alert, oriented, only briefly conversant while wearing facemask BiPAP.  Cranial nerves III - XII grossly intact.  No gross motor or sensory deficits.   PSYCH: Alert, briefly conversant.  Able to articulate logical thoughts, no tangential thoughts, no hallucinations or delusions.  Affect appears anxious.            Data:     Results for orders placed or performed during the hospital encounter of 10/01/21 (from the past 24 hour(s))   Triglycerides   Result Value Ref Range    Triglycerides 112 <150 mg/dL   Glucose by meter   Result Value Ref Range    GLUCOSE BY METER POCT 139 (H) 70 - 99 mg/dL   Lactic Acid STAT   Result Value Ref Range    Lactic Acid 1.4 0.7 - 2.0 mmol/L   Blood gas venous   Result Value Ref Range    pH Venous 7.39 7.32 - 7.43    pCO2 Venous 50 40 - 50 mm Hg    pO2 Venous 36 25 - 47 mm Hg    Bicarbonate Venous 31 (H) 21 - 28 mmol/L    Base Excess/Deficit (+/-) 4.5 (H) -7.7 - 1.9 mmol/L    FIO2 100    Glucose by meter   Result Value Ref Range    GLUCOSE BY METER POCT 133 (H) 70 - 99 mg/dL   Glucose by meter   Result Value Ref Range    GLUCOSE BY METER POCT 156 (H) 70 - 99 mg/dL   CBC with platelets   Result Value Ref Range    WBC Count 11.4 (H) 4.0 - 11.0 10e3/uL    RBC Count 4.55 4.40 - 5.90 10e6/uL    Hemoglobin 13.5 13.3 - 17.7 g/dL    Hematocrit 39.5 (L) 40.0 - 53.0 %    MCV 87 78 - 100 fL    MCH 29.7 26.5 - 33.0 pg    MCHC 34.2 31.5 - 36.5 g/dL    RDW 12.8 10.0 - 15.0 %    Platelet Count 198 150 - 450 10e3/uL   CRP inflammation   Result Value Ref Range    CRP Inflammation 11.8 (H) 0.0 - 8.0 mg/L   Basic metabolic panel   Result Value Ref Range    Sodium 138 133 - 144 mmol/L    Potassium 4.0 3.4 - 5.3 mmol/L    Chloride 103 94 - 109 mmol/L    Carbon Dioxide (CO2) 30 20  - 32 mmol/L    Anion Gap 5 3 - 14 mmol/L    Urea Nitrogen 14 7 - 30 mg/dL    Creatinine 0.64 (L) 0.66 - 1.25 mg/dL    Calcium 8.7 8.5 - 10.1 mg/dL    Glucose 149 (H) 70 - 99 mg/dL    GFR Estimate >90 >60 mL/min/1.73m2   Magnesium   Result Value Ref Range    Magnesium 2.3 1.6 - 2.3 mg/dL   Phosphorus   Result Value Ref Range    Phosphorus 3.6 2.5 - 4.5 mg/dL   Glucose by meter   Result Value Ref Range    GLUCOSE BY METER POCT 132 (H) 70 - 99 mg/dL           Attestation:  I have reviewed today's vital signs, notes, medications, labs and imaging.  Amount of time spent in direct patient care providing critical care: 45 minutes.     Marcelo Villa MD   [facility]

## 2021-10-21 LAB
ANION GAP SERPL CALCULATED.3IONS-SCNC: 2 MMOL/L (ref 3–14)
BUN SERPL-MCNC: 16 MG/DL (ref 7–30)
CALCIUM SERPL-MCNC: 9.1 MG/DL (ref 8.5–10.1)
CHLORIDE BLD-SCNC: 104 MMOL/L (ref 94–109)
CO2 SERPL-SCNC: 31 MMOL/L (ref 20–32)
CREAT SERPL-MCNC: 0.57 MG/DL (ref 0.66–1.25)
CRP SERPL-MCNC: 15.7 MG/L (ref 0–8)
ERYTHROCYTE [DISTWIDTH] IN BLOOD BY AUTOMATED COUNT: 13 % (ref 10–15)
GFR SERPL CREATININE-BSD FRML MDRD: >90 ML/MIN/1.73M2
GLUCOSE BLD-MCNC: 131 MG/DL (ref 70–99)
GLUCOSE BLDC GLUCOMTR-MCNC: 104 MG/DL (ref 70–99)
GLUCOSE BLDC GLUCOMTR-MCNC: 127 MG/DL (ref 70–99)
GLUCOSE BLDC GLUCOMTR-MCNC: 166 MG/DL (ref 70–99)
HCT VFR BLD AUTO: 39.7 % (ref 40–53)
HGB BLD-MCNC: 13.4 G/DL (ref 13.3–17.7)
MAGNESIUM SERPL-MCNC: 2.5 MG/DL (ref 1.6–2.3)
MCH RBC QN AUTO: 29.5 PG (ref 26.5–33)
MCHC RBC AUTO-ENTMCNC: 33.8 G/DL (ref 31.5–36.5)
MCV RBC AUTO: 87 FL (ref 78–100)
PHOSPHATE SERPL-MCNC: 4 MG/DL (ref 2.5–4.5)
PLATELET # BLD AUTO: 184 10E3/UL (ref 150–450)
POTASSIUM BLD-SCNC: 4.1 MMOL/L (ref 3.4–5.3)
PROCALCITONIN SERPL-MCNC: <0.05 NG/ML
RBC # BLD AUTO: 4.54 10E6/UL (ref 4.4–5.9)
SODIUM SERPL-SCNC: 137 MMOL/L (ref 133–144)
WBC # BLD AUTO: 12.9 10E3/UL (ref 4–11)

## 2021-10-21 PROCEDURE — 250N000013 HC RX MED GY IP 250 OP 250 PS 637: Performed by: HOSPITALIST

## 2021-10-21 PROCEDURE — 250N000013 HC RX MED GY IP 250 OP 250 PS 637: Performed by: INTERNAL MEDICINE

## 2021-10-21 PROCEDURE — 94660 CPAP INITIATION&MGMT: CPT

## 2021-10-21 PROCEDURE — 999N000157 HC STATISTIC RCP TIME EA 10 MIN

## 2021-10-21 PROCEDURE — 99291 CRITICAL CARE FIRST HOUR: CPT

## 2021-10-21 PROCEDURE — 83735 ASSAY OF MAGNESIUM: CPT | Performed by: FAMILY MEDICINE

## 2021-10-21 PROCEDURE — 84145 PROCALCITONIN (PCT): CPT

## 2021-10-21 PROCEDURE — 250N000011 HC RX IP 250 OP 636

## 2021-10-21 PROCEDURE — 250N000013 HC RX MED GY IP 250 OP 250 PS 637

## 2021-10-21 PROCEDURE — 250N000011 HC RX IP 250 OP 636: Performed by: FAMILY MEDICINE

## 2021-10-21 PROCEDURE — 86140 C-REACTIVE PROTEIN: CPT | Performed by: FAMILY MEDICINE

## 2021-10-21 PROCEDURE — 200N000001 HC R&B ICU

## 2021-10-21 PROCEDURE — 250N000009 HC RX 250: Performed by: FAMILY MEDICINE

## 2021-10-21 PROCEDURE — 85027 COMPLETE CBC AUTOMATED: CPT | Performed by: FAMILY MEDICINE

## 2021-10-21 PROCEDURE — 84100 ASSAY OF PHOSPHORUS: CPT | Performed by: FAMILY MEDICINE

## 2021-10-21 PROCEDURE — 80048 BASIC METABOLIC PNL TOTAL CA: CPT | Performed by: FAMILY MEDICINE

## 2021-10-21 PROCEDURE — 250N000013 HC RX MED GY IP 250 OP 250 PS 637: Performed by: FAMILY MEDICINE

## 2021-10-21 RX ORDER — POLYETHYLENE GLYCOL 3350 17 G/17G
17 POWDER, FOR SOLUTION ORAL DAILY PRN
Status: DISCONTINUED | OUTPATIENT
Start: 2021-10-21 | End: 2021-11-01 | Stop reason: HOSPADM

## 2021-10-21 RX ORDER — LISINOPRIL 10 MG/1
10 TABLET ORAL DAILY
Status: DISCONTINUED | OUTPATIENT
Start: 2021-10-21 | End: 2021-11-01 | Stop reason: HOSPADM

## 2021-10-21 RX ORDER — AMOXICILLIN 250 MG
1 CAPSULE ORAL 2 TIMES DAILY
Status: DISCONTINUED | OUTPATIENT
Start: 2021-10-21 | End: 2021-10-23

## 2021-10-21 RX ADMIN — Medication 5 MG: at 20:20

## 2021-10-21 RX ADMIN — ALBUTEROL SULFATE 2 PUFF: 90 AEROSOL, METERED RESPIRATORY (INHALATION) at 12:38

## 2021-10-21 RX ADMIN — ACETAMINOPHEN 650 MG: 325 TABLET, FILM COATED ORAL at 15:18

## 2021-10-21 RX ADMIN — METHYLPREDNISOLONE SODIUM SUCCINATE 62.5 MG: 125 INJECTION, POWDER, FOR SOLUTION INTRAMUSCULAR; INTRAVENOUS at 15:18

## 2021-10-21 RX ADMIN — ASCORBIC ACID, VITAMIN A PALMITATE, CHOLECALCIFEROL, THIAMINE HYDROCHLORIDE, RIBOFLAVIN-5 PHOSPHATE SODIUM, PYRIDOXINE HYDROCHLORIDE, NIACINAMIDE, DEXPANTHENOL, ALPHA-TOCOPHEROL ACETATE, VITAMIN K1, FOLIC ACID, BIOTIN, CYANOCOBALAMIN: 200; 3300; 200; 6; 3.6; 6; 40; 15; 10; 150; 600; 60; 5 INJECTION, SOLUTION INTRAVENOUS at 19:02

## 2021-10-21 RX ADMIN — CITALOPRAM HYDROBROMIDE 20 MG: 20 TABLET ORAL at 07:38

## 2021-10-21 RX ADMIN — PANTOPRAZOLE SODIUM 40 MG: 40 TABLET, DELAYED RELEASE ORAL at 07:38

## 2021-10-21 RX ADMIN — ALBUTEROL SULFATE 2 PUFF: 90 AEROSOL, METERED RESPIRATORY (INHALATION) at 15:27

## 2021-10-21 RX ADMIN — POLYETHYLENE GLYCOL 3350 17 G: 17 POWDER, FOR SOLUTION ORAL at 15:17

## 2021-10-21 RX ADMIN — LISINOPRIL 10 MG: 10 TABLET ORAL at 20:19

## 2021-10-21 RX ADMIN — Medication 5 MG: at 06:38

## 2021-10-21 RX ADMIN — APIXABAN 10 MG: 5 TABLET, FILM COATED ORAL at 07:38

## 2021-10-21 RX ADMIN — LORAZEPAM 0.5 MG: 2 INJECTION INTRAMUSCULAR; INTRAVENOUS at 10:41

## 2021-10-21 RX ADMIN — APIXABAN 10 MG: 5 TABLET, FILM COATED ORAL at 20:19

## 2021-10-21 RX ADMIN — SENNOSIDES AND DOCUSATE SODIUM 1 TABLET: 50; 8.6 TABLET ORAL at 15:18

## 2021-10-21 RX ADMIN — Medication 5 MG: at 01:54

## 2021-10-21 RX ADMIN — Medication 5 MG: at 10:18

## 2021-10-21 RX ADMIN — MICONAZOLE NITRATE: 20 CREAM TOPICAL at 12:33

## 2021-10-21 RX ADMIN — Medication 5 MG: at 12:32

## 2021-10-21 RX ADMIN — ACETAMINOPHEN 650 MG: 325 TABLET, FILM COATED ORAL at 07:37

## 2021-10-21 RX ADMIN — I.V. FAT EMULSION 250 ML: 20 EMULSION INTRAVENOUS at 19:02

## 2021-10-21 RX ADMIN — Medication 5 MG: at 17:14

## 2021-10-21 ASSESSMENT — ACTIVITIES OF DAILY LIVING (ADL)
ADLS_ACUITY_SCORE: 10
ADLS_ACUITY_SCORE: 11
ADLS_ACUITY_SCORE: 10
ADLS_ACUITY_SCORE: 11
ADLS_ACUITY_SCORE: 10
ADLS_ACUITY_SCORE: 11
ADLS_ACUITY_SCORE: 11
ADLS_ACUITY_SCORE: 10
ADLS_ACUITY_SCORE: 11
ADLS_ACUITY_SCORE: 10
ADLS_ACUITY_SCORE: 10
ADLS_ACUITY_SCORE: 11
ADLS_ACUITY_SCORE: 11
ADLS_ACUITY_SCORE: 10
ADLS_ACUITY_SCORE: 11
ADLS_ACUITY_SCORE: 11
ADLS_ACUITY_SCORE: 10
ADLS_ACUITY_SCORE: 11
ADLS_ACUITY_SCORE: 11
ADLS_ACUITY_SCORE: 10

## 2021-10-21 ASSESSMENT — MIFFLIN-ST. JEOR: SCORE: 1631.38

## 2021-10-21 NOTE — PROGRESS NOTES
Pt awake, voided 300 ml clear, yellow urine. Pt became SOA with this activity on BIPAP 90%, RR 40-50's. Pt offered more oral Morphine for air hunger/SOA, and accepted it. 5 mg po Morphine concentrate given po. Pt resettled, and fell back to sleep. O2 sats mid 90's at rest on BIPAP 90%. Will continue to monitor.

## 2021-10-21 NOTE — PROGRESS NOTES
[facility]           Assessment & Plan        Liborio Barajas is a 56 year old male admitted on 10/1/2021. He presented with shortness of breath and was found to be COVID positive.     Confirmed COVID-19 infection    Acute Hypoxic Respiratory Failure secondary to COVID-19 infection  Viral Pneumonia secondary to COVID-19 infection     Symptom Onset 9/22/2021    Date of 1st Positive Test 10/01/2021      Vaccination Status Not Vaccinated, but interested/contemplative        Initial CT chest 10/1/2021 demonstrated extensive bilateral groundglass consolidation consistent with pneumonia, and was negative for pulmonary embolism.  The patient required transfer to intensive care unit immediately after admission, where he has remained.  For more than a week, the patient has been alternating between HFNC oxygen and BiPAP.  Since the time of admission, we had not been able to make any significant reductions in HFNC flow or in FiO2.  Since the patient appeared \"stuck\" on high flow nasal cannula and/or BiPAP therapy with high FiO2 for more than 2 weeks, he and I discussed risk versus benefit of beginning empiric systemic steroids for what could be inflammatory pneumonitis following COVID-19 pneumonia.  He agreed to the use of methylprednisolone 60 mg daily, started 10/20/2021.     This morning he is on HFNC 60 L/min and FiO2 0.95.  BiPAP settings are 16/10 cm, rate 18, FiO2 0.90.  He has found the use of morphine concentrated solution every 2 hours as needed dyspnea to be very helpful in terms of relieving the dyspnea and the anxiety associated with it.  His rest dyspnea remains profound.  Cough is mild, and is nonproductive.  He is having no respiratory chest pain.  He is afebrile.    - Oxygen: Continue HFNC 60 L/min and FiO2 0.95, alternating with BiPAP 16/10 cm, rate 18, FiO2 0.90.  He is able to prone with minimal assistance, which does at least temporarily improve oxygenation.  We will continue to " encourage proning 1 hour out of every 3.  - Labs: Markers of severity were followed earlier in the hospital stay, with trend toward improvement.  Daily monitoring has been discontinued, though CRP today is mildly elevated at 15.7.  - Imaging: Chest CT 10/10/2021 continued to demonstrate extensive bilateral infiltrates, and was again negative for pulmonary embolism.  With continued multifocal/reticular/GGO PNA as would be expected.  Chest x-ray 10/18/2021 again showed extensive patchy opacities throughout both lungs.  Breathing treatments: Albuterol HFA 2 puffs every 4 hours while awake was started on 10/19/2021 due to wheezing.  Now that the patient is recovered COVID-19 status, nebulized medications could be used if needed.    IV fluids: None indicated.  Antibiotics: Not indicated.   Corticosteroids: Methylprednisolone 60 mg IV every 24 hours started 10/20/2021 for what could possibly represent inflammatory pneumonitis following COVID-19 pneumonia.  COVID-Focused Medications:    - Dexamethasone was given 10/1 - 10/10/2021.   - Remdesivir was given 10/1 - 10/5/2021.   - Tocilizumab 700 mg IV given 10/2/2021.  - DVT Prophylaxis: on apixaban for treatment of aortic and right lower extremity thrombus, see below.    Infrarenal Aortic Thrombus with thromboembolic obstruction of right tibial-peroneal trunk    Right foot pain and diminished RLE pulses noted 10/11/2021. CTA abdomen and pelvis revealed infrarenal aortic wall adherent clot, and apparent thromboembolic obstruction of right tibial-peroneal trunk with distal reconstitution of tibial and peroneal arteries.  Findings were discussed with vascular surgery [facility], who advised that COVID-related arterial thrombectomies frequently reclot, so surgery was not recommended, and that increased heparin resistance was seen in these patients, so anticoagulation with argatroban was recommended.  Argatroban infusion given 10/11 - 10/16/2021, after which the patient was  transitioned to apixaban, starting at 10 mg p.o. twice daily for the first week (first dose 10/17/2021), then 5 mg daily.  Right foot no longer cold, cap refill better though not normal, dorsalis pedal pulses palpable.  -Continue apixaban.    Anxiety    Patient had been having severe subjective dyspnea only partially and briefly relieved by the use of morphine IV and lorazepam IV.  We started morphine sulfate 5 mg p.o. every 2 hours as needed dyspnea on 10/20/2021, which the patient has found to be very helpful.  He even slept well last night.  -Continue morphine sulfate solution 5 mg p.o. every 2 hours as needed dyspnea.  -We initiated citalopram 20 mg every day  On 10/13/2021, knowing that this will take some time to reach effect.      Hyponatremia   Present on admission. Probably related to volume depletion.  Sodium normalized without specific therapy.  -Resolved.     Acute kidney injury on admission  GFR on admission was 32.  The patient was probably volume depleted on admission.  With volume correction, will function has been normal since approximately 10/5/2021.   -Resolved.     Transaminitis  Mild elevation of AST and ALT were noted on admission, likely due to COVID-19 infection and/or remdesivir.  Transaminases normalized as of 10/18/2021. -Resolved.    Hypertension  Managed prior to admission with lisinopril 10 mg daily.  Lisinopril has been resumed here at 5 mg daily.  Blood pressure has increased since beginning systemic steroids.  -Increase lisinopril to 10 mg daily.     GERD  -Continue omeprazole     Malnutrition:  - Level of malnutrition: Severe   - Based on: weight loss, reduced intake - see nutrition notes - notable weight loss and intake remained poor.  Patient declined placement of NG feeding tube, and BiPAP/HFNC fit and seal would be compromised with an NG tube.  A PICC line was placed, and TPN started on 10/18/2021.  Because there are no good outcome data for the use of TPN in a critically ill  population, I would like to continue efforts either at increasing oral intake or seeing if he will allow placement of a small bore NG feeding tube.  As of this morning he is able to spend at least some time on HFNC oxygen.  During HFNC use, we should try to resume some oral nutrition, even if it is only liquid supplements to start.     DVT Prophylaxis: Apixaban.   Neal Catheter: Not present  Central Lines: PICC placed 10/18/2021 for TPN.  Code Status:  Full         Diet  TPN started 10/18/2021.  See discussion above.      Disposition  Continues to require HFNC and BiPAP at high FiO2 demands, and therefore continues to require ICU care.            Interval History:   Reports greatly improved dyspnea with the use of morphine concentrated solution.  He was able to get some sleep last night, which is unusual for him.  He finds HFNC to be much more comfortable than BiPAP, though expresses willingness to resume BiPAP as needed.  Cough is mild, and is nonproductive.  He has no respiratory chest pain.  He has had no nausea or emesis.  Denies musculoskeletal pain.             Review of Systems:    ROS: 10 point ROS neg other than the symptoms noted above in the HPI.           Medications:   Current active medications and PTA medications reviewed, see medication list for details.            Physical Exam:   Vitals were reviewed  Patient Vitals for the past 24 hrs:   BP Temp Temp src Pulse Resp SpO2 Weight   10/21/21 0645 -- -- -- 90 -- 90 % --   10/21/21 0620 -- -- -- -- -- -- 82.1 kg (181 lb)   10/21/21 0429 (!) 163/109 97.4  F (36.3  C) Axillary 100 26 97 % --   10/21/21 0401 -- -- -- 98 -- -- --   10/21/21 0400 -- -- -- 100 -- 97 % --   10/21/21 0300 -- -- -- 86 -- 96 % --   10/21/21 0200 -- -- -- 101 (!) 33 97 % --   10/20/21 2345 -- -- -- 101 (!) 38 95 % --   10/20/21 2342 -- -- -- 95 -- -- --   10/20/21 2328 (!) 171/99 98  F (36.7  C) Axillary (!) 122 (!) 38 90 % --   10/20/21 2100 -- -- -- -- (!) 40 -- --   10/20/21   (!) 174/103 98.2  F (36.8  C) Axillary 114 (!) 38 94 % --   10/20/21 1957 -- -- -- 104 -- -- --   10/20/21 1813 -- -- -- 93 (!) 42 96 % --   10/20/21 1745 -- -- -- 111 30 91 % --   10/20/21 1600 -- -- -- 93 (!) 37 97 % --   10/20/21 1538 (!) 147/101 97.1  F (36.2  C) Axillary 101 (!) 37 98 % --   10/20/21 1458 -- -- -- 113 (!) 40 (!) 72 % --   10/20/21 1340 -- -- -- 112 25 91 % --   10/20/21 1300 -- -- -- 85 (!) 75 97 % --   10/20/21 1244 -- -- -- 108 (!) 36 90 % --   10/20/21 1240 -- -- -- 113 (!) 43 (!) 77 % --   10/20/21 1229 -- -- -- -- -- (!) 87 % --   10/20/21 1200 (!) 147/95 -- -- 111 (!) 45 97 % --   10/20/21 1158 (!) 147/95 98.3  F (36.8  C) Axillary 118 (!) 39 98 % --   10/20/21 0900 -- -- -- 92 30 92 % --   10/20/21 0837 (!) 127/95 -- -- -- -- 92 % --   10/20/21 0818 -- 97.7  F (36.5  C) Axillary -- -- -- --   10/20/21 0800 -- -- -- 106 (!) 32 95 % --       Temperatures:  Current - Temp: 97.7  F (36.5  C); Max - Temp  Av.9  F (36.6  C)  Min: 97.7  F (36.5  C)  Max: 98.1  F (36.7  C)  Respiration range: Resp  Av.3  Min: 25  Max: 51  Pulse range: Pulse  Av.9  Min: 85  Max: 118  Blood pressure range: Systolic (24hrs), Av , Min:121 , Max:124   ; Diastolic (24hrs), Av, Min:74, Max:93    Pulse oximetry range: SpO2  Av.1 %  Min: 82 %  Max: 96 %  I/O last 3 completed shifts:  In: 2891.63 [P.O.:900]  Out: 2150 [Urine:2150]    GENERAL: Pleasant man, seated propped up in bed on HFNC oxygen.  He is in no distress.  EYES: Eyes grossly normal to inspection.  HENT: HFNC cannula in place.  NECK: Trachea midline, no stridor.    RESP: No accessory muscle use.  Lungs notable for prominent inspiratory crackles of both posterior lung bases, and clear sounds elsewhere on inspiration.  Expiration not prolonged, no wheeze.  CV: Regular rate and rhythm, non-tachycardic.  Normal S1 S2, no murmur or extra sound.  No lower extremity edema.  Right foot color is normal, and it is warm.  I can palpate  a dorsalis pedis pulse.  ABDOMEN: Soft, non-tender, no guarding.  Liver and spleen not enlarged, no masses palpable.  Bowel sounds positive.  MS: No bony deformities noted.  No red or inflamed joints.  SKIN: Warm and dry, no rashes.  NEURO: Alert, conversant, and appropriate in conversation.  Cranial nerves III - XII grossly intact.  No gross motor or sensory deficits.   PSYCH: Alert, conversant.  Able to articulate logical thoughts, no tangential thoughts, no hallucinations or delusions.  Affect appears less anxious than yesterday.            Data:     Results for orders placed or performed during the hospital encounter of 10/01/21 (from the past 24 hour(s))   Glucose by meter   Result Value Ref Range    GLUCOSE BY METER POCT 132 (H) 70 - 99 mg/dL   Glucose by meter   Result Value Ref Range    GLUCOSE BY METER POCT 163 (H) 70 - 99 mg/dL   Lactic Acid STAT   Result Value Ref Range    Lactic Acid 1.4 0.7 - 2.0 mmol/L   Glucose by meter   Result Value Ref Range    GLUCOSE BY METER POCT 202 (H) 70 - 99 mg/dL   Glucose by meter   Result Value Ref Range    GLUCOSE BY METER POCT 127 (H) 70 - 99 mg/dL   CBC with platelets   Result Value Ref Range    WBC Count 12.9 (H) 4.0 - 11.0 10e3/uL    RBC Count 4.54 4.40 - 5.90 10e6/uL    Hemoglobin 13.4 13.3 - 17.7 g/dL    Hematocrit 39.7 (L) 40.0 - 53.0 %    MCV 87 78 - 100 fL    MCH 29.5 26.5 - 33.0 pg    MCHC 33.8 31.5 - 36.5 g/dL    RDW 13.0 10.0 - 15.0 %    Platelet Count 184 150 - 450 10e3/uL   CRP inflammation   Result Value Ref Range    CRP Inflammation 15.7 (H) 0.0 - 8.0 mg/L   Basic metabolic panel   Result Value Ref Range    Sodium 137 133 - 144 mmol/L    Potassium 4.1 3.4 - 5.3 mmol/L    Chloride 104 94 - 109 mmol/L    Carbon Dioxide (CO2) 31 20 - 32 mmol/L    Anion Gap 2 (L) 3 - 14 mmol/L    Urea Nitrogen 16 7 - 30 mg/dL    Creatinine 0.57 (L) 0.66 - 1.25 mg/dL    Calcium 9.1 8.5 - 10.1 mg/dL    Glucose 131 (H) 70 - 99 mg/dL    GFR Estimate >90 >60 mL/min/1.73m2    Magnesium   Result Value Ref Range    Magnesium 2.5 (H) 1.6 - 2.3 mg/dL   Phosphorus   Result Value Ref Range    Phosphorus 4.0 2.5 - 4.5 mg/dL           Attestation:  I have reviewed today's vital signs, notes, medications, labs and imaging.  In 5374  Out 1958     Marcelo Villa MD   [facility]

## 2021-10-21 NOTE — PROGRESS NOTES
Patient remains on Bipap through-out the morning. Tends to breath very fast ,needing reminders to slow breathing down.Medication used due to anxiousness. H R running high    genia w/activity. Is currently 140 while sitting on commode.

## 2021-10-21 NOTE — PROGRESS NOTES
Current BiPAP parameters 16/10 and 90%. RR 40s, due to high anxiety. RN notified, patient medicated. Continue to monitor.

## 2021-10-21 NOTE — PROGRESS NOTES
Patient gets very anxious,needing to sit on commode w/no results.RR elevated in the 40's. Remind to slow breathing down.

## 2021-10-21 NOTE — PROGRESS NOTES
VSS, although BP and RR remain elevated. Pt continues afebrile. Pt states relief/ease of breathing from earlier given MS oral concentrate 5 mg po. Accucheck 202. Pt remains on BIPAP 90% all this evening/night, with only sips of water occasionally. TPN at 90 ml/hr and Lipids 20 ml/hr continue. Pt has been resting/sleeping on BIPAP 90%, and hopes to do so overnight. Will continue to monitor.

## 2021-10-21 NOTE — PROGRESS NOTES
Care Management Follow Up    Length of Stay (days): 20    Expected Discharge Date: 10/28/2021     Concerns to be Addressed:       Patient plan of care discussed at interdisciplinary rounds: Yes    Anticipated Discharge Disposition:  TBD      Referrals Placed by CM/SW:  [facility]  Private pay costs discussed: Not applicable    Additional Information:    Per the discussion in Interdisciplinary Rounds, the Pt may benefit from a LTAC.  Referral placed for [facility] on the [place] [facility].  Spoke to Sonia Jones RN Care Manager [facility] [phone].  Per Sonia at this time there are no beds available at the  LT.  There may be openings next week.  [facility] will follow/monitor the Pt.  [facility] will be called if there is an opening (do not call daily).  The Patient has no insurance.  Per Sonia [facility] would have to approve admission to [facility].    Care Transitions will continue to monitor through daily chart reviews and Interdisciplinary Rounds.        Marilyn Ash RN

## 2021-10-21 NOTE — PROGRESS NOTES
Pt has rested/slept on BIPAP 90% all night, with frequent sips of water. Cares grouped for patient to conserve his energy and keep O2 sats in the mid 90's at rest. UO 1150 ml clear yellow per 3 voids in urinal. BP and RR rate remain elevated, but stable.

## 2021-10-21 NOTE — PROGRESS NOTES
Pt asking for a break from the BIPAP and to go on HFNC. Pt anxious about this and becoming SOA. Morphine 5 mg concentrate given po to help pt with his anxiety over becoming SOA. O2 sats initially in the 70's, but with encouragement and gentle reinforcement of slowing down his breathing, pt is slowly increasing his sats on the HFNC.O2 Sats presently are 90% and pt is calm and resting on HFNC 95%.

## 2021-10-22 LAB
ANION GAP SERPL CALCULATED.3IONS-SCNC: 6 MMOL/L (ref 3–14)
BUN SERPL-MCNC: 20 MG/DL (ref 7–30)
CALCIUM SERPL-MCNC: 9.2 MG/DL (ref 8.5–10.1)
CHLORIDE BLD-SCNC: 102 MMOL/L (ref 94–109)
CO2 SERPL-SCNC: 30 MMOL/L (ref 20–32)
CREAT SERPL-MCNC: 0.58 MG/DL (ref 0.66–1.25)
ERYTHROCYTE [DISTWIDTH] IN BLOOD BY AUTOMATED COUNT: 13.1 % (ref 10–15)
GFR SERPL CREATININE-BSD FRML MDRD: >90 ML/MIN/1.73M2
GLUCOSE BLD-MCNC: 123 MG/DL (ref 70–99)
GLUCOSE BLDC GLUCOMTR-MCNC: 121 MG/DL (ref 70–99)
GLUCOSE BLDC GLUCOMTR-MCNC: 126 MG/DL (ref 70–99)
GLUCOSE BLDC GLUCOMTR-MCNC: 181 MG/DL (ref 70–99)
GLUCOSE BLDC GLUCOMTR-MCNC: 187 MG/DL (ref 70–99)
HCT VFR BLD AUTO: 39.9 % (ref 40–53)
HGB BLD-MCNC: 13.4 G/DL (ref 13.3–17.7)
LACTATE SERPL-SCNC: 1.3 MMOL/L (ref 0.7–2)
MAGNESIUM SERPL-MCNC: 2.3 MG/DL (ref 1.6–2.3)
MCH RBC QN AUTO: 29.4 PG (ref 26.5–33)
MCHC RBC AUTO-ENTMCNC: 33.6 G/DL (ref 31.5–36.5)
MCV RBC AUTO: 88 FL (ref 78–100)
PHOSPHATE SERPL-MCNC: 3.6 MG/DL (ref 2.5–4.5)
PLATELET # BLD AUTO: 218 10E3/UL (ref 150–450)
POTASSIUM BLD-SCNC: 4.4 MMOL/L (ref 3.4–5.3)
RBC # BLD AUTO: 4.56 10E6/UL (ref 4.4–5.9)
SODIUM SERPL-SCNC: 138 MMOL/L (ref 133–144)
WBC # BLD AUTO: 13.7 10E3/UL (ref 4–11)

## 2021-10-22 PROCEDURE — 250N000013 HC RX MED GY IP 250 OP 250 PS 637

## 2021-10-22 PROCEDURE — 94660 CPAP INITIATION&MGMT: CPT

## 2021-10-22 PROCEDURE — 250N000009 HC RX 250: Performed by: FAMILY MEDICINE

## 2021-10-22 PROCEDURE — 85027 COMPLETE CBC AUTOMATED: CPT | Performed by: FAMILY MEDICINE

## 2021-10-22 PROCEDURE — 999N000105 HC STATISTIC NO DOCUMENTATION TO SUPPORT CHARGE

## 2021-10-22 PROCEDURE — 82374 ASSAY BLOOD CARBON DIOXIDE: CPT | Performed by: FAMILY MEDICINE

## 2021-10-22 PROCEDURE — 250N000011 HC RX IP 250 OP 636: Performed by: FAMILY MEDICINE

## 2021-10-22 PROCEDURE — 250N000013 HC RX MED GY IP 250 OP 250 PS 637: Performed by: FAMILY MEDICINE

## 2021-10-22 PROCEDURE — 200N000001 HC R&B ICU

## 2021-10-22 PROCEDURE — 250N000011 HC RX IP 250 OP 636

## 2021-10-22 PROCEDURE — 250N000013 HC RX MED GY IP 250 OP 250 PS 637: Performed by: INTERNAL MEDICINE

## 2021-10-22 PROCEDURE — 250N000013 HC RX MED GY IP 250 OP 250 PS 637: Performed by: HOSPITALIST

## 2021-10-22 PROCEDURE — 83735 ASSAY OF MAGNESIUM: CPT | Performed by: FAMILY MEDICINE

## 2021-10-22 PROCEDURE — 99291 CRITICAL CARE FIRST HOUR: CPT

## 2021-10-22 PROCEDURE — 250N000009 HC RX 250

## 2021-10-22 PROCEDURE — 83605 ASSAY OF LACTIC ACID: CPT

## 2021-10-22 PROCEDURE — 84100 ASSAY OF PHOSPHORUS: CPT | Performed by: FAMILY MEDICINE

## 2021-10-22 PROCEDURE — 999N000157 HC STATISTIC RCP TIME EA 10 MIN

## 2021-10-22 RX ORDER — LORAZEPAM 0.5 MG/1
0.5 TABLET ORAL EVERY 4 HOURS PRN
Status: DISCONTINUED | OUTPATIENT
Start: 2021-10-22 | End: 2021-11-01 | Stop reason: HOSPADM

## 2021-10-22 RX ADMIN — METHYLPREDNISOLONE SODIUM SUCCINATE 62.5 MG: 125 INJECTION, POWDER, FOR SOLUTION INTRAMUSCULAR; INTRAVENOUS at 15:34

## 2021-10-22 RX ADMIN — ALBUTEROL SULFATE 2 PUFF: 90 AEROSOL, METERED RESPIRATORY (INHALATION) at 06:25

## 2021-10-22 RX ADMIN — Medication 5 MG: at 09:32

## 2021-10-22 RX ADMIN — LORAZEPAM 0.5 MG: 0.5 TABLET ORAL at 19:22

## 2021-10-22 RX ADMIN — PANTOPRAZOLE SODIUM 40 MG: 40 TABLET, DELAYED RELEASE ORAL at 07:57

## 2021-10-22 RX ADMIN — ALBUTEROL SULFATE 2 PUFF: 90 AEROSOL, METERED RESPIRATORY (INHALATION) at 17:00

## 2021-10-22 RX ADMIN — SENNOSIDES AND DOCUSATE SODIUM 1 TABLET: 50; 8.6 TABLET ORAL at 23:21

## 2021-10-22 RX ADMIN — SENNOSIDES AND DOCUSATE SODIUM 1 TABLET: 50; 8.6 TABLET ORAL at 07:57

## 2021-10-22 RX ADMIN — SENNOSIDES AND DOCUSATE SODIUM 1 TABLET: 50; 8.6 TABLET ORAL at 13:12

## 2021-10-22 RX ADMIN — ASCORBIC ACID, VITAMIN A PALMITATE, CHOLECALCIFEROL, THIAMINE HYDROCHLORIDE, RIBOFLAVIN-5 PHOSPHATE SODIUM, PYRIDOXINE HYDROCHLORIDE, NIACINAMIDE, DEXPANTHENOL, ALPHA-TOCOPHEROL ACETATE, VITAMIN K1, FOLIC ACID, BIOTIN, CYANOCOBALAMIN: 200; 3300; 200; 6; 3.6; 6; 40; 15; 10; 150; 600; 60; 5 INJECTION, SOLUTION INTRAVENOUS at 16:57

## 2021-10-22 RX ADMIN — LISINOPRIL 10 MG: 10 TABLET ORAL at 19:22

## 2021-10-22 RX ADMIN — CITALOPRAM HYDROBROMIDE 20 MG: 20 TABLET ORAL at 07:56

## 2021-10-22 RX ADMIN — Medication 5 MG: at 03:44

## 2021-10-22 RX ADMIN — ALBUTEROL SULFATE 2 PUFF: 90 AEROSOL, METERED RESPIRATORY (INHALATION) at 23:21

## 2021-10-22 RX ADMIN — Medication 5 MG: at 13:12

## 2021-10-22 RX ADMIN — I.V. FAT EMULSION 250 ML: 20 EMULSION INTRAVENOUS at 19:22

## 2021-10-22 RX ADMIN — ONDANSETRON 4 MG: 4 TABLET, ORALLY DISINTEGRATING ORAL at 13:15

## 2021-10-22 RX ADMIN — ALBUTEROL SULFATE 2 PUFF: 90 AEROSOL, METERED RESPIRATORY (INHALATION) at 13:13

## 2021-10-22 RX ADMIN — LORAZEPAM 0.5 MG: 0.5 TABLET ORAL at 23:21

## 2021-10-22 RX ADMIN — LORAZEPAM 0.5 MG: 0.5 TABLET ORAL at 13:12

## 2021-10-22 RX ADMIN — Medication 5 MG: at 23:20

## 2021-10-22 RX ADMIN — APIXABAN 10 MG: 5 TABLET, FILM COATED ORAL at 07:56

## 2021-10-22 RX ADMIN — ALBUTEROL SULFATE 2 PUFF: 90 AEROSOL, METERED RESPIRATORY (INHALATION) at 08:43

## 2021-10-22 RX ADMIN — Medication 1 DROP: at 08:43

## 2021-10-22 RX ADMIN — APIXABAN 10 MG: 5 TABLET, FILM COATED ORAL at 19:22

## 2021-10-22 RX ADMIN — Medication: at 07:57

## 2021-10-22 RX ADMIN — LORAZEPAM 0.5 MG: 0.5 TABLET ORAL at 07:57

## 2021-10-22 RX ADMIN — ACETAMINOPHEN 650 MG: 325 TABLET, FILM COATED ORAL at 13:12

## 2021-10-22 RX ADMIN — Medication 5 MG: at 19:21

## 2021-10-22 ASSESSMENT — ACTIVITIES OF DAILY LIVING (ADL)
ADLS_ACUITY_SCORE: 8
ADLS_ACUITY_SCORE: 11
ADLS_ACUITY_SCORE: 8
ADLS_ACUITY_SCORE: 11
ADLS_ACUITY_SCORE: 8

## 2021-10-22 ASSESSMENT — MIFFLIN-ST. JEOR: SCORE: 1606.38

## 2021-10-22 NOTE — PROGRESS NOTES
Pt complaining of air hunger, requesting Morphine. 5 mg po morphine concentrate given. Pt encouraged to slow down his respirations. O2 sats mid 90's on BIPAP 90%.

## 2021-10-22 NOTE — PROGRESS NOTES
Pt placed back on BIPAP 90%. O2 sats 79%, 's. Pt feeling SOA, not able to catch his breath. Pt had received Ativan 0.5 mg po earlier for anxiety. Will continue to monitor.

## 2021-10-22 NOTE — PROGRESS NOTES
"CLINICAL NUTRITION SERVICES - REASSESSMENT NOTE     Nutrition Prescription    RECOMMENDATIONS FOR MDs/PROVIDERS TO ORDER:  None     Recommendations already ordered by Registered Dietitian (RD):  None    Future/Additional Recommendations:  -Continue to encourage intake of meals and supplements.  -Goal of transitioning off TPN and starting EN if patient agrees to this. RD is aware that pt has declined an NG tube in the past. EN recs found below if able to pursue this feeding route:     EVALUATION OF THE PROGRESS TOWARD GOALS   Diet: Regular  Intake: Only eating a few bites of meals which has been chicken noodle soup and crackers. Also a few bites from a chicken sandwich that his wife brought in.      Nutrition Support: TPN Clinimix E @ 90 mL/hr x 24 hrs and 250 mL 20% lipids  x 12 hrs Mon-Fri.   -This provides 2160 mL, 1891 kcal (24 kcal/kg, 100% needs), 324 g dextrose, 108 g AA (1.4 g pro/kg, 100% needs), and 250 mL of 20% IV lipids (19% kcal from fat). GIR = 2.9 mg/kg/min    Supplements: Ensure Enlive with all meals and Magic Cup between meals TID.   Intake: No documentation.        NEW FINDINGS   -Per IDT rounds, patient's status is improving.   -Alternating between BiPAP and HFNC. \"He desaturates less quickly after conversion to HFNC, and now has time to eat.\" - per provider note today.   -Complaining of air hunger.     Labs:  K+: 4.4 (WNL)  Phos: 3.6 (WNL)  Ma.3 (WNL)  Glucose: 121 (H)    TG (10/19/21): 112 (WNL)    INTERVENTIONS  Implementation  Collaboration with other providers: MD, PharmD.    If able to transition from TPN to EN:  Recommend: Jevity 1.5 Braden @ goal of 65ml/hr x 24 hrs (1560ml/day). This will provide: 2,340 kcals, 99 g Pro, 1.185 ml free water, 336 g CHO, and 32 g fiber daily.   -Water flushes: 30 Q 4 hrs.   -Total water: 1365 mL    Monitoring/Evaluation  Progress toward goals will be monitored and evaluated per protocol.    Julia Newberry RD, LD  Clinical Dietitian  [facility]: " [phone]  [facility]: [phone]

## 2021-10-22 NOTE — PROGRESS NOTES
[facility]           Assessment & Plan        Liborio Barajas is a 56 year old male admitted on 10/1/2021. He presented with shortness of breath and was found to be COVID positive.     Confirmed COVID-19 infection    Acute Hypoxic Respiratory Failure secondary to COVID-19 infection  Viral Pneumonia secondary to COVID-19 infection     Symptom Onset 9/22/2021    Date of 1st Positive Test 10/01/2021      Vaccination Status Not Vaccinated, but interested/contemplative        Initial CT chest 10/1/2021 demonstrated extensive bilateral groundglass consolidation consistent with pneumonia, and was negative for pulmonary embolism.  The patient required transfer to intensive care unit immediately after admission, where he has remained.  For more than a week, the patient has been alternating between HFNC oxygen and BiPAP.  Since the time of admission, we had not been able to make any significant reductions in HFNC flow or in FiO2.  Since the patient appeared \"stuck\" on high flow nasal cannula and/or BiPAP therapy with high FiO2 for more than 2 weeks, he and I discussed risk versus benefit of beginning empiric systemic steroids for what could be inflammatory pneumonitis following COVID-19 pneumonia.  He agreed to the use of methylprednisolone 60 mg daily, started 10/20/2021.     This morning he is on HFNC 60 L/min and FiO2 0.95.  He desaturates less quickly after conversion to HFNC, and now has time to eat.  BiPAP settings overnight were 16/10 cm, rate 18, FiO2 0.90.  He has found the use of morphine concentrated solution every 2 hours as needed dyspnea to be very helpful in terms of relieving the dyspnea and some of the anxiety associated with it.  His RN reports ongoing anxiety, however, severe enough that the patient will ask her not to leave the room.  His rest dyspnea on HFNC this morning is only mild.  Cough is mild, and is nonproductive.  He is having no respiratory chest pain.  He is " afebrile.    - Oxygen: Continue HFNC 60 L/min and FiO2 0.95, alternating with BiPAP 16/10 cm, rate 18, FiO2 0.90.  He is able to prone with minimal assistance, which does at least temporarily improve oxygenation.  We will continue to encourage proning 1 hour out of every 3.  - Labs: Markers of severity were followed earlier in the hospital stay, with trend toward improvement.  Daily monitoring has been discontinued.  - Imaging: Chest CT 10/10/2021 continued to demonstrate extensive bilateral infiltrates, and was again negative for pulmonary embolism.  Chest x-ray 10/18/2021 again showed extensive patchy opacities throughout both lungs.  Breathing treatments: Albuterol HFA 2 puffs every 4 hours while awake was started on 10/19/2021 due to wheezing.  Now that the patient is recovered COVID-19 status, nebulized medications could be used if needed.    IV fluids: None indicated, though the patient remains on TPN, see below.  Antibiotics: Not indicated.   Corticosteroids: Methylprednisolone 60 mg IV every 24 hours started 10/20/2021 for what could possibly represent inflammatory pneumonitis following COVID-19 pneumonia.  COVID-Focused Medications:    - Dexamethasone was given 10/1 - 10/10/2021.   - Remdesivir was given 10/1 - 10/5/2021.   - Tocilizumab 700 mg IV given 10/2/2021.  - DVT Prophylaxis: on apixaban for treatment of aortic and right lower extremity thrombus, see below.    Infrarenal Aortic Thrombus with thromboembolic obstruction of right tibial-peroneal trunk    Right foot pain and diminished RLE pulses noted 10/11/2021. CTA abdomen and pelvis revealed infrarenal aortic wall adherent clot, and apparent thromboembolic obstruction of right tibial-peroneal trunk with distal reconstitution of tibial and peroneal arteries.  Findings were discussed with vascular surgery [facility], who advised that COVID-related arterial thrombectomies frequently reclot, so surgery was not recommended, and that increased heparin  resistance was seen in these patients, so anticoagulation with argatroban was recommended.  Argatroban infusion given 10/11 - 10/16/2021, after which the patient was transitioned to apixaban, starting at 10 mg p.o. twice daily for the first week (first dose 10/17/2021), then dose will be decreased to 5 mg daily.  Right foot no longer cold, dorsalis pedal pulses palpable.  -Continue apixaban.    Anxiety    Patient had been having severe subjective dyspnea only partially and briefly relieved by the use of morphine IV and lorazepam IV.  We started morphine sulfate 5 mg p.o. every 2 hours as needed dyspnea on 10/20/2021, which the patient has found to be very helpful.  However, his RN overnight reports that the patient has ongoing anxiety, severe enough that he asks her not to leave the room for periods of time.  He appears overtly anxious this morning.  -Continue morphine sulfate solution 5 mg p.o. every 2 hours as needed dyspnea.  -Add lorazepam 0.5 mg p.o. every 4 hours as needed anxiety, and titrate dose up as needed.  -We initiated citalopram 20 mg every day  On 10/13/2021, knowing that this will take some time to reach effect.      Hyponatremia   Present on admission. Probably related to volume depletion.  Sodium normalized without specific therapy.  -Resolved.     Acute kidney injury on admission  GFR on admission was 32.  The patient was probably volume depleted on admission.  With volume correction, will function has been normal since approximately 10/5/2021.   -Resolved.     Transaminitis  Mild elevation of AST and ALT were noted on admission, likely due to COVID-19 infection and/or remdesivir.  Transaminases normalized as of 10/18/2021. -Resolved.    Leukocytosis  WBC has been elevated since 10/19/2021, though has been roughly stable since that time.  The elevation correlates roughly with starting methylprednisolone.  He is afebrile, and there is no clinical evidence of underlying infection.  -Monitor for  fever or other signs of infection.  -Follow WBC.    Hypertension  Managed prior to admission with lisinopril 10 mg daily.  Lisinopril was resumed here initially at 5 mg daily, then increased to 10 mg daily 10/21/2021 due to hypertension.  Blood pressure control has improved.  Blood pressure has increased since beginning systemic steroids.  -Continue lisinopril 10 mg daily.     GERD  Exacerbated since methylprednisolone was started.  -Continue omeprazole.     Malnutrition:  - Level of malnutrition: Severe   - Based on: weight loss, reduced intake - see nutrition notes - notable weight loss and intake remained poor.  Patient declined placement of NG feeding tube, and BiPAP/HFNC fit and seal would be compromised with an NG tube.  A PICC line was placed, and TPN started on 10/18/2021.  Because there are no good outcome data for the use of TPN in a critically ill population, I would like to continue efforts either at increasing oral intake or seeing if he will allow placement of a small bore NG feeding tube.  He is now able to spend a little more time on HFNC oxygen, and as result has been able to resume an oral diet, although he is not eating much.  We will discuss his an intake with RD, and make a plan for TPN titration downward.     DVT Prophylaxis: Apixaban.   Neal Catheter: Not present  Central Lines: PICC placed 10/18/2021 for TPN.  Code Status:  Full         Diet  TPN started 10/18/2021.  We will increase oral diet as able now that he is able to spend some time off of BiPAP.  See discussion above.      Disposition  Continues to require HFNC and BiPAP at high FiO2 demands, and therefore continues to require ICU care.            Interval History:   Slept well on BiPAP last night, at least for stretches.  This morning on HFNC, he says that his dyspnea is only mild.  Cough is mild, nonproductive.  He has no respiratory chest pain.  The only discomfort that he has is mild discomfort from the right foot which has been  present for a week or more, and slowly improving.  He denies dysphagia, nausea, or vomiting.  He has some heartburn, which has been exacerbated since methylprednisolone was started.             Review of Systems:    ROS: 10 point ROS neg other than the symptoms noted above in the HPI.           Medications:   Current active medications and PTA medications reviewed, see medication list for details.            Physical Exam:   Vitals were reviewed  Patient Vitals for the past 24 hrs:   BP Temp Temp src Pulse Resp SpO2   10/22/21 0621 -- -- -- -- -- 92 %   10/22/21 0417 -- -- -- 88 24 99 %   10/22/21 0412 -- -- -- 87 -- --   10/22/21 0400 -- -- -- 92 -- 98 %   10/22/21 0338 (!) 168/100 97.4  F (36.3  C) Axillary 109 (!) 33 --   10/22/21 0300 -- -- -- 105 26 97 %   10/22/21 0215 -- -- -- 92 (!) 32 97 %   10/22/21 0010 (!) 159/95 97.9  F (36.6  C) Axillary 108 28 97 %   10/21/21 202 (!) 151/95 97.7  F (36.5  C) Axillary 107 28 --   10/21/21 2000 (!) 139/91 -- -- 105 (!) 36 98 %   10/21/21 1700 -- -- -- 93 (!) 34 96 %   10/21/21 1641 -- (!) 96.7  F (35.9  C) Axillary -- -- --   10/21/21 1600 108/70 -- -- 100 (!) 34 95 %   10/21/21 1500 -- -- Axillary -- -- 91 %   10/21/21 1100 131/76 97.4  F (36.3  C) Axillary -- -- --   10/21/21 1000 (!) 142/92 97.8  F (36.6  C) Axillary -- -- --   10/21/21 0856 -- 98  F (36.7  C) Axillary -- -- --       Temperatures:  Current - Temp: 97.7  F (36.5  C); Max - Temp  Av.9  F (36.6  C)  Min: 97.7  F (36.5  C)  Max: 98.1  F (36.7  C)  Respiration range: Resp  Av.3  Min: 25  Max: 51  Pulse range: Pulse  Av.9  Min: 85  Max: 118  Blood pressure range: Systolic (24hrs), Av , Min:121 , Max:124   ; Diastolic (24hrs), Av, Min:74, Max:93    Pulse oximetry range: SpO2  Av.1 %  Min: 82 %  Max: 96 %  I/O last 3 completed shifts:  In: 3268.11 [P.O.:880]  Out: 2410 [Urine:2410]    GENERAL: Pleasant man, seated propped up in bed on HFNC oxygen.  He is in no distress.  EYES:  Eyes grossly normal to inspection.  HENT: HFNC cannula in place.  NECK: Trachea midline, no stridor.    RESP: No accessory muscle use.  Lungs notable for mildly consolidated sounds at the right posterior base, and inspiratory crackles at both posterior bases.  Elsewhere inspiration is clear.  Expiration not prolonged, no wheeze.  CV: Regular rate and rhythm, non-tachycardic.  Normal S1 S2, no murmur or extra sound.  No lower extremity edema.  Right foot color is normal, and it is warm.  I can palpate a faint dorsalis pedis pulse.  ABDOMEN: Soft, non-tender, no guarding.  Liver and spleen not enlarged, no masses palpable.  Bowel sounds positive.  MS: No bony deformities noted.  No red or inflamed joints.  SKIN: Warm and dry, no rashes.  NEURO: Alert, conversant, and appropriate in conversation.  Cranial nerves III - XII grossly intact.  No gross motor or sensory deficits.   PSYCH: Alert, conversant.  Able to articulate logical thoughts, no tangential thoughts, no hallucinations or delusions.  Affect appears overtly anxious.            Data:     Results for orders placed or performed during the hospital encounter of 10/01/21 (from the past 24 hour(s))   Glucose by meter   Result Value Ref Range    GLUCOSE BY METER POCT 104 (H) 70 - 99 mg/dL   Glucose by meter   Result Value Ref Range    GLUCOSE BY METER POCT 166 (H) 70 - 99 mg/dL   Glucose by meter   Result Value Ref Range    GLUCOSE BY METER POCT 181 (H) 70 - 99 mg/dL   CBC with platelets   Result Value Ref Range    WBC Count 13.7 (H) 4.0 - 11.0 10e3/uL    RBC Count 4.56 4.40 - 5.90 10e6/uL    Hemoglobin 13.4 13.3 - 17.7 g/dL    Hematocrit 39.9 (L) 40.0 - 53.0 %    MCV 88 78 - 100 fL    MCH 29.4 26.5 - 33.0 pg    MCHC 33.6 31.5 - 36.5 g/dL    RDW 13.1 10.0 - 15.0 %    Platelet Count 218 150 - 450 10e3/uL   Basic metabolic panel   Result Value Ref Range    Sodium 138 133 - 144 mmol/L    Potassium 4.4 3.4 - 5.3 mmol/L    Chloride 102 94 - 109 mmol/L    Carbon Dioxide  (CO2) 30 20 - 32 mmol/L    Anion Gap 6 3 - 14 mmol/L    Urea Nitrogen 20 7 - 30 mg/dL    Creatinine 0.58 (L) 0.66 - 1.25 mg/dL    Calcium 9.2 8.5 - 10.1 mg/dL    Glucose 123 (H) 70 - 99 mg/dL    GFR Estimate >90 >60 mL/min/1.73m2   Magnesium   Result Value Ref Range    Magnesium 2.3 1.6 - 2.3 mg/dL   Phosphorus   Result Value Ref Range    Phosphorus 3.6 2.5 - 4.5 mg/dL   Lactic Acid STAT   Result Value Ref Range    Lactic Acid 1.3 0.7 - 2.0 mmol/L           Attestation:  I have reviewed today's vital signs, notes, medications, labs and imaging.  In 3358  Out 3929     Marcelo Villa MD   [facility]

## 2021-10-22 NOTE — PROGRESS NOTES
Pt appears resting/sleeping quietly since MN on BIPAP 90%. TPN and Lipids infusing as ordered. Will continue to monitor

## 2021-10-22 NOTE — PROGRESS NOTES
Pt asking to get ready for bed/sleep. VSS, pt afebrile. Pt offered and given Morphine concentrate 5 mg po for air hunger/sleep. Pt voided 260 ml clear yellow urine. Pt states comfort at present on BIPAP 90%. Will continue to monitor.

## 2021-10-23 LAB
ERYTHROCYTE [DISTWIDTH] IN BLOOD BY AUTOMATED COUNT: 13.3 % (ref 10–15)
GLUCOSE BLDC GLUCOMTR-MCNC: 114 MG/DL (ref 70–99)
GLUCOSE BLDC GLUCOMTR-MCNC: 127 MG/DL (ref 70–99)
GLUCOSE BLDC GLUCOMTR-MCNC: 136 MG/DL (ref 70–99)
HCT VFR BLD AUTO: 37.9 % (ref 40–53)
HGB BLD-MCNC: 12.5 G/DL (ref 13.3–17.7)
LACTATE SERPL-SCNC: 1.8 MMOL/L (ref 0.7–2)
MCH RBC QN AUTO: 30.1 PG (ref 26.5–33)
MCHC RBC AUTO-ENTMCNC: 33 G/DL (ref 31.5–36.5)
MCV RBC AUTO: 91 FL (ref 78–100)
PLATELET # BLD AUTO: 233 10E3/UL (ref 150–450)
RBC # BLD AUTO: 4.15 10E6/UL (ref 4.4–5.9)
WBC # BLD AUTO: 9.6 10E3/UL (ref 4–11)

## 2021-10-23 PROCEDURE — 94660 CPAP INITIATION&MGMT: CPT

## 2021-10-23 PROCEDURE — 999N000157 HC STATISTIC RCP TIME EA 10 MIN

## 2021-10-23 PROCEDURE — 99233 SBSQ HOSP IP/OBS HIGH 50: CPT

## 2021-10-23 PROCEDURE — 999N000105 HC STATISTIC NO DOCUMENTATION TO SUPPORT CHARGE

## 2021-10-23 PROCEDURE — 83605 ASSAY OF LACTIC ACID: CPT

## 2021-10-23 PROCEDURE — 200N000001 HC R&B ICU

## 2021-10-23 PROCEDURE — 250N000013 HC RX MED GY IP 250 OP 250 PS 637: Performed by: INTERNAL MEDICINE

## 2021-10-23 PROCEDURE — 250N000013 HC RX MED GY IP 250 OP 250 PS 637: Performed by: FAMILY MEDICINE

## 2021-10-23 PROCEDURE — 250N000013 HC RX MED GY IP 250 OP 250 PS 637: Performed by: HOSPITALIST

## 2021-10-23 PROCEDURE — 85027 COMPLETE CBC AUTOMATED: CPT | Performed by: FAMILY MEDICINE

## 2021-10-23 PROCEDURE — 250N000013 HC RX MED GY IP 250 OP 250 PS 637

## 2021-10-23 PROCEDURE — 250N000011 HC RX IP 250 OP 636

## 2021-10-23 PROCEDURE — 250N000009 HC RX 250

## 2021-10-23 RX ORDER — AMOXICILLIN 250 MG
2 CAPSULE ORAL 2 TIMES DAILY
Status: DISCONTINUED | OUTPATIENT
Start: 2021-10-23 | End: 2021-10-26

## 2021-10-23 RX ADMIN — ALBUTEROL SULFATE 2 PUFF: 90 AEROSOL, METERED RESPIRATORY (INHALATION) at 10:30

## 2021-10-23 RX ADMIN — CITALOPRAM HYDROBROMIDE 20 MG: 20 TABLET ORAL at 08:25

## 2021-10-23 RX ADMIN — MICONAZOLE NITRATE: 20 CREAM TOPICAL at 08:26

## 2021-10-23 RX ADMIN — LORAZEPAM 0.5 MG: 0.5 TABLET ORAL at 16:14

## 2021-10-23 RX ADMIN — Medication: at 08:26

## 2021-10-23 RX ADMIN — SENNOSIDES AND DOCUSATE SODIUM 2 TABLET: 50; 8.6 TABLET ORAL at 14:02

## 2021-10-23 RX ADMIN — PANTOPRAZOLE SODIUM 40 MG: 40 TABLET, DELAYED RELEASE ORAL at 08:25

## 2021-10-23 RX ADMIN — SENNOSIDES AND DOCUSATE SODIUM 1 TABLET: 50; 8.6 TABLET ORAL at 08:26

## 2021-10-23 RX ADMIN — ALBUTEROL SULFATE 2 PUFF: 90 AEROSOL, METERED RESPIRATORY (INHALATION) at 21:13

## 2021-10-23 RX ADMIN — APIXABAN 5 MG: 5 TABLET, FILM COATED ORAL at 08:26

## 2021-10-23 RX ADMIN — APIXABAN 5 MG: 5 TABLET, FILM COATED ORAL at 19:21

## 2021-10-23 RX ADMIN — ALBUTEROL SULFATE 2 PUFF: 90 AEROSOL, METERED RESPIRATORY (INHALATION) at 18:00

## 2021-10-23 RX ADMIN — ACETAMINOPHEN 650 MG: 325 TABLET, FILM COATED ORAL at 14:05

## 2021-10-23 RX ADMIN — ALBUTEROL SULFATE 2 PUFF: 90 AEROSOL, METERED RESPIRATORY (INHALATION) at 05:23

## 2021-10-23 RX ADMIN — ALBUTEROL SULFATE 2 PUFF: 90 AEROSOL, METERED RESPIRATORY (INHALATION) at 14:03

## 2021-10-23 RX ADMIN — LORAZEPAM 0.5 MG: 0.5 TABLET ORAL at 19:59

## 2021-10-23 RX ADMIN — LISINOPRIL 10 MG: 10 TABLET ORAL at 19:21

## 2021-10-23 RX ADMIN — ASCORBIC ACID, VITAMIN A PALMITATE, CHOLECALCIFEROL, THIAMINE HYDROCHLORIDE, RIBOFLAVIN-5 PHOSPHATE SODIUM, PYRIDOXINE HYDROCHLORIDE, NIACINAMIDE, DEXPANTHENOL, ALPHA-TOCOPHEROL ACETATE, VITAMIN K1, FOLIC ACID, BIOTIN, CYANOCOBALAMIN: 200; 3300; 200; 6; 3.6; 6; 40; 15; 10; 150; 600; 60; 5 INJECTION, SOLUTION INTRAVENOUS at 15:16

## 2021-10-23 RX ADMIN — Medication 5 MG: at 05:10

## 2021-10-23 RX ADMIN — METHYLPREDNISOLONE SODIUM SUCCINATE 62.5 MG: 125 INJECTION, POWDER, FOR SOLUTION INTRAMUSCULAR; INTRAVENOUS at 16:14

## 2021-10-23 RX ADMIN — LORAZEPAM 0.5 MG: 0.5 TABLET ORAL at 05:10

## 2021-10-23 RX ADMIN — POLYETHYLENE GLYCOL 3350 17 G: 17 POWDER, FOR SOLUTION ORAL at 19:20

## 2021-10-23 ASSESSMENT — ACTIVITIES OF DAILY LIVING (ADL)
ADLS_ACUITY_SCORE: 9
ADLS_ACUITY_SCORE: 10
ADLS_ACUITY_SCORE: 8
ADLS_ACUITY_SCORE: 9
ADLS_ACUITY_SCORE: 10
ADLS_ACUITY_SCORE: 11
ADLS_ACUITY_SCORE: 10
ADLS_ACUITY_SCORE: 8
ADLS_ACUITY_SCORE: 9
ADLS_ACUITY_SCORE: 10
ADLS_ACUITY_SCORE: 9
ADLS_ACUITY_SCORE: 10
ADLS_ACUITY_SCORE: 11
ADLS_ACUITY_SCORE: 9
ADLS_ACUITY_SCORE: 10
ADLS_ACUITY_SCORE: 9
ADLS_ACUITY_SCORE: 10
ADLS_ACUITY_SCORE: 9
ADLS_ACUITY_SCORE: 10
ADLS_ACUITY_SCORE: 11

## 2021-10-23 NOTE — PLAN OF CARE
Alert and oriented, up with SBA. SR-ST, BP stable, afebrile. 90%/60L, LS diminished. Regular diet, no BM. States he feels constipated, bowel meds given. Urinal voiding. TPN and lipids IV. PICC line patent. Headache pain states, Tylenol given.

## 2021-10-23 NOTE — PROGRESS NOTES
[facility]           Assessment & Plan        Liborio Barajas is a 56 year old male admitted on 10/1/2021. He presented with shortness of breath and was found to be COVID positive.     Confirmed COVID-19 infection    Acute Hypoxic Respiratory Failure secondary to COVID-19 infection  Viral Pneumonia secondary to COVID-19 infection     Symptom Onset 9/22/2021    Date of 1st Positive Test 10/01/2021      Vaccination Status Not Vaccinated, but interested/contemplative        Initial CT chest 10/1/2021 demonstrated extensive bilateral groundglass consolidation consistent with pneumonia, and was negative for pulmonary embolism.  The patient required transfer to intensive care unit immediately after admission, where he has remained.  For more than a week, the patient had been alternating between HFNC oxygen and BiPAP.  Since the time of admission, we had not been able to make any significant reductions in HFNC flow or in FiO2.  Since the patient appeared \"stuck\" on high flow nasal cannula and/or BiPAP therapy with high FiO2 for more than 2 weeks, he and I discussed risk versus benefit of beginning empiric systemic steroids for what could be inflammatory pneumonitis following COVID-19 pneumonia.  He agreed to the use of methylprednisolone 60 mg daily, started 10/20/2021.     Aside from 1 or 2 hours of BiPAP use last night during sleep, he has maintained oxygenation on HFNC 60 L, FiO2 0.95.  He thinks that he is noticing gradually less dyspnea.  His anxiety due to dyspnea is also improved.  Cough is not a problem anymore.  He has no respiratory chest pain.  He remains afebrile.      - Oxygen: Continue HFNC 60 L/min and FiO2 0.95, resuming BiPAP 16/10 cm, rate 18, FiO2 0.90 as needed.  He is able to prone with minimal assistance, though he has not needed to in the last day or so.  - Labs: Markers of severity were followed earlier in the hospital stay, with trend toward improvement.  Daily " monitoring has been discontinued.  - Imaging: Chest CT 10/10/2021 continued to demonstrate extensive bilateral infiltrates, and was again negative for pulmonary embolism.  Chest x-ray 10/18/2021 again showed extensive patchy opacities throughout both lungs.  Breathing treatments: Albuterol HFA 2 puffs every 4 hours while awake was started on 10/19/2021 due to wheezing.  Now that the patient is recovered COVID-19 status, nebulized medications could be used if needed.    IV fluids: None indicated; oral intake is adequate.  Antibiotics: Not indicated.   Corticosteroids: Methylprednisolone 60 mg IV every 24 hours started 10/20/2021 for what could possibly represent inflammatory pneumonitis following COVID-19 pneumonia.  We will plan on a 7-day course on this dose, then perhaps reduce to 40 mg daily.  COVID-Focused Medications:    - Dexamethasone was given 10/1 - 10/10/2021.   - Remdesivir was given 10/1 - 10/5/2021.   - Tocilizumab 700 mg IV given 10/2/2021.  - DVT Prophylaxis: on apixaban for treatment of aortic and right lower extremity thrombus, see below.    Infrarenal Aortic Thrombus with thromboembolic obstruction of right tibial-peroneal trunk    Right foot pain and diminished RLE pulses noted 10/11/2021. CTA abdomen and pelvis revealed infrarenal aortic wall adherent clot, and apparent thromboembolic obstruction of right tibial-peroneal trunk with distal reconstitution of tibial and peroneal arteries.  Findings were discussed with vascular surgery [facility], who advised that COVID-related arterial thrombectomies frequently reclot, so surgery was not recommended, and that increased heparin resistance was seen in these patients, so anticoagulation with argatroban was recommended.  Argatroban infusion given 10/11 - 10/16/2021, after which the patient was transitioned to apixaban, starting at 10 mg p.o. twice daily for the first week (first dose 10/17/2021), then dose will be decreased to 5 mg daily.  Right foot no  longer cold, dorsalis pedal pulses palpable.  -Continue apixaban.    Anxiety    Patient had been having severe subjective dyspnea only partially and briefly relieved by the use of morphine IV and lorazepam IV.  We started morphine sulfate 5 mg p.o. every 2 hours as needed dyspnea on 10/20/2021, and added lorazepam 0.5 mg p.o. every 4 hours as needed on 10/22/2021.  Control of anxiety is significantly better, both subjectively and objectively.  -Continue morphine sulfate solution 5 mg p.o. every 2 hours as needed dyspnea.  -Continue lorazepam 0.5 mg p.o. every 4 hours as needed anxiety, and titrate dose up as needed.  -We initiated citalopram 20 mg every day  On 10/13/2021, knowing that this will take some time to reach effect.      Hyponatremia   Present on admission. Probably related to volume depletion.  Sodium normalized without specific therapy.  -Resolved.     Acute kidney injury on admission  GFR on admission was 32.  The patient was probably volume depleted on admission.  With volume correction, will function has been normal since approximately 10/5/2021.   -Resolved.     Transaminitis  Mild elevation of AST and ALT were noted on admission, likely due to COVID-19 infection and/or remdesivir.  Transaminases normalized as of 10/18/2021. -Resolved.    Leukocytosis  WBC has been elevated since 10/19/2021, though has been roughly stable since that time.  The elevation correlates roughly with starting methylprednisolone.  He is afebrile, and there is no clinical evidence of underlying infection.  WBC today, 10/23/2021, is again normal.  -Resolved.    Hypertension  Managed prior to admission with lisinopril 10 mg daily.  Lisinopril was resumed here initially at 5 mg daily, then increased to 10 mg daily 10/21/2021 due to hypertension.  Blood pressure control has improved.  Blood pressure has increased since beginning systemic steroids.  -Continue lisinopril 10 mg daily.     GERD  Exacerbated since methylprednisolone  was started.  -Continue omeprazole.     Malnutrition:  - Level of malnutrition: Severe   - Based on: weight loss, reduced intake - see nutrition notes - notable weight loss and intake remained poor.  Patient declined placement of NG feeding tube, and BiPAP/HFNC fit and seal would be compromised with an NG tube.  A PICC line was placed, and TPN started on 10/18/2021.  Because there are no good outcome data for the use of TPN in a critically ill population, I would like to continue efforts either at increasing oral intake or seeing if he will allow placement of a small bore NG feeding tube.  He is now able to spend considerably more time off of BiPAP, and as result has been able to resume an oral diet, although he is not eating much.  I reviewed the RD note from 10/22/2021.  Her recommendation is for feeding tube placement to facilitate enteral feeds.  The patient remains politely opposed.     DVT Prophylaxis: Apixaban.   Neal Catheter: Not present  Central Lines: PICC placed 10/18/2021 for TPN.  Code Status:  Full         Diet  See discussion above.  I am hopeful that the patient will be able to increase oral intake now that he is spending less time on BiPAP, will permit the discontinuation of TPN.  He is opposed to feeding tube placement.      Discussion  The trend is toward slow improvement in oxygenation.  He is now requiring only an hour or two of BiPAP use every 24 hours.  Empiric methylprednisolone continues.  Malnutrition continues to be a problem.  Hopefully the patient's oral intake will continue to improve now that he is less BiPAP reliant.            Interval History:   He has been unable to have a bowel movement, which is his chief complaint today.  Denies dysphagia, nausea, or vomiting.  He is not entirely pleased with his food selections.  Anxiety is much better controlled, and he has been able to sleep at night.  He has stable rest dyspnea.  Cough is minimal, and is nonproductive.  Denies any  musculoskeletal pain, specifically denying right distal lower extremity pain.             Review of Systems:    ROS: 10 point ROS neg other than the symptoms noted above in the HPI.           Medications:   Current active medications and PTA medications reviewed, see medication list for details.            Physical Exam:   Vitals were reviewed  Patient Vitals for the past 24 hrs:   BP Temp Temp src Pulse Resp SpO2   10/23/21 0944 -- 97.8  F (36.6  C) Oral 109 20 --   10/23/21 0822 -- -- -- 106 22 94 %   10/23/21 0800 -- -- -- 95 20 94 %   10/23/21 0750 -- -- -- 103 -- 94 %   10/23/21 0747 137/82 97.8  F (36.6  C) Axillary -- -- --   10/23/21 0601 -- -- -- -- (!) 32 --   10/23/21 0510 -- -- -- -- (!) 32 --   10/23/21 0503 130/81 98.1  F (36.7  C) Oral 96 30 97 %   10/23/21 0443 -- -- -- 88 27 98 %   10/23/21 0300 -- -- -- 93 30 98 %   10/23/21 0135 -- -- -- -- -- 92 %   10/23/21 0130 -- -- -- 101 (!) 33 97 %   10/23/21 0020 -- -- -- 97 (!) 31 --   10/22/21 2356 -- -- -- 105 (!) 39 97 %   10/22/21 2330 120/68 98.7  F (37.1  C) Axillary 111 (!) 44 98 %   10/22/21 2300 -- -- -- 97 (!) 39 95 %   10/22/21 220 -- -- -- 106 (!) 36 96 %   10/22/21 2100 -- -- -- 98 28 98 %   10/22/21 2010 -- 98.5  F (36.9  C) Axillary -- -- --   10/22/21 2000 -- -- -- 113 27 96 %   10/22/21 1900 -- -- -- (!) 123 (!) 46 92 %   10/22/21 1800 -- -- -- (!) 121 28 92 %   10/22/21 1700 -- -- -- 107 (!) 32 93 %   10/22/21 1600 -- -- -- 102 13 96 %   10/22/21 1543 120/67 98.4  F (36.9  C) Axillary 98 25 93 %   10/22/21 1500 -- -- -- 101 24 95 %   10/22/21 1400 -- -- -- 96 (!) 32 98 %   10/22/21 1300 -- -- -- 109 (!) 36 92 %   10/22/21 1200 (!) 136/91 98.2  F (36.8  C) Axillary 108 27 97 %   10/22/21 1045 -- -- -- 88 28 96 %       Temperatures:  Current - Temp: 97.7  F (36.5  C); Max - Temp  Av.9  F (36.6  C)  Min: 97.7  F (36.5  C)  Max: 98.1  F (36.7  C)  Respiration range: Resp  Av.3  Min: 25  Max: 51  Pulse range: Pulse  Av.9   Min: 85  Max: 118  Blood pressure range: Systolic (24hrs), Av , Min:121 , Max:124   ; Diastolic (24hrs), Av, Min:74, Max:93    Pulse oximetry range: SpO2  Av.1 %  Min: 82 %  Max: 96 %  I/O last 3 completed shifts:  In: 3933.64 [P.O.:875]  Out: 1775 [Urine:1775]    GENERAL: Pleasant man, seated propped up in bed on HFNC oxygen.  He is in no distress.  EYES: Eyes grossly normal to inspection.  HENT: HFNC cannula in place.  NECK: Trachea midline, no stridor.    RESP: No accessory muscle use.  Lungs notable for inspiratory crackles at both posterior bases.  Elsewhere inspiration is clear.  Expiration not prolonged, no wheeze.  CV: Regular rate and rhythm, non-tachycardic.  Normal S1 S2, no murmur or extra sound.  No lower extremity edema.  Right foot color is normal, and it is warm.  I can palpate a faint dorsalis pedis pulse.  ABDOMEN: Soft, non-tender, no guarding.  Liver and spleen not enlarged, no masses palpable.  Bowel sounds positive.  MS: No bony deformities noted.  No red or inflamed joints.  SKIN: Warm and dry, no rashes.  NEURO: Alert, conversant, and appropriate in conversation.  Cranial nerves III - XII grossly intact.  No gross motor or sensory deficits.   PSYCH: Alert, conversant.  Able to articulate logical thoughts, no tangential thoughts, no hallucinations or delusions.  Affect appears much less anxious today.            Data:     Results for orders placed or performed during the hospital encounter of 10/01/21 (from the past 24 hour(s))   Glucose by meter   Result Value Ref Range    GLUCOSE BY METER POCT 121 (H) 70 - 99 mg/dL   Glucose by meter   Result Value Ref Range    GLUCOSE BY METER POCT 126 (H) 70 - 99 mg/dL   Glucose by meter   Result Value Ref Range    GLUCOSE BY METER POCT 187 (H) 70 - 99 mg/dL   CBC with platelets   Result Value Ref Range    WBC Count 9.6 4.0 - 11.0 10e3/uL    RBC Count 4.15 (L) 4.40 - 5.90 10e6/uL    Hemoglobin 12.5 (L) 13.3 - 17.7 g/dL    Hematocrit 37.9 (L)  40.0 - 53.0 %    MCV 91 78 - 100 fL    MCH 30.1 26.5 - 33.0 pg    MCHC 33.0 31.5 - 36.5 g/dL    RDW 13.3 10.0 - 15.0 %    Platelet Count 233 150 - 450 10e3/uL   Glucose by meter   Result Value Ref Range    GLUCOSE BY METER POCT 127 (H) 70 - 99 mg/dL           Attestation:  I have reviewed today's vital signs, notes, medications, labs and imaging.     Marcelo Villa MD   [facility]

## 2021-10-23 NOTE — PLAN OF CARE
End Of Shift Note    Situation: 55 yo male here with post Covid respiratory requirements requiring HFNC/BiPAP    Plan: Resp support, emotional support    Subjective/Objective: Pt anxious which leads to shallow tachynpea breaths. PRN ativan and oxy given. Pulses present in BLE, equal in size, no complaints of pain, numbness, or tingling. Nausea this morning, zofran x 1. Poor appetite. Voiding in urinal. Assist x 1 for pivot.

## 2021-10-23 NOTE — PLAN OF CARE
"Patient is a&o, assist of 1 with gait belt for transfers. Lung sounds diminished with coarse crackles in bilateral bases. Weaning high flow fio2 down gradually as patient tolerates. Currently 80% fio2 at 60L with o2 saturations mid 90's. Moderately anxious and fixated on his bowels, yet refused PRN miralax. Encouraged use of commode rather than stooling in his brief. PRN ativan administered. Supportive wife at bedside. /73 (BP Location: Left arm)   Pulse 92   Temp 97.5  F (36.4  C) (Oral)   Resp 18   Ht 1.753 m (5' 9\")   Wt 79.6 kg (175 lb 7.8 oz)   SpO2 97%   BMI 25.91 kg/m      "

## 2021-10-23 NOTE — PLAN OF CARE
Vitals stable. Patient anxiety seems to have improved overnight, PRN oxycodone and PRN ativan given x1 at 0510.  Patient requested bipap to be removed and HFNC to be applied at 0130, patient able to sleep well with HFNC.  Oxygen sats upper 90's.  Patient using urinal in bed, urine output adequate.

## 2021-10-24 LAB
GLUCOSE BLDC GLUCOMTR-MCNC: 122 MG/DL (ref 70–99)
GLUCOSE BLDC GLUCOMTR-MCNC: 135 MG/DL (ref 70–99)
GLUCOSE BLDC GLUCOMTR-MCNC: 171 MG/DL (ref 70–99)
GLUCOSE BLDC GLUCOMTR-MCNC: 171 MG/DL (ref 70–99)
GLUCOSE BLDC GLUCOMTR-MCNC: 192 MG/DL (ref 70–99)
LACTATE SERPL-SCNC: 1 MMOL/L (ref 0.7–2)

## 2021-10-24 PROCEDURE — 99232 SBSQ HOSP IP/OBS MODERATE 35: CPT

## 2021-10-24 PROCEDURE — 250N000011 HC RX IP 250 OP 636: Performed by: FAMILY MEDICINE

## 2021-10-24 PROCEDURE — 200N000001 HC R&B ICU

## 2021-10-24 PROCEDURE — 83605 ASSAY OF LACTIC ACID: CPT

## 2021-10-24 PROCEDURE — 250N000011 HC RX IP 250 OP 636

## 2021-10-24 PROCEDURE — 250N000013 HC RX MED GY IP 250 OP 250 PS 637: Performed by: FAMILY MEDICINE

## 2021-10-24 PROCEDURE — 250N000009 HC RX 250

## 2021-10-24 PROCEDURE — 250N000013 HC RX MED GY IP 250 OP 250 PS 637: Performed by: INTERNAL MEDICINE

## 2021-10-24 PROCEDURE — 250N000013 HC RX MED GY IP 250 OP 250 PS 637

## 2021-10-24 PROCEDURE — 250N000013 HC RX MED GY IP 250 OP 250 PS 637: Performed by: HOSPITALIST

## 2021-10-24 RX ORDER — CALCIUM CARBONATE 500 MG/1
500 TABLET, CHEWABLE ORAL DAILY PRN
Status: DISCONTINUED | OUTPATIENT
Start: 2021-10-24 | End: 2021-11-01 | Stop reason: HOSPADM

## 2021-10-24 RX ORDER — BISACODYL 10 MG
10 SUPPOSITORY, RECTAL RECTAL DAILY PRN
Status: DISCONTINUED | OUTPATIENT
Start: 2021-10-24 | End: 2021-11-01 | Stop reason: HOSPADM

## 2021-10-24 RX ADMIN — LORAZEPAM 0.5 MG: 0.5 TABLET ORAL at 00:00

## 2021-10-24 RX ADMIN — ASCORBIC ACID, VITAMIN A PALMITATE, CHOLECALCIFEROL, THIAMINE HYDROCHLORIDE, RIBOFLAVIN-5 PHOSPHATE SODIUM, PYRIDOXINE HYDROCHLORIDE, NIACINAMIDE, DEXPANTHENOL, ALPHA-TOCOPHEROL ACETATE, VITAMIN K1, FOLIC ACID, BIOTIN, CYANOCOBALAMIN: 200; 3300; 200; 6; 3.6; 6; 40; 15; 10; 150; 600; 60; 5 INJECTION, SOLUTION INTRAVENOUS at 12:47

## 2021-10-24 RX ADMIN — Medication 2.5 MG: at 23:48

## 2021-10-24 RX ADMIN — MICONAZOLE NITRATE: 20 CREAM TOPICAL at 07:50

## 2021-10-24 RX ADMIN — LORAZEPAM 0.5 MG: 0.5 TABLET ORAL at 12:51

## 2021-10-24 RX ADMIN — POLYETHYLENE GLYCOL 3350 17 G: 17 POWDER, FOR SOLUTION ORAL at 10:32

## 2021-10-24 RX ADMIN — Medication 5 MG: at 00:00

## 2021-10-24 RX ADMIN — ALBUTEROL SULFATE 2 PUFF: 90 AEROSOL, METERED RESPIRATORY (INHALATION) at 13:44

## 2021-10-24 RX ADMIN — Medication 5 MG: at 08:00

## 2021-10-24 RX ADMIN — APIXABAN 5 MG: 5 TABLET, FILM COATED ORAL at 07:49

## 2021-10-24 RX ADMIN — SENNOSIDES AND DOCUSATE SODIUM 2 TABLET: 50; 8.6 TABLET ORAL at 07:48

## 2021-10-24 RX ADMIN — METHYLPREDNISOLONE SODIUM SUCCINATE 62.5 MG: 125 INJECTION, POWDER, FOR SOLUTION INTRAMUSCULAR; INTRAVENOUS at 14:33

## 2021-10-24 RX ADMIN — ALBUTEROL SULFATE 2 PUFF: 90 AEROSOL, METERED RESPIRATORY (INHALATION) at 21:46

## 2021-10-24 RX ADMIN — LISINOPRIL 10 MG: 10 TABLET ORAL at 19:30

## 2021-10-24 RX ADMIN — CITALOPRAM HYDROBROMIDE 20 MG: 20 TABLET ORAL at 07:49

## 2021-10-24 RX ADMIN — LORAZEPAM 0.5 MG: 0.5 TABLET ORAL at 07:53

## 2021-10-24 RX ADMIN — Medication 5 MG: at 03:52

## 2021-10-24 RX ADMIN — ACETAMINOPHEN 325 MG: 325 TABLET, FILM COATED ORAL at 19:29

## 2021-10-24 RX ADMIN — LORAZEPAM 0.5 MG: 0.5 TABLET ORAL at 23:48

## 2021-10-24 RX ADMIN — BISACODYL 10 MG: 10 SUPPOSITORY RECTAL at 10:31

## 2021-10-24 RX ADMIN — APIXABAN 5 MG: 5 TABLET, FILM COATED ORAL at 19:30

## 2021-10-24 RX ADMIN — ONDANSETRON 4 MG: 4 TABLET, ORALLY DISINTEGRATING ORAL at 19:29

## 2021-10-24 RX ADMIN — CALCIUM CARBONATE (ANTACID) CHEW TAB 500 MG 500 MG: 500 CHEW TAB at 12:51

## 2021-10-24 RX ADMIN — ALBUTEROL SULFATE 2 PUFF: 90 AEROSOL, METERED RESPIRATORY (INHALATION) at 09:57

## 2021-10-24 RX ADMIN — ALBUTEROL SULFATE 2 PUFF: 90 AEROSOL, METERED RESPIRATORY (INHALATION) at 17:52

## 2021-10-24 RX ADMIN — LORAZEPAM 0.5 MG: 0.5 TABLET ORAL at 03:52

## 2021-10-24 RX ADMIN — ALBUTEROL SULFATE 2 PUFF: 90 AEROSOL, METERED RESPIRATORY (INHALATION) at 06:24

## 2021-10-24 RX ADMIN — Medication 5 MG: at 14:53

## 2021-10-24 RX ADMIN — Medication 2.5 MG: at 19:29

## 2021-10-24 RX ADMIN — PANTOPRAZOLE SODIUM 40 MG: 40 TABLET, DELAYED RELEASE ORAL at 07:49

## 2021-10-24 ASSESSMENT — ACTIVITIES OF DAILY LIVING (ADL)
ADLS_ACUITY_SCORE: 10
ADLS_ACUITY_SCORE: 8
ADLS_ACUITY_SCORE: 10
ADLS_ACUITY_SCORE: 8
ADLS_ACUITY_SCORE: 10
ADLS_ACUITY_SCORE: 8
ADLS_ACUITY_SCORE: 10
ADLS_ACUITY_SCORE: 8
ADLS_ACUITY_SCORE: 10
ADLS_ACUITY_SCORE: 10

## 2021-10-24 ASSESSMENT — MIFFLIN-ST. JEOR: SCORE: 1605.38

## 2021-10-24 NOTE — PLAN OF CARE
Patient up to commode and desatted into the 70s. Fio2 turned up to 90%. Sat increasing with encouragement to take deep breaths and 91% as he sat in chair. Patient attempted to use urinal once in chair and sats decreased into the 40s-50s. Placed on BIPAP and oxygen saturation 96-97% with coaching to slow his resps. Relaxing in chair. Will decrease activity now and [facility] respiratory therapist will come and reassess at 1300 and try to place him back on high flow. Will monitor.

## 2021-10-24 NOTE — PLAN OF CARE
Decreased fio2 from 87% to 80, 60 LPM and saturation are in the mid 90s. Dyspneic with exertion. Assisted with bathing due to dyspnea. Becomes anxious and needs reassurance. Given ativan this am. His chief complaint is constipation. Reports pain in rectum with urination. Told MD that his wife had checked him rectally and felt hard stool. Patient given senna tabs 2, miralax and dulcolax supp, oxycodone and TUCKS. Plan is to allow him to rest after bath, supp, then get up into chair or commode. Appetite is poor and he continues on TPN continuous, lipids cycle at night M-F. PICC infusing, dressing clean, dry and intact. Vitals are stable. Will continue to monitor.

## 2021-10-24 NOTE — PLAN OF CARE
Patient is alert and oriented, up with stand by assist.  Lung sounds diminished, oxygen sats mid 90's on 87% fiO2, 60L oxygen via HFNC.  Patient wore bipap briefly last night otherwise has worn HFNC overnight.  Patient c/o mild generalized pain, PRN oxycodone given.  Patient moderately anxious d/t desire to have bowel movement, patient agreeable to take scheduled miralax, PRN ativan also given.  Patient passing flatus but has been unable to have bowel movement yet.  Patient repositioning independently in bed.  TPN infusing per orders.

## 2021-10-24 NOTE — PROGRESS NOTES
"[facility] Hospitalist Progress Note           Assessment & Plan        Liborio Barajas is a 56 year old male admitted on 10/1/2021. He presented with shortness of breath and was found to be COVID positive.     Confirmed COVID-19 infection    Acute Hypoxic Respiratory Failure secondary to COVID-19 infection  Viral Pneumonia secondary to COVID-19 infection     Symptom Onset 9/22/2021    Date of 1st Positive Test 10/01/2021      Vaccination Status Not Vaccinated, but interested/contemplative        Initial CT chest 10/1/2021 demonstrated extensive bilateral groundglass consolidation consistent with pneumonia, and was negative for pulmonary embolism.  The patient required transfer to intensive care unit immediately after admission, where he has remained.  For more than a week, the patient had been alternating between HFNC oxygen and BiPAP.  Since the time of admission, we had not been able to make any significant reductions in HFNC flow or in FiO2.  Since the patient appeared \"stuck\" on high flow nasal cannula and/or BiPAP therapy with high FiO2 for more than 2 weeks, he and I discussed risk versus benefit of beginning empiric systemic steroids for what could be inflammatory pneumonitis following COVID-19 pneumonia.  He agreed to the use of methylprednisolone 60 mg daily, started 10/20/2021.     BiPAP has been discontinued as of 10/22/2021. We have been able to decrease HFNC FiO2 to 0.80. He has less rest dyspnea, less cough, and no exertional chest pain. His anxiety is improving as his breathing improves.    - Oxygen: Continue HFNC 60 L/min and FiO2 0.80.  It is unclear whether BiPAP will need to be resumed.  He is able to prone with minimal assistance, though he has not needed to in the last day or so.  - Labs: Markers of severity were followed earlier in the hospital stay, with trend toward improvement.  Daily monitoring has been discontinued.  - Imaging: Chest CT 10/10/2021 continued to demonstrate extensive " bilateral infiltrates, and was again negative for pulmonary embolism.  Chest x-ray 10/18/2021 again showed extensive patchy opacities throughout both lungs.  Breathing treatments: Albuterol HFA 2 puffs every 4 hours while awake was started on 10/19/2021 due to wheezing.  Now that the patient is recovered COVID-19 status, nebulized medications could be used if needed.    IV fluids: None indicated; oral intake is adequate.  Antibiotics: Not indicated.   Corticosteroids: Methylprednisolone 60 mg IV every 24 hours started 10/20/2021 for what could possibly represent inflammatory pneumonitis following COVID-19 pneumonia.  We will plan on a 7-day course on this dose, then perhaps reduce to Prednisone 40 mg daily.  COVID-Focused Medications:    - Dexamethasone was given 10/1 - 10/10/2021.   - Remdesivir was given 10/1 - 10/5/2021.   - Tocilizumab 700 mg IV given 10/2/2021.  - DVT Prophylaxis: on apixaban for treatment of aortic and right lower extremity thrombus, see below.    Infrarenal Aortic Thrombus with thromboembolic obstruction of right tibial-peroneal trunk    Right foot pain and diminished RLE pulses noted 10/11/2021. CTA abdomen and pelvis revealed infrarenal aortic wall adherent clot, and apparent thromboembolic obstruction of right tibial-peroneal trunk with distal reconstitution of tibial and peroneal arteries.  Findings were discussed with vascular surgery [facility], who advised that COVID-related arterial thrombectomies frequently reclot, so surgery was not recommended, and that increased heparin resistance was seen in these patients, so anticoagulation with argatroban was recommended.  Argatroban infusion given 10/11 - 10/16/2021, after which the patient was transitioned to apixaban, starting at 10 mg p.o. twice daily for the first week (first dose 10/17/2021), then dose will be decreased to 5 mg daily.  Right foot no longer cold, dorsalis pedal pulses palpable.  -Continue apixaban.    Anxiety    Patient had  been having severe subjective dyspnea only partially and briefly relieved by the use of morphine IV and lorazepam IV.  We started morphine sulfate 5 mg p.o. every 2 hours as needed dyspnea on 10/20/2021, and added lorazepam 0.5 mg p.o. every 4 hours as needed on 10/22/2021.  Control of anxiety has steadily improved as his dyspnea has improved.  -Continue morphine sulfate solution 5 mg p.o. every 2 hours as needed dyspnea.  -Continue lorazepam 0.5 mg p.o. every 4 hours as needed anxiety, and titrate dose up as needed.  -We initiated citalopram 20 mg every day  On 10/13/2021, knowing that this will take some time to reach effect.      Hyponatremia   Present on admission. Probably related to volume depletion.  Sodium normalized without specific therapy.  -Resolved.     Acute kidney injury on admission  GFR on admission was 32.  The patient was probably volume depleted on admission.  With volume correction, will function has been normal since approximately 10/5/2021.   -Resolved.     Transaminitis  Mild elevation of AST and ALT were noted on admission, likely due to COVID-19 infection and/or remdesivir.  Transaminases normalized as of 10/18/2021. -Resolved.    Leukocytosis  WBC has been elevated since 10/19/2021, though has been roughly stable since that time.  The elevation correlates roughly with starting methylprednisolone.  He is afebrile, and there is no clinical evidence of underlying infection.  WBC today, 10/23/2021, is again normal.  -Resolved.    Hypertension  Managed prior to admission with lisinopril 10 mg daily.  Lisinopril was resumed here initially at 5 mg daily, then increased to 10 mg daily 10/21/2021 due to hypertension.  Blood pressure control has been generally good.  Blood pressure has increased since beginning systemic steroids.  -Continue lisinopril 10 mg daily.     GERD  Exacerbated since methylprednisolone was started.  -Continue omeprazole.     Malnutrition:  - Level of malnutrition: Severe   -  Based on: weight loss, reduced intake - see nutrition notes - notable weight loss and intake remained poor.  Patient declined placement of NG feeding tube, and BiPAP/HFNC fit and seal would be compromised with an NG tube.  A PICC line was placed, and TPN started on 10/18/2021.  Because there are no good outcome data for the use of TPN in a critically ill population, I would like to continue efforts either at increasing oral intake or seeing if he will allow placement of a small bore NG feeding tube.  He is now able to spend considerably more time off of BiPAP, and as result has been able to resume an oral diet, although he is not eating much.  I reviewed the RD note from 10/22/2021.  Her recommendation is for feeding tube placement to facilitate enteral feeds.  The patient remains politely opposed.     DVT Prophylaxis: Apixaban.   Neal Catheter: Not present  Central Lines: PICC placed 10/18/2021 for TPN.  Code Status:  Full         Diet  See discussion above.  I am hopeful that the patient will be able to increase oral intake now that he is spending less time on BiPAP, will permit the discontinuation of TPN.  He is opposed to feeding tube placement.      Discussion  Improvement in oxygenation slowly continues. He has not required any BiPAP support in about 36 hours. FiO2 need through HFNC is now 0.80. Empiric methylprednisolone continues.            Interval History:   Still unable to have a BM, aside from a single stool roughly the size and consistency of a Tootsie Roll. He denies dysphagia, nausea, or vomiting. He has no abdominal pain. Rest dyspnea is improving. Exertional dyspnea is still significant. Cough is becoming less frequent, and is nonproductive. He denies musculoskeletal pain.             Review of Systems:    ROS: 10 point ROS neg other than the symptoms noted above in the HPI.           Medications:   Current active medications and PTA medications reviewed, see medication list for details.             Physical Exam:   Vitals were reviewed  Patient Vitals for the past 24 hrs:   BP Temp Temp src Pulse Resp SpO2 Weight   10/24/21 0900 -- -- -- 101 (!) 35 -- --   10/24/21 0803 -- -- -- 97 (!) 37 96 % --   10/24/21 0756 -- -- -- 103 -- -- --   10/24/21 0743 137/84 98.5  F (36.9  C) Axillary 93 (!) 44 -- --   10/24/21 0700 -- -- -- 93 (!) 58 97 % --   10/24/21 0530 -- -- -- 87 28 -- 79.5 kg (175 lb 4.3 oz)   10/24/21 0500 -- -- -- 86 28 98 % --   10/24/21 0440 -- -- -- 87 28 97 % --   10/24/21 0430 -- -- -- 87 (!) 32 95 % --   10/24/21 0400 -- 98  F (36.7  C) Oral 106 25 96 % --   10/24/21 0352 -- -- -- -- 30 -- --   10/24/21 0330 -- -- -- (!) 123 (!) 71 91 % --   10/24/21 0300 -- -- -- 89 29 96 % --   10/24/21 0230 -- -- -- 94 (!) 32 96 % --   10/24/21 0130 -- -- -- 104 (!) 37 96 % --   10/24/21 0100 -- -- -- 91 27 96 % --   10/24/21 0050 -- -- -- 95 29 95 % --   10/24/21 0030 -- -- -- 96 29 96 % --   10/24/21 0012 -- -- -- 110 (!) 32 97 % --   10/24/21 0001 138/86 97.8  F (36.6  C) Oral 110 (!) 36 94 % --   10/24/21 0000 -- -- -- -- (!) 35 93 % --   10/23/21 2100 -- -- -- -- (!) 46 97 % --   10/23/21 2030 -- -- -- 103 (!) 39 96 % --   10/23/21 2011 -- -- -- (!) 128 (!) 48 91 % --   10/23/21 2000 (!) 164/96 98.1  F (36.7  C) Oral (!) 125 (!) 46 97 % --   10/23/21 1930 -- -- -- 113 (!) 46 93 % --   10/23/21 1921 (!) 150/90 -- Oral 114 (!) 36 91 % --   10/23/21 1900 -- -- -- 114 24 96 % --   10/23/21 1840 -- -- -- -- -- 93 % --   10/23/21 1832 -- -- -- -- -- (!) 86 % --   10/23/21 1714 -- -- -- 92 -- 97 % --   10/23/21 1530 125/73 97.5  F (36.4  C) Oral 109 18 97 % --   10/23/21 1336 -- -- -- 105 -- 94 % --   10/23/21 1334 -- -- -- 105 18 91 % --   10/23/21 1300 -- -- -- -- 14 90 % --   10/23/21 1200 -- -- -- 96 20 96 % --   10/23/21 1139 133/83 -- -- -- -- -- --   10/23/21 1138 -- 98.2  F (36.8  C) Oral 105 20 95 % --   10/23/21 1100 -- -- -- 97 22 96 % --       Temperatures:  Current - Temp: 97.7  F (36.5  C); Max -  Temp  Av.9  F (36.6  C)  Min: 97.7  F (36.5  C)  Max: 98.1  F (36.7  C)  Respiration range: Resp  Av.3  Min: 25  Max: 51  Pulse range: Pulse  Av.9  Min: 85  Max: 118  Blood pressure range: Systolic (24hrs), Av , Min:121 , Max:124   ; Diastolic (24hrs), Av, Min:74, Max:93    Pulse oximetry range: SpO2  Av.1 %  Min: 82 %  Max: 96 %  I/O last 3 completed shifts:  In: 979.93 [P.O.:240]  Out: 3025 [Urine:3025]    GENERAL: Pleasant man, seated propped up in bed on HFNC oxygen.  He is in no distress.  EYES: Eyes grossly normal to inspection.  HENT: HFNC cannula in place.  NECK: Trachea midline, no stridor.    RESP: No accessory muscle use.  Lungs notable for inspiratory crackles at both posterior bases, and a few right anterior inspiratory crackles.  Expiration not prolonged, no wheeze.  CV: Regular rate and rhythm, mildly tachycardic.  Normal S1 S2, no murmur or extra sound.  No lower extremity edema.  Right foot color is normal, and it is warm.    ABDOMEN: Soft, non-tender, no guarding.  Liver and spleen not enlarged, no masses palpable.  Bowel sounds positive.  MS: No bony deformities noted.  No red or inflamed joints.  SKIN: Warm and dry, no rashes.  NEURO: Alert, conversant, and appropriate in conversation.  Cranial nerves III - XII grossly intact.  No gross motor or sensory deficits.   PSYCH: Alert, conversant.  Able to articulate logical thoughts, no tangential thoughts, no hallucinations or delusions.  Affect normal. He is engaging and smiling.            Data:     Results for orders placed or performed during the hospital encounter of 10/01/21 (from the past 24 hour(s))   Glucose by meter   Result Value Ref Range    GLUCOSE BY METER POCT 114 (H) 70 - 99 mg/dL   Glucose by meter   Result Value Ref Range    GLUCOSE BY METER POCT 136 (H) 70 - 99 mg/dL   Lactic Acid STAT   Result Value Ref Range    Lactic Acid 1.8 0.7 - 2.0 mmol/L   Glucose by meter   Result Value Ref Range    GLUCOSE BY  METER POCT 192 (H) 70 - 99 mg/dL   Glucose by meter   Result Value Ref Range    GLUCOSE BY METER POCT 122 (H) 70 - 99 mg/dL           Attestation:  I have reviewed today's vital signs, notes, medications, labs and imaging.     Marcelo Villa MD   [facility] Medicine

## 2021-10-25 ENCOUNTER — APPOINTMENT (OUTPATIENT)
Dept: PHYSICAL THERAPY | Facility: CLINIC | Age: 56
DRG: 177 | End: 2021-10-25
Payer: OTHER GOVERNMENT

## 2021-10-25 ENCOUNTER — APPOINTMENT (OUTPATIENT)
Dept: OCCUPATIONAL THERAPY | Facility: CLINIC | Age: 56
DRG: 177 | End: 2021-10-25
Payer: OTHER GOVERNMENT

## 2021-10-25 LAB
ALBUMIN SERPL-MCNC: 2.4 G/DL (ref 3.4–5)
ALP SERPL-CCNC: 101 U/L (ref 40–150)
ALT SERPL W P-5'-P-CCNC: 196 U/L (ref 0–70)
ANION GAP SERPL CALCULATED.3IONS-SCNC: 5 MMOL/L (ref 3–14)
AST SERPL W P-5'-P-CCNC: 47 U/L (ref 0–45)
BILIRUB SERPL-MCNC: 0.5 MG/DL (ref 0.2–1.3)
BUN SERPL-MCNC: 27 MG/DL (ref 7–30)
CALCIUM SERPL-MCNC: 9.2 MG/DL (ref 8.5–10.1)
CHLORIDE BLD-SCNC: 101 MMOL/L (ref 94–109)
CO2 SERPL-SCNC: 29 MMOL/L (ref 20–32)
CREAT SERPL-MCNC: 0.64 MG/DL (ref 0.66–1.25)
GFR SERPL CREATININE-BSD FRML MDRD: >90 ML/MIN/1.73M2
GLUCOSE BLD-MCNC: 97 MG/DL (ref 70–99)
GLUCOSE BLDC GLUCOMTR-MCNC: 131 MG/DL (ref 70–99)
GLUCOSE BLDC GLUCOMTR-MCNC: 141 MG/DL (ref 70–99)
GLUCOSE BLDC GLUCOMTR-MCNC: 148 MG/DL (ref 70–99)
GLUCOSE BLDC GLUCOMTR-MCNC: 148 MG/DL (ref 70–99)
GLUCOSE BLDC GLUCOMTR-MCNC: 206 MG/DL (ref 70–99)
INR PPP: 1.26 (ref 0.85–1.15)
LACTATE SERPL-SCNC: 1.3 MMOL/L (ref 0.7–2)
MAGNESIUM SERPL-MCNC: 2.8 MG/DL (ref 1.6–2.3)
PHOSPHATE SERPL-MCNC: 3.8 MG/DL (ref 2.5–4.5)
POTASSIUM BLD-SCNC: 4.5 MMOL/L (ref 3.4–5.3)
PREALB SERPL IA-MCNC: 21 MG/DL (ref 15–45)
PROT SERPL-MCNC: 7 G/DL (ref 6.8–8.8)
SODIUM SERPL-SCNC: 135 MMOL/L (ref 133–144)

## 2021-10-25 PROCEDURE — 80053 COMPREHEN METABOLIC PANEL: CPT | Performed by: FAMILY MEDICINE

## 2021-10-25 PROCEDURE — 250N000011 HC RX IP 250 OP 636

## 2021-10-25 PROCEDURE — 250N000009 HC RX 250: Performed by: FAMILY MEDICINE

## 2021-10-25 PROCEDURE — 83735 ASSAY OF MAGNESIUM: CPT | Performed by: FAMILY MEDICINE

## 2021-10-25 PROCEDURE — 250N000013 HC RX MED GY IP 250 OP 250 PS 637: Performed by: FAMILY MEDICINE

## 2021-10-25 PROCEDURE — 85610 PROTHROMBIN TIME: CPT | Performed by: FAMILY MEDICINE

## 2021-10-25 PROCEDURE — 97110 THERAPEUTIC EXERCISES: CPT | Mod: GO

## 2021-10-25 PROCEDURE — 83605 ASSAY OF LACTIC ACID: CPT

## 2021-10-25 PROCEDURE — 250N000013 HC RX MED GY IP 250 OP 250 PS 637: Performed by: INTERNAL MEDICINE

## 2021-10-25 PROCEDURE — 84134 ASSAY OF PREALBUMIN: CPT | Performed by: FAMILY MEDICINE

## 2021-10-25 PROCEDURE — 97110 THERAPEUTIC EXERCISES: CPT | Mod: GP

## 2021-10-25 PROCEDURE — 250N000013 HC RX MED GY IP 250 OP 250 PS 637

## 2021-10-25 PROCEDURE — 97161 PT EVAL LOW COMPLEX 20 MIN: CPT | Mod: GP

## 2021-10-25 PROCEDURE — 250N000009 HC RX 250

## 2021-10-25 PROCEDURE — 200N000001 HC R&B ICU

## 2021-10-25 PROCEDURE — 99232 SBSQ HOSP IP/OBS MODERATE 35: CPT

## 2021-10-25 PROCEDURE — 84100 ASSAY OF PHOSPHORUS: CPT | Performed by: FAMILY MEDICINE

## 2021-10-25 PROCEDURE — 97165 OT EVAL LOW COMPLEX 30 MIN: CPT | Mod: GO

## 2021-10-25 PROCEDURE — 250N000013 HC RX MED GY IP 250 OP 250 PS 637: Performed by: HOSPITALIST

## 2021-10-25 PROCEDURE — 97530 THERAPEUTIC ACTIVITIES: CPT | Mod: GP

## 2021-10-25 RX ADMIN — ALBUTEROL SULFATE 2 PUFF: 90 AEROSOL, METERED RESPIRATORY (INHALATION) at 14:24

## 2021-10-25 RX ADMIN — METHYLPREDNISOLONE SODIUM SUCCINATE 62.5 MG: 125 INJECTION, POWDER, FOR SOLUTION INTRAMUSCULAR; INTRAVENOUS at 14:24

## 2021-10-25 RX ADMIN — APIXABAN 5 MG: 5 TABLET, FILM COATED ORAL at 08:22

## 2021-10-25 RX ADMIN — ALBUTEROL SULFATE 2 PUFF: 90 AEROSOL, METERED RESPIRATORY (INHALATION) at 10:27

## 2021-10-25 RX ADMIN — ALBUTEROL SULFATE 2 PUFF: 90 AEROSOL, METERED RESPIRATORY (INHALATION) at 05:24

## 2021-10-25 RX ADMIN — CITALOPRAM HYDROBROMIDE 20 MG: 20 TABLET ORAL at 08:23

## 2021-10-25 RX ADMIN — SENNOSIDES AND DOCUSATE SODIUM 2 TABLET: 50; 8.6 TABLET ORAL at 08:23

## 2021-10-25 RX ADMIN — I.V. FAT EMULSION 250 ML: 20 EMULSION INTRAVENOUS at 20:02

## 2021-10-25 RX ADMIN — ALBUTEROL SULFATE 2 PUFF: 90 AEROSOL, METERED RESPIRATORY (INHALATION) at 21:59

## 2021-10-25 RX ADMIN — ALBUTEROL SULFATE 2 PUFF: 90 AEROSOL, METERED RESPIRATORY (INHALATION) at 18:18

## 2021-10-25 RX ADMIN — LORAZEPAM 0.5 MG: 0.5 TABLET ORAL at 19:55

## 2021-10-25 RX ADMIN — APIXABAN 5 MG: 5 TABLET, FILM COATED ORAL at 19:55

## 2021-10-25 RX ADMIN — LISINOPRIL 10 MG: 10 TABLET ORAL at 19:55

## 2021-10-25 RX ADMIN — Medication 2.5 MG: at 19:55

## 2021-10-25 RX ADMIN — ASCORBIC ACID, VITAMIN A PALMITATE, CHOLECALCIFEROL, THIAMINE HYDROCHLORIDE, RIBOFLAVIN-5 PHOSPHATE SODIUM, PYRIDOXINE HYDROCHLORIDE, NIACINAMIDE, DEXPANTHENOL, ALPHA-TOCOPHEROL ACETATE, VITAMIN K1, FOLIC ACID, BIOTIN, CYANOCOBALAMIN: 200; 3300; 200; 6; 3.6; 6; 40; 15; 10; 150; 600; 60; 5 INJECTION, SOLUTION INTRAVENOUS at 10:12

## 2021-10-25 RX ADMIN — PANTOPRAZOLE SODIUM 40 MG: 40 TABLET, DELAYED RELEASE ORAL at 08:23

## 2021-10-25 ASSESSMENT — ACTIVITIES OF DAILY LIVING (ADL)
ADLS_ACUITY_SCORE: 10
ADLS_ACUITY_SCORE: 12
ADLS_ACUITY_SCORE: 12
ADLS_ACUITY_SCORE: 10
ADLS_ACUITY_SCORE: 10
ADLS_ACUITY_SCORE: 11
ADLS_ACUITY_SCORE: 12
ADLS_ACUITY_SCORE: 12
ADLS_ACUITY_SCORE: 10
ADLS_ACUITY_SCORE: 12
ADLS_ACUITY_SCORE: 12
ADLS_ACUITY_SCORE: 10
ADLS_ACUITY_SCORE: 12
ADLS_ACUITY_SCORE: 10
ADLS_ACUITY_SCORE: 12
ADLS_ACUITY_SCORE: 10
ADLS_ACUITY_SCORE: 10
ADLS_ACUITY_SCORE: 12
ADLS_ACUITY_SCORE: 11
ADLS_ACUITY_SCORE: 12
ADLS_ACUITY_SCORE: 12
ADLS_ACUITY_SCORE: 11
ADLS_ACUITY_SCORE: 10
ADLS_ACUITY_SCORE: 10

## 2021-10-25 NOTE — PROGRESS NOTES
"[facility] Hospitalist Progress Note           Assessment & Plan        Liborio Barajas is a 56 year old male admitted on 10/1/2021. He presented with shortness of breath and was found to be COVID positive.     Confirmed COVID-19 infection    Acute Hypoxic Respiratory Failure secondary to COVID-19 infection  Viral Pneumonia secondary to COVID-19 infection     Symptom Onset 9/22/2021    Date of 1st Positive Test 10/01/2021      Vaccination Status Not Vaccinated, but interested/contemplative          Initial CT chest 10/1/2021 demonstrated extensive bilateral groundglass consolidation consistent with pneumonia, and was negative for pulmonary embolism.  The patient required transfer to intensive care unit immediately after admission, where he has remained.  For more than a week, the patient had been alternating between HFNC oxygen and BiPAP.  Since the time of admission, we had not been able to make any significant reductions in HFNC flow or in FiO2.  Since the patient appeared \"stuck\" on high flow nasal cannula and/or BiPAP therapy with high FiO2 for more than 2 weeks, he and I discussed risk versus benefit of beginning empiric systemic steroids for what could be inflammatory pneumonitis following COVID-19 pneumonia.  He agreed to the use of methylprednisolone 60 mg daily, started 10/20/2021.     BiPAP was discontinued as of 10/22/2021. We have been able to decrease HFNC to 60 L/min, FiO2 0.65.  He has less rest dyspnea, less cough, and no exertional chest pain.  Exertional dyspnea remains severe, and is associated with oxygen desaturation even on HFNC.    - Oxygen: Continue HFNC 60 L/min and FiO2 0.65.  It is unlikely that BiPAP will need to be resumed.  He is able to prone with minimal assistance, though he has not needed to in the last few days.  - Labs: Markers of severity were followed earlier in the hospital stay, with trend toward improvement.  Daily monitoring has been discontinued.  - Imaging: Chest CT " 10/10/2021 continued to demonstrate extensive bilateral infiltrates, and was again negative for pulmonary embolism.  Chest x-ray 10/18/2021 again showed extensive patchy opacities throughout both lungs.  Breathing treatments: Albuterol HFA 2 puffs every 4 hours while awake was started on 10/19/2021 due to wheezing.  Now that the patient is recovered COVID-19 status, nebulized medications could be used if needed.    IV fluids: None indicated; oral intake is adequate.  Antibiotics: Not indicated.   Corticosteroids: Methylprednisolone 60 mg IV every 24 hours started 10/20/2021 for what could possibly represent inflammatory pneumonitis following COVID-19 pneumonia.  We will plan on a 7-day course on this dose, then perhaps reduce to Prednisone 40 mg daily on 10/27/2021.  COVID-Focused Medications:    - Dexamethasone was given 10/1 - 10/10/2021.   - Remdesivir was given 10/1 - 10/5/2021.   - Tocilizumab 700 mg IV given 10/2/2021.  - DVT Prophylaxis: on apixaban for treatment of aortic and right lower extremity thrombus, see below.    Infrarenal Aortic Thrombus with thromboembolic obstruction of right tibial-peroneal trunk    Right foot pain and diminished RLE pulses noted 10/11/2021. CTA abdomen and pelvis revealed infrarenal aortic wall adherent clot, and apparent thromboembolic obstruction of right tibial-peroneal trunk with distal reconstitution of tibial and peroneal arteries.  Findings were discussed with vascular surgery [facility], who advised that COVID-related arterial thrombectomies frequently reclot, so surgery was not recommended, and that increased heparin resistance was seen in these patients, so anticoagulation with argatroban was recommended.  Argatroban infusion given 10/11 - 10/16/2021, after which the patient was transitioned to apixaban, starting at 10 mg p.o. twice daily for the first week (first dose 10/17/2021), then dose will be decreased to 5 mg daily.  Right foot no longer cold, dorsalis pedal pulses  "palpable, though he still has an occasional \"ache\" in the right foot, not severe enough to require medication.  -Continue apixaban.    Anxiety    Patient had been having severe subjective dyspnea only partially and briefly relieved by the use of morphine IV and lorazepam IV.  We started morphine sulfate 5 mg p.o. every 2 hours as needed dyspnea on 10/20/2021, and added lorazepam 0.5 mg p.o. every 4 hours as needed on 10/22/2021.  Control of anxiety has steadily improved as his dyspnea has improved.  He has not required morphine sulfate solution since 10/22/2021.  He is requiring occasional doses of lorazepam 0.5 mg.  -Will continue to make morphine sulfate solution 5 mg p.o. every 2 hours as needed dyspnea and/or lorazepam 0.5 mg p.o. every 4 hours as needed for anxiety, though he is requiring fewer doses as dyspnea improves.  -We initiated citalopram 20 mg every day  On 10/13/2021, knowing that this will take some time to reach effect.      Hyponatremia   Present on admission. Probably related to volume depletion.  Sodium normalized without specific therapy.  -Resolved.     Acute kidney injury on admission  GFR on admission was 32.  The patient was probably volume depleted on admission.  With volume correction, will function has been normal since approximately 10/5/2021.   -Resolved.     Transaminitis  Mild elevation of AST and ALT were noted on admission, likely due to COVID-19 infection and/or remdesivir.  Transaminases normalized as of 10/18/2021, but have increased in the interval between 10/19/2021: ALT 41 --> 196, AST 32 --> 47.  Etiology unclear, though this can be seen during TPN therapy.  -Follow LFTs intermittently.    Leukocytosis  WBC has been elevated since 10/19/2021, though has been roughly stable since that time.  The elevation correlates roughly with starting methylprednisolone.  He is afebrile, and there is no clinical evidence of underlying infection.  WBC today, 10/23/2021, is again " "normal.  -Resolved.    Hypertension  Managed prior to admission with lisinopril 10 mg daily.  Lisinopril was resumed here initially at 5 mg daily, then increased to 10 mg daily 10/21/2021 due to hypertension.  Blood pressure control has been generally good.  Blood pressure has increased since beginning systemic steroids.  -Continue lisinopril 10 mg daily.     GERD  Exacerbated since methylprednisolone was started.  -Continue omeprazole.     Malnutrition:  - Level of malnutrition: Severe   - Based on: weight loss, reduced intake - see nutrition notes - notable weight loss and intake remained poor.  Patient declined placement of NG feeding tube, and BiPAP/HFNC fit and seal would be compromised with an NG tube.  A PICC line was placed, and TPN started on 10/18/2021.  His intake of food is improving, but not yet adequate to discontinue TPN.     DVT Prophylaxis: Apixaban.   Neal Catheter: Not present  Central Lines: PICC placed 10/18/2021 for TPN.  Code Status:  Full         Diet  See discussion above.  I am hopeful that the patient will be able to increase oral intake, which will permit the discontinuation of TPN.  He is opposed to feeding tube placement.      Discussion  Improvement in oxygenation slowly continues.  Once we are able to reduce HFNC flow to 40 L/min, he can be stepped down to the medical/surgical unit.            Interval History:   Although he had a BM earlier today, he still thinks that he should be inducing more stool.  Does not have abdominal pain.  Denies dysphagia, nausea, or vomiting.  Appetite is still poor.  Rest dyspnea is mild.  Exertional dyspnea is severe; the patient is able to participate in PT today but was very short of breath while doing so.  Cough is no longer problematic, and is nonproductive.  He reports an occasional \"ache\" from his right foot, though this is not severe enough to necessitate medication by his report.             Review of Systems:    ROS: 10 point ROS neg other " than the symptoms noted above in the HPI.           Medications:   Current active medications and PTA medications reviewed, see medication list for details.            Physical Exam:   Vitals were reviewed  Patient Vitals for the past 24 hrs:   BP Temp Temp src Pulse Resp SpO2   10/25/21 1153 108/79 -- -- 111 27 95 %   10/25/21 1132 -- -- -- 105 -- --   10/25/21 1130 -- -- -- 114 26 96 %   10/25/21 1109 -- 98.1  F (36.7  C) Oral -- -- --   10/25/21 0900 -- -- -- 116 (!) 40 91 %   10/25/21 0840 -- -- -- 114 (!) 38 92 %   10/25/21 0830 -- -- -- 110 (!) 34 (!) 76 %   10/25/21 0820 131/87 98.4  F (36.9  C) Oral 100 (!) 35 93 %   10/25/21 0759 -- -- -- 108 -- --   10/25/21 0743 -- 98.3  F (36.8  C) Axillary -- -- --   10/25/21 0540 -- -- -- 82 25 97 %   10/25/21 0352 133/80 97.8  F (36.6  C) Oral 98 (!) 40 --   10/25/21 0344 -- -- -- -- -- 98 %   10/25/21 0340 -- -- -- -- -- (!) 88 %   10/25/21 0326 -- -- -- 89 (!) 34 96 %   10/25/21 0219 -- -- -- -- -- 97 %   10/25/21 0201 -- -- -- 86 27 98 %   10/25/21 0200 -- -- -- -- -- 98 %   10/25/21 0116 -- -- -- -- -- 98 %   10/25/21 0100 -- -- -- -- -- 99 %   10/25/21 0032 -- -- -- 87 27 96 %   10/24/21 2348 -- -- -- -- 30 --   10/24/21 2347 124/72 98  F (36.7  C) Oral 90 30 96 %   10/24/21 2020 -- -- -- -- 30 --   10/24/21 1929 -- -- -- -- (!) 32 --   10/24/21 1909 130/79 98.3  F (36.8  C) Axillary 112 29 95 %   10/24/21 1900 -- -- -- 112 (!) 31 --   10/24/21 1716 -- -- -- 108 (!) 43 94 %   10/24/21 1700 -- -- -- 109 (!) 54 93 %   10/24/21 1616 -- -- -- -- -- 94 %   10/24/21 1615 -- -- -- -- -- (!) 88 %   10/24/21 1551 -- -- -- -- -- 97 %   10/24/21 1549 -- -- -- -- -- 99 %   10/24/21 1534 -- -- -- -- (!) 40 --   10/24/21 1507 -- 98.2  F (36.8  C) Axillary 106 (!) 51 98 %   10/24/21 1348 -- -- -- -- -- 96 %   10/24/21 1333 -- -- -- -- -- 92 %   10/24/21 1300 121/78 -- -- 111 (!) 32 97 %   10/24/21 1255 -- 97.9  F (36.6  C) Axillary 106 (!) 42 97 %   10/24/21 1250 -- -- -- -- --  97 %       Temperatures:  Current - Temp: 97.7  F (36.5  C); Max - Temp  Av.9  F (36.6  C)  Min: 97.7  F (36.5  C)  Max: 98.1  F (36.7  C)  Respiration range: Resp  Av.3  Min: 25  Max: 51  Pulse range: Pulse  Av.9  Min: 85  Max: 118  Blood pressure range: Systolic (24hrs), Av , Min:121 , Max:124   ; Diastolic (24hrs), Av, Min:74, Max:93    Pulse oximetry range: SpO2  Av.1 %  Min: 82 %  Max: 96 %  I/O last 3 completed shifts:  In: 540 [P.O.:540]  Out: 2195 [Urine:2195]    GENERAL: Pleasant man, seated in a recliner on HFNC oxygen.  He is in no distress.  EYES: Eyes grossly normal to inspection.  HENT: HFNC cannula in place.  NECK: Trachea midline, no stridor.    RESP: No accessory muscle use.  Lungs notable for inspiratory crackles at both posterior bases, with clear sounds anteriorly.  Expiration not prolonged, no wheeze.  CV: Regular rate and rhythm, mildly tachycardic.  Normal S1 S2, no murmur or extra sound.  No lower extremity edema.  Right foot color is normal, and it is warm.  Dorsalis pedis pulse is palpable.  ABDOMEN: Soft, non-tender, no guarding.  Liver and spleen not enlarged, no masses palpable.  Bowel sounds positive.  MS: No bony deformities noted.  No red or inflamed joints.  SKIN: Warm and dry, no rashes.  NEURO: Alert, conversant, and appropriate in conversation.  Cranial nerves III - XII grossly intact.  No gross motor or sensory deficits.   PSYCH: Alert, conversant.  Able to articulate logical thoughts, no tangential thoughts, no hallucinations or delusions.  Affect normal. He is chatty and smiling.            Data:     Results for orders placed or performed during the hospital encounter of 10/01/21 (from the past 24 hour(s))   Glucose by meter   Result Value Ref Range    GLUCOSE BY METER POCT 171 (H) 70 - 99 mg/dL   Lactic Acid STAT   Result Value Ref Range    Lactic Acid 1.0 0.7 - 2.0 mmol/L   Glucose by meter   Result Value Ref Range    GLUCOSE BY METER POCT 171  (H) 70 - 99 mg/dL   Glucose by meter   Result Value Ref Range    GLUCOSE BY METER POCT 131 (H) 70 - 99 mg/dL   Magnesium   Result Value Ref Range    Magnesium 2.8 (H) 1.6 - 2.3 mg/dL   INR   Result Value Ref Range    INR 1.26 (H) 0.85 - 1.15   Prealbumin   Result Value Ref Range    Prealbumin 21 15 - 45 mg/dL   Comprehensive metabolic panel   Result Value Ref Range    Sodium 135 133 - 144 mmol/L    Potassium 4.5 3.4 - 5.3 mmol/L    Chloride 101 94 - 109 mmol/L    Carbon Dioxide (CO2) 29 20 - 32 mmol/L    Anion Gap 5 3 - 14 mmol/L    Urea Nitrogen 27 7 - 30 mg/dL    Creatinine 0.64 (L) 0.66 - 1.25 mg/dL    Calcium 9.2 8.5 - 10.1 mg/dL    Glucose 97 70 - 99 mg/dL    Alkaline Phosphatase 101 40 - 150 U/L    AST 47 (H) 0 - 45 U/L     (H) 0 - 70 U/L    Protein Total 7.0 6.8 - 8.8 g/dL    Albumin 2.4 (L) 3.4 - 5.0 g/dL    Bilirubin Total 0.5 0.2 - 1.3 mg/dL    GFR Estimate >90 >60 mL/min/1.73m2   Phosphorus   Result Value Ref Range    Phosphorus 3.8 2.5 - 4.5 mg/dL   Glucose by meter   Result Value Ref Range    GLUCOSE BY METER POCT 148 (H) 70 - 99 mg/dL   Glucose by meter   Result Value Ref Range    GLUCOSE BY METER POCT 148 (H) 70 - 99 mg/dL           Attestation:  I have reviewed today's vital signs, notes, medications, labs and imaging.     Marcelo Villa MD   [facility]

## 2021-10-25 NOTE — PROGRESS NOTES
Initial IP Physical Therapy Evaluation     10/25/21 1214   Quick Adds   Type of Visit Initial PT Evaluation   Living Environment   People in home spouse;child(vincent), adult   Current Living Arrangements house   Home Accessibility stairs to enter home   Number of Stairs, Main Entrance 4   Stair Railings, Main Entrance railings on both sides of stairs   Transportation Anticipated family or friend will provide   Self-Care   Usual Activity Tolerance good   Current Activity Tolerance poor   Regular Exercise No   Equipment Currently Used at Home none   Activity/Exercise/Self-Care Comment No prior AD use, independent at baseline, normally able to walk in community without issues   Disability/Function   Hearing Difficulty or Deaf no   Wear Glasses or Blind no   Concentrating, Remembering or Making Decisions Difficulty no   Difficulty Communicating no   Walking or Climbing Stairs Difficulty no   Dressing/Bathing Difficulty no   Toileting issues no   Doing Errands Independently Difficulty (such as shopping) no   Fall history within last six months no   Change in Functional Status Since Onset of Current Illness/Injury yes   General Information   Onset of Illness/Injury or Date of Surgery 10/02/21   Referring Physician Mareclo Villa MD   Patient/Family Therapy Goals Statement (PT) Get stronger   Pertinent History of Current Problem (include personal factors and/or comorbidities that impact the POC) Liborio Barajas is a 56 year old male admitted on 10/1/2021. He presents with shortness of breath and was found to be Covid positive.   Existing Precautions/Restrictions no known precautions/restrictions   Cognition   Orientation Status (Cognition) oriented x 4   Affect/Mental Status (Cognition) WNL;anxious   Pain Assessment   Patient Currently in Pain No   Integumentary/Edema   Integumentary/Edema no deficits were identifed   Posture    Posture Not impaired   Range of Motion (ROM)   ROM Comment BLE WNL   Strength   Strength  Comments BLE WFL   Bed Mobility   Comment (Bed Mobility) in recliner   Transfers   Transfer Safety Comments Sit<>stand x3 total, SBA, slow and effortful. CGA for transfer to commode, SaO2 dropped to 84% but improved to 90% after one minute of resting. Again dropped to 85% return back to chair, CGA with FWW. Steady on feet but easily fatigued   Gait/Stairs (Locomotion)   Comment (Gait/Stairs) transfer to commode, see above   Balance   Balance no deficits were identified   Sensory Examination   Sensory Perception WFL   Coordination   Coordination no deficits were identified   Muscle Tone   Muscle Tone no deficits were identified   Clinical Impression   Criteria for Skilled Therapeutic Intervention yes, treatment indicated   PT Diagnosis (PT) Impaired functional mobility   Influenced by the following impairments Weakness, decreased activity tolerance   Functional limitations due to impairments Mobility, transfers   Clinical Presentation Stable/Uncomplicated   Clinical Presentation Rationale Clinical judgment   Clinical Decision Making (Complexity) low complexity   Therapy Frequency (PT) Daily   Predicted Duration of Therapy Intervention (days/wks) 7 days   Planned Therapy Interventions (PT) bed mobility training;gait training;home exercise program;strengthening;transfer training   Anticipated Equipment Needs at Discharge (PT) walker, rolling   Risk & Benefits of therapy have been explained evaluation/treatment results reviewed;care plan/treatment goals reviewed;current/potential barriers reviewed;risks/benefits reviewed;participants voiced agreement with care plan;participants included;patient   PT Discharge Planning    PT Discharge Recommendation (DC Rec) Acute Rehab Center-Motivated patient will benefit from intensive, interdisciplinary therapy.  Anticipate will be able to tolerate 3 hours of therapy per day;Transitional Care Facility   PT Rationale for DC Rec Patient is currently very easily fatigued from minimal  activity primarily due to respiratory status. Independent and works as  at baseline. Anticipate he will progress to be able to participate in 3 hours per day of therapy for ARU as respiratory status improves vs. TCU   PT Brief overview of current status  SBA sit to stand, CGA pivot transfer to commode, desats quickly   Total Evaluation Time   Total Evaluation Time (Minutes) 10     Dieudonne Garcia, PT, DPT

## 2021-10-25 NOTE — PLAN OF CARE
Lung sounds diminished/coarse, patient has infrequent cough.  HFNC 60L 65% FiO2.    Patient c/o right sided rib/chest pain after coughing episode last night, has received PRN oxycodone and PRN tylenol, patient declines need for additional pain meds at this time.  Patient states he does have mild gas pain, passing flatus.  Patient voiding without difficulty using urinal independently.  Anxiety seems to be improving, patient used PRN ativan once tonight.

## 2021-10-25 NOTE — PROGRESS NOTES
"   10/25/21 1246   Quick Adds   Type of Visit Initial Occupational Therapy Evaluation   Living Environment   People in home spouse;child(vincent), adult   Current Living Arrangements house   Home Accessibility stairs to enter home   Number of Stairs, Main Entrance 4   Stair Railings, Main Entrance railings on both sides of stairs   Transportation Anticipated family or friend will provide   Self-Care   Usual Activity Tolerance good   Current Activity Tolerance poor   Regular Exercise No   Equipment Currently Used at Home none   Activity/Exercise/Self-Care Comment Pt states he is active at baseline. Works in construction.    Disability/Function   Hearing Difficulty or Deaf no   Wear Glasses or Blind no   Concentrating, Remembering or Making Decisions Difficulty no   Difficulty Communicating no   Walking or Climbing Stairs Difficulty no   Dressing/Bathing Difficulty no   Toileting issues no   Doing Errands Independently Difficulty (such as shopping) no   Fall history within last six months no   Change in Functional Status Since Onset of Current Illness/Injury yes   General Information   Onset of Illness/Injury or Date of Surgery 10/02/21   Referring Physician Marcelo Villa MD   Patient/Family Therapy Goal Statement (OT) to increase strength    Additional Occupational Profile Info/Pertinent History of Current Problem Liborio Barajas is a 56 year old male admitted on 10/1/2021. He presents with shortness of breath and was found to be Covid positive.   General Observations and Info No O2 at baseling. Currently on 60L 65% FiO2.    Cognitive Status Examination   Orientation Status orientation to person, place and time   Cognitive Status Comments Pt states \"yes\" when asking about cognitive fog. Encouraged cognitive exercise while IP.    Pain Assessment   Patient Currently in Pain No   Range of Motion Comprehensive   Comment, General Range of Motion B UE ROM: WNL   Strength Comprehensive (MMT)   Comment, General Manual " Muscle Testing (MMT) Assessment B UE strength: WFL for ADLs, however fatigues very easily and would benefit from UE HEP.    Bed Mobility   Comment (Bed Mobility) Pt up in chair upon arrival.    Transfers   Transfer Comments see PT note for details.    Activities of Daily Living   BADL Assessment upper body dressing;lower body dressing;toileting   Upper Body Dressing Assessment   [place] Level (Upper Body Dressing) set up   Lower Body Dressing Assessment   [place] Level (Lower Body Dressing) minimum assist (75% patient effort)   Clinical Impression   Criteria for Skilled Therapeutic Interventions Met (OT) yes;skilled treatment is necessary   OT Diagnosis decreased independence with ADLs/IADLs   OT Problem List-Impairments impacting ADL activity tolerance impaired;strength  (resp status )   Assessment of Occupational Performance 5 or more Performance Deficits   Identified Performance Deficits dressing, toileting, showering, functional mobility, home management tasks    Planned Therapy Interventions (OT) ADL retraining;strengthening;progressive activity/exercise   Clinical Decision Making Complexity (OT) low complexity   Therapy Frequency (OT) Daily   Predicted Duration of Therapy 7-8 days   Risk & Benefits of therapy have been explained evaluation/treatment results reviewed;care plan/treatment goals reviewed;risks/benefits reviewed;current/potential barriers reviewed;participants voiced agreement with care plan;participants included;patient   OT Discharge Planning    OT Discharge Recommendation (DC Rec) Transitional Care Facility;Acute Rehab Center-Motivated patient will benefit from intensive, interdisciplinary therapy.  Anticipate will be able to tolerate 3 hours of therapy per day   OT Rationale for DC Rec currently recommending TCU, however pt may be a good ARU candidate as he is able to tolerate more activity. Will continue to monitor.    Total Evaluation Time (Minutes)   Total Evaluation Time (Minutes)  8

## 2021-10-25 NOTE — PROGRESS NOTES
"Care Management Follow Up    Length of Stay (days): 24    Expected Discharge Date: 11/01/2021     Concerns to be Addressed:     Discharge planning  Patient plan of care discussed at interdisciplinary rounds: Yes    Anticipated Discharge Disposition:  TBD     Anticipated Discharge Services:  None  Anticipated Discharge DME:  Walker & oxygen    Patient/family educated on Medicare website which has current facility and service quality ratings:  NA  Education Provided on the Discharge Plan:  NA  Patient/Family in Agreement with the Plan:  NA    Referrals Placed by CM/SW:  ARU  Private pay costs discussed: Pt lacks insurance at this time    Additional Information:  Per IDT rounds today, MD team states that pt is not medically stable for discharge today as he is on \"HFNC 60L 65%, increased FIO2 to 100% and pt recovered >90% within a few minutes with rest. Resting sats 92 to 96%. Appetite remains poor this am, TPN at 90 ml/hr. Remains tachycardic one teens to one twenties.\"    Per PT/OT note, pt may be a good ARU candidate; ARCELIA called [facility], [phone], spoke with admissions staff graciela, Rosalina & then supervisor, Karyna, for the purpose of discussing nikita cares for their programs.  ARCELIA learned there are no nikita care programs for [facility]VARU/TCU.    ARCELIA did contact Care Management supervision & also learned that Patient  will contact her supervisor to discuss pt's case with leadership.    ARCELIA concern is that pt will need to remain hospitalized until he is medically stable to leave the hospital without any discharge cares needed due to lack of insurance.  Example:  He cannot go to a TCU, nor can he go home with oxygen as he has no payor source.  ARCELIA awaiting response from leadership re: any assistance programs for this patient.    Addendum at 5:PM:  ARCELIA sent referrals to the following ARU facilities via [facility]charge on the Double to review for cares:  1.  [facility], " [phone].  2.  [facility]  ARU, [phone]  3.  [facility], [phone]  [phone]  [phone]    Care Management team to follow for discharge plans.      SERA Crump

## 2021-10-25 NOTE — PROGRESS NOTES
Pt denies pain this am. Up to chair with stand by assist, having pt march in place and pt incontinent of small/moderate soft/loose stool. sats decreased to 70's with activity on HFNC 60L 65%, increased FIO2 to 100% and pt recovered >90% within a few minutes with rest. Resting sats 92 to 96%. Appetite remains poor this am, TPN at 90 ml/hr. Remains tachycardic one teens to one twenties.   PT OT ordered for increase strength, deconditioning.

## 2021-10-25 NOTE — PLAN OF CARE
Patient is alert and oriented.  HFNC slowly being weaned as tolerated, patient currently on 75% FiO2, 60L oxygen.  Lung sounds coarse, patient had coughing episode after albuterol inhaler given, c/o now of right sided chest discomfort.  PRN tylenol and PRN oxycodone given for pain.  Patient declines need for ativan at this time.  He was c/o nausea after dinner tonight, PRN zofran given.  Sepsis triggered, lactic 1.0.  CHG bath done, PICC line dressing change completed. TPN infusing per orders.  Patient voiding without difficulty.

## 2021-10-26 ENCOUNTER — APPOINTMENT (OUTPATIENT)
Dept: PHYSICAL THERAPY | Facility: CLINIC | Age: 56
DRG: 177 | End: 2021-10-26
Payer: OTHER GOVERNMENT

## 2021-10-26 LAB
GLUCOSE BLDC GLUCOMTR-MCNC: 115 MG/DL (ref 70–99)
GLUCOSE BLDC GLUCOMTR-MCNC: 156 MG/DL (ref 70–99)
GLUCOSE BLDC GLUCOMTR-MCNC: 161 MG/DL (ref 70–99)
TRIGL SERPL-MCNC: 89 MG/DL

## 2021-10-26 PROCEDURE — 250N000013 HC RX MED GY IP 250 OP 250 PS 637: Performed by: HOSPITALIST

## 2021-10-26 PROCEDURE — 120N000004 HC R&B MS OVERFLOW

## 2021-10-26 PROCEDURE — 250N000009 HC RX 250: Performed by: FAMILY MEDICINE

## 2021-10-26 PROCEDURE — 97530 THERAPEUTIC ACTIVITIES: CPT | Mod: GP

## 2021-10-26 PROCEDURE — 250N000013 HC RX MED GY IP 250 OP 250 PS 637: Performed by: FAMILY MEDICINE

## 2021-10-26 PROCEDURE — 97110 THERAPEUTIC EXERCISES: CPT | Mod: GP

## 2021-10-26 PROCEDURE — 250N000011 HC RX IP 250 OP 636

## 2021-10-26 PROCEDURE — 250N000009 HC RX 250

## 2021-10-26 PROCEDURE — 99232 SBSQ HOSP IP/OBS MODERATE 35: CPT

## 2021-10-26 PROCEDURE — 250N000013 HC RX MED GY IP 250 OP 250 PS 637: Performed by: INTERNAL MEDICINE

## 2021-10-26 PROCEDURE — 84478 ASSAY OF TRIGLYCERIDES: CPT

## 2021-10-26 PROCEDURE — 250N000013 HC RX MED GY IP 250 OP 250 PS 637

## 2021-10-26 RX ORDER — AMOXICILLIN 250 MG
1 CAPSULE ORAL 2 TIMES DAILY PRN
Status: DISCONTINUED | OUTPATIENT
Start: 2021-10-26 | End: 2021-11-01 | Stop reason: HOSPADM

## 2021-10-26 RX ADMIN — CITALOPRAM HYDROBROMIDE 20 MG: 20 TABLET ORAL at 07:34

## 2021-10-26 RX ADMIN — SENNOSIDES AND DOCUSATE SODIUM 2 TABLET: 50; 8.6 TABLET ORAL at 07:37

## 2021-10-26 RX ADMIN — ACETAMINOPHEN 650 MG: 325 TABLET, FILM COATED ORAL at 23:28

## 2021-10-26 RX ADMIN — MICONAZOLE NITRATE: 20 CREAM TOPICAL at 07:35

## 2021-10-26 RX ADMIN — I.V. FAT EMULSION 250 ML: 20 EMULSION INTRAVENOUS at 20:20

## 2021-10-26 RX ADMIN — APIXABAN 5 MG: 5 TABLET, FILM COATED ORAL at 07:34

## 2021-10-26 RX ADMIN — LORAZEPAM 0.5 MG: 0.5 TABLET ORAL at 23:28

## 2021-10-26 RX ADMIN — ALBUTEROL SULFATE 2 PUFF: 90 AEROSOL, METERED RESPIRATORY (INHALATION) at 23:33

## 2021-10-26 RX ADMIN — METHYLPREDNISOLONE SODIUM SUCCINATE 62.5 MG: 125 INJECTION, POWDER, FOR SOLUTION INTRAMUSCULAR; INTRAVENOUS at 14:20

## 2021-10-26 RX ADMIN — ACETAMINOPHEN 650 MG: 325 TABLET, FILM COATED ORAL at 16:43

## 2021-10-26 RX ADMIN — ALBUTEROL SULFATE 2 PUFF: 90 AEROSOL, METERED RESPIRATORY (INHALATION) at 14:24

## 2021-10-26 RX ADMIN — ACETAMINOPHEN 650 MG: 325 TABLET, FILM COATED ORAL at 04:16

## 2021-10-26 RX ADMIN — Medication: at 07:34

## 2021-10-26 RX ADMIN — LISINOPRIL 10 MG: 10 TABLET ORAL at 20:19

## 2021-10-26 RX ADMIN — ALBUTEROL SULFATE 2 PUFF: 90 AEROSOL, METERED RESPIRATORY (INHALATION) at 06:11

## 2021-10-26 RX ADMIN — ALBUTEROL SULFATE 2 PUFF: 90 AEROSOL, METERED RESPIRATORY (INHALATION) at 09:04

## 2021-10-26 RX ADMIN — APIXABAN 5 MG: 5 TABLET, FILM COATED ORAL at 20:19

## 2021-10-26 RX ADMIN — PANTOPRAZOLE SODIUM 40 MG: 40 TABLET, DELAYED RELEASE ORAL at 07:34

## 2021-10-26 RX ADMIN — ASCORBIC ACID, VITAMIN A PALMITATE, CHOLECALCIFEROL, THIAMINE HYDROCHLORIDE, RIBOFLAVIN-5 PHOSPHATE SODIUM, PYRIDOXINE HYDROCHLORIDE, NIACINAMIDE, DEXPANTHENOL, ALPHA-TOCOPHEROL ACETATE, VITAMIN K1, FOLIC ACID, BIOTIN, CYANOCOBALAMIN: 200; 3300; 200; 6; 3.6; 6; 40; 15; 10; 150; 600; 60; 5 INJECTION, SOLUTION INTRAVENOUS at 09:04

## 2021-10-26 RX ADMIN — ACETAMINOPHEN 650 MG: 325 TABLET, FILM COATED ORAL at 08:51

## 2021-10-26 ASSESSMENT — ACTIVITIES OF DAILY LIVING (ADL)
ADLS_ACUITY_SCORE: 12
ADLS_ACUITY_SCORE: 12
ADLS_ACUITY_SCORE: 13
ADLS_ACUITY_SCORE: 12
ADLS_ACUITY_SCORE: 13
ADLS_ACUITY_SCORE: 13
ADLS_ACUITY_SCORE: 12
ADLS_ACUITY_SCORE: 13
ADLS_ACUITY_SCORE: 12
ADLS_ACUITY_SCORE: 13
ADLS_ACUITY_SCORE: 12
ADLS_ACUITY_SCORE: 13
ADLS_ACUITY_SCORE: 12
ADLS_ACUITY_SCORE: 12
ADLS_ACUITY_SCORE: 13
ADLS_ACUITY_SCORE: 13
ADLS_ACUITY_SCORE: 12
ADLS_ACUITY_SCORE: 11
ADLS_ACUITY_SCORE: 12

## 2021-10-26 NOTE — PROGRESS NOTES
[place] [facility] at [facility] ([address])         At the request of Marilyn Rodriguez RN CC, the chart of Liborio Barajas was reviewed for potential admission to [facility]. While the pt appears to be appropriate for LTACH based on respiratory needs he, unfortunately, can't be accepted until he has active insurance that will cover both LTACH and needs post-LTACH. I will continue to follow.      Tamra Fabian RN  LTACH Referral Specialist  [address] [address]  [email or web address]    [email or web address]  Office: [phone]  Fax: [phone]     CONFIDENTIAL Protected under [place] Statute  145.61 et seq

## 2021-10-26 NOTE — PROGRESS NOTES
"[facility] Hospitalist Progress Note           Assessment & Plan        Liborio Barajas is a 56 year old male admitted on 10/1/2021. He presented with shortness of breath and was found to be COVID positive.     Confirmed COVID-19 infection    Acute Hypoxic Respiratory Failure secondary to COVID-19 infection  Viral Pneumonia secondary to COVID-19 infection     Symptom Onset 9/22/2021    Date of 1st Positive Test 10/01/2021      Vaccination Status Not Vaccinated, but interested/contemplative          Initial CT chest 10/1/2021 demonstrated extensive bilateral groundglass consolidation consistent with pneumonia, and was negative for pulmonary embolism.  The patient required transfer to intensive care unit immediately after admission, where he has remained.  For more than a week, the patient had been alternating between HFNC oxygen and BiPAP.  Since the time of admission, we had not been able to make any significant reductions in HFNC flow or in FiO2.  Since the patient appeared \"stuck\" on high flow nasal cannula and/or BiPAP therapy with high FiO2 for more than 2 weeks, he and I discussed risk versus benefit of beginning empiric systemic steroids for what could be inflammatory pneumonitis following COVID-19 pneumonia.  He agreed to the use of methylprednisolone 60 mg daily, started 10/20/2021.     BiPAP was discontinued as of 10/22/2021. We were able to decrease HFNC flow and FiO2 over the next few days.  As of this morning, 10/26/2021, the patient was able to maintain adequate saturations on a simple mask.  However, he did not like the sensation of the cold air blowing on his face, he is currently on a nasal cannula at 7 L/min, with adequate saturation at rest.  Rest dyspnea is improving.  His ability to participate in PT is improving, with less dyspnea, and a shorter recovery time.  Cough is mild, no longer bothersome.  He has no respiratory chest pain, remains afebrile.    - Oxygen: Continue oxygen either via " simple mask or nasal cannula, titrated to keep saturation 90% or better.  He can be stepped down from the ICU today.  - Labs: Markers of severity were followed earlier in the hospital stay, with trend toward improvement.  Daily monitoring has been discontinued.  - Imaging: Chest CT 10/10/2021 continued to demonstrate extensive bilateral infiltrates, and was again negative for pulmonary embolism.  Chest x-ray 10/18/2021 again showed extensive patchy opacities throughout both lungs.  Breathing treatments: Albuterol HFA 2 puffs every 4 hours while awake was started on 10/19/2021 due to wheezing.  Now that the patient is recovered COVID-19 status, nebulized medications could be used if needed.    IV fluids: None indicated; oral intake is adequate.  Antibiotics: Not indicated.   Corticosteroids: Methylprednisolone 60 mg IV every 24 hours started 10/20/2021 for what could possibly represent inflammatory pneumonitis following COVID-19 pneumonia.  We will plan on a 7-day course on this dose, then perhaps change to Prednisone 40 mg daily on 10/27/2021 with a taper to off over 3 to 4 weeks.  COVID-Focused Medications:    - Dexamethasone was given 10/1 - 10/10/2021.   - Remdesivir was given 10/1 - 10/5/2021.   - Tocilizumab 700 mg IV given 10/2/2021.  - DVT Prophylaxis: on apixaban for treatment of aortic and right lower extremity thrombus, see below.    Infrarenal Aortic Thrombus with thromboembolic obstruction of right tibial-peroneal trunk    Right foot pain and diminished RLE pulses noted 10/11/2021. CTA abdomen and pelvis revealed infrarenal aortic wall adherent clot, and apparent thromboembolic obstruction of right tibial-peroneal trunk with distal reconstitution of tibial and peroneal arteries.  Findings were discussed with vascular surgery [facility], who advised that COVID-related arterial thrombectomies frequently reclot, so surgery was not recommended, and that increased heparin resistance was seen in these patients, so  "anticoagulation with argatroban was recommended.  Argatroban infusion given 10/11 - 10/16/2021, after which the patient was transitioned to apixaban, starting at 10 mg p.o. twice daily for the first week (first dose 10/17/2021), then dose will be decreased to 5 mg daily.  Right foot no longer cold, dorsalis pedal pulses palpable, though he still has an occasional \"ache\" in the right foot, not severe enough to require medication, and steadily improving.  -Continue apixaban.    Anxiety    Patient had been having severe subjective dyspnea only partially and briefly relieved by the use of morphine IV and lorazepam IV.  We started morphine sulfate 5 mg p.o. every 2 hours as needed dyspnea on 10/20/2021, and added lorazepam 0.5 mg p.o. every 4 hours as needed on 10/22/2021.  Control of anxiety has steadily improved as his dyspnea has improved.  He has not required morphine sulfate solution since 10/22/2021, so I am going to discontinue it.  Lorazepam use has also decreased over the last couple of days.  He is generally requiring a single dose near bedtime.  -We will discontinue morphine sulfate solution.  -Continue lorazepam 0.5 mg p.o. every 4 hours as needed for anxiety, which she is currently primarily taking as an at bedtime dose.  -We initiated citalopram 20 mg every day  On 10/13/2021, knowing that this will take some time to reach effect.      Hyponatremia   Present on admission. Probably related to volume depletion.  Sodium normalized without specific therapy.  -Resolved.     Acute kidney injury on admission  GFR on admission was 32.  The patient was probably volume depleted on admission.  With volume correction, will function has been normal since approximately 10/5/2021.   -Resolved.     Transaminitis  Mild elevation of AST and ALT were noted on admission, likely due to COVID-19 infection and/or remdesivir.  Transaminases normalized as of 10/18/2021, but have increased in the interval between 10/19/2021: ALT 41 " --> 196, AST 32 --> 47.  Etiology unclear, though this can be seen during TPN therapy.  -Follow LFTs intermittently, next draw tomorrow.    Leukocytosis  WBC has been elevated since 10/19/2021, though has been roughly stable since that time.  The elevation correlates roughly with starting methylprednisolone.  He is afebrile, and there is no clinical evidence of underlying infection.  WBC was again normal as of 10/23/2021.  -Resolved.    Hypertension  Managed prior to admission with lisinopril 10 mg daily.  Lisinopril was resumed here initially at 5 mg daily, then increased to 10 mg daily 10/21/2021 due to hypertension.  Blood pressure control has been generally good.  .  -Continue lisinopril 10 mg daily.     GERD  Exacerbated since methylprednisolone was started.  -Continue omeprazole.     Malnutrition:  - Level of malnutrition: Severe   - Based on: weight loss, reduced intake - see nutrition notes - notable weight loss and intake remained poor.  Patient declined placement of NG feeding tube.   A PICC line was placed, and TPN started on 10/18/2021.  His intake of food is improving, but not yet adequate to discontinue TPN.     DVT Prophylaxis: Apixaban.   Neal Catheter: Not present  Central Lines: PICC placed 10/18/2021 for TPN.  Code Status:  Full.         Diet  See discussion above.  I am hopeful that the patient will be able to increase oral intake, which will permit the discontinuation of TPN.  He is opposed to feeding tube placement.      Discussion  Improvement in oxygenation and dyspnea continues, correlating with starting systemic steroids about a week ago.  He can be stepped down from the ICU at current oxygen needs.            Interval History:   Exertional dyspnea and endurance are improving.  He is able to participate in PT Neal, and is started ambulating down his room on his own.  Less dyspnea is mild.  He has no respiratory chest pain.  His diet is slowly improving; he thinks he ate half of his  "breakfast.  He had 3 bowel movements in response to laxatives and stool softeners, no longer feels constipated.  Denies nausea or vomiting.  Right foot \"ache\" persists, though is improving every day.             Review of Systems:    ROS: 10 point ROS neg other than the symptoms noted above in the HPI.           Medications:   Current active medications and PTA medications reviewed, see medication list for details.            Physical Exam:   Vitals were reviewed  Patient Vitals for the past 24 hrs:   BP Temp Temp src Pulse Resp SpO2   10/26/21 1040 -- -- -- -- 22 90 %   10/26/21 0953 -- -- -- -- -- 92 %   10/26/21 0935 -- -- -- -- 24 --   10/26/21 0727 128/80 97.7  F (36.5  C) Oral 101 28 99 %   10/26/21 0613 -- -- -- 89 -- 97 %   10/26/21 0500 -- -- -- -- 24 98 %   10/26/21 0410 124/74 97.9  F (36.6  C) Oral 92 23 94 %   10/26/21 0300 -- -- -- 92 (!) 39 95 %   10/26/21 0100 -- -- -- 88 29 97 %   10/26/21 0013 131/83 -- -- -- -- 96 %   10/25/21 2326 130/87 98  F (36.7  C) Oral 101 23 95 %   10/25/21 2300 -- -- -- 94 (!) 58 98 %   10/25/21 2136 -- -- -- 105 20 93 %   10/25/21 2100 -- -- -- 93 (!) 37 97 %   10/25/21 2035 -- -- -- -- -- 95 %   10/25/21 2032 -- -- -- -- -- 96 %   10/25/21 2029 -- -- -- -- -- 97 %   10/25/21 2000 127/79 98.3  F (36.8  C) Oral 106 (!) 39 96 %   10/25/21 1700 -- -- -- 103 30 96 %   10/25/21 1610 134/84 -- -- 107 28 96 %   10/25/21 1556 -- -- -- 107 -- --   10/25/21 1545 -- 97.8  F (36.6  C) Oral -- -- --   10/25/21 1300 -- -- -- 112 (!) 43 97 %   10/25/21 1200 -- -- -- 107 (!) 35 96 %   10/25/21 1153 108/79 -- -- 111 27 95 %   10/25/21 1132 -- -- -- 105 -- --   10/25/21 1130 -- -- -- 114 26 96 %   10/25/21 1109 -- 98.1  F (36.7  C) Oral -- -- --       Temperatures:  Current - Temp: 97.7  F (36.5  C); Max - Temp  Av.9  F (36.6  C)  Min: 97.7  F (36.5  C)  Max: 98.1  F (36.7  C)  Respiration range: Resp  Av.3  Min: 25  Max: 51  Pulse range: Pulse  Av.9  Min: 85  Max: " 118  Blood pressure range: Systolic (24hrs), Av , Min:121 , Max:124   ; Diastolic (24hrs), Av, Min:74, Max:93    Pulse oximetry range: SpO2  Av.1 %  Min: 82 %  Max: 96 %  I/O last 3 completed shifts:  In: 6571.74 [P.O.:660]  Out: 2400 [Urine:2400]    GENERAL: Pleasant man, seated in a recliner on NC oxygen.  He is in no distress.  EYES: Eyes grossly normal to inspection.  HENT: HFNC cannula in place.  NECK: Trachea midline, no stridor.    RESP: No accessory muscle use.  Lungs notable for inspiratory crackles at both posterior bases, with clear sounds anteriorly.  Expiration not prolonged, no wheeze.  CV: Regular rate and rhythm, mildly tachycardic.  Normal S1 S2, no murmur or extra sound.  No lower extremity edema.  Right foot color is normal, and it is warm.  Dorsalis pedis pulse is palpable.  ABDOMEN: Soft, non-tender, no guarding.  Liver and spleen not enlarged, no masses palpable.  Bowel sounds positive.  MS: No bony deformities noted.  No red or inflamed joints.  SKIN: Warm and dry, no rashes.  NEURO: Alert, conversant, and appropriate in conversation.  Cranial nerves III - XII grossly intact.  No gross motor or sensory deficits.   PSYCH: Alert, conversant.  Able to articulate logical thoughts, no tangential thoughts, no hallucinations or delusions.  Affect normal. He is chatty and smiling.            Data:     Results for orders placed or performed during the hospital encounter of 10/01/21 (from the past 24 hour(s))   Glucose by meter   Result Value Ref Range    GLUCOSE BY METER POCT 148 (H) 70 - 99 mg/dL   Glucose by meter   Result Value Ref Range    GLUCOSE BY METER POCT 148 (H) 70 - 99 mg/dL   Glucose by meter   Result Value Ref Range    GLUCOSE BY METER POCT 141 (H) 70 - 99 mg/dL   Glucose by meter   Result Value Ref Range    GLUCOSE BY METER POCT 206 (H) 70 - 99 mg/dL   Lactic Acid STAT   Result Value Ref Range    Lactic Acid 1.3 0.7 - 2.0 mmol/L   Glucose by meter   Result Value Ref Range     GLUCOSE BY METER POCT 156 (H) 70 - 99 mg/dL           Attestation:  I have reviewed today's vital signs, notes, medications, labs and imaging.     Marcelo Villa MD   [facility]

## 2021-10-26 NOTE — PROGRESS NOTES
Care Management Follow Up    Length of Stay (days): 25    Expected Discharge Date: 11/01/2021     Concerns to be Addressed:       Patient plan of care discussed at interdisciplinary rounds: Yes    Anticipated Discharge Disposition:  Home as pt has no insurance for any post discharge services     Anticipated Discharge Services:  NA  Anticipated Discharge DME:  NA    Patient/family educated on Medicare website which has current facility and service quality ratings:  NA  Education Provided on the Discharge Plan:  Yes  Patient/Family in Agreement with the Plan:  Yes    Referrals Placed by CM/SW:  ARU, TCU and Financial Counselor  Private pay costs discussed: private room/amenity fees and cost of ARU, TCU and other discharge needs were discussed with pt prior to discharge.    Additional Information:  Per IDT rounds today, MD team states that pt is not medically stable for discharge today.  MD shared that pt continues to improve daily, however, due to lack of insurance pt does not have coverage for ARU, TCU or other cares needed at time of discharge, unfortunately.    PT/OT continue to recommend ARU cares upon discharge & SW sent referrals to the following facilities:  1.  [facility], [phone]- declined; per admissions, pt is not currently appropriate for Acute Rehab as his oxygen needs are too high (needs to be on 8L or less at rest and with activity), he was not intubated for COVID (indicating a medical complexity component required for Acute Rehab), and he isn t participating in enough therapy at this time.   2.  [facility]  ARU, [phone]; no beds  3.  [facility] ARU, [phone]; left message for admissions  4.  [facility] ARU, [phone]  5.  [facility] ARU, [phone], Deepa- no nikita care program to cover cost of cares; will not admit due to lack of insurance.    SW called pt's spouse, Chikis, to discuss discharge planning & costs for ARU, TCU, and  discussed the possible need for home oxgyen.  Chikis shared that the household has no funds to privately pay for ARU, TCU and if the pt requires oxygen, Chikis requested getting a cost/month estimate for service.  ARCELIA did send email to Manager of RT and requested assistance with obtaining this estimate.    Chikis also shared that she works from home so is able to be with the pt  and has a walker with seat, a bedside commode, a shower chair with movable shower head, commode and cane as needed for the pt to return home.    Chikis expressed having great family & friend support during this time, however, she also shared that in the past year both of her parents & pt's dad , their adult son also had Covid-19 and is now re-hospitalized at [facility].      Care Management team to follow for discharge plans.    SERA Crump

## 2021-10-26 NOTE — PLAN OF CARE
"Able to wean Pt from HiFlo to Oxymask at 15L this morning - maintaining sats at 94%. Desats with activity so HiFlo left in Pt's room to facilitate working with therapies. LS CTA though diminished. Offered to skip Senna this AM due to loose incontinent stools, but Pt requested to continue taking to \"stay regular\". Incontinent of stool again x2 this AM after morning meds. Pt now requests to skip AGATHA dose of Senna. Ate 75% of breakfast - Appetite is improving. MPAP given for continued c/o pain 8/10 to Rt foot. Pt declines Oxycodone, and reports no anxiety this shift.  Geremias Nassar RN on 10/26/2021 at 10:15 AM    Pt is c/o a cold face and ears due to Oxymask. Switched to 7L NC to warm Pt's face. Pt is aware that if he desats, then we will have to move back to the Oxymask. Currently at 90% on 7 L NC.  Geremias Nassar RN on 10/26/2021 at 10:42 AM    "

## 2021-10-26 NOTE — PLAN OF CARE
Vitals stable using oxy-mask at 7-12L. Required more oxygen during and after PT. Sats dropped into the 80s and took about 10 minutes to recover; encouraged deep breathing exercises. Denies pain. Alert and oriented. Regular diet; appetite seems to be improving. Up with stand by assist; encouraged activity as tolerated. TPN infusing. Plan of care reviewed with patient.

## 2021-10-26 NOTE — PLAN OF CARE
"Pt able to maintain sats above 94% with FiO2 decreased to 63% on 45L, though Pt has not gotten out of bed this shifty. All lung fields CTA though diminished. Good urine output. MPAP given for moderate Rt foot pain rated 8/10. Pt declines Oxycodone and Ativan this shift. Lactic triggered at start of shift - returned at 1.3.  at 2300. TPN and lipids running per MAR.  /74 (BP Location: Left arm)   Pulse 92   Temp 97.9  F (36.6  C) (Oral)   Resp 23   Ht 1.753 m (5' 9\")   Wt 79.5 kg (175 lb 4.3 oz)   SpO2 94%   BMI 25.88 kg/m    Geremias Nassar RN on 10/26/2021 at 4:31 AM    Incontinent of med soft brown BM x1 - Pt verbalized he \"thought it was just gas\".  Geremias Nassar RN on 10/26/2021 at 4:46 AM    "

## 2021-10-27 ENCOUNTER — APPOINTMENT (OUTPATIENT)
Dept: OCCUPATIONAL THERAPY | Facility: CLINIC | Age: 56
DRG: 177 | End: 2021-10-27
Payer: OTHER GOVERNMENT

## 2021-10-27 ENCOUNTER — APPOINTMENT (OUTPATIENT)
Dept: PHYSICAL THERAPY | Facility: CLINIC | Age: 56
DRG: 177 | End: 2021-10-27
Payer: OTHER GOVERNMENT

## 2021-10-27 LAB
ALBUMIN SERPL-MCNC: 2.3 G/DL (ref 3.4–5)
ALP SERPL-CCNC: 92 U/L (ref 40–150)
ALT SERPL W P-5'-P-CCNC: 246 U/L (ref 0–70)
ANION GAP SERPL CALCULATED.3IONS-SCNC: 7 MMOL/L (ref 3–14)
AST SERPL W P-5'-P-CCNC: 38 U/L (ref 0–45)
BILIRUB DIRECT SERPL-MCNC: <0.1 MG/DL (ref 0–0.2)
BILIRUB SERPL-MCNC: 0.3 MG/DL (ref 0.2–1.3)
BUN SERPL-MCNC: 24 MG/DL (ref 7–30)
CALCIUM SERPL-MCNC: 9.1 MG/DL (ref 8.5–10.1)
CHLORIDE BLD-SCNC: 101 MMOL/L (ref 94–109)
CO2 SERPL-SCNC: 28 MMOL/L (ref 20–32)
CREAT SERPL-MCNC: 0.64 MG/DL (ref 0.66–1.25)
GFR SERPL CREATININE-BSD FRML MDRD: >90 ML/MIN/1.73M2
GLUCOSE BLD-MCNC: 128 MG/DL (ref 70–99)
GLUCOSE BLDC GLUCOMTR-MCNC: 125 MG/DL (ref 70–99)
GLUCOSE BLDC GLUCOMTR-MCNC: 136 MG/DL (ref 70–99)
GLUCOSE BLDC GLUCOMTR-MCNC: 158 MG/DL (ref 70–99)
GLUCOSE BLDC GLUCOMTR-MCNC: 196 MG/DL (ref 70–99)
LACTATE SERPL-SCNC: 1 MMOL/L (ref 0.7–2)
MAGNESIUM SERPL-MCNC: 2.4 MG/DL (ref 1.6–2.3)
PHOSPHATE SERPL-MCNC: 4.1 MG/DL (ref 2.5–4.5)
POTASSIUM BLD-SCNC: 4.3 MMOL/L (ref 3.4–5.3)
PROT SERPL-MCNC: 6.8 G/DL (ref 6.8–8.8)
SODIUM SERPL-SCNC: 136 MMOL/L (ref 133–144)

## 2021-10-27 PROCEDURE — 97110 THERAPEUTIC EXERCISES: CPT | Mod: GP

## 2021-10-27 PROCEDURE — 120N000004 HC R&B MS OVERFLOW

## 2021-10-27 PROCEDURE — 250N000013 HC RX MED GY IP 250 OP 250 PS 637: Performed by: HOSPITALIST

## 2021-10-27 PROCEDURE — 97530 THERAPEUTIC ACTIVITIES: CPT | Mod: GP

## 2021-10-27 PROCEDURE — 97535 SELF CARE MNGMENT TRAINING: CPT | Mod: GO

## 2021-10-27 PROCEDURE — 84100 ASSAY OF PHOSPHORUS: CPT | Performed by: FAMILY MEDICINE

## 2021-10-27 PROCEDURE — 250N000013 HC RX MED GY IP 250 OP 250 PS 637: Performed by: FAMILY MEDICINE

## 2021-10-27 PROCEDURE — 84295 ASSAY OF SERUM SODIUM: CPT | Performed by: FAMILY MEDICINE

## 2021-10-27 PROCEDURE — 83735 ASSAY OF MAGNESIUM: CPT | Performed by: FAMILY MEDICINE

## 2021-10-27 PROCEDURE — 99233 SBSQ HOSP IP/OBS HIGH 50: CPT | Performed by: FAMILY MEDICINE

## 2021-10-27 PROCEDURE — 82374 ASSAY BLOOD CARBON DIOXIDE: CPT | Performed by: FAMILY MEDICINE

## 2021-10-27 PROCEDURE — 82248 BILIRUBIN DIRECT: CPT | Performed by: FAMILY MEDICINE

## 2021-10-27 PROCEDURE — 250N000013 HC RX MED GY IP 250 OP 250 PS 637

## 2021-10-27 PROCEDURE — 250N000013 HC RX MED GY IP 250 OP 250 PS 637: Performed by: INTERNAL MEDICINE

## 2021-10-27 PROCEDURE — 250N000011 HC RX IP 250 OP 636: Performed by: FAMILY MEDICINE

## 2021-10-27 PROCEDURE — 97110 THERAPEUTIC EXERCISES: CPT | Mod: GO

## 2021-10-27 PROCEDURE — 250N000009 HC RX 250

## 2021-10-27 PROCEDURE — 250N000009 HC RX 250: Performed by: FAMILY MEDICINE

## 2021-10-27 PROCEDURE — 83605 ASSAY OF LACTIC ACID: CPT

## 2021-10-27 PROCEDURE — 250N000011 HC RX IP 250 OP 636

## 2021-10-27 PROCEDURE — 36592 COLLECT BLOOD FROM PICC: CPT | Performed by: FAMILY MEDICINE

## 2021-10-27 RX ADMIN — ACETAMINOPHEN 650 MG: 325 TABLET, FILM COATED ORAL at 08:00

## 2021-10-27 RX ADMIN — ACETAMINOPHEN 650 MG: 325 TABLET, FILM COATED ORAL at 20:47

## 2021-10-27 RX ADMIN — CITALOPRAM HYDROBROMIDE 20 MG: 20 TABLET ORAL at 08:00

## 2021-10-27 RX ADMIN — Medication: at 07:07

## 2021-10-27 RX ADMIN — ACETAMINOPHEN 650 MG: 325 TABLET, FILM COATED ORAL at 13:53

## 2021-10-27 RX ADMIN — LISINOPRIL 10 MG: 10 TABLET ORAL at 20:46

## 2021-10-27 RX ADMIN — ASCORBIC ACID, VITAMIN A PALMITATE, CHOLECALCIFEROL, THIAMINE HYDROCHLORIDE, RIBOFLAVIN-5 PHOSPHATE SODIUM, PYRIDOXINE HYDROCHLORIDE, NIACINAMIDE, DEXPANTHENOL, ALPHA-TOCOPHEROL ACETATE, VITAMIN K1, FOLIC ACID, BIOTIN, CYANOCOBALAMIN: 200; 3300; 200; 6; 3.6; 6; 40; 15; 10; 150; 600; 60; 5 INJECTION, SOLUTION INTRAVENOUS at 07:07

## 2021-10-27 RX ADMIN — ALBUTEROL SULFATE 2 PUFF: 90 AEROSOL, METERED RESPIRATORY (INHALATION) at 20:35

## 2021-10-27 RX ADMIN — APIXABAN 5 MG: 5 TABLET, FILM COATED ORAL at 08:00

## 2021-10-27 RX ADMIN — I.V. FAT EMULSION 250 ML: 20 EMULSION INTRAVENOUS at 20:35

## 2021-10-27 RX ADMIN — ONDANSETRON 4 MG: 2 INJECTION INTRAMUSCULAR; INTRAVENOUS at 09:50

## 2021-10-27 RX ADMIN — METHYLPREDNISOLONE SODIUM SUCCINATE 62.5 MG: 125 INJECTION, POWDER, FOR SOLUTION INTRAMUSCULAR; INTRAVENOUS at 13:53

## 2021-10-27 RX ADMIN — APIXABAN 5 MG: 5 TABLET, FILM COATED ORAL at 20:47

## 2021-10-27 RX ADMIN — ALBUTEROL SULFATE 2 PUFF: 90 AEROSOL, METERED RESPIRATORY (INHALATION) at 09:50

## 2021-10-27 RX ADMIN — ALBUTEROL SULFATE 2 PUFF: 90 AEROSOL, METERED RESPIRATORY (INHALATION) at 07:06

## 2021-10-27 RX ADMIN — LORAZEPAM 0.5 MG: 0.5 TABLET ORAL at 22:21

## 2021-10-27 RX ADMIN — CALCIUM CARBONATE (ANTACID) CHEW TAB 500 MG 500 MG: 500 CHEW TAB at 09:50

## 2021-10-27 RX ADMIN — ALBUTEROL SULFATE 2 PUFF: 90 AEROSOL, METERED RESPIRATORY (INHALATION) at 13:14

## 2021-10-27 RX ADMIN — PANTOPRAZOLE SODIUM 40 MG: 40 TABLET, DELAYED RELEASE ORAL at 08:00

## 2021-10-27 ASSESSMENT — ACTIVITIES OF DAILY LIVING (ADL)
ADLS_ACUITY_SCORE: 12
ADLS_ACUITY_SCORE: 12
ADLS_ACUITY_SCORE: 10
ADLS_ACUITY_SCORE: 12
ADLS_ACUITY_SCORE: 10
ADLS_ACUITY_SCORE: 12
ADLS_ACUITY_SCORE: 12
ADLS_ACUITY_SCORE: 10
ADLS_ACUITY_SCORE: 12
ADLS_ACUITY_SCORE: 10
ADLS_ACUITY_SCORE: 12
ADLS_ACUITY_SCORE: 12
ADLS_ACUITY_SCORE: 10

## 2021-10-27 ASSESSMENT — MIFFLIN-ST. JEOR: SCORE: 1587.38

## 2021-10-27 NOTE — PLAN OF CARE
Vitals stable; having pain in his foot that is managed with tylenol. On 7-10L via oxy-mask; desats significantly with exertion, but recovers after resting for a couple of minutes. Has declined albuterol this evening. Alert and oriented. Ate about 25% of dinner and half an ensure. TPN and lipids infusing through PICC line. Plan of care reviewed with patient.

## 2021-10-27 NOTE — PROGRESS NOTES
CLINICAL NUTRITION SERVICES - REASSESSMENT NOTE     Nutrition Prescription    RECOMMENDATIONS FOR MDs/PROVIDERS TO ORDER:  None    Malnutrition Status:    Patient does not meet two of the established criteria necessary for diagnosing malnutrition but is at risk for malnutrition.    Recommendations already ordered by Registered Dietitian (RD):  -Ensure Enlive/Plus TID w/ meals.    Future/Additional Recommendations:  -Continue to monitor and encourage oral intake.  -Monitor for ability to taper off TPN.  -Daily weights as ordered.     EVALUATION OF THE PROGRESS TOWARD GOALS   Diet: Regular, small portions    Nutrition Support: TPN Clinimix E @ 90 mL/hr x 24 hrs and 250 mL 20% lipids  x 12 hrs Mon-Fri. This provides 2160 mL, 1891 kcal (24 kcal/kg, 100% needs), 324 g dextrose, 108 g AA (1.4 g pro/kg, 100% needs), and 250 mL of 20% IV lipids (19% kcal from fat). GIR = 2.9 mg/kg/min. TPN reached goal rate evening of 10/20.    Supplements: Ensure Enlive strawberry w/ breakfast and supper. Magic Cup TID between meals was discontinued 10/24.    Intake:  -Continues to consume bites to 25% over the past 7 days per flowsheets.  -Per RN note yesterday, patient ate ~ 25% of supper and half an Ensure.  -Spoke to patient this morning. He reports no improvement in oral intake since admit and continues to have no appetite. He likes the Ensure and is willing to have 3 sent daily. Informed patient that due to product shortages he may be receiving Ensure Plus instead of Ensure Enlive. He doesn't like the sausage, only iverson. He denied any other nutrition interventions.     NEW FINDINGS   -O2: BiPAP discontinued as of 10/22/21. Currently using O2 either via simple mask or nasal cannula. Having less dyspnea and a shorter recovery time.    -labs: Na 136 (WNL), K+ 4.3 (WNL), Mag 2.4 (H), Phos 4.1 (WNL),  and 128 . TG 89 (WNL), Bilirubin total 0.5 (WNL), Alkaline Phosphatase 101 (WNL, up from 89 prior to TPN starting),  (H,up  from 45 prior to TPN starting, but noted to be elevated on admit) and AST 47 (H, up from 34 prior to TPN starting, also noted to be elevated on admit) yesterday.    -meds: reviewed.    -weight: Appears patient has lost 4.3 kg (5% body weight) in the last 7 days. However, patient is clinically improving and receiving 100% of his needs via TPN and eating some orally. Additionally, patient weighed 82 kg on 10/9, 10/15, 10/20, and 10/21, which may be related to normal weight fluctuations and accuracy of bedscales. Therefore, weight loss doesn't meet criteria at this time, but may if weight starts to trend below 77 kg.  Vitals:    10/19/21 0331 10/20/21 0448 10/21/21 0620 10/22/21 0720   Weight: 79.4 kg (175 lb 0.7 oz) 82 kg (180 lb 12.4 oz) 82.1 kg (181 lb) 79.6 kg (175 lb 7.8 oz)    10/24/21 0530 10/27/21 0418   Weight: 79.5 kg (175 lb 4.3 oz) 77.7 kg (171 lb 4.8 oz)       -GI: last BM today (large, soft, brown); passing flatus. Diarrhea yesterday.    MALNUTRITION (10/27)  % Intake: Decreased intake does not meet criteria - meeting needs via TPN.  % Weight Loss: Weight loss does not meet criteria  Subcutaneous Fat Loss: None observed  Muscle Loss: Temporal:  mild and Dorsal hand:  Mild.  Fluid Accumulation/Edema: None noted  Malnutrition Diagnosis: Patient does not meet two of the established criteria necessary for diagnosing malnutrition but is at risk for malnutrition    Previous Goals   Total avg nutritional intake to meet a minimum of 20 kcal/kg and 1.2 g PRO/kg daily (per dosing wt 78 kg).  Evaluation: Met    Previous Nutrition Diagnosis  Inadequate oral intake related to poor appetite secondary to acute illness, and dislike of food options as evidenced by chart review indicating patient is eating 25-50% of meals over the last 7 days, patient report of poor appetite, and weight loss of 4.2 kg (5% body weight) in the last 7 days now recommending TPN to help meet estimated needs.  Evaluation: No change    CURRENT  NUTRITION DIAGNOSIS  Inadequate oral intake related to poor appetite secondary to acute illness as evidenced by chart review indicating patient is eating 0-25% of meals over the last 7 days, patient report of poor appetite, and reliant on TPN to help meet estimated needs.    INTERVENTIONS  Implementation  Collaboration with other providers: anticipate plan of care will be discussed in IDT rounds.    Medical food supplement therapy  Parenteral Nutrition - continue as ordered.     Goals  Total avg nutritional intake to meet a minimum of 20 kcal/kg and 1.2 g PRO/kg daily (per dosing wt 78 kg).    Monitoring/Evaluation  Progress toward goals will be monitored and evaluated per protocol.    Bertha Velez RDN, LD  Clinical Dietitian  Office (Monday-Friday): [phone]  Weekend/Holiday Pager: [phone]

## 2021-10-27 NOTE — PROGRESS NOTES
Liborio reports stomach upset today and increasing diarrhea. Loose stools noted X2 today. PRN zofran and tums given with some relief.

## 2021-10-27 NOTE — PLAN OF CARE
Problem: Adult Inpatient Plan of Care  Goal: Plan of Care Review  Outcome: No Change   Remains on oxymask 7-10L. Desats with minimal activity, but this resolves quickly with rest.      Problem: Adult Inpatient Plan of Care  Goal: Absence of Hospital-Acquired Illness or Injury  Intervention: Identify and Manage Fall Risk  Recent Flowsheet Documentation  Taken 10/27/2021 0752 by Elizabeth Teran RN  Safety Promotion/Fall Prevention:   activity supervised   lighting adjusted   nonskid shoes/slippers when out of bed   patient and family education     Problem: Adult Inpatient Plan of Care  Goal: Absence of Hospital-Acquired Illness or Injury  Intervention: Prevent and Manage VTE (Venous Thromboembolism) Risk  Recent Flowsheet Documentation  Taken 10/27/2021 0752 by Elizabeth Teran RN  VTE Prevention/Management:   ambulation promoted   fluids promoted   intravenous therapy administered     Problem: Adult Inpatient Plan of Care  Goal: Absence of Hospital-Acquired Illness or Injury  Intervention: Prevent Infection  Recent Flowsheet Documentation  Taken 10/27/2021 0752 by Elizabeth Teran RN  Infection Prevention:   equipment surfaces disinfected   hand hygiene promoted   personal protective equipment utilized     Problem: Adult Inpatient Plan of Care  Goal: Optimal Comfort and Wellbeing  Outcome: No Change   PRN Tylenol for Right foot pain.

## 2021-10-27 NOTE — PLAN OF CARE
"Pt has been able to sleep quietly tonight, awake x 2 for urinal and then for a BM. Pt initially anxious-\"this is my first night with this Mask\" (oxymask). He did take tylenol for the right toe pain and an ativan to help him relax and sleep. He has had a RR of 24 even and non-labored while sleeping. Oxygen remained at 7L and he maintained adequate sats.   "

## 2021-10-27 NOTE — PROGRESS NOTES
[facility]           [facility]essment & Plan      Liborio Barajas is a 56 year old male admitted on 10/1/2021. He presented with shortness of breath and was found to be COVID positive.     Confirmed COVID-19 infection    Acute Hypoxic Respiratory Failure secondary to COVID-19 infection  Viral Pneumonia secondary to COVID-19 infection     Symptom Onset 9/22/2021    Date of 1st Positive Test 10/01/2021      Vaccination Status Not Vaccinated, but interested/contemplative            Initial CT chest 10/1/2021 demonstrated extensive bilateral groundglass consolidation consistent with pneumonia, and was negative for pulmonary embolism.  The patient required transfer to intensive care unit immediately after admission, where he has remained.  For more than a week, the patient had been alternating between HFNC oxygen and BiPAP.  Since the time of admission, we had not been able to make any significant reductions in HFNC flow or in FiO2.  Since the patient appeared \"stuck\" on high flow nasal cannula and/or BiPAP therapy with high FiO2 for more than 2 weeks, he and I discussed risk versus benefit of beginning empiric systemic steroids for what could be inflammatory pneumonitis following COVID-19 pneumonia.  He agreed to the use of methylprednisolone 60 mg daily, started 10/20/2021 and appeared to improve significantly after starting this.     BiPAP was discontinued as of 10/22/2021. We were able to decrease HFNC flow and FiO2 over the next few days.  As of this morning, 10/26/2021, the patient was able to maintain adequate saturations on a simple mask and was running 7-10L 10/27.   Rest dyspnea is improving.  His ability to participate in PT is improving.   He has no respiratory chest pain, remains afebrile.     - Oxygen: Continue oxygen either via simple mask or nasal cannula, titrated to keep saturation 90% or better.  stepped down to med-surg status 10/27.    - Labs: Markers of severity were followed " earlier in the hospital stay, with trend toward improvement.  Daily monitoring has been discontinued.  - Imaging: Chest CT 10/10/2021 continued to demonstrate extensive bilateral infiltrates, and was again negative for pulmonary embolism.  Chest x-ray 10/18/2021 again showed extensive patchy opacities throughout both lungs.  Breathing treatments: Albuterol HFA 2 puffs every 4 hours while awake was started on 10/19/2021 due to wheezing.  Now that the patient is recovered COVID-19 status, nebulized medications could be used if needed.    IV fluids: None indicated; oral intake is adequate.  Antibiotics: Not indicated.   Corticosteroids: Methylprednisolone 60 mg IV every 24 hours started 10/20/2021 for what could possibly represent inflammatory pneumonitis following COVID-19 pneumonia.  We will plan on a 7-day course on this dose, then perhaps change to Prednisone 40 mg daily on 10/27/2021 with a taper to off over 3 to 4 weeks.  COVID-Focused Medications:    - Dexamethasone was given 10/1 - 10/10/2021.   - Remdesivir was given 10/1 - 10/5/2021.   - Tocilizumab 700 mg IV given 10/2/2021.  - DVT Prophylaxis: on apixaban for treatment of aortic and right lower extremity thrombus, see below.     Infrarenal Aortic Thrombus with thromboembolic obstruction of right tibial-peroneal trunk    Right foot pain and diminished RLE pulses noted 10/11/2021. CTA abdomen and pelvis revealed infrarenal aortic wall adherent clot, and apparent thromboembolic obstruction of right tibial-peroneal trunk with distal reconstitution of tibial and peroneal arteries.  Findings were discussed with vascular surgery [facility], who advised that COVID-related arterial thrombectomies frequently reclot, so surgery was not recommended, and that increased heparin resistance was seen in these patients, so anticoagulation with argatroban was recommended.  Argatroban infusion given 10/11 - 10/16/2021, after which the patient was transitioned to apixaban, starting  "at 10 mg p.o. twice daily for the first week (first dose 10/17/2021), then dose will be decreased to 5 mg daily.  Right foot no longer cold, dorsalis pedal pulses palpable, though he still has an occasional \"ache\" in the right foot, not severe enough to require medication, and steadily improving.  -Continue apixaban.     Anxiety    Patient had been having severe subjective dyspnea only partially and briefly relieved by the use of morphine IV and lorazepam IV.  We started morphine sulfate 5 mg p.o. every 2 hours as needed dyspnea on 10/20/2021, and added lorazepam 0.5 mg p.o. every 4 hours as needed on 10/22/2021.  Control of anxiety has steadily improved as his dyspnea has improved.  He has not required morphine sulfate solution since 10/22/2021, so I am going to discontinue it.  Lorazepam use has also decreased over the last couple of days.  He is generally requiring a single dose near bedtime.  -We will discontinue morphine sulfate solution.  -Continue lorazepam 0.5 mg p.o. every 4 hours as needed for anxiety, which she is currently primarily taking as an at bedtime dose.  -We initiated citalopram 20 mg every day  On 10/13/2021, knowing that this will take some time to reach effect.   Patient seems improved with this.       Hyponatremia   Present on admission. Probably related to volume depletion.  Sodium normalized without specific therapy.  -Resolved.     Acute kidney injury on admission  GFR on admission was 32.  The patient was probably volume depleted on admission.  With volume correction, will function has been normal since approximately 10/5/2021.   -Resolved.     Transaminitis  Mild elevation of AST and ALT were noted on admission, likely due to COVID-19 infection and/or remdesivir.  Transaminases normalized as of 10/18/2021, but have increased in the interval between 10/19/2021:   ALT 41 --> 196 --> 246   AST 32 --> 47 --> 38    Etiology unclear, though this can be seen during TPN therapy.  -Follow LFTs " intermittently, next draw tomorrow.     Leukocytosis  WBC elevated 10/19/2021, though has been roughly stable since that time.  The elevation correlates roughly with starting methylprednisolone.  He is afebrile, and there is no clinical evidence of underlying infection.  WBC was again normal as of 10/23/2021.  -Resolved.     Hypertension  Managed prior to admission with lisinopril 10 mg daily.  Lisinopril was resumed here initially at 5 mg daily, then increased to 10 mg daily 10/21/2021 due to hypertension.  Blood pressure control has been generally good.    -Continue lisinopril 10 mg daily.     GERD  Exacerbated since methylprednisolone was started.  -Continue omeprazole.     Malnutrition:  - Level of malnutrition: Severe   - Based on: weight loss, reduced intake - see nutrition notes - notable weight loss and intake remained poor.  Patient declined placement of NG feeding tube.   A PICC line was placed, and TPN started on 10/18/2021.  His intake of food is improving, but not yet adequate to discontinue TPN.     DVT Prophylaxis: Apixaban.   Neal Catheter: Not present  Central Lines: PICC placed 10/18/2021 for TPN.  Code Status:  Full.       Diet  Orders Placed This Encounter      Combination Diet Regular Diet Adult              Disposition  Anticipate at least 3-4 days inpatient still, but clearly improving             Interval History:   Improving.  No pain.  Breathing feels good on 7-10L.  Strength improving.  Says intake getting better but still poor per  nursing  No other pain             Review of Systems:    ROS: 10 point ROS neg other than the symptoms noted above in the HPI.           Medications:   Current active medications and PTA medications reviewed, see medication list for details.            Physical Exam:   Vitals were reviewed  Patient Vitals for the past 24 hrs:   BP Temp Temp src Pulse Resp SpO2 Weight   10/27/21 1507 (!) 141/74 98  F (36.7  C) Axillary 101 20 98 % --   10/27/21 1200 -- -- -- --  -- 96 % --   10/27/21 1115 135/85 98  F (36.7  C) Axillary 104 22 -- --   10/27/21 1000 -- -- -- -- -- 96 % --   10/27/21 0936 -- -- -- -- -- 94 % --   10/27/21 0900 -- -- -- -- -- 97 % --   10/27/21 0800 -- -- -- -- -- 93 % --   10/27/21 0743 135/84 98.2  F (36.8  C) Axillary 116 -- (!) 88 % --   10/27/21 0500 -- -- -- 80 24 94 % --   10/27/21 0420 -- -- -- -- -- 90 % --   10/27/21 0418 -- -- -- -- -- -- 77.7 kg (171 lb 4.8 oz)   10/27/21 0415 137/79 97.9  F (36.6  C) Oral 109 26 (!) 86 % --   10/27/21 0250 -- -- -- -- 24 96 % --   10/27/21 0045 -- -- -- -- 24 97 % --   10/27/21 0030 -- -- -- -- -- 96 % --   10/27/21 0000 -- -- -- -- 24 95 % --   10/26/21 2330 -- -- -- -- -- 90 % --   10/26/21 2325 139/78 98.1  F (36.7  C) Axillary 105 (!) 32 (!) 87 % --   10/26/21 1928 (!) 149/89 97.7  F (36.5  C) Oral 107 19 94 % --       Temperatures:  Current - Temp: 98  F (36.7  C); Max - Temp  Av  F (36.7  C)  Min: 97.7  F (36.5  C)  Max: 98.2  F (36.8  C)  Respiration range: Resp  Av.9  Min: 19  Max: 32  Pulse range: Pulse  Av.1  Min: 80  Max: 116  Blood pressure range: Systolic (24hrs), Av , Min:135 , Max:149   ; Diastolic (24hrs), Av, Min:74, Max:89    Pulse oximetry range: SpO2  Av.4 %  Min: 86 %  Max: 98 %  I/O last 3 completed shifts:  In: 2341.07 [P.O.:220; I.V.:10]  Out:  [Urine:]    Intake/Output Summary (Last 24 hours) at 10/27/2021 1702  Last data filed at 10/27/2021 1510  Gross per 24 hour   Intake 2341.07 ml   Output 1800 ml   Net 541.07 ml     EXAM:  General: awake and alert, NAD, oriented x 3  Head: normocephalic  Neck: unremarkable, no lymphadenopathy   HEENT: oropharynx pink and moist    Heart: Regular rate and rhythm, no murmurs, rubs, or gallops  Lungs: clear to auscultation bilaterally with good air movement throughout  Abdomen: soft, non-tender, no masses or organomegaly  Extremities: no edema in lower extremities   Skin unremarkable.               Data:     Results  for orders placed or performed during the hospital encounter of 10/01/21 (from the past 24 hour(s))   Glucose by meter   Result Value Ref Range    GLUCOSE BY METER POCT 161 (H) 70 - 99 mg/dL   Glucose by meter   Result Value Ref Range    GLUCOSE BY METER POCT 196 (H) 70 - 99 mg/dL   Glucose by meter   Result Value Ref Range    GLUCOSE BY METER POCT 136 (H) 70 - 99 mg/dL   Basic metabolic panel   Result Value Ref Range    Sodium 136 133 - 144 mmol/L    Potassium 4.3 3.4 - 5.3 mmol/L    Chloride 101 94 - 109 mmol/L    Carbon Dioxide (CO2) 28 20 - 32 mmol/L    Anion Gap 7 3 - 14 mmol/L    Urea Nitrogen 24 7 - 30 mg/dL    Creatinine 0.64 (L) 0.66 - 1.25 mg/dL    Calcium 9.1 8.5 - 10.1 mg/dL    Glucose 128 (H) 70 - 99 mg/dL    GFR Estimate >90 >60 mL/min/1.73m2   Magnesium   Result Value Ref Range    Magnesium 2.4 (H) 1.6 - 2.3 mg/dL   Phosphorus   Result Value Ref Range    Phosphorus 4.1 2.5 - 4.5 mg/dL   Lactic Acid STAT   Result Value Ref Range    Lactic Acid 1.0 0.7 - 2.0 mmol/L   Hepatic panel   Result Value Ref Range    Bilirubin Total 0.3 0.2 - 1.3 mg/dL    Bilirubin Direct <0.1 0.0 - 0.2 mg/dL    Protein Total 6.8 6.8 - 8.8 g/dL    Albumin 2.3 (L) 3.4 - 5.0 g/dL    Alkaline Phosphatase 92 40 - 150 U/L    AST 38 0 - 45 U/L     (H) 0 - 70 U/L   Glucose by meter   Result Value Ref Range    GLUCOSE BY METER POCT 125 (H) 70 - 99 mg/dL   Glucose by meter   Result Value Ref Range    GLUCOSE BY METER POCT 158 (H) 70 - 99 mg/dL           Attestation:  I have reviewed today's vital signs, notes, medications, labs and imaging.  Amount of time spent in direct patient care: 40 minutes.     Go Zimmer MD, MD                      Calm

## 2021-10-28 ENCOUNTER — APPOINTMENT (OUTPATIENT)
Dept: PHYSICAL THERAPY | Facility: CLINIC | Age: 56
DRG: 177 | End: 2021-10-28
Payer: OTHER GOVERNMENT

## 2021-10-28 ENCOUNTER — APPOINTMENT (OUTPATIENT)
Dept: OCCUPATIONAL THERAPY | Facility: CLINIC | Age: 56
DRG: 177 | End: 2021-10-28
Payer: OTHER GOVERNMENT

## 2021-10-28 LAB
ALBUMIN SERPL-MCNC: 2.5 G/DL (ref 3.4–5)
ALP SERPL-CCNC: 95 U/L (ref 40–150)
ALT SERPL W P-5'-P-CCNC: 233 U/L (ref 0–70)
ANION GAP SERPL CALCULATED.3IONS-SCNC: 6 MMOL/L (ref 3–14)
AST SERPL W P-5'-P-CCNC: 40 U/L (ref 0–45)
BILIRUB SERPL-MCNC: 0.4 MG/DL (ref 0.2–1.3)
BUN SERPL-MCNC: 25 MG/DL (ref 7–30)
CALCIUM SERPL-MCNC: 9.1 MG/DL (ref 8.5–10.1)
CHLORIDE BLD-SCNC: 102 MMOL/L (ref 94–109)
CO2 SERPL-SCNC: 28 MMOL/L (ref 20–32)
CREAT SERPL-MCNC: 0.68 MG/DL (ref 0.66–1.25)
ERYTHROCYTE [DISTWIDTH] IN BLOOD BY AUTOMATED COUNT: 12.9 % (ref 10–15)
GFR SERPL CREATININE-BSD FRML MDRD: >90 ML/MIN/1.73M2
GLUCOSE BLD-MCNC: 91 MG/DL (ref 70–99)
GLUCOSE BLDC GLUCOMTR-MCNC: 107 MG/DL (ref 70–99)
GLUCOSE BLDC GLUCOMTR-MCNC: 138 MG/DL (ref 70–99)
GLUCOSE BLDC GLUCOMTR-MCNC: 96 MG/DL (ref 70–99)
HCT VFR BLD AUTO: 40.2 % (ref 40–53)
HGB BLD-MCNC: 13.6 G/DL (ref 13.3–17.7)
MAGNESIUM SERPL-MCNC: 2.4 MG/DL (ref 1.6–2.3)
MCH RBC QN AUTO: 29.2 PG (ref 26.5–33)
MCHC RBC AUTO-ENTMCNC: 33.8 G/DL (ref 31.5–36.5)
MCV RBC AUTO: 86 FL (ref 78–100)
PHOSPHATE SERPL-MCNC: 3.9 MG/DL (ref 2.5–4.5)
PLATELET # BLD AUTO: 402 10E3/UL (ref 150–450)
POTASSIUM BLD-SCNC: 4.2 MMOL/L (ref 3.4–5.3)
PROT SERPL-MCNC: 7.2 G/DL (ref 6.8–8.8)
RBC # BLD AUTO: 4.66 10E6/UL (ref 4.4–5.9)
SODIUM SERPL-SCNC: 136 MMOL/L (ref 133–144)
WBC # BLD AUTO: 6.9 10E3/UL (ref 4–11)

## 2021-10-28 PROCEDURE — 80053 COMPREHEN METABOLIC PANEL: CPT | Performed by: FAMILY MEDICINE

## 2021-10-28 PROCEDURE — 250N000013 HC RX MED GY IP 250 OP 250 PS 637: Performed by: INTERNAL MEDICINE

## 2021-10-28 PROCEDURE — 84100 ASSAY OF PHOSPHORUS: CPT | Performed by: FAMILY MEDICINE

## 2021-10-28 PROCEDURE — 250N000013 HC RX MED GY IP 250 OP 250 PS 637: Performed by: FAMILY MEDICINE

## 2021-10-28 PROCEDURE — 85027 COMPLETE CBC AUTOMATED: CPT | Performed by: FAMILY MEDICINE

## 2021-10-28 PROCEDURE — 97535 SELF CARE MNGMENT TRAINING: CPT | Mod: GO

## 2021-10-28 PROCEDURE — 83735 ASSAY OF MAGNESIUM: CPT | Performed by: FAMILY MEDICINE

## 2021-10-28 PROCEDURE — 250N000011 HC RX IP 250 OP 636

## 2021-10-28 PROCEDURE — 120N000004 HC R&B MS OVERFLOW

## 2021-10-28 PROCEDURE — 99232 SBSQ HOSP IP/OBS MODERATE 35: CPT | Performed by: FAMILY MEDICINE

## 2021-10-28 PROCEDURE — 97110 THERAPEUTIC EXERCISES: CPT | Mod: GO

## 2021-10-28 PROCEDURE — 97116 GAIT TRAINING THERAPY: CPT | Mod: GP

## 2021-10-28 PROCEDURE — 250N000009 HC RX 250: Performed by: FAMILY MEDICINE

## 2021-10-28 PROCEDURE — 97110 THERAPEUTIC EXERCISES: CPT | Mod: GP

## 2021-10-28 PROCEDURE — 250N000013 HC RX MED GY IP 250 OP 250 PS 637: Performed by: HOSPITALIST

## 2021-10-28 PROCEDURE — 250N000013 HC RX MED GY IP 250 OP 250 PS 637

## 2021-10-28 PROCEDURE — 250N000011 HC RX IP 250 OP 636: Performed by: FAMILY MEDICINE

## 2021-10-28 RX ADMIN — BENZOCAINE AND MENTHOL 1 LOZENGE: 15; 3.6 LOZENGE ORAL at 22:09

## 2021-10-28 RX ADMIN — APIXABAN 5 MG: 5 TABLET, FILM COATED ORAL at 08:53

## 2021-10-28 RX ADMIN — ASCORBIC ACID, VITAMIN A PALMITATE, CHOLECALCIFEROL, THIAMINE HYDROCHLORIDE, RIBOFLAVIN-5 PHOSPHATE SODIUM, PYRIDOXINE HYDROCHLORIDE, NIACINAMIDE, DEXPANTHENOL, ALPHA-TOCOPHEROL ACETATE, VITAMIN K1, FOLIC ACID, BIOTIN, CYANOCOBALAMIN: 200; 3300; 200; 6; 3.6; 6; 40; 15; 10; 150; 600; 60; 5 INJECTION, SOLUTION INTRAVENOUS at 05:33

## 2021-10-28 RX ADMIN — METHYLPREDNISOLONE SODIUM SUCCINATE 62.5 MG: 125 INJECTION, POWDER, FOR SOLUTION INTRAMUSCULAR; INTRAVENOUS at 15:21

## 2021-10-28 RX ADMIN — ALBUTEROL SULFATE 2 PUFF: 90 AEROSOL, METERED RESPIRATORY (INHALATION) at 05:33

## 2021-10-28 RX ADMIN — ALBUTEROL SULFATE 2 PUFF: 90 AEROSOL, METERED RESPIRATORY (INHALATION) at 15:22

## 2021-10-28 RX ADMIN — ONDANSETRON 4 MG: 4 TABLET, ORALLY DISINTEGRATING ORAL at 11:03

## 2021-10-28 RX ADMIN — MICONAZOLE NITRATE: 20 CREAM TOPICAL at 11:09

## 2021-10-28 RX ADMIN — I.V. FAT EMULSION 250 ML: 20 EMULSION INTRAVENOUS at 20:58

## 2021-10-28 RX ADMIN — APIXABAN 5 MG: 5 TABLET, FILM COATED ORAL at 20:32

## 2021-10-28 RX ADMIN — CITALOPRAM HYDROBROMIDE 20 MG: 20 TABLET ORAL at 08:53

## 2021-10-28 RX ADMIN — LISINOPRIL 10 MG: 10 TABLET ORAL at 20:32

## 2021-10-28 RX ADMIN — ALBUTEROL SULFATE 2 PUFF: 90 AEROSOL, METERED RESPIRATORY (INHALATION) at 18:33

## 2021-10-28 RX ADMIN — ALBUTEROL SULFATE 2 PUFF: 90 AEROSOL, METERED RESPIRATORY (INHALATION) at 22:09

## 2021-10-28 RX ADMIN — ACETAMINOPHEN 650 MG: 325 TABLET, FILM COATED ORAL at 01:12

## 2021-10-28 RX ADMIN — ACETAMINOPHEN 650 MG: 325 TABLET, FILM COATED ORAL at 13:39

## 2021-10-28 RX ADMIN — ALBUTEROL SULFATE 2 PUFF: 90 AEROSOL, METERED RESPIRATORY (INHALATION) at 11:08

## 2021-10-28 RX ADMIN — PANTOPRAZOLE SODIUM 40 MG: 40 TABLET, DELAYED RELEASE ORAL at 08:53

## 2021-10-28 ASSESSMENT — ACTIVITIES OF DAILY LIVING (ADL)
ADLS_ACUITY_SCORE: 10

## 2021-10-28 NOTE — PLAN OF CARE
Pt is not sleeping well tonight. He does not c/o pain nor resp distress. He has been calm and pleasant. Assisting with comfort measures/room temp.

## 2021-10-28 NOTE — PLAN OF CARE
Pt tolerating the oxymask well. He is optimistic about his improving condition. No new issues per pt.

## 2021-10-28 NOTE — PROGRESS NOTES
[facility]           Assessment & Plan        Liborio Barajas is a 56 year old male admitted on 10/1/2021. He presented with shortness of breath and was found to be COVID positive.     Confirmed COVID-19 infection    Acute Hypoxic Respiratory Failure secondary to COVID-19 infection  Viral Pneumonia secondary to COVID-19 infection     Symptom Onset 9/22/2021    Date of 1st Positive Test 10/01/2021      Vaccination Status Not Vaccinated, but interested/contemplative            Initial CT chest 10/1/2021 demonstrated extensive bilateral groundglass consolidation consistent with pneumonia, and was negative for pulmonary embolism.  The patient required transfer to intensive care unit immediately after admission, where he has remained.  For more than a week, the patient had been alternating between HFNC oxygen and BiPAP.  Since the time of admission, we had not been able to make any significant reductions in HFNC flow or in FiO2.  Since the patient appeared \"stuck\" on high flow nasal cannula and/or BiPAP therapy with high FiO2 for more than 2 weeks, he and I discussed risk versus benefit of beginning empiric systemic steroids for what could be inflammatory pneumonitis following COVID-19 pneumonia.  He agreed to the use of methylprednisolone 60 mg daily, started 10/20/2021 and appeared to improve significantly after starting this.     BiPAP was discontinued as of 10/22/2021. We were able to decrease HFNC flow and FiO2 over the next few days.  As of this morning, 10/26/2021, the patient was able to maintain adequate saturations on a simple mask and was running 7-10L 10/27, 5-7L 10/28.   Rest dyspnea is improving.  His ability to participate in PT is improving but still weak and drops sats with activity.   He has no respiratory chest pain, remains afebrile.     - Oxygen: Continue oxygen either via simple mask or nasal cannula, titrated to keep saturation 90% or better.  stepped down to med-surg " status 10/27.    - Labs: Markers of severity were followed earlier in the hospital stay, with trend toward improvement.  Daily monitoring has been discontinued.  - Imaging: Chest CT 10/10/2021 continued to demonstrate extensive bilateral infiltrates, and was again negative for pulmonary embolism.  Chest x-ray 10/18/2021 again showed extensive patchy opacities throughout both lungs.  Breathing treatments: Albuterol HFA 2 puffs every 4 hours while awake was started on 10/19/2021 due to wheezing.  Now that the patient is recovered COVID-19 status, nebulized medications could be used if needed.    IV fluids: None indicated; oral intake is adequate.  Antibiotics: Not indicated.   Corticosteroids: Methylprednisolone 60 mg IV every 24 hours started 10/20/2021 for what could possibly represent inflammatory pneumonitis following COVID-19 pneumonia.  We will plan on a 7-day course on this dose, then perhaps change to Prednisone 40 mg daily on 10/27/2021 with a taper to off over 3 to 4 weeks.  COVID-Focused Medications:    - Dexamethasone was given 10/1 - 10/10/2021.   - Remdesivir was given 10/1 - 10/5/2021.   - Tocilizumab 700 mg IV given 10/2/2021.  - DVT Prophylaxis: on apixaban for treatment of aortic and right lower extremity thrombus, see below.     Infrarenal Aortic Thrombus with thromboembolic obstruction of right tibial-peroneal trunk    Right foot pain and diminished RLE pulses noted 10/11/2021. CTA abdomen and pelvis revealed infrarenal aortic wall adherent clot, and apparent thromboembolic obstruction of right tibial-peroneal trunk with distal reconstitution of tibial and peroneal arteries.  Findings were discussed with vascular surgery [facility], who advised that COVID-related arterial thrombectomies frequently reclot, so surgery was not recommended, and that increased heparin resistance was seen in these patients, so anticoagulation with argatroban was recommended.  Argatroban infusion given 10/11 - 10/16/2021, after  "which the patient was transitioned to apixaban, starting at 10 mg p.o. twice daily for the first week (first dose 10/17/2021), then dose was decreased to 5 mg daily.  Right foot no longer cold, dorsalis pedal pulses palpable, though he still has an occasional \"ache\" in the right foot, not severe enough to require medication, and steadily improving.  -Continue apixaban.     Anxiety    Patient had been having severe subjective dyspnea only partially and briefly relieved by the use of morphine IV and lorazepam IV.  We started morphine sulfate 5 mg p.o. every 2 hours as needed dyspnea on 10/20/2021, and added lorazepam 0.5 mg p.o. every 4 hours as needed on 10/22/2021.  Control of anxiety has steadily improved as his dyspnea has improved.  He has not required morphine sulfate solution since 10/22/2021, so I am going to discontinue it.  Lorazepam use has also decreased over the last couple of days.  He is generally requiring a single dose near bedtime.  -We will discontinue morphine sulfate solution.  -Continue lorazepam 0.5 mg p.o. every 4 hours as needed for anxiety, which she is currently primarily taking as an at bedtime dose.  -We initiated citalopram 20 mg every day  On 10/13/2021, knowing that this will take some time to reach effect.   Patient seems at least somewhat improved with this.       Hyponatremia   Present on admission. Probably related to volume depletion.  Sodium normalized without specific therapy.  -Resolved.     Acute kidney injury on admission  GFR on admission was 32.  The patient was probably volume depleted on admission.  With volume correction, will function has been normal since approximately 10/5/2021.   -Resolved.     Transaminitis  Mild elevation of AST and ALT were noted on admission, likely due to COVID-19 infection and/or remdesivir.  Transaminases normalized as of 10/18/2021, but have increased in the interval between 10/19/2021:   ALT 41 --> 196 --> 246 --> 233  AST 32 --> 47 --> 38 "  -->40  Etiology unclear, though this can be seen during TPN therapy.  -Follow LFTs intermittently, next draw tomorrow.     Leukocytosis  WBC elevated 10/19/2021, though has been roughly stable since that time.  The elevation correlates roughly with starting methylprednisolone.  He is afebrile, and there is no clinical evidence of underlying infection.  WBC was again normal as of 10/23/2021.  -Resolved.     Hypertension  Managed prior to admission with lisinopril 10 mg daily.  Lisinopril was resumed here initially at 5 mg daily, then increased to 10 mg daily 10/21/2021 due to hypertension.  Blood pressure control has been generally good.    -Continue lisinopril 10 mg daily.     GERD  Exacerbated since methylprednisolone was started.  -Continue omeprazole.     Malnutrition:  - Level of malnutrition: Severe   - Based on: weight loss, reduced intake - see nutrition notes - notable weight loss and intake remained poor.  Patient declined placement of NG feeding tube.   A PICC line was placed, and TPN started on 10/18/2021.  His intake of food is improving per patient but not notably from nursing/nutrition report.  Not yet adequate to discontinue TPN.     DVT Prophylaxis: Apixaban.   Neal Catheter: Not present  Central Lines: PICC placed 10/18/2021 for TPN.  Code Status:  Full.       Diet  Orders Placed This Encounter      Combination Diet Regular Diet Adult                Disposition  Anticipate at least 3-4 more days inpatient.              Interval History:   No new concerns.  Patient says his intake is slowly getting better, but no notable change per nursing/nutrition and he hadn't really eaten any of his breakfast at the time of my evaluation.  Breathing continues to improve, although he desats with activity still and still gets spells of getting anxious and then hypoxic briefly but says these are getting better.  Overall oxygen needs continue to slowly improve.  No new concerns or worsening symptoms.   No other  pain             Review of Systems:    ROS: 10 point ROS neg other than the symptoms noted above in the HPI.           Medications:   Current active medications and PTA medications reviewed, see medication list for details.            Physical Exam:   Vitals were reviewed  Patient Vitals for the past 24 hrs:   BP Temp Temp src Pulse Resp SpO2   10/28/21 0753 (!) 141/77 98.2  F (36.8  C) Oral 98 20 93 %   10/28/21 0600 -- -- -- -- -- 96 %   10/28/21 0400 -- -- -- -- -- 96 %   10/27/21 2024 128/87 98  F (36.7  C) Oral 94 20 93 %   10/27/21 1800 -- -- -- -- -- 92 %   10/27/21 1716 -- -- -- -- -- 90 %   10/27/21 1700 -- -- -- -- -- 90 %   10/27/21 1630 -- -- -- -- -- 94 %   10/27/21 1600 -- -- -- -- -- 97 %   10/27/21 1507 (!) 141/74 98  F (36.7  C) Axillary 101 20 98 %   10/27/21 1200 -- -- -- -- -- 96 %   10/27/21 1115 135/85 98  F (36.7  C) Axillary 104 22 --       Temperatures:  Current - Temp: 98.2  F (36.8  C); Max - Temp  Av.1  F (36.7  C)  Min: 98  F (36.7  C)  Max: 98.2  F (36.8  C)  Respiration range: Resp  Av.5  Min: 20  Max: 22  Pulse range: Pulse  Av.3  Min: 94  Max: 104  Blood pressure range: Systolic (24hrs), Av , Min:128 , Max:141   ; Diastolic (24hrs), Av, Min:74, Max:87    Pulse oximetry range: SpO2  Av.1 %  Min: 90 %  Max: 98 %  I/O last 3 completed shifts:  In: 2382.7 [P.O.:100; I.V.:10]  Out: 1175 [Urine:1175]    Intake/Output Summary (Last 24 hours) at 10/28/2021 1006  Last data filed at 10/28/2021 0926  Gross per 24 hour   Intake 2256.7 ml   Output 1375 ml   Net 881.7 ml     EXAM:  General: awake and alert, NAD, oriented x 3  Head: normocephalic  Neck: unremarkable, no lymphadenopathy   HEENT: oropharynx pink and moist    Heart: Regular rate and rhythm, no murmurs, rubs, or gallops  Lungs: clear to auscultation bilaterally with good air movement throughout  Abdomen: soft, non-tender, no masses or organomegaly  Extremities: no edema in lower extremities   Skin  unremarkable.               Data:     Results for orders placed or performed during the hospital encounter of 10/01/21 (from the past 24 hour(s))   Glucose by meter   Result Value Ref Range    GLUCOSE BY METER POCT 125 (H) 70 - 99 mg/dL   Glucose by meter   Result Value Ref Range    GLUCOSE BY METER POCT 158 (H) 70 - 99 mg/dL   Glucose by meter   Result Value Ref Range    GLUCOSE BY METER POCT 96 70 - 99 mg/dL   Comprehensive metabolic panel   Result Value Ref Range    Sodium 136 133 - 144 mmol/L    Potassium 4.2 3.4 - 5.3 mmol/L    Chloride 102 94 - 109 mmol/L    Carbon Dioxide (CO2) 28 20 - 32 mmol/L    Anion Gap 6 3 - 14 mmol/L    Urea Nitrogen 25 7 - 30 mg/dL    Creatinine 0.68 0.66 - 1.25 mg/dL    Calcium 9.1 8.5 - 10.1 mg/dL    Glucose 91 70 - 99 mg/dL    Alkaline Phosphatase 95 40 - 150 U/L    AST 40 0 - 45 U/L     (H) 0 - 70 U/L    Protein Total 7.2 6.8 - 8.8 g/dL    Albumin 2.5 (L) 3.4 - 5.0 g/dL    Bilirubin Total 0.4 0.2 - 1.3 mg/dL    GFR Estimate >90 >60 mL/min/1.73m2   Magnesium   Result Value Ref Range    Magnesium 2.4 (H) 1.6 - 2.3 mg/dL   Phosphorus   Result Value Ref Range    Phosphorus 3.9 2.5 - 4.5 mg/dL   CBC with platelets   Result Value Ref Range    WBC Count 6.9 4.0 - 11.0 10e3/uL    RBC Count 4.66 4.40 - 5.90 10e6/uL    Hemoglobin 13.6 13.3 - 17.7 g/dL    Hematocrit 40.2 40.0 - 53.0 %    MCV 86 78 - 100 fL    MCH 29.2 26.5 - 33.0 pg    MCHC 33.8 31.5 - 36.5 g/dL    RDW 12.9 10.0 - 15.0 %    Platelet Count 402 150 - 450 10e3/uL           Attestation:  I have reviewed today's vital signs, notes, medications, labs and imaging.  Amount of time spent in direct patient care: 30 minutes.     Go Zimmer MD, MD

## 2021-10-28 NOTE — PROGRESS NOTES
Up in chair all shift.  Continues with oxymask @ 5L with sats in low 90's.  Does desat with activity but recovers quickly.  Lungs diminished but clear.  Will monitor.

## 2021-10-29 ENCOUNTER — APPOINTMENT (OUTPATIENT)
Dept: PHYSICAL THERAPY | Facility: CLINIC | Age: 56
DRG: 177 | End: 2021-10-29
Payer: OTHER GOVERNMENT

## 2021-10-29 LAB
ALBUMIN SERPL-MCNC: 2.3 G/DL (ref 3.4–5)
ALP SERPL-CCNC: 92 U/L (ref 40–150)
ALT SERPL W P-5'-P-CCNC: 311 U/L (ref 0–70)
ANION GAP SERPL CALCULATED.3IONS-SCNC: 7 MMOL/L (ref 3–14)
AST SERPL W P-5'-P-CCNC: 61 U/L (ref 0–45)
BILIRUB SERPL-MCNC: 0.3 MG/DL (ref 0.2–1.3)
BUN SERPL-MCNC: 26 MG/DL (ref 7–30)
CALCIUM SERPL-MCNC: 9.1 MG/DL (ref 8.5–10.1)
CHLORIDE BLD-SCNC: 102 MMOL/L (ref 94–109)
CO2 SERPL-SCNC: 27 MMOL/L (ref 20–32)
CREAT SERPL-MCNC: 0.68 MG/DL (ref 0.66–1.25)
ERYTHROCYTE [DISTWIDTH] IN BLOOD BY AUTOMATED COUNT: 12.9 % (ref 10–15)
GFR SERPL CREATININE-BSD FRML MDRD: >90 ML/MIN/1.73M2
GLUCOSE BLD-MCNC: 111 MG/DL (ref 70–99)
GLUCOSE BLDC GLUCOMTR-MCNC: 111 MG/DL (ref 70–99)
GLUCOSE BLDC GLUCOMTR-MCNC: 197 MG/DL (ref 70–99)
HCT VFR BLD AUTO: 37.6 % (ref 40–53)
HGB BLD-MCNC: 12.9 G/DL (ref 13.3–17.7)
LACTATE SERPL-SCNC: 1.4 MMOL/L (ref 0.7–2)
MAGNESIUM SERPL-MCNC: 2.2 MG/DL (ref 1.6–2.3)
MAGNESIUM SERPL-MCNC: NORMAL MG/DL
MCH RBC QN AUTO: 29.6 PG (ref 26.5–33)
MCHC RBC AUTO-ENTMCNC: 34.3 G/DL (ref 31.5–36.5)
MCV RBC AUTO: 86 FL (ref 78–100)
PHOSPHATE SERPL-MCNC: 3.3 MG/DL (ref 2.5–4.5)
PLATELET # BLD AUTO: 393 10E3/UL (ref 150–450)
POTASSIUM BLD-SCNC: 4.1 MMOL/L (ref 3.4–5.3)
PROT SERPL-MCNC: 6.6 G/DL (ref 6.8–8.8)
RBC # BLD AUTO: 4.36 10E6/UL (ref 4.4–5.9)
SODIUM SERPL-SCNC: 136 MMOL/L (ref 133–144)
WBC # BLD AUTO: 8.9 10E3/UL (ref 4–11)

## 2021-10-29 PROCEDURE — 97530 THERAPEUTIC ACTIVITIES: CPT | Mod: GP

## 2021-10-29 PROCEDURE — 82040 ASSAY OF SERUM ALBUMIN: CPT | Performed by: FAMILY MEDICINE

## 2021-10-29 PROCEDURE — 83735 ASSAY OF MAGNESIUM: CPT | Performed by: FAMILY MEDICINE

## 2021-10-29 PROCEDURE — 250N000013 HC RX MED GY IP 250 OP 250 PS 637

## 2021-10-29 PROCEDURE — 85027 COMPLETE CBC AUTOMATED: CPT | Performed by: FAMILY MEDICINE

## 2021-10-29 PROCEDURE — 36592 COLLECT BLOOD FROM PICC: CPT | Performed by: FAMILY MEDICINE

## 2021-10-29 PROCEDURE — 250N000013 HC RX MED GY IP 250 OP 250 PS 637: Performed by: HOSPITALIST

## 2021-10-29 PROCEDURE — 250N000013 HC RX MED GY IP 250 OP 250 PS 637: Performed by: FAMILY MEDICINE

## 2021-10-29 PROCEDURE — 84100 ASSAY OF PHOSPHORUS: CPT | Performed by: FAMILY MEDICINE

## 2021-10-29 PROCEDURE — 250N000009 HC RX 250: Performed by: FAMILY MEDICINE

## 2021-10-29 PROCEDURE — 83605 ASSAY OF LACTIC ACID: CPT | Performed by: HOSPITALIST

## 2021-10-29 PROCEDURE — 99233 SBSQ HOSP IP/OBS HIGH 50: CPT | Performed by: FAMILY MEDICINE

## 2021-10-29 PROCEDURE — 120N000001 HC R&B MED SURG/OB

## 2021-10-29 PROCEDURE — 36415 COLL VENOUS BLD VENIPUNCTURE: CPT | Performed by: HOSPITALIST

## 2021-10-29 PROCEDURE — 250N000012 HC RX MED GY IP 250 OP 636 PS 637: Performed by: FAMILY MEDICINE

## 2021-10-29 PROCEDURE — 250N000013 HC RX MED GY IP 250 OP 250 PS 637: Performed by: INTERNAL MEDICINE

## 2021-10-29 RX ORDER — PREDNISONE 20 MG/1
40 TABLET ORAL DAILY
Status: DISCONTINUED | OUTPATIENT
Start: 2021-10-29 | End: 2021-11-01 | Stop reason: HOSPADM

## 2021-10-29 RX ADMIN — POLYETHYLENE GLYCOL 3350 8.5 G: 17 POWDER, FOR SOLUTION ORAL at 12:35

## 2021-10-29 RX ADMIN — ALBUTEROL SULFATE 2 PUFF: 90 AEROSOL, METERED RESPIRATORY (INHALATION) at 09:06

## 2021-10-29 RX ADMIN — PANTOPRAZOLE SODIUM 40 MG: 40 TABLET, DELAYED RELEASE ORAL at 09:04

## 2021-10-29 RX ADMIN — CITALOPRAM HYDROBROMIDE 20 MG: 20 TABLET ORAL at 09:04

## 2021-10-29 RX ADMIN — APIXABAN 5 MG: 5 TABLET, FILM COATED ORAL at 09:04

## 2021-10-29 RX ADMIN — LISINOPRIL 10 MG: 10 TABLET ORAL at 19:29

## 2021-10-29 RX ADMIN — APIXABAN 5 MG: 5 TABLET, FILM COATED ORAL at 19:29

## 2021-10-29 RX ADMIN — I.V. FAT EMULSION 250 ML: 20 EMULSION INTRAVENOUS at 20:50

## 2021-10-29 RX ADMIN — PREDNISONE 40 MG: 20 TABLET ORAL at 12:35

## 2021-10-29 RX ADMIN — ALBUTEROL SULFATE 2 PUFF: 90 AEROSOL, METERED RESPIRATORY (INHALATION) at 15:02

## 2021-10-29 RX ADMIN — DOCUSATE SODIUM AND SENNOSIDES 1 TABLET: 8.6; 5 TABLET ORAL at 12:35

## 2021-10-29 RX ADMIN — ALBUTEROL SULFATE 2 PUFF: 90 AEROSOL, METERED RESPIRATORY (INHALATION) at 05:27

## 2021-10-29 RX ADMIN — ALBUTEROL SULFATE 2 PUFF: 90 AEROSOL, METERED RESPIRATORY (INHALATION) at 19:40

## 2021-10-29 RX ADMIN — ASCORBIC ACID, VITAMIN A PALMITATE, CHOLECALCIFEROL, THIAMINE HYDROCHLORIDE, RIBOFLAVIN-5 PHOSPHATE SODIUM, PYRIDOXINE HYDROCHLORIDE, NIACINAMIDE, DEXPANTHENOL, ALPHA-TOCOPHEROL ACETATE, VITAMIN K1, FOLIC ACID, BIOTIN, CYANOCOBALAMIN: 200; 3300; 200; 6; 3.6; 6; 40; 15; 10; 150; 600; 60; 5 INJECTION, SOLUTION INTRAVENOUS at 02:59

## 2021-10-29 ASSESSMENT — ACTIVITIES OF DAILY LIVING (ADL)
ADLS_ACUITY_SCORE: 10
ADLS_ACUITY_SCORE: 7
ADLS_ACUITY_SCORE: 6
ADLS_ACUITY_SCORE: 7
ADLS_ACUITY_SCORE: 10
ADLS_ACUITY_SCORE: 10
ADLS_ACUITY_SCORE: 7
ADLS_ACUITY_SCORE: 7
ADLS_ACUITY_SCORE: 6
ADLS_ACUITY_SCORE: 6
ADLS_ACUITY_SCORE: 10
ADLS_ACUITY_SCORE: 10
ADLS_ACUITY_SCORE: 6
ADLS_ACUITY_SCORE: 7
ADLS_ACUITY_SCORE: 6
ADLS_ACUITY_SCORE: 7
ADLS_ACUITY_SCORE: 10
ADLS_ACUITY_SCORE: 6
ADLS_ACUITY_SCORE: 6
ADLS_ACUITY_SCORE: 10
ADLS_ACUITY_SCORE: 6
ADLS_ACUITY_SCORE: 10
ADLS_ACUITY_SCORE: 10
ADLS_ACUITY_SCORE: 7

## 2021-10-29 NOTE — PROGRESS NOTES
"[facility] Progress Note           Assessment & Plan         Liborio Barajas is a 56 year old male admitted on 10/1/2021. He presented with shortness of breath and was found to be COVID positive.     Confirmed COVID-19 infection    Acute Hypoxic Respiratory Failure secondary to COVID-19 infection  Viral Pneumonia secondary to COVID-19 infection     Symptom Onset 9/22/2021    Date of 1st Positive Test 10/01/2021      Vaccination Status Not Vaccinated, but interested/contemplative            Initial CT chest 10/1/2021 demonstrated extensive bilateral groundglass consolidation consistent with pneumonia, and was negative for pulmonary embolism.  The patient required transfer to intensive care unit immediately after admission, where he has remained.  For more than a week, the patient had been alternating between HFNC oxygen and BiPAP.  Since the time of admission, we had not been able to make any significant reductions in HFNC flow or in FiO2.  Since the patient appeared \"stuck\" on high flow nasal cannula and/or BiPAP therapy with high FiO2 for more than 2 weeks, he and I discussed risk versus benefit of beginning empiric systemic steroids for what could be inflammatory pneumonitis following COVID-19 pneumonia.  He agreed to the use of methylprednisolone 60 mg daily, started 10/20/2021 and appeared to improve significantly after starting this.     BiPAP was discontinued as of 10/22/2021. We were able to decrease HFNC flow and FiO2 over the next few days.  As of  10/26/2021 patient was running 7-10L by facemask, then 5-7L 10/28.   and 5L NC 10/29.  Stopped methylprednisolone and switched to prednisone 40 mg 10/29 with plan for slow taper from there.  Rest dyspnea is improving.  His ability to participate in PT is improving but still weak and drops sats with activity.        - Oxygen: Continue oxygen either via simple mask or nasal cannula, titrated to keep saturation 90% or better.  stepped down to med-surg " status 10/27.  down to 5L 10/29.    - Labs: Markers of severity were followed earlier in the hospital stay, with trend toward improvement.  Daily monitoring has been discontinued.  - Imaging: Chest CT 10/10/2021 continued to demonstrate extensive bilateral infiltrates, and was again negative for pulmonary embolism.  Chest x-ray 10/18/2021 again showed extensive patchy opacities throughout both lungs.  Breathing treatments: Albuterol HFA 2 puffs every 4 hours while awake was started on 10/19/2021 due to wheezing.  Now that the patient is recovered COVID-19 status, nebulized medications could be used if needed.    IV fluids: None indicated; oral intake is adequate.  Antibiotics: Not indicated.   Corticosteroids: Methylprednisolone 60 mg IV every 24 hours started 10/20/2021 for what could possibly represent inflammatory pneumonitis following COVID-19 pneumonia.  switched to prednisone 40 mg 10/29, plan to taper from there.  COVID-Focused Medications:    - Dexamethasone was given 10/1 - 10/10/2021.   - Remdesivir was given 10/1 - 10/5/2021.   - Tocilizumab 700 mg IV given 10/2/2021.  - DVT Prophylaxis: on apixaban for treatment of aortic and right lower extremity thrombus, see below.     Infrarenal Aortic Thrombus with thromboembolic obstruction of right tibial-peroneal trunk    Right foot pain and diminished RLE pulses noted 10/11/2021. CTA abdomen and pelvis revealed infrarenal aortic wall adherent clot, and apparent thromboembolic obstruction of right tibial-peroneal trunk with distal reconstitution of tibial and peroneal arteries.  Findings were discussed with vascular surgery [facility], who advised that COVID-related arterial thrombectomies frequently reclot, so surgery was not recommended, and that increased heparin resistance was seen in these patients, so anticoagulation with argatroban was recommended.  Argatroban infusion given 10/11 - 10/16/2021, after which the patient was transitioned to apixaban, starting at 10  "mg p.o. twice daily for the first week (first dose 10/17/2021), then dose was decreased to 5 mg daily.  Right foot no longer cold, dorsalis pedal pulses palpable, though he still has an occasional \"ache\" in the right foot, not severe enough to require medication, and steadily improving.  -Continue apixaban.     Anxiety    Patient had been having severe subjective dyspnea only partially and briefly relieved by the use of morphine IV and lorazepam IV.  We started morphine sulfate 5 mg p.o. every 2 hours as needed dyspnea on 10/20/2021, and added lorazepam 0.5 mg p.o. every 4 hours as needed on 10/22/2021.  Control of anxiety has steadily improved as his dyspnea has improved.  He has not required morphine sulfate solution since 10/22/2021, so I am going to discontinue it.  Lorazepam use has also decreased over the last couple of days.  He is generally requiring a single dose near bedtime.  -We will discontinue morphine sulfate solution.  -Continue lorazepam 0.5 mg p.o. every 4 hours as needed for anxiety, which she is currently primarily taking as an at bedtime dose.  -We initiated citalopram 20 mg every day  On 10/13/2021, knowing that this will take some time to reach effect.   Patient seems improved with this.       Hyponatremia   Present on admission. Probably related to volume depletion.  Sodium normalized without specific therapy.  -Resolved.     Acute kidney injury on admission  GFR on admission was 32.  The patient was probably volume depleted on admission.  With volume correction, will function has been normal since approximately 10/5/2021.   -Resolved.     Transaminitis  Mild elevation of AST and ALT were noted on admission, likely due to COVID-19 infection and/or remdesivir.  Transaminases normalized as of 10/18/2021, but have increased in the interval between 10/19/2021:   ALT 41 --> 196 --> 246 --> 233 --> 311  AST 32 --> 47 --> 38  -->40 --> 61  Etiology unclear, though this can be seen during TPN " therapy.  Follow for now.       Leukocytosis  WBC elevated 10/19/2021, though has been roughly stable since that time.  The elevation correlates roughly with starting methylprednisolone.  He is afebrile, and there is no clinical evidence of underlying infection.  WBC was again normal as of 10/23/2021.  -Resolved.     Hypertension  Managed prior to admission with lisinopril 10 mg daily.  Lisinopril was resumed here initially at 5 mg daily, then increased to 10 mg daily 10/21/2021 due to hypertension.  Blood pressure control has been generally good.    -Continue lisinopril 10 mg daily.     GERD  Exacerbated since methylprednisolone was started.  -Continue omeprazole.     Malnutrition:  - Level of malnutrition: Severe   - Based on: weight loss, reduced intake - see nutrition notes - notable weight loss and intake remained poor.  Patient declined placement of NG feeding tube.   A PICC line was placed, and TPN started on 10/18/2021.  His intake of food is improving per patient but not notably changed from nursing/nutrition report.  Not yet adequate to discontinue TPN.  Plan for calorie counting over the weekend with nursing to encourage at minimum taking his boost with each meal and nutrition will reassess on Monday for stopping TPN.       DVT Prophylaxis: Apixaban.   Neal Catheter: Not present  Central Lines: PICC placed 10/18/2021 for TPN.  Code Status:  Full.       Diet  Orders Placed This Encounter      Combination Diet Regular Diet Adult  on TPN as above                  Disposition  Anticipate at least 3 more days inpatient.             Interval History:   No new concerns. Continued poor intake, but no nausea or vomiting.  No pain.  Breathing and strength continue to slowly improve.              Review of Systems:    ROS: 10 point ROS neg other than the symptoms noted above in the HPI.           Medications:   Current active medications and PTA medications reviewed, see medication list for details.             Physical Exam:   Vitals were reviewed  Patient Vitals for the past 24 hrs:   BP Temp Temp src Pulse Resp SpO2   10/29/21 0900 -- -- -- 102 20 92 %   10/29/21 0727 126/67 97.7  F (36.5  C) Oral 97 18 94 %   10/29/21 0539 -- -- -- -- -- 94 %   10/29/21 0224 133/67 98.7  F (37.1  C) Oral 110 20 91 %   10/28/21 1945 128/70 -- -- -- -- 92 %   10/28/21 1944 128/70 98.5  F (36.9  C) Axillary 115 20 92 %   10/28/21 1629 -- -- -- -- -- 95 %   10/28/21 1600 -- -- -- -- -- 96 %   10/28/21 1436 113/68 98.8  F (37.1  C) Axillary 104 20 95 %   10/28/21 1400 -- -- -- -- -- 97 %   10/28/21 1200 -- -- -- -- -- (!) 84 %       Temperatures:  Current - Temp: 97.7  F (36.5  C); Max - Temp  Av.4  F (36.9  C)  Min: 97.7  F (36.5  C)  Max: 98.8  F (37.1  C)  Respiration range: Resp  Av.6  Min: 18  Max: 20  Pulse range: Pulse  Av.6  Min: 97  Max: 115  Blood pressure range: Systolic (24hrs), Av , Min:113 , Max:133   ; Diastolic (24hrs), Av, Min:67, Max:70    Pulse oximetry range: SpO2  Av.9 %  Min: 84 %  Max: 97 %  I/O last 3 completed shifts:  In: 250 [P.O.:250]  Out: 1825 [Urine:1825]    Intake/Output Summary (Last 24 hours) at 10/29/2021 1037  Last data filed at 10/29/2021 0820  Gross per 24 hour   Intake 486 ml   Output 1875 ml   Net -1389 ml     EXAM:  General: awake and alert, NAD, oriented x 3  Head: normocephalic  Neck: unremarkable, no lymphadenopathy   HEENT: oropharynx pink and moist    Heart: Regular rate and rhythm, no murmurs, rubs, or gallops  Lungs: clear to auscultation bilaterally with good air movement throughout  Abdomen: soft, non-tender, no masses or organomegaly  Extremities: no edema in lower extremities   Skin unremarkable.               Data:     Results for orders placed or performed during the hospital encounter of 10/01/21 (from the past 24 hour(s))   Glucose by meter   Result Value Ref Range    GLUCOSE BY METER POCT 107 (H) 70 - 99 mg/dL   Glucose by meter   Result Value Ref Range     GLUCOSE BY METER POCT 138 (H) 70 - 99 mg/dL   Glucose by meter   Result Value Ref Range    GLUCOSE BY METER POCT 197 (H) 70 - 99 mg/dL   Glucose by meter   Result Value Ref Range    GLUCOSE BY METER POCT 197 (H) 70 - 99 mg/dL   Glucose by meter   Result Value Ref Range    GLUCOSE BY METER POCT 197 (H) 70 - 99 mg/dL   Magnesium   Result Value Ref Range    Magnesium     Phosphorus   Result Value Ref Range    Phosphorus 3.3 2.5 - 4.5 mg/dL   Comprehensive metabolic panel   Result Value Ref Range    Sodium 136 133 - 144 mmol/L    Potassium 4.1 3.4 - 5.3 mmol/L    Chloride 102 94 - 109 mmol/L    Carbon Dioxide (CO2) 27 20 - 32 mmol/L    Anion Gap 7 3 - 14 mmol/L    Urea Nitrogen 26 7 - 30 mg/dL    Creatinine 0.68 0.66 - 1.25 mg/dL    Calcium 9.1 8.5 - 10.1 mg/dL    Glucose 111 (H) 70 - 99 mg/dL    Alkaline Phosphatase 92 40 - 150 U/L    AST 61 (H) 0 - 45 U/L     (H) 0 - 70 U/L    Protein Total 6.6 (L) 6.8 - 8.8 g/dL    Albumin 2.3 (L) 3.4 - 5.0 g/dL    Bilirubin Total 0.3 0.2 - 1.3 mg/dL    GFR Estimate >90 >60 mL/min/1.73m2   CBC with platelets   Result Value Ref Range    WBC Count 8.9 4.0 - 11.0 10e3/uL    RBC Count 4.36 (L) 4.40 - 5.90 10e6/uL    Hemoglobin 12.9 (L) 13.3 - 17.7 g/dL    Hematocrit 37.6 (L) 40.0 - 53.0 %    MCV 86 78 - 100 fL    MCH 29.6 26.5 - 33.0 pg    MCHC 34.3 31.5 - 36.5 g/dL    RDW 12.9 10.0 - 15.0 %    Platelet Count 393 150 - 450 10e3/uL   Magnesium   Result Value Ref Range    Magnesium 2.2 1.6 - 2.3 mg/dL   Glucose by meter   Result Value Ref Range    GLUCOSE BY METER POCT 111 (H) 70 - 99 mg/dL           Attestation:  I have reviewed today's vital signs, notes, medications, labs and imaging.  Amount of time spent in direct patient care: 35 minutes.     Go Zimmer MD, MD

## 2021-10-29 NOTE — PROGRESS NOTES
Care Management Follow Up    Length of Stay (days): 28    Expected Discharge Date: 11/01/2021     Concerns to be Addressed:     Discharge planning  Patient plan of care discussed at interdisciplinary rounds: Yes    Anticipated Discharge Disposition:  Home with new O2     Anticipated Discharge Services:  NA  Anticipated Discharge DME:  Oxygen    Patient/family educated on Medicare website which has current facility and service quality ratings:  No  Education Provided on the Discharge Plan:  Yes  Patient/Family in Agreement with the Plan:  Yes    Referrals Placed by CM/SW:   & RT supervisor for assistance with home oxygen coverage    Private pay costs discussed: NA; but will have to pay for cost of prescription medications at discharge.  If expensive, contact Eda PANG, Supervisor, for [facility] [employer]    Additional Information:  Per IDT rounds today, MD team states that pt is not medically stable for discharge today, but anticipates he will be able to leave in 2-4 days.    Pt lacks insurance.  Medical team is aware that pt must be at a safe point to discharge to home (can have oxygen) with 24/7 family support, as Chikis works from home & will be with the pt 24/7.  Household already has a walker with seat, a bedside commode, a shower chair with movable shower head, commode and cane as needed for the pt to return home.    ARCELIA learned today that [facility] will work with [employer] billing to cover the cost of pt's home oxygen needs until pt receives his MNSure on 1-1-2022.      Arcelia met with pt & then called wife to educate on this topic.  Pt & wife both expressed appreciation for this assistance.      Ffamily to transport at time of discharge.      SERA Crump

## 2021-10-29 NOTE — PLAN OF CARE
"Patient alert, oriented.   TPN infusing and oral intake frequently encouraged, however patient states he has no appetite. Denies pain, nausea. Had small BM this morning, but admits to feeling \"full.\"  Patient given senna and miralax with ensure. Strongly encouraged to drink ensure or any oral intake he is able to. Calorie count initiated this shift and will continue through the weekend.    Oxygen sats maintain 92-96% on 4L per NC most of shift while at rest. Desats to upper 80s with activity but recovers. Physical therapy reported patient required 5L to maintain >90% during session. Patient returned to 4L and sats 94% at this time.  "

## 2021-10-29 NOTE — PROGRESS NOTES
Pt moved to [address] with all belongings. VSS. Transport on 4L O2 with stable sats. Report given to Breanna DENISE.

## 2021-10-29 NOTE — PROGRESS NOTES
No changes to report overnight. Pt denies any pain.    Continues to be on 4l NC.  Using bedside urinal.  TPN and lipids infusing into right PICC.

## 2021-10-29 NOTE — PROGRESS NOTES
CLINICAL NUTRITION SERVICES - BRIEF NOTE      Discussed plan of care during rounds. Patient should be here through the weekend, and oral intake remains poor. He doesn't seem motivated to eat. Provider would like to discontinue TPN as soon as possible.    To help w/ the process of weaning TPN, we will start kcal counts at lunch today 10/29 thru 10/31. Writer and MD asking nursing to encourage patient to drink 100% of Ensure w/ all meals.    Nursing will keep records in the patients room. Writer will obtain records when back on-site Monday 11/1.    Bertha Velez RDN, LD  Clinical Dietitian  Office (Monday-Friday): [phone]  Weekend/Holiday Pager: [phone]     No

## 2021-10-30 ENCOUNTER — APPOINTMENT (OUTPATIENT)
Dept: PHYSICAL THERAPY | Facility: CLINIC | Age: 56
DRG: 177 | End: 2021-10-30
Payer: OTHER GOVERNMENT

## 2021-10-30 ENCOUNTER — APPOINTMENT (OUTPATIENT)
Dept: OCCUPATIONAL THERAPY | Facility: CLINIC | Age: 56
DRG: 177 | End: 2021-10-30
Payer: OTHER GOVERNMENT

## 2021-10-30 LAB
ALBUMIN SERPL-MCNC: 2.2 G/DL (ref 3.4–5)
ALP SERPL-CCNC: 86 U/L (ref 40–150)
ALT SERPL W P-5'-P-CCNC: 332 U/L (ref 0–70)
ANION GAP SERPL CALCULATED.3IONS-SCNC: 6 MMOL/L (ref 3–14)
AST SERPL W P-5'-P-CCNC: 59 U/L (ref 0–45)
BILIRUB SERPL-MCNC: 0.3 MG/DL (ref 0.2–1.3)
BUN SERPL-MCNC: 29 MG/DL (ref 7–30)
CALCIUM SERPL-MCNC: 8.7 MG/DL (ref 8.5–10.1)
CHLORIDE BLD-SCNC: 102 MMOL/L (ref 94–109)
CO2 SERPL-SCNC: 28 MMOL/L (ref 20–32)
CREAT SERPL-MCNC: 0.69 MG/DL (ref 0.66–1.25)
ERYTHROCYTE [DISTWIDTH] IN BLOOD BY AUTOMATED COUNT: 13.2 % (ref 10–15)
GFR SERPL CREATININE-BSD FRML MDRD: >90 ML/MIN/1.73M2
GLUCOSE BLD-MCNC: 110 MG/DL (ref 70–99)
GLUCOSE BLDC GLUCOMTR-MCNC: 113 MG/DL (ref 70–99)
HCT VFR BLD AUTO: 37.4 % (ref 40–53)
HGB BLD-MCNC: 12.5 G/DL (ref 13.3–17.7)
MAGNESIUM SERPL-MCNC: 2.3 MG/DL (ref 1.6–2.3)
MCH RBC QN AUTO: 29.1 PG (ref 26.5–33)
MCHC RBC AUTO-ENTMCNC: 33.4 G/DL (ref 31.5–36.5)
MCV RBC AUTO: 87 FL (ref 78–100)
PHOSPHATE SERPL-MCNC: 3.4 MG/DL (ref 2.5–4.5)
PLATELET # BLD AUTO: 425 10E3/UL (ref 150–450)
POTASSIUM BLD-SCNC: 4.2 MMOL/L (ref 3.4–5.3)
PROT SERPL-MCNC: 6.3 G/DL (ref 6.8–8.8)
RBC # BLD AUTO: 4.29 10E6/UL (ref 4.4–5.9)
SODIUM SERPL-SCNC: 136 MMOL/L (ref 133–144)
WBC # BLD AUTO: 10.9 10E3/UL (ref 4–11)

## 2021-10-30 PROCEDURE — 250N000013 HC RX MED GY IP 250 OP 250 PS 637

## 2021-10-30 PROCEDURE — 85027 COMPLETE CBC AUTOMATED: CPT | Performed by: FAMILY MEDICINE

## 2021-10-30 PROCEDURE — 84155 ASSAY OF PROTEIN SERUM: CPT | Performed by: FAMILY MEDICINE

## 2021-10-30 PROCEDURE — 97116 GAIT TRAINING THERAPY: CPT | Mod: GP

## 2021-10-30 PROCEDURE — 250N000009 HC RX 250: Performed by: INTERNAL MEDICINE

## 2021-10-30 PROCEDURE — 250N000012 HC RX MED GY IP 250 OP 636 PS 637: Performed by: FAMILY MEDICINE

## 2021-10-30 PROCEDURE — 99232 SBSQ HOSP IP/OBS MODERATE 35: CPT | Performed by: INTERNAL MEDICINE

## 2021-10-30 PROCEDURE — 97110 THERAPEUTIC EXERCISES: CPT | Mod: GP

## 2021-10-30 PROCEDURE — 84450 TRANSFERASE (AST) (SGOT): CPT | Performed by: FAMILY MEDICINE

## 2021-10-30 PROCEDURE — 250N000009 HC RX 250: Performed by: FAMILY MEDICINE

## 2021-10-30 PROCEDURE — 97535 SELF CARE MNGMENT TRAINING: CPT | Mod: GO

## 2021-10-30 PROCEDURE — 84100 ASSAY OF PHOSPHORUS: CPT | Performed by: FAMILY MEDICINE

## 2021-10-30 PROCEDURE — 250N000013 HC RX MED GY IP 250 OP 250 PS 637: Performed by: FAMILY MEDICINE

## 2021-10-30 PROCEDURE — 120N000001 HC R&B MED SURG/OB

## 2021-10-30 PROCEDURE — 83735 ASSAY OF MAGNESIUM: CPT | Performed by: FAMILY MEDICINE

## 2021-10-30 PROCEDURE — 250N000013 HC RX MED GY IP 250 OP 250 PS 637: Performed by: HOSPITALIST

## 2021-10-30 RX ADMIN — ASCORBIC ACID, VITAMIN A PALMITATE, CHOLECALCIFEROL, THIAMINE HYDROCHLORIDE, RIBOFLAVIN-5 PHOSPHATE SODIUM, PYRIDOXINE HYDROCHLORIDE, NIACINAMIDE, DEXPANTHENOL, ALPHA-TOCOPHEROL ACETATE, VITAMIN K1, FOLIC ACID, BIOTIN, CYANOCOBALAMIN: 200; 3300; 200; 6; 3.6; 6; 40; 15; 10; 150; 600; 60; 5 INJECTION, SOLUTION INTRAVENOUS at 00:37

## 2021-10-30 RX ADMIN — ALBUTEROL SULFATE 2 PUFF: 90 AEROSOL, METERED RESPIRATORY (INHALATION) at 09:14

## 2021-10-30 RX ADMIN — PREDNISONE 40 MG: 20 TABLET ORAL at 09:15

## 2021-10-30 RX ADMIN — APIXABAN 5 MG: 5 TABLET, FILM COATED ORAL at 09:15

## 2021-10-30 RX ADMIN — CITALOPRAM HYDROBROMIDE 20 MG: 20 TABLET ORAL at 09:14

## 2021-10-30 RX ADMIN — ASCORBIC ACID, VITAMIN A PALMITATE, CHOLECALCIFEROL, THIAMINE HYDROCHLORIDE, RIBOFLAVIN-5 PHOSPHATE SODIUM, PYRIDOXINE HYDROCHLORIDE, NIACINAMIDE, DEXPANTHENOL, ALPHA-TOCOPHEROL ACETATE, VITAMIN K1, FOLIC ACID, BIOTIN, CYANOCOBALAMIN: 200; 3300; 200; 6; 3.6; 6; 40; 15; 10; 150; 600; 60; 5 INJECTION, SOLUTION INTRAVENOUS at 22:34

## 2021-10-30 RX ADMIN — APIXABAN 5 MG: 5 TABLET, FILM COATED ORAL at 20:31

## 2021-10-30 RX ADMIN — ALBUTEROL SULFATE 2 PUFF: 90 AEROSOL, METERED RESPIRATORY (INHALATION) at 18:53

## 2021-10-30 RX ADMIN — LISINOPRIL 10 MG: 10 TABLET ORAL at 20:31

## 2021-10-30 RX ADMIN — PANTOPRAZOLE SODIUM 40 MG: 40 TABLET, DELAYED RELEASE ORAL at 09:15

## 2021-10-30 RX ADMIN — ACETAMINOPHEN 650 MG: 325 TABLET, FILM COATED ORAL at 19:00

## 2021-10-30 ASSESSMENT — ACTIVITIES OF DAILY LIVING (ADL)
ADLS_ACUITY_SCORE: 7
ADLS_ACUITY_SCORE: 5
ADLS_ACUITY_SCORE: 7
ADLS_ACUITY_SCORE: 7
ADLS_ACUITY_SCORE: 5
ADLS_ACUITY_SCORE: 7
ADLS_ACUITY_SCORE: 5
ADLS_ACUITY_SCORE: 7
ADLS_ACUITY_SCORE: 7
ADLS_ACUITY_SCORE: 5
ADLS_ACUITY_SCORE: 7
ADLS_ACUITY_SCORE: 5
ADLS_ACUITY_SCORE: 7
ADLS_ACUITY_SCORE: 5
ADLS_ACUITY_SCORE: 7
ADLS_ACUITY_SCORE: 5
ADLS_ACUITY_SCORE: 5

## 2021-10-30 ASSESSMENT — MIFFLIN-ST. JEOR: SCORE: 1608.38

## 2021-10-30 NOTE — PROGRESS NOTES
Patient alert, oriented. Denies pain.   3 bowel movements today, up to bedside commode.  Patient oxygen sats decreased from 94% 4L to 76% during one episode up to BSC - patient's oxygen increased to 9L for several minutes to regain 92%, but was able to recover and weaned oxygen back down to 4L per NC after approx 15min.     Appetite improved today, however still needs encouragement to eat. TPN infusing as ordered.     Ambulated into nguyen this afternoon with PT, otherwise up in chair for meals and activity as tolerated rest of shift.

## 2021-10-30 NOTE — PLAN OF CARE
"A&O x4. Assist of 1 with a gait belt and IV pole. Ambulated 35 feet in the hallway today with portable oxygen, recovered within about 1 minute back to SpO2 at 90-92% on 4 L via NC. Ate a full [employer] fish sandwich  and fries for dinner. Continues on TPN and Lipids per MAR infusing to right PICC.     /70 (BP Location: Left arm)   Pulse (!) 122   Temp 97.3  F (36.3  C) (Oral)   Resp 20   Ht 1.753 m (5' 9\")   Wt 77.7 kg (171 lb 4.8 oz)   SpO2 90%   BMI 25.30 kg/m      "

## 2021-10-30 NOTE — PLAN OF CARE
"Patient alert and oriented x4. Up with assist of 1. Denies pain. Denies nausea. On 5 L oxygen while sleeping with sats >90%. Sats did decrease with exertion, but increased within a couple minutes. TPN infusing per orders. Able to make needs known.     /67 (BP Location: Left arm)   Pulse 98   Temp 98.2  F (36.8  C) (Oral)   Resp 20   Ht 1.753 m (5' 9\")   Wt 79.8 kg (175 lb 14.8 oz)   SpO2 94%   BMI 25.98 kg/m      "

## 2021-10-30 NOTE — PROGRESS NOTES
"[facility] Progress Note  Date of Service: 10/30/2021    Assessment & Plan   Liborio Barajas is a 56 year old male admitted on 10/1/2021. He presented with shortness of breath and was found to be COVID positive.     Confirmed COVID-19 infection    Acute Hypoxic Respiratory Failure secondary to COVID-19 infection  Viral Pneumonia secondary to COVID-19 infection     Symptom Onset 9/22/2021    Date of 1st Positive Test 10/01/2021      Vaccination Status Not Vaccinated, but interested/contemplative            Initial CT chest 10/1/2021 demonstrated extensive bilateral groundglass consolidation consistent with pneumonia, and was negative for pulmonary embolism.  The patient required transfer to intensive care unit immediately after admission, where he has remained.  For more than a week, the patient had been alternating between HFNC oxygen and BiPAP.  Since the time of admission, we had not been able to make any significant reductions in HFNC flow or in FiO2.  Since the patient appeared \"stuck\" on high flow nasal cannula and/or BiPAP therapy with high FiO2 for more than 2 weeks, he and I discussed risk versus benefit of beginning empiric systemic steroids for what could be inflammatory pneumonitis following COVID-19 pneumonia.  He agreed to the use of methylprednisolone 60 mg daily, started 10/20/2021 and appeared to improve significantly after starting this.     BiPAP was discontinued as of 10/22/2021. We were able to decrease HFNC flow and FiO2 over the next few days.  As of  10/26/2021 patient was running 7-10L by facemask, then 5-7L 10/28.   and 5L NC 10/29.  Stopped methylprednisolone and switched to prednisone 40 mg 10/29 with plan for slow taper from there.  Rest dyspnea is improving.  His ability to participate in PT is improving but still weak and drops sats with activity.        - Oxygen: Continue oxygen either via simple mask or nasal cannula, titrated to keep " saturation 90% or better.  stepped down to med-surg status 10/27.  down to 4-5L 10/30.    - Labs: Markers of severity were followed earlier in the hospital stay, with trend toward improvement.  Daily monitoring has been discontinued.  - Imaging: Chest CT 10/10/2021 continued to demonstrate extensive bilateral infiltrates, and was again negative for pulmonary embolism.  Chest x-ray 10/18/2021 again showed extensive patchy opacities throughout both lungs.  Breathing treatments: Albuterol HFA 2 puffs every 4 hours while awake was started on 10/19/2021 due to wheezing.  Now that the patient is recovered COVID-19 status, nebulized medications could be used if needed.    IV fluids: None indicated; oral intake is adequate.  Antibiotics: Not indicated.   Corticosteroids: Methylprednisolone 60 mg IV every 24 hours started 10/20/2021 for what could possibly represent inflammatory pneumonitis following COVID-19 pneumonia.  switched to prednisone 40 mg 10/29, plan to taper from there.  COVID-Focused Medications:    - Dexamethasone was given 10/1 - 10/10/2021.   - Remdesivir was given 10/1 - 10/5/2021.   - Tocilizumab 700 mg IV given 10/2/2021.  - DVT Prophylaxis: on apixaban for treatment of aortic and right lower extremity thrombus, see below.     Infrarenal Aortic Thrombus with thromboembolic obstruction of right tibial-peroneal trunk    Right foot pain and diminished RLE pulses noted 10/11/2021. CTA abdomen and pelvis revealed infrarenal aortic wall adherent clot, and apparent thromboembolic obstruction of right tibial-peroneal trunk with distal reconstitution of tibial and peroneal arteries.  Findings were discussed with vascular surgery [facility], who advised that COVID-related arterial thrombectomies frequently reclot, so surgery was not recommended, and that increased heparin resistance was seen in these patients, so anticoagulation with argatroban was recommended.      Argatroban infusion given 10/11 - 10/16/2021, after  which the patient was transitioned to apixaban, starting at 10 mg p.o. twice daily for the first week (first dose 10/17/2021), then dose was decreased to 5 mg daily.      Clinically improved perfusion of right foot.  -Continue apixaban.     Anxiety    Patient had been having severe subjective dyspnea only partially and briefly relieved by the use of morphine IV and lorazepam IV.  We started morphine sulfate 5 mg p.o. every 2 hours as needed dyspnea on 10/20/2021, and added lorazepam 0.5 mg p.o. every 4 hours as needed on 10/22/2021.  Control of anxiety has steadily improved as his dyspnea has improved.  He has not required morphine sulfate solution since 10/22/2021, so I am going to discontinue it.  Lorazepam use has also decreased over the last couple of days.  He is generally requiring a single dose near bedtime.  -We will discontinue morphine sulfate solution.  -Continue lorazepam 0.5 mg p.o. every 4 hours as needed for anxiety, which she is currently primarily taking as an at bedtime dose.  -We initiated citalopram 20 mg every day  On 10/13/2021, knowing that this will take some time to reach effect. Patient seems improved with this.       Hyponatremia   Present on admission. Probably related to volume depletion.  Sodium normalized without specific therapy.  -Resolved.     Acute kidney injury on admission    GFR on admission was 32.  The patient was probably volume depleted on admission.  With volume correction, will function has been normal since approximately 10/5/2021.   -Resolved.     Transaminitis    Mild elevation of AST and ALT were noted on admission, likely due to COVID-19 infection and/or remdesivir.  Transaminases normalized as of 10/18/2021, but have increased in the interval between 10/19/2021:   ALT 41 --> 196 --> 246 --> 233 --> 311  AST 32 --> 47 --> 38  -->40 --> 61  Etiology unclear, though this can be seen during TPN therapy.  Follow for now.       Hypertension    Managed prior to admission  with lisinopril 10 mg daily.  Lisinopril was resumed here initially at 5 mg daily, then increased to 10 mg daily 10/21/2021 due to hypertension.  Blood pressure control has been generally good.    -Continue lisinopril 10 mg daily.     GERD    Exacerbated since methylprednisolone was started.  -Continue omeprazole.     Malnutrition:  - Level of malnutrition: Severe   - Based on: weight loss, reduced intake - see nutrition notes - notable weight loss and intake remained poor.  Patient declined placement of NG feeding tube.   A PICC line was placed, and TPN started on 10/18/2021.  His intake of food is improving per patient but not notably changed from nursing/nutrition report.  Not yet adequate to discontinue TPN.  Plan for calorie counting over the weekend with nursing to encourage at minimum taking his boost with each meal and nutrition will reassess on Monday for stopping TPN.       DVT Prophylaxis: Apixaban.   Neal Catheter: Not present  Central Lines: PICC placed 10/18/2021 for TPN.  Code Status:  Full.  Diet  Orders Placed This Encounter      Combination Diet Regular Diet Adult  on TPN as above      Discussion: Continues gradual improvement.     Disposition Plan   Expected discharge: 2-3 days to home if O2 needs taper down to 3 lpm    Attestation:  Total time: 25 minutes    Richi Garza MD  [facility]        Interval History   Exertion still very limited by dyspnea and hypoxemia. No right foot pain. Concern for small bruising right great toe. No chest pain.     Physical Exam   Temp:  [97.3  F (36.3  C)-98.2  F (36.8  C)] 97.7  F (36.5  C)  Pulse:  [] 122  Resp:  [20-22] 20  BP: (108-123)/(62-70) 121/68  SpO2:  [90 %-96 %] 95 %    Weights:   Vitals:    10/24/21 0530 10/27/21 0418 10/30/21 0546   Weight: 79.5 kg (175 lb 4.3 oz) 77.7 kg (171 lb 4.8 oz) 79.8 kg (175 lb 14.8 oz)    Body mass index is 25.98 kg/m .    Constitutional: alert and oriented, mild increased work of breathing with talking,  nontoxic  CV: Regular  Respiratory: a few scattered crackles bases right > left otherwise CTA bilaterally  GI: Soft, non-tender, bowel sounds normal  Skin: Warm and dry  Musculoskeletal: small/slight area of ecchymosis medial to the toenail on right great toe does not appear ischemic. Good cap refill right toes, foot warm    Data   Recent Labs   Lab 10/30/21  0742 10/30/21  0543 10/29/21  0805 10/29/21  0607 10/29/21  0001 10/28/21  1206 10/28/21  0532 10/25/21  0637 10/25/21  0521   WBC  --  10.9  --  8.9  --   --  6.9  --   --    HGB  --  12.5*  --  12.9*  --   --  13.6  --   --    MCV  --  87  --  86  --   --  86  --   --    PLT  --  425  --  393  --   --  402  --   --    INR  --   --   --   --   --   --   --   --  1.26*   NA  --  136  --  136  --   --  136   < >  --    POTASSIUM  --  4.2  --  4.1  --   --  4.2   < >  --    CHLORIDE  --  102  --  102  --   --  102   < >  --    CO2  --  28  --  27  --   --  28   < >  --    BUN  --  29  --  26  --   --  25   < >  --    CR  --  0.69  --  0.68  --   --  0.68   < >  --    ANIONGAP  --  6  --  7  --   --  6   < >  --    JANET  --  8.7  --  9.1  --   --  9.1   < >  --    * 110* 111* 111*   < >   < > 91   < >  --    ALBUMIN  --  2.2*  --  2.3*  --   --  2.5*   < >  --    PROTTOTAL  --  6.3*  --  6.6*  --   --  7.2   < >  --    BILITOTAL  --  0.3  --  0.3  --   --  0.4   < >  --    ALKPHOS  --  86  --  92  --   --  95   < >  --    ALT  --  332*  --  311*  --   --  233*   < >  --    AST  --  59*  --  61*  --   --  40   < >  --     < > = values in this interval not displayed.       Recent Labs   Lab 10/30/21  0742 10/30/21  0543 10/29/21  0805 10/29/21  0607 10/29/21  0001 10/28/21  1715   * 110* 111* 111* 197*  197*  197* 138*        Unresulted Labs Ordered in the Past 30 Days of this Admission     No orders found from 9/1/2021 to 10/2/2021.           Imaging: No results found for this or any previous visit (from the past 24 hour(s)).     I reviewed all new  labs and imaging results over the last 24 hours. I personally reviewed no images or EKG's today.    Medications     dextrose       - MEDICATION INSTRUCTIONS -       parenteral nutrition - Clinimix E 90 mL/hr at 10/30/21 0544       albuterol  2 puff Inhalation Q4H While awake     apixaban ANTICOAGULANT  5 mg Oral BID     citalopram  20 mg Oral Daily     lipids  250 mL Intravenous Once per day on Mon Tue Wed Thu Fri    And     - MEDICATION INSTRUCTIONS -   Does not apply Q24H     influenza recomb quadrivalent PF  0.5 mL Intramuscular Prior to discharge     lisinopril  10 mg Oral Daily     miconazole   Topical BID     pantoprazole  40 mg Oral QAM     predniSONE  40 mg Oral Daily     sodium chloride (PF)  3 mL Intracatheter Q8H   Reviewed joby Garza MD  [facility]

## 2021-10-31 ENCOUNTER — APPOINTMENT (OUTPATIENT)
Dept: PHYSICAL THERAPY | Facility: CLINIC | Age: 56
DRG: 177 | End: 2021-10-31
Payer: OTHER GOVERNMENT

## 2021-10-31 ENCOUNTER — APPOINTMENT (OUTPATIENT)
Dept: OCCUPATIONAL THERAPY | Facility: CLINIC | Age: 56
DRG: 177 | End: 2021-10-31
Payer: OTHER GOVERNMENT

## 2021-10-31 LAB — GLUCOSE BLDC GLUCOMTR-MCNC: 102 MG/DL (ref 70–99)

## 2021-10-31 PROCEDURE — 97110 THERAPEUTIC EXERCISES: CPT | Mod: GP

## 2021-10-31 PROCEDURE — 99233 SBSQ HOSP IP/OBS HIGH 50: CPT | Performed by: INTERNAL MEDICINE

## 2021-10-31 PROCEDURE — 120N000001 HC R&B MED SURG/OB

## 2021-10-31 PROCEDURE — 97535 SELF CARE MNGMENT TRAINING: CPT | Mod: GO

## 2021-10-31 PROCEDURE — 97116 GAIT TRAINING THERAPY: CPT | Mod: GP

## 2021-10-31 PROCEDURE — 250N000012 HC RX MED GY IP 250 OP 636 PS 637: Performed by: FAMILY MEDICINE

## 2021-10-31 PROCEDURE — 250N000013 HC RX MED GY IP 250 OP 250 PS 637: Performed by: INTERNAL MEDICINE

## 2021-10-31 PROCEDURE — 250N000013 HC RX MED GY IP 250 OP 250 PS 637: Performed by: FAMILY MEDICINE

## 2021-10-31 PROCEDURE — 250N000013 HC RX MED GY IP 250 OP 250 PS 637: Performed by: HOSPITALIST

## 2021-10-31 PROCEDURE — 250N000013 HC RX MED GY IP 250 OP 250 PS 637

## 2021-10-31 RX ORDER — CARBOXYMETHYLCELLULOSE SODIUM 5 MG/ML
1 SOLUTION/ DROPS OPHTHALMIC
Status: DISCONTINUED | OUTPATIENT
Start: 2021-10-31 | End: 2021-10-31

## 2021-10-31 RX ORDER — DIPHENHYDRAMINE HYDROCHLORIDE AND LIDOCAINE HYDROCHLORIDE AND ALUMINUM HYDROXIDE AND MAGNESIUM HYDRO
10 KIT EVERY 6 HOURS PRN
Status: DISCONTINUED | OUTPATIENT
Start: 2021-10-31 | End: 2021-11-01 | Stop reason: HOSPADM

## 2021-10-31 RX ADMIN — DIPHENHYDRAMINE HYDROCHLORIDE AND LIDOCAINE HYDROCHLORIDE AND ALUMINUM HYDROXIDE AND MAGNESIUM HYDRO 10 ML: KIT at 20:14

## 2021-10-31 RX ADMIN — CITALOPRAM HYDROBROMIDE 20 MG: 20 TABLET ORAL at 09:35

## 2021-10-31 RX ADMIN — PREDNISONE 40 MG: 20 TABLET ORAL at 09:35

## 2021-10-31 RX ADMIN — Medication 1 DROP: at 16:19

## 2021-10-31 RX ADMIN — LISINOPRIL 10 MG: 10 TABLET ORAL at 20:07

## 2021-10-31 RX ADMIN — APIXABAN 5 MG: 5 TABLET, FILM COATED ORAL at 09:35

## 2021-10-31 RX ADMIN — ALBUTEROL SULFATE 2 PUFF: 90 AEROSOL, METERED RESPIRATORY (INHALATION) at 14:24

## 2021-10-31 RX ADMIN — ACETAMINOPHEN 650 MG: 325 TABLET, FILM COATED ORAL at 20:07

## 2021-10-31 RX ADMIN — ALBUTEROL SULFATE 2 PUFF: 90 AEROSOL, METERED RESPIRATORY (INHALATION) at 10:15

## 2021-10-31 RX ADMIN — Medication: at 08:30

## 2021-10-31 RX ADMIN — ALBUTEROL SULFATE 2 PUFF: 90 AEROSOL, METERED RESPIRATORY (INHALATION) at 21:24

## 2021-10-31 RX ADMIN — APIXABAN 5 MG: 5 TABLET, FILM COATED ORAL at 20:07

## 2021-10-31 RX ADMIN — PANTOPRAZOLE SODIUM 40 MG: 40 TABLET, DELAYED RELEASE ORAL at 09:35

## 2021-10-31 ASSESSMENT — ACTIVITIES OF DAILY LIVING (ADL)
ADLS_ACUITY_SCORE: 9
ADLS_ACUITY_SCORE: 5
ADLS_ACUITY_SCORE: 9

## 2021-10-31 ASSESSMENT — MIFFLIN-ST. JEOR: SCORE: 1639.38

## 2021-10-31 NOTE — PROGRESS NOTES
Care Management Follow Up    Length of Stay (days): 30    Expected Discharge Date: 11/02/2021     Concerns to be Addressed: discharge planning     Patient plan of care discussed at interdisciplinary rounds: Yes    Anticipated Discharge Disposition: Acute Rehab, Home     Anticipated Discharge Services: None  Anticipated Discharge DME: Oxygen    Patient/family educated on Medicare website which has current facility and service quality ratings:  yes    Referrals Placed by CM/SW: Post Acute Facilities  Private pay costs discussed: none    Additional Information:  Met with patient at bedside, discussed discharge plans.  Discussed therapy recommendations.  Discussed acute rehab and recommendations for ongoing strengthening and conditioning.  Discussed known barrier of no active insurance.  Patient is hopeful he will regain function to return home safely with his wife's assistance.  Patient mentions he could hire a friend/private pay home care to help at home.  He is interested in exploring whether he would meet criteria for ARU and cost breakdown if so.      Spoke with CAMELIA Gentile in admissions @ ARU ([phone]).  Discussed current clinical status, oxygen and therapy needs.  Seferino states patient WOULD benefit from ARU, however private pay cost is approximately $3000/day.  If patient is wanting to move forward with possible acceptance, ARU staff would have financial counseling follow up to give full estimate and work with patient on next steps.  Seferino suggests private pay TCU or home w/ good family support for ongoing strengthening and endurance may be a good alternative if patient does not want to take on financial burden.  CM team to discuss further with patient 11/1.      TPN being weaned today, as patient is improved his oral intake.  Anticipate patient will likely be ready for discharge early this week.  CM team to continue to work with patient on next steps.      Discussed need for follow up with PCP at discharge.   Patient would like to change PCP and establish primary care with [facility].  [facility] task initiated to proactively make appointment following discharge.      PLAN:  KWADWO Baker MSN, RN  Inpatient Care Coordinator  [facility] [phone]  [facility] [phone]

## 2021-10-31 NOTE — PLAN OF CARE
"Pt is A/O, able to make needs known. Blood pressure 121/63, pulse 91, temperature 97.6  F (36.4  C), temperature source Oral, resp. rate 17, height 1.753 m (5' 9\"), weight 79.8 kg (175 lb 14.8 oz), SpO2 94 %. Currently stable on 4 L of supplemental oxygen. Using urinal to void this shift. Resting appropriately. Denies pain. Continuous TPN. Working with PT/OT on strengthening. Ambulating longer distances. Tolerating increased PO intake without nausea or emesis. Calorie counts continue. Continue to assess per plan of care.    "

## 2021-10-31 NOTE — PROGRESS NOTES
Patient reports increasing appetite. Ate eggs, sausage and pancakes for breakfast. Full turkey sandwich ans sam salad for lunch. Calorie count in place.   Complaint of mouth sore irritation interfered with sleep last night and at times with eating.  Patient provided/encouraged to do salt water rinses and reports this is somewhat helpful - will update provider.    Oxygen 4L per oxygen with sats >92% at rest, desats with activity and rebounds after several minutes.    Incentive spirometry encouraged and patient using independently.

## 2021-10-31 NOTE — PLAN OF CARE
"A&O x4. Assist of 1 with walker and gait belt. 3L via Nasal cannula with humidification, SpO2 maintaining at 93-94%. Tylenol given once for headache with relief.     BP (!) (P) 146/76 (BP Location: Left arm)   Pulse (P) 100   Temp 98.2  F (36.8  C) (Oral)   Resp (P) 20   Ht 1.753 m (5' 9\")   Wt 79.8 kg (175 lb 14.8 oz)   SpO2 (P) 94%   BMI 25.98 kg/m      "

## 2021-10-31 NOTE — PROGRESS NOTES
"[facility] Medicine Progress Note  Date of Service: 10/31/2021    Assessment & Plan   Liborio Barajas is a 56 year old male admitted on 10/1/2021. He presented with shortness of breath and was found to be COVID positive.     Confirmed COVID-19 infection    Acute Hypoxic Respiratory Failure secondary to COVID-19 infection  Viral Pneumonia secondary to COVID-19 infection     Symptom Onset 9/22/2021    Date of 1st Positive Test 10/01/2021      Vaccination Status Not Vaccinated, but interested/contemplative              Initial CT chest 10/1/2021 demonstrated extensive bilateral groundglass consolidation consistent with pneumonia, and was negative for pulmonary embolism.  The patient required transfer to intensive care unit immediately after admission, where he has remained.  For more than a week, the patient had been alternating between HFNC oxygen and BiPAP.  Since the time of admission, we had not been able to make any significant reductions in HFNC flow or in FiO2.  Since the patient appeared \"stuck\" on high flow nasal cannula and/or BiPAP therapy with high FiO2 for more than 2 weeks, he and I discussed risk versus benefit of beginning empiric systemic steroids for what could be inflammatory pneumonitis following COVID-19 pneumonia.  He agreed to the use of methylprednisolone 60 mg daily, started 10/20/2021 and appeared to improve significantly after starting this.       BiPAP was discontinued as of 10/22/2021. We were able to decrease HFNC flow and FiO2 over the next few days.  As of  10/26/2021 patient was running 7-10L by facemask, then 5-7L 10/28, and 5L NC 10/29.  Stopped methylprednisolone and switched to prednisone 40 mg 10/29 with plan for slow taper from there, perhaps taper each week by 10 mg/day.  Rest dyspnea is improving.  His ability to participate in PT is improving but still weak and drops sats with activity.      Hope to discharge when O2 needs stable 4L or " less. He may be there if there is patience to let him recover after exertion but patient gets anxious. Given reassurance today.       - Oxygen: Continue oxygen either via simple mask or nasal cannula, titrated to keep saturation 90% or better.  Stepped down to med-surg status 10/27. SpO2 drops with activity and slowly recovers.   - Labs: Markers of severity were followed earlier in the hospital stay, with trend toward improvement.  Daily monitoring has been discontinued.  - Imaging: Chest CT 10/10/2021 continued to demonstrate extensive bilateral infiltrates, and was again negative for pulmonary embolism.  Chest x-ray 10/18/2021 again showed extensive patchy opacities throughout both lungs.  Breathing treatments: Albuterol HFA 2 puffs every 4 hours while awake was started on 10/19/2021 due to wheezing.  Now that the patient is recovered COVID-19 status, nebulized medications could be used if needed.    IV fluids: None indicated; oral intake is adequate.  Antibiotics: Not indicated.   Corticosteroids: Methylprednisolone 60 mg IV every 24 hours started 10/20/2021 for what could possibly represent inflammatory pneumonitis following COVID-19 pneumonia.  switched to prednisone 40 mg 10/29, plan to taper from there.  COVID-Focused Medications:    - Dexamethasone was given 10/1 - 10/10/2021.   - Remdesivir was given 10/1 - 10/5/2021.   - Tocilizumab 700 mg IV given 10/2/2021.  - DVT Prophylaxis: on apixaban for treatment of aortic and right lower extremity thrombus, see below.     Infrarenal Aortic Thrombus with thromboembolic obstruction of right tibial-peroneal trunk    Right foot pain and diminished RLE pulses noted 10/11/2021. CTA abdomen and pelvis revealed infrarenal aortic wall adherent clot, and apparent thromboembolic obstruction of right tibial-peroneal trunk with distal reconstitution of tibial and peroneal arteries.  Findings were discussed with vascular surgery [facility], who advised that COVID-related arterial  thrombectomies frequently reclot, so surgery was not recommended, and that increased heparin resistance was seen in these patients, so anticoagulation with argatroban was recommended.      Argatroban infusion given 10/11 - 10/16/2021, after which the patient was transitioned to apixaban, starting at 10 mg p.o. twice daily for the first week (first dose 10/17/2021), then dose was decreased to 5 mg daily.      Clinically improved perfusion of right foot.  -Continue apixaban, unclear how long to continue but would consider at least 3-6 months.     Anxiety    Patient had been having severe subjective dyspnea only partially and briefly relieved by the use of morphine IV and lorazepam IV.  We started morphine sulfate 5 mg p.o. every 2 hours as needed dyspnea on 10/20/2021, and added lorazepam 0.5 mg p.o. every 4 hours as needed on 10/22/2021.  Control of anxiety has steadily improved as his dyspnea has improved.  He has not required morphine sulfate solution since 10/22/2021, so I am going to discontinue it.  Lorazepam use has also decreased over the last couple of days.  He is generally requiring a single dose near bedtime. Discontinued morphine sulfate solution.  -Continue lorazepam 0.5 mg p.o. every 4 hours as needed for anxiety, which he is currently primarily taking as an at bedtime dose.  -We initiated citalopram 20 mg every day  On 10/13/2021, knowing that this will take some time to reach effect. Patient seems improved with this.       Hyponatremia   Present on admission. Probably related to volume depletion.  Sodium normalized without specific therapy.  -Resolved.     Acute kidney injury on admission    GFR on admission was 32.  The patient was probably volume depleted on admission.  With volume correction, will function has been normal since approximately 10/5/2021.   -Resolved.     Transaminitis    Mild elevation of AST and ALT were noted on admission, likely due to COVID-19 infection and/or remdesivir.   Transaminases normalized as of 10/18/2021, but have increased in the interval between 10/19/2021:   ALT 41 --> 196 --> 246 --> 233 --> 311 --> 332  AST 32 --> 47 --> 38  -->40 --> 61  Etiology unclear, though this can be seen during TPN therapy.  Stopping TPN 10/31      Hypertension    Managed prior to admission with lisinopril 10 mg daily.  Lisinopril was resumed here initially at 5 mg daily, then increased to 10 mg daily 10/21/2021 due to hypertension.  Blood pressure control has been generally good.    -Continue lisinopril 10 mg daily.     GERD    Exacerbated since methylprednisolone was started.  -Continue omeprazole.     Malnutrition:  - Level of malnutrition: Severe   - Based on: weight loss, reduced intake - see nutrition notes - notable weight loss and intake remained poor.  Patient declined placement of NG feeding tube.   A PICC line was placed, and TPN started on 10/18/2021.  His intake of food is improving per patient but not notably changed from nursing/nutrition report.      TPN may be inhibiting appetite.    - stop TPN today 10/31    DVT Prophylaxis: Apixaban.   Neal Catheter: Not present  Central Lines: PICC placed 10/18/2021 for TPN. Probably can pull tomorrow 11/1  Code Status:  Full.  Diet  Orders Placed This Encounter      Combination Diet Regular Diet Adult  on TPN as above           Discussion: Gradual improvement. Seems nearly ready for discharge.  Patient should expect slow recovery and measure his progress not moment to moment or day-to-day but week to week going forward.  Like Kranthiane this to him gave him an encouragement to keep working on it and to expect improvement over time.    Disposition Plan   Expected discharge: Possibly tomorrow or next day to home    Attestation:  Total time: 35 minutes with 20 minutes counseling patient on progress and future expectations    Richi Garza MD  Hospital Medicine        Interval History   Hello trouble sleeping last night.  Gets short of breath at  times and anxious.  Eating a little bit but not full meals.  He is getting Ensure down.    Physical Exam   Temp:  [97.2  F (36.2  C)-98.2  F (36.8  C)] 97.2  F (36.2  C)  Pulse:  [] 101  Resp:  [17-20] 18  BP: (114-146)/(61-76) 124/72  SpO2:  [91 %-97 %] 97 %    Weights:   Vitals:    10/27/21 0418 10/30/21 0546 10/31/21 0423   Weight: 77.7 kg (171 lb 4.8 oz) 79.8 kg (175 lb 14.8 oz) 82.9 kg (182 lb 12.2 oz)    Body mass index is 26.99 kg/m .    Constitutional: alert and oriented, NAD, nontoxic  CV: Regular, no edema  Respiratory: Bibasilar scattered medium crackles that are relatively mild and about a third up   GI: Soft, non-tender, bowel sounds normal  Skin: Warm and dry  Musculoskeletal: feet warm    Data   Recent Labs   Lab 10/31/21  0822 10/30/21  0742 10/30/21  0543 10/29/21  0805 10/29/21  0607 10/28/21  1206 10/28/21  0532 10/25/21  0637 10/25/21  0521   WBC  --   --  10.9  --  8.9  --  6.9  --   --    HGB  --   --  12.5*  --  12.9*  --  13.6  --   --    MCV  --   --  87  --  86  --  86  --   --    PLT  --   --  425  --  393  --  402  --   --    INR  --   --   --   --   --   --   --   --  1.26*   NA  --   --  136  --  136  --  136   < >  --    POTASSIUM  --   --  4.2  --  4.1  --  4.2   < >  --    CHLORIDE  --   --  102  --  102  --  102   < >  --    CO2  --   --  28  --  27  --  28   < >  --    BUN  --   --  29  --  26  --  25   < >  --    CR  --   --  0.69  --  0.68  --  0.68   < >  --    ANIONGAP  --   --  6  --  7  --  6   < >  --    JANET  --   --  8.7  --  9.1  --  9.1   < >  --    * 113* 110*   < > 111*   < > 91   < >  --    ALBUMIN  --   --  2.2*  --  2.3*  --  2.5*   < >  --    PROTTOTAL  --   --  6.3*  --  6.6*  --  7.2   < >  --    BILITOTAL  --   --  0.3  --  0.3  --  0.4   < >  --    ALKPHOS  --   --  86  --  92  --  95   < >  --    ALT  --   --  332*  --  311*  --  233*   < >  --    AST  --   --  59*  --  61*  --  40   < >  --     < > = values in this interval not displayed.        Recent Labs   Lab 10/31/21  0822 10/30/21  0742 10/30/21  0543 10/29/21  0805 10/29/21  0607 10/29/21  0001   * 113* 110* 111* 111* 197*  197*  197*        Unresulted Labs Ordered in the Past 30 Days of this Admission     No orders found from 9/1/2021 to 10/2/2021.           Imaging: No results found for this or any previous visit (from the past 24 hour(s)).     I reviewed all new labs and imaging results over the last 24 hours. I personally reviewed no images or EKG's today.    Medications     dextrose       - MEDICATION INSTRUCTIONS -       parenteral nutrition - Clinimix E 90 mL/hr at 10/30/21 2234       albuterol  2 puff Inhalation Q4H While awake     apixaban ANTICOAGULANT  5 mg Oral BID     citalopram  20 mg Oral Daily     lipids  250 mL Intravenous Once per day on Mon Tue Wed Thu Fri    And     - MEDICATION INSTRUCTIONS -   Does not apply Q24H     influenza recomb quadrivalent PF  0.5 mL Intramuscular Prior to discharge     lisinopril  10 mg Oral Daily     miconazole   Topical BID     pantoprazole  40 mg Oral QAM     predniSONE  40 mg Oral Daily     sodium chloride (PF)  3 mL Intracatheter Q8H   Reviewed meds    Richi Garza MD  [facility] Medicine

## 2021-11-01 ENCOUNTER — APPOINTMENT (OUTPATIENT)
Dept: PHYSICAL THERAPY | Facility: CLINIC | Age: 56
DRG: 177 | End: 2021-11-01
Payer: OTHER GOVERNMENT

## 2021-11-01 ENCOUNTER — APPOINTMENT (OUTPATIENT)
Dept: OCCUPATIONAL THERAPY | Facility: CLINIC | Age: 56
DRG: 177 | End: 2021-11-01
Payer: OTHER GOVERNMENT

## 2021-11-01 ENCOUNTER — DOCUMENTATION ONLY (OUTPATIENT)
Dept: OTHER | Facility: CLINIC | Age: 56
End: 2021-11-01

## 2021-11-01 VITALS
HEART RATE: 99 BPM | WEIGHT: 174.38 LBS | SYSTOLIC BLOOD PRESSURE: 142 MMHG | BODY MASS INDEX: 25.83 KG/M2 | OXYGEN SATURATION: 96 % | DIASTOLIC BLOOD PRESSURE: 76 MMHG | RESPIRATION RATE: 16 BRPM | TEMPERATURE: 97.9 F | HEIGHT: 69 IN

## 2021-11-01 LAB
ALBUMIN SERPL-MCNC: 2.2 G/DL (ref 3.4–5)
ALP SERPL-CCNC: 97 U/L (ref 40–150)
ALT SERPL W P-5'-P-CCNC: 387 U/L (ref 0–70)
ANION GAP SERPL CALCULATED.3IONS-SCNC: 7 MMOL/L (ref 3–14)
AST SERPL W P-5'-P-CCNC: 77 U/L (ref 0–45)
BILIRUB SERPL-MCNC: 0.4 MG/DL (ref 0.2–1.3)
BUN SERPL-MCNC: 33 MG/DL (ref 7–30)
CALCIUM SERPL-MCNC: 9.1 MG/DL (ref 8.5–10.1)
CHLORIDE BLD-SCNC: 104 MMOL/L (ref 94–109)
CO2 SERPL-SCNC: 27 MMOL/L (ref 20–32)
CREAT SERPL-MCNC: 0.78 MG/DL (ref 0.66–1.25)
GFR SERPL CREATININE-BSD FRML MDRD: >90 ML/MIN/1.73M2
GLUCOSE BLD-MCNC: 82 MG/DL (ref 70–99)
INR PPP: 1.12 (ref 0.85–1.15)
MAGNESIUM SERPL-MCNC: 2.5 MG/DL (ref 1.6–2.3)
PHOSPHATE SERPL-MCNC: 3.8 MG/DL (ref 2.5–4.5)
POTASSIUM BLD-SCNC: 4.3 MMOL/L (ref 3.4–5.3)
PREALB SERPL IA-MCNC: 33 MG/DL (ref 15–45)
PROT SERPL-MCNC: 6.4 G/DL (ref 6.8–8.8)
SODIUM SERPL-SCNC: 138 MMOL/L (ref 133–144)

## 2021-11-01 PROCEDURE — 97535 SELF CARE MNGMENT TRAINING: CPT | Mod: GO

## 2021-11-01 PROCEDURE — 250N000013 HC RX MED GY IP 250 OP 250 PS 637: Performed by: FAMILY MEDICINE

## 2021-11-01 PROCEDURE — 250N000012 HC RX MED GY IP 250 OP 636 PS 637: Performed by: FAMILY MEDICINE

## 2021-11-01 PROCEDURE — 84100 ASSAY OF PHOSPHORUS: CPT | Performed by: FAMILY MEDICINE

## 2021-11-01 PROCEDURE — 250N000013 HC RX MED GY IP 250 OP 250 PS 637: Performed by: HOSPITALIST

## 2021-11-01 PROCEDURE — 97116 GAIT TRAINING THERAPY: CPT | Mod: GP

## 2021-11-01 PROCEDURE — 83735 ASSAY OF MAGNESIUM: CPT | Performed by: FAMILY MEDICINE

## 2021-11-01 PROCEDURE — 82040 ASSAY OF SERUM ALBUMIN: CPT | Performed by: FAMILY MEDICINE

## 2021-11-01 PROCEDURE — 85610 PROTHROMBIN TIME: CPT | Performed by: FAMILY MEDICINE

## 2021-11-01 PROCEDURE — 99239 HOSP IP/OBS DSCHRG MGMT >30: CPT | Performed by: INTERNAL MEDICINE

## 2021-11-01 PROCEDURE — 84134 ASSAY OF PREALBUMIN: CPT | Performed by: FAMILY MEDICINE

## 2021-11-01 PROCEDURE — 82247 BILIRUBIN TOTAL: CPT | Performed by: FAMILY MEDICINE

## 2021-11-01 RX ORDER — SULFAMETHOXAZOLE AND TRIMETHOPRIM 400; 80 MG/1; MG/1
1 TABLET ORAL DAILY
Qty: 30 TABLET | Refills: 0 | Status: SHIPPED | OUTPATIENT
Start: 2021-11-01 | End: 2021-12-09

## 2021-11-01 RX ORDER — ALBUTEROL SULFATE 90 UG/1
2 AEROSOL, METERED RESPIRATORY (INHALATION) 4 TIMES DAILY
Qty: 18 G | Refills: 1 | Status: SHIPPED | OUTPATIENT
Start: 2021-11-01 | End: 2021-11-10

## 2021-11-01 RX ORDER — CITALOPRAM HYDROBROMIDE 20 MG/1
20 TABLET ORAL DAILY
Qty: 30 TABLET | Refills: 1 | Status: SHIPPED | OUTPATIENT
Start: 2021-11-02 | End: 2021-12-09

## 2021-11-01 RX ORDER — LISINOPRIL 10 MG/1
10 TABLET ORAL DAILY
Qty: 30 TABLET | Refills: 1 | Status: SHIPPED | OUTPATIENT
Start: 2021-11-01 | End: 2021-12-09

## 2021-11-01 RX ORDER — PREDNISONE 20 MG/1
TABLET ORAL
Qty: 33 TABLET | Refills: 0 | Status: SHIPPED | OUTPATIENT
Start: 2021-11-02 | End: 2021-11-10

## 2021-11-01 RX ORDER — DIPHENHYDRAMINE HYDROCHLORIDE AND LIDOCAINE HYDROCHLORIDE AND ALUMINUM HYDROXIDE AND MAGNESIUM HYDRO
10 KIT EVERY 6 HOURS PRN
Qty: 237 ML | Refills: 1 | Status: SHIPPED | OUTPATIENT
Start: 2021-11-01 | End: 2021-12-09

## 2021-11-01 RX ADMIN — ALBUTEROL SULFATE 2 PUFF: 90 AEROSOL, METERED RESPIRATORY (INHALATION) at 05:20

## 2021-11-01 RX ADMIN — APIXABAN 5 MG: 5 TABLET, FILM COATED ORAL at 08:36

## 2021-11-01 RX ADMIN — DIPHENHYDRAMINE HYDROCHLORIDE AND LIDOCAINE HYDROCHLORIDE AND ALUMINUM HYDROXIDE AND MAGNESIUM HYDRO 10 ML: KIT at 13:38

## 2021-11-01 RX ADMIN — ALBUTEROL SULFATE 2 PUFF: 90 AEROSOL, METERED RESPIRATORY (INHALATION) at 13:40

## 2021-11-01 RX ADMIN — PANTOPRAZOLE SODIUM 40 MG: 40 TABLET, DELAYED RELEASE ORAL at 08:35

## 2021-11-01 RX ADMIN — DIPHENHYDRAMINE HYDROCHLORIDE AND LIDOCAINE HYDROCHLORIDE AND ALUMINUM HYDROXIDE AND MAGNESIUM HYDRO 10 ML: KIT at 03:19

## 2021-11-01 RX ADMIN — ALBUTEROL SULFATE 2 PUFF: 90 AEROSOL, METERED RESPIRATORY (INHALATION) at 11:08

## 2021-11-01 RX ADMIN — CITALOPRAM HYDROBROMIDE 20 MG: 20 TABLET ORAL at 08:36

## 2021-11-01 RX ADMIN — PREDNISONE 40 MG: 20 TABLET ORAL at 08:35

## 2021-11-01 ASSESSMENT — ACTIVITIES OF DAILY LIVING (ADL)
ADLS_ACUITY_SCORE: 7
ADLS_ACUITY_SCORE: 9
ADLS_ACUITY_SCORE: 7
ADLS_ACUITY_SCORE: 9
ADLS_ACUITY_SCORE: 7
ADLS_ACUITY_SCORE: 9
ADLS_ACUITY_SCORE: 7
ADLS_ACUITY_SCORE: 7
ADLS_ACUITY_SCORE: 9
ADLS_ACUITY_SCORE: 7

## 2021-11-01 ASSESSMENT — MIFFLIN-ST. JEOR: SCORE: 1601.38

## 2021-11-01 NOTE — PROGRESS NOTES
Patient transferred from chair to bed with minimal assistance. PRN Tylenol given for pain to right toe. Right great toe has bruising, but no open sores and he still reports numbness and pain to it.   Magic mouthwash also requested and given at HS.   Remains on 4L with stable vitals.

## 2021-11-01 NOTE — PROGRESS NOTES
Pt has been assessed for Home Oxygen needs    * RA at rest Pulse oximetry SpO2 82 %    *O2 at 3 liters/minute (at rest)  SpO2 92%    *O2 at 3 liters/minute (during activity/exercise) 90 %

## 2021-11-01 NOTE — DISCHARGE SUMMARY
[facility]  Hospitalist Discharge Summary       Date of Admission:  10/1/2021  Date of Discharge:  11/1/2021  Discharging Provider: Alen Andrea MD      Discharge Diagnoses     Confirmed COVID-19 infection    Acute Hypoxic Respiratory Failure secondary to COVID-19 infection  Viral Pneumonia secondary to COVID-19 infection  Infrarenal Aortic Thrombus with thromboembolic obstruction of right tibial-peroneal trunk  Anxiety  Hyponatremia  Acute kidney injury on admission  Transaminitis  Hypertension  GERD  Severe Malnutrition    Follow-ups Needed After Discharge   Follow-up Appointments     Follow-up and recommended labs and tests      Follow up with primary care provider, Seferino Pack, within 7 days   for hospital follow- up.  The following labs/tests are recommended: BMP   and CBC.           Unresulted Labs Ordered in the Past 30 Days of this Admission     Date and Time Order Name Status Description    11/1/2021 12:01 AM Prealbumin In process       These results will be followed up by Alen Andrea or PCP    Discharge Disposition   Discharged to home  Condition at discharge: Stable    Hospital Course   Liborio Barajas is a 56 year old male admitted on 10/1/2021. He presented with shortness of breath and was found to be COVID positive.     Confirmed COVID-19 infection    Acute Hypoxic Respiratory Failure secondary to COVID-19 infection  Viral Pneumonia secondary to COVID-19 infection     Symptom Onset 9/22/2021    Date of 1st Positive Test 10/01/2021      Vaccination Status Not Vaccinated, but interested/contemplative              Initial CT chest 10/1/2021 demonstrated extensive bilateral groundglass consolidation consistent with pneumonia, and was negative for pulmonary embolism.  The patient required transfer to intensive care unit immediately after admission, where he has remained.  For more than a week, the patient had been alternating between HFNC oxygen and BiPAP.  Since  "the time of admission, we had not been able to make any significant reductions in HFNC flow or in FiO2.  Since the patient appeared \"stuck\" on high flow nasal cannula and/or BiPAP therapy with high FiO2 for more than 2 weeks, he and I discussed risk versus benefit of beginning empiric systemic steroids for what could be inflammatory pneumonitis following COVID-19 pneumonia.  He agreed to the use of methylprednisolone 60 mg daily, started 10/20/2021 and appeared to improve significantly after starting this.       BiPAP was discontinued as of 10/22/2021. We were able to decrease HFNC flow and FiO2 over the next few days.  As of  10/26/2021 patient was running 7-10L by facemask, then 5-7L 10/28, and 5L NC 10/29.  Stopped methylprednisolone and switched to prednisone 40 mg 10/29 with plan for slow taper from there, perhaps taper each week by 10 mg/day.  Rest dyspnea is improving.  His ability to participate in PT is improving but still weak and drops sats with activity.      Hope to discharge when O2 needs stable 4L or less. He may be there if there is patience to let him recover after exertion but patient gets anxious. Given reassurance today.       - Oxygen: Continue oxygen either via simple mask or nasal cannula, titrated to keep saturation 90% or better.  Stepped down to med-surg status 10/27. SpO2 drops with activity and slowly recovers.   - Labs: Markers of severity were followed earlier in the hospital stay, with trend toward improvement.  Daily monitoring has been discontinued.  - Imaging: Chest CT 10/10/2021 continued to demonstrate extensive bilateral infiltrates, and was again negative for pulmonary embolism.  Chest x-ray 10/18/2021 again showed extensive patchy opacities throughout both lungs.  Breathing treatments: Albuterol HFA 2 puffs every 4 hours while awake was started on 10/19/2021 due to wheezing.  Now that the patient is recovered COVID-19 status, nebulized medications could be used if needed.    IV " fluids: None indicated; oral intake is adequate.  Antibiotics: Not indicated.   Corticosteroids: Methylprednisolone 60 mg IV every 24 hours started 10/20/2021 for what could possibly represent inflammatory pneumonitis following COVID-19 pneumonia.  switched to prednisone 40 mg 10/29, plan to taper from there.  COVID-Focused Medications:    - Dexamethasone was given 10/1 - 10/10/2021.   - Remdesivir was given 10/1 - 10/5/2021.   - Tocilizumab 700 mg IV given 10/2/2021.  - DVT Prophylaxis: Discharged on apixaban for treatment of aortic and right lower extremity thrombus, see below.     Infrarenal Aortic Thrombus with thromboembolic obstruction of right tibial-peroneal trunk    Right foot pain and diminished RLE pulses noted 10/11/2021. CTA abdomen and pelvis revealed infrarenal aortic wall adherent clot, and apparent thromboembolic obstruction of right tibial-peroneal trunk with distal reconstitution of tibial and peroneal arteries.  Findings were discussed with vascular surgery [facility], who advised that COVID-related arterial thrombectomies frequently reclot, so surgery was not recommended, and that increased heparin resistance was seen in these patients, so anticoagulation with argatroban was recommended.      Argatroban infusion given 10/11 - 10/16/2021, after which the patient was transitioned to apixaban, starting at 10 mg p.o. twice daily for the first week (first dose 10/17/2021), then dose was decreased to 5 mg daily.    - Clinically improved perfusion of right foot.  - Discharged on apixaban, unclear how long to continue but would consider at least 3-6 months.     Anxiety    Patient had been having severe subjective dyspnea only partially and briefly relieved by the use of morphine IV and lorazepam IV.  We started morphine sulfate 5 mg p.o. every 2 hours as needed dyspnea on 10/20/2021, and added lorazepam 0.5 mg p.o. every 4 hours as needed on 10/22/2021.  Control of anxiety has steadily improved as his dyspnea  has improved.  He has not required morphine sulfate solution since 10/22/2021, so I am going to discontinue it.  Lorazepam use has also decreased over the last couple of days.  He is generally requiring a single dose near bedtime. Discontinued morphine sulfate solution.  -Treated with lorazepam 0.5 mg orally. every 4 hours as needed for anxiety, which he is currently primarily taking as an at bedtime dose.  -Initiated citalopram 20 mg every day on 10/13/2021, knowing that this will take some time to reach effect. Patient seems improved with this.  Discharged on citalopram.     Hyponatremia   Present on admission. Probably related to volume depletion.  Sodium normalized without specific therapy.  -Resolved.     Acute kidney injury on admission    GFR on admission was 32.  The patient was probably volume depleted on admission.  With volume correction, will function has been normal since approximately 10/5/2021.   -Resolved.     Transaminitis    Mild elevation of AST and ALT were noted on admission, likely due to COVID-19 infection and/or remdesivir.  Transaminases normalized as of 10/18/2021, but have increased in the interval between 10/19/2021:   ALT 41 --> 196 --> 246 --> 233 --> 311 --> 332  AST 32 --> 47 --> 38  -->40 --> 61  Etiology unclear, though this can be seen during TPN therapy.  Stopping TPN 10/31      Hypertension    Managed prior to admission with lisinopril 10 mg daily.  Lisinopril was resumed here initially at 5 mg daily, then increased to 10 mg daily 10/21/2021 due to hypertension.  Blood pressure control has been generally good.    -Continue lisinopril 10 mg daily.     GERD    Exacerbated since methylprednisolone was started.  -Continue omeprazole.     Severe Malnutrition  - Based on: weight loss, reduced intake - see nutrition notes - notable weight loss and intake remained poor.  Patient declined placement of NG feeding tube.   A PICC line was placed, and TPN started on 10/18/2021.  His intake of  food is improving per patient but not notably changed from nursing/nutrition report.      TPN may be inhibiting appetite.    - stop TPN today 10/31    Consultations This Hospital Stay   PHARMACY IP CONSULT  PHARMACY IP CONSULT  PHARMACY TO DOSE ARGATROBAN  CARE MANAGEMENT / SOCIAL WORK IP CONSULT  NUTRITION SERVICES ADULT IP CONSULT  VASCULAR ACCESS ADULT IP CONSULT  PHARMACY/NUTRITION TO START AND MANAGE TPN  PHYSICAL THERAPY ADULT IP CONSULT  OCCUPATIONAL THERAPY ADULT IP CONSULT  SURGERY GENERAL IP CONSULT  RESPIRATORY CARE IP CONSULT    Code Status   Full Code    Time Spent on this Encounter   I, Alen Andrea MD, personally saw the patient today and spent greater than 30 minutes discharging this patient.       Alen Andrea MD  [facility]  ______________________________________________________________________    Physical Exam   Vital Signs: Temp: 98.7  F (37.1  C) Temp src: Oral BP: 117/61 Pulse: 94   Resp: 16 SpO2: 97 % O2 Device: Nasal cannula Oxygen Delivery: 3 LPM  Weight: 174 lbs 6.14 oz       Gen: Well nourished, well developed, alert and oriented x 3, appears fatigued  HEENT: Atraumatic, normocephalic; sclera non-injected, anicterric; oral mucosa moist, no lesion, no exudate  Lungs: Bibasilar rales, no wheezes, no rhonchi  Heart: Regular rate, regular rhythm, no gallops, no rubs, no murmurs  GI: Bowel sound normal, no hepatosplenomegaly, no masses, non-tender, non-distended, no guarding, no rebound tenderness  Lymph: No lymphadenopathy, no edema  Skin: No rashes, no chronic venous stasis     Primary Care Physician   Seferino Pack    Discharge Orders      Reason for your hospital stay    This is a 56 year old male admitted with pneumonia due to COVID-19.     Activity    Your activity upon discharge: activity as tolerated     Follow-up and recommended labs and tests    Follow up with primary care provider, Seferino Pack, within 7 days for hospital follow-  up.  The following labs/tests are recommended: CMP and CBC.     Oxygen Adult/Peds    Oxygen Documentation:   I certify that this patient, Liborio Barajas has been under my care (or a nurse practitioner or physican's assistant working with me). This is the face-to-face encounter for oxygen medical necessity.      Liborio Barajas is now in a chronic stable state and continues to require supplemental oxygen. Patient has continued oxygen desaturation due to COVID-19.    Alternative treatment(s) tried or considered and deemed clinically infective for treatment of COVID-19 include inhalers, steroids, and pulmonary toileting.  If portability is ordered, is the patient mobile within the home? yes    **Patients who qualify for home O2 coverage under the CMS guidelines require ABG tests or O2 sat readings obtained closest to, but no earlier than 2 days prior to the discharge, as evidence of the need for home oxygen therapy. Testing must be performed while patient is in the chronic stable state. See notes for O2 sats.**     Diet    Follow this diet upon discharge: Orders Placed This Encounter      Snacks/Supplements Adult: Ensure Enlive; With Meals      Combination Diet Regular Diet Adult         Significant Results and Procedures   Most Recent 3 CBC's:Recent Labs   Lab Test 10/30/21  0543 10/29/21  0607 10/28/21  0532   WBC 10.9 8.9 6.9   HGB 12.5* 12.9* 13.6   MCV 87 86 86    393 402     Most Recent 3 BMP's:Recent Labs   Lab Test 11/01/21  0519 10/31/21  0822 10/30/21  0742 10/30/21  0543 10/29/21  0805 10/29/21  0607     --   --  136  --  136   POTASSIUM 4.3  --   --  4.2  --  4.1   CHLORIDE 104  --   --  102  --  102   CO2 27  --   --  28  --  27   BUN 33*  --   --  29  --  26   CR 0.78  --   --  0.69  --  0.68   ANIONGAP 7  --   --  6  --  7   JANET 9.1  --   --  8.7  --  9.1   GLC 82 102* 113* 110*   < > 111*    < > = values in this interval not displayed.     Most Recent 2 LFT's:Recent Labs   Lab Test  11/01/21  0519 10/30/21  0543   AST 77* 59*   * 332*   ALKPHOS 97 86   BILITOTAL 0.4 0.3     Most Recent 3 INR's:Recent Labs   Lab Test 11/01/21  0519 10/25/21  0521 10/19/21  0607   INR 1.12 1.26* 1.30*     Most Recent D-dimer:Recent Labs   Lab Test 10/17/21  0810   DD 2.10*     Most Recent 6 Bacteria Isolates From Any Culture (See EPIC Reports for Culture Details):No lab results found.  Most Recent ABG:No lab results found.  Most Recent ESR & CRP:Recent Labs   Lab Test 10/21/21  0612   CRP 15.7*   ,   Results for orders placed or performed during the hospital encounter of 10/01/21   CT Chest Pulmonary Embolism w Contrast    Narrative    CT CHEST PULMONARY EMBOLISM WITH CONTRAST October 1, 2021 2:57 PM    CLINICAL HISTORY: Dyspnea, COVID, elevated D-dimer.    TECHNIQUE: CT angiogram chest during arterial phase injection IV  contrast. 2D and 3D MIP reconstructions were performed by the CT  technologist. Dose reduction techniques were used.   CONTRAST: 83 mL Isovue 370.    COMPARISON: None.    FINDINGS:  ANGIOGRAM CHEST: Pulmonary arteries are normal caliber and negative  for pulmonary emboli. Thoracic aorta is negative for dissection.     LUNGS AND PLEURA: Extensive bilateral ground-glass consolidation with  more dense bibasilar consolidation identified involving all lobes of  both lungs. No effusions. No pneumothorax. No central airway  abnormality.    MEDIASTINUM/AXILLAE: Small hiatal hernia. Enlarged subcarinal lymph  node is 1.5 cm series 4 image 72. There are a few other borderline  prominent mediastinal lymph nodes bilaterally. Mildly prominent  bilateral hilar lymph nodes. Small pericardial fluid.    CORONARY ARTERY CALCIFICATION: Mild.    UPPER ABDOMEN: No acute abnormality. Incidental hepatic and renal  cysts not requiring specific follow-up.    MUSCULOSKELETAL: Unremarkable.      Impression    IMPRESSION:  1.  Extensive bilateral ground-glass and patchy regions of dense  consolidation involving all  lobes of both lungs worrisome for  multifocal pneumonia versus severe inflammatory disease.  2.  No evidence for pulmonary embolism.  3.  Several prominent thoracic lymph nodes.  4.  Small pericardial fluid.    LANRE BARRON MD         SYSTEM ID:  [id number]   XR Chest Port 1 View    Narrative    EXAM: XR CHEST PORT 1 VIEW  LOCATION: [facility]  DATE/TIME: 10/9/2021 12:18 PM    INDICATION: No pneumonia with increasing hypoxemia.  COMPARISON: Chest CTA 10/1/2021      Impression    IMPRESSION: Heart size magnified in AP projection. Increasing mid and lower lung consolidative opacities with some apical sparing. Findings compatible with worsening pneumonia. No pneumothorax nor gross pleural effusion.   CT Chest Pulmonary Embolism w Contrast    Narrative    EXAM: CT CHEST PULMONARY EMBOLISM W CONTRAST  LOCATION: [facility]  DATE/TIME: 10/10/2021 3:11 PM    INDICATION: COVID pneumonia, increasing D-dimer. PE suspected, low/intermediate prob, positive D-dimer  COMPARISON: None.  TECHNIQUE: CT chest pulmonary angiogram during arterial phase injection of IV contrast. Multiplanar reformats and MIP reconstructions were performed. Dose reduction techniques were used.   CONTRAST: 78 mL Isovue-370    FINDINGS:  ANGIOGRAM CHEST: Pulmonary arteries are normal caliber and negative for pulmonary emboli. Thoracic aorta is negative for dissection. No CT evidence of right heart strain.    LUNGS AND PLEURA: Extensive interstitial, ground-glass and consolidative infiltrates. No effusions.    MEDIASTINUM/AXILLAE: Mildly prominent lymph nodes noted, which are nonspecific but likely reactive in the setting. No aneurysm.    CORONARY ARTERY CALCIFICATION: None.    UPPER ABDOMEN: No acute findings.    MUSCULOSKELETAL: No frankly destructive bony lesions.      Impression    IMPRESSION:  1.  No pulmonary embolism demonstrated.  2.  Extensive bilateral pulmonary infiltrates.   CTA Abdomen Pelvis  Bilat Leg Runoff w Contr    Narrative    EXAM: CTA ABDOMEN PELVIS BILAT LEG RUNOFF W CONTR  LOCATION: [facility]  DATE/TIME: 10/11/2021 8:08 PM    INDICATION: Arterial embolism, lower extremity  COMPARISON: None.  TECHNIQUE: Helical acquisition through the abdomen, pelvis, and bilateral lower extremities was performed during the arterial phase of contrast enhancement using IV Contrast. 2D and 3D reconstructions were performed by the CT technologist. Dose reduction   techniques were used.   CONTRAST: 100mL Isovue-370    FINDINGS:  AORTA: There is focal bilobed wall adherent thrombus located on the right wall of the mid infrarenal abdominal aorta. This measures approximately 1.0 x 1.1 x 1.9 cm in maximum transverse, anteroposterior, and craniocaudad dimensions.    The abdominal aorta is otherwise patent without aneurysmal dilatation or significant stenosis. The celiac trunk, superior mesenteric artery, renal arteries, and inferior mesenteric artery are patent without significant stenoses.    Iliac arteries: The common and external iliac arteries are patent without significant stenoses. The internal iliac arteries are patent.    RIGHT LEG:   Common femoral artery: No significant stenosis.  Profunda femoris artery: No significant stenosis.  Superficial femoral artery: No significant stenosis.  Popliteal artery: No significant stenosis.  Tibial arteries: There is an occlusion of the tibioperoneal trunk. There is reconstitution of the proximal posterior tibial and peroneal arteries which can be followed to the foot. The anterior tibial artery is patent the ankle. The dorsalis pedis artery   is not well seen.    LEFT LEG:   Common femoral artery: No significant stenosis.  Profunda femoris artery: No significant stenosis.  Superficial femoral artery: No significant stenosis.  Popliteal artery: No significant stenosis.  Tibial arteries: Three-vessel runoff.    LUNG BASES: Diffuse extensive patchy  and confluent airspace opacities in the visualized lower lobes, right middle lobe and lingula.    ABDOMEN: There are at least 5 tiny hypodensities in the liver. These are too small to characterize but likely represent tiny cysts. There is a calcified granuloma in the spleen. There are subcentimeter cysts in the kidneys. No hydronephrosis bilaterally.   Remaining solid organs appear unremarkable.    There are a few scattered colonic diverticuli.    There is air in the subcutaneous tissues of the left abdomen most likely from the subcutaneous injection.    PELVIS: Bilateral scrotal hydroceles right greater than left.      Impression    IMPRESSION:  1.  Focal wall adherent thrombus in the infrarenal abdominal aorta as above. There is an occlusion of the right tibioperoneal trunk. This would be consistent with an embolic occlusion with the aortic thrombus serving as a source. The proximal posterior   tibial and peroneal arteries are reconstituted and can be followed to the right foot. The anterior tibial artery is patent to the ankle however, the dorsalis pedis artery is not well seen.  2.  Diffuse extensive pneumonia in the lungs.   XR Chest Port 1 View    Narrative    XR CHEST PORT 1 VIEW 10/18/2021 8:47 AM    HISTORY: Persistent hypoxia, COVID-19, please compare to previous,  rule out other contributing etiology    COMPARISON: October 9, 2021    FINDINGS: There are extensive bilateral infiltrates, essentially  unchanged since the previous chest x-ray. No definite pleural  effusions. Normal heart size.    YANELY CHUN MD         SYSTEM ID:  [id number]   XR Chest Port 1 View    Narrative    EXAM: XR CHEST PORT 1 VIEW  LOCATION: [facility]  DATE/TIME: 10/18/2021 9:22 PM    INDICATION: PICC line placement.  COMPARISON: Chest x-ray 10/18/2021 at 0836 hours.      Impression    IMPRESSION: Newly identified right PICC line, its tip at the right atrium. Consider retraction of this line by  approximately 3.2 cm for placement at the cavoatrial junction. Extensive bilateral pulmonary consolidation appears stable. Stable cardiac   silhouette.   XR Chest Port 1 View    Narrative    EXAM: CHEST SINGLE VIEW PORTABLE  LOCATION: [facility]  DATE/TIME: 10/18/2021 10:31 PM    INDICATION: Line adjustment.  COMPARISON: 10/18/2021 at 2119 hours.      Impression    IMPRESSION:   1. Interval pullback of a right upper extremity peripherally inserted central catheter with distal catheter tip now in the superior vena cava near its junction with the right atrium.  2. No other significant change since the recent comparison study.  3. Hypoinflated lungs. Extensive patchy opacities throughout both lungs with relative sparing of the lung apices again noted.       Discharge Medications   Current Discharge Medication List      START taking these medications    Details   albuterol (PROAIR HFA/PROVENTIL HFA/VENTOLIN HFA) 108 (90 Base) MCG/ACT inhaler Inhale 2 puffs into the lungs 4 times daily  Qty: 18 g, Refills: 1    Comments: Pharmacy may dispense brand covered by insurance (Proair, or proventil or ventolin or generic albuterol inhaler)  Associated Diagnoses: Pneumonia due to 2019 novel coronavirus      apixaban ANTICOAGULANT (ELIQUIS) 5 MG tablet Take 1 tablet (5 mg) by mouth 2 times daily  Qty: 60 tablet, Refills: 1    Associated Diagnoses: Pneumonia due to 2019 novel coronavirus      citalopram (CELEXA) 20 MG tablet Take 1 tablet (20 mg) by mouth daily  Qty: 30 tablet, Refills: 1    Associated Diagnoses: Pneumonia due to 2019 novel coronavirus      magic mouthwash suspension, diphenhydrAMINE, lidocaine, aluminum-magnesium & simethicone, (FIRST-MOUTHWASH BLM) compounding kit Swish and swallow 10 mLs in mouth every 6 hours as needed for mouth sores  Qty: 237 mL, Refills: 1    Associated Diagnoses: Pneumonia due to 2019 novel coronavirus      predniSONE (DELTASONE) 20 MG tablet Take 2 tablets (40  mg) by mouth daily for 6 days, THEN 1.5 tablets (30 mg) daily for 7 days, THEN 1 tablet (20 mg) daily for 7 days, THEN 0.5 tablets (10 mg) daily for 7 days.  Qty: 33 tablet, Refills: 0    Associated Diagnoses: Pneumonia due to 2019 novel coronavirus      sulfamethoxazole-trimethoprim (BACTRIM) 400-80 MG tablet Take 1 tablet by mouth daily  Qty: 30 tablet, Refills: 0    Associated Diagnoses: Pneumonia due to 2019 novel coronavirus         CONTINUE these medications which have CHANGED    Details   lisinopril (ZESTRIL) 10 MG tablet Take 1 tablet (10 mg) by mouth daily  Qty: 30 tablet, Refills: 1    Associated Diagnoses: Benign essential hypertension         CONTINUE these medications which have NOT CHANGED    Details   acetaminophen (TYLENOL) 500 MG tablet Take 500-1,000 mg by mouth every 6 hours as needed for mild pain, fever or pain      IBUPROFEN PO Take 400 mg by mouth every 6 hours as needed for moderate pain      Omeprazole (PRILOSEC PO) Take 20 mg by mouth every morning Over the counter           Allergies   Allergies   Allergen Reactions     Nka [No Known Allergies]

## 2021-11-01 NOTE — PROGRESS NOTES
I certify that this patient, Liborio Barajas has been under my care (or a nurse practitioner or physican's assistant working with me). This is the face-to-face encounter for oxygen medical necessity.      Liborio Barajas is now in a chronic stable state and continues to require supplemental oxygen. Patient has continued oxygen desaturation due to COVID-19.    Alternative treatment(s) tried or considered and deemed clinically infective for treatment of COVID-19 include inhalers, steroids and pulmonary rehab.  If portability is ordered, is the patient mobile within the home? yes    **Patients who qualify for home O2 coverage under the CMS guidelines require ABG tests or O2 sat readings obtained closest to, but no earlier than 2 days prior to the discharge, as evidence of the need for home oxygen therapy. Testing must be performed while patient is in the chronic stable state. See notes for O2 sats.**

## 2021-11-01 NOTE — PROGRESS NOTES
"Care Management Discharge Note    Discharge Date: 11/02/2021     Discharge Disposition: Home    Discharge Services: None--Pt declined ARU, TCU and Home Care services at this time due to private pay costs involved. Pt is self pay-currently has no medical insurance.     Discharge DME: Oxygen--[facility]    Discharge Transportation: family or friend will provide    Private pay costs discussed: Not applicable    PAS Confirmation Code:  N/A    Education Provided on the Discharge Plan:  yes  Persons Notified of Discharge Plans: Pt and spouse Chikis  Patient/Family in Agreement with the Plan: yes    Handoff Referral Completed: Yes--External handoff    Additional Information:  CM met with to explore discharge options/services:     *ARU-self pay $3,000/day. Screened by CAMELIA-Seferino/Yadi. Pt appears appropriate for admission.     *TCU-self pay ~$6,000 deposit. Current recommendation of PT/OT departments     *Home Care-[facility] unable to provide private pay options at this time. CM to provide private pay resources to pt should he request prior to discharge.       CM met with the Pt today, and spoke to Pt's wife Chikis via phone per Pt's request to review above and discuss the Pt's goals. Pt stated he is feeling well today and really looking forward to discharging home today. Pt and Chikis declined going to ARU/TCU or having Home Care due to the costs involved. Both did not want services that would accrue out of pocket costs.     Pt stated that he has had extensive conversations with his wife Chikis and they both agree that returning home will be a safe plan given all the family help and support they have available. Chikis stated they recently cared for 3 different family members in their home and with the support of 10 + people within their family/friend group were able to \"make it happen.\" Chikis confirms they have all needed DME in the home.     Pt and Chikis report no concerns discharging home today with new home O2. CM called and " notified the MD and RT dept with Pt's discharge plan.     Chikis will provide transportation when Home O2 is arranged today.      PCP appointment scheduled through [facility] per Pt and Wife's request.   Nov. 10th- [facility]       SERA Schmitz

## 2021-11-01 NOTE — PROGRESS NOTES
"SPIRITUAL HEALTH SERVICES  [facility] - Med/Surg    Referral Source: Length of Stay Courtesy Visit    - Pt, Liborio, has been COVID+ for many days so I was not able to visit with him.  Today the plan is to discharge home after 31 days.  He shared about his journey and the good care he has received.  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  His son, who also had COVID, just recently discharged from [facility] and he reflected on the care both of them have received from Liborio's wife, Chikis.    - Liborio welcomed prayer, especially for the rehab he will need for his legs, which have atrophied \"so much in just a month.\"      Plan:  will remain available for any ongoing support needs during LOS.    Alen Miller M.A., [facility]  Lead   [facility]  Office: [phone]    "

## 2021-11-01 NOTE — PROVIDER NOTIFICATION
Provider Zully paged:    SHANTEL [id number], is pt to D/C with PICC line? Will need an official order for home oxygen placed please so we can order for D/C. TY.     Will await response.

## 2021-11-01 NOTE — PROGRESS NOTES
[place] [facility] DISCHARGE NOTE    Patient discharged to home at 5:30 PM via wheel chair. Accompanied by spouse and staff. Discharge instructions reviewed with patient and spouse, opportunity offered to ask questions. Prescriptions filled and sent with patient upon discharge. All belongings sent with patient.    Briana Sanz RN

## 2021-11-01 NOTE — PROGRESS NOTES
Calorie Count  Intake recorded for: 10/29 lunch thru 10/31.   Total Kcals: 4572 (1524 kcal avg per day) -> 20 kcal/kg, 98% energy needs.  Total Protein: 201 g (67 g avg per day) -> 0.9 g/kg, 71% protein needs.  Kcals from Hospital Food: 3372  Protein: 171 g  Kcals from Outside Food (average): 1200 Protein: 30 g  # Meals Ordered from Kitchen: 7  # Meals Recorded: 8  # Supplements Recorded: 5    Met w/ patient at bedside. He reports his appetite and intake are much improved. Based on calorie counts above intake is much better than previous. TPN was stopped 10/31.    Will continue to monitor.    Bertha Velez RDN, LD  Clinical Dietitian  Office (Monday-Friday): [phone]  Weekend/Holiday Pager: [phone]

## 2021-11-01 NOTE — PLAN OF CARE
Occupational Therapy Discharge Summary    Reason for therapy discharge:    All goals and outcomes met, no further needs identified.    Progress towards therapy goal(s). See goals on Care Plan in [facility] electronic health record for goal details.  Goals met    Therapy recommendation(s):    Continue home exercise program.

## 2021-11-01 NOTE — PROGRESS NOTES
All qualifying documents obtained for home oxygen needs. Confirmed pt. Wanted to use [facility] to service for oxygen needs. Sending for delivery of transport tank to bedside. Pt. To call us once he gets home/leaving hospital for home set up. Please call [facility] with questions/concerns .

## 2021-11-02 NOTE — PLAN OF CARE
Physical Therapy Discharge Summary    Reason for therapy discharge:    Discharged to home.    Progress towards therapy goal(s). See goals on Care Plan in [facility] electronic health record for goal details.  Goals partially met.  Barriers to achieving goals:   discharge from facility.    Therapy recommendation(s):    Continued therapy is recommended.  Rationale/Recommendations:  Home PT vs. TCU vs. ARU but unable due to insurance issues. Will continue HEP.

## 2021-11-03 ENCOUNTER — CARE COORDINATION (OUTPATIENT)
Dept: CASE MANAGEMENT | Facility: CLINIC | Age: 56
End: 2021-11-03

## 2021-11-03 NOTE — PROGRESS NOTES
Care Coordination Contact:    ARCELIA received phone call from pt's spouse, Chikis, requesting information to see if pt's follow up appointment will be covered under the Covid-19 HRSA funds.    ARCELIA reviewed this document that was received from  and then sent a copy of it via e-mail to Dr. Munoz for review & explained pt's case to her.    ARCELIA informed Chikis that I am not a Patient , and recommend she contact Renae or her supervisor with this question.       What's covered  Reimbursement under this program will be made for qualifying testing for COVID-19, for treatment services with a primary COVID-19 diagnosis, and for qualifying COVID-19 vaccine administration fees, as determined by [facility] (subject to adjustment as may be necessary), which include the following:    Specimen collection, diagnostic and antibody testing.    Testing-related visits including in the following settings: office, urgent care or emergency room or telehealth.    Treatment: office visit (including telehealth), emergency room, inpatient, outpatient/observation, skilled nursing facility, long-term acute care (LTAC), rehabilitation care, home health, durable medical equipment (e.g., oxygen, ventilator), emergency ambulance transportation, non-emergent patient transfers via ambulance, and FDA-licensed, authorized, or approved treatments as they become available for COVID-19 treatment.    Administration fees related to FDA-licensed or authorized vaccines.  Claims will be subject to Medicare timely filing requirements.  Services not covered by traditional Medicare will also not be covered under this program. In addition, the following services are excluded:    Any treatment without a COVID-19 primary diagnosis, except for pregnancy when the COVID-19 code may be listed as secondary.    Hospice services.    Outpatient prescription drugs.  All claims submitted must be complete and final.    Chikis said she will  contact [facility], but also said she believed she did already ask this question of them & just wanted to confirm with this writer.    Vanessa Whitehead, [facility]  Care Transitions   Tele: [phone]

## 2021-11-07 ENCOUNTER — HEALTH MAINTENANCE LETTER (OUTPATIENT)
Age: 56
End: 2021-11-07

## 2021-11-09 NOTE — PROGRESS NOTES
Assessment & Plan     Pneumonia due to 2019 novel coronavirus -continue using oxygen and wean as able.  Discussed this at length.  He has not been following the prednisone taper due to oversight.  Advised a second taper to wean him off the prednisone.  Plan for stopping the Bactrim as below.  Recheck in 1 month  - Basic metabolic panel  (Ca, Cl, CO2, Creat, Gluc, K, Na, BUN); Future  - CBC with platelets; Future  - albuterol (PROAIR HFA/PROVENTIL HFA/VENTOLIN HFA) 108 (90 Base) MCG/ACT inhaler; Inhale 2 puffs into the lungs 4 times daily  - predniSONE (DELTASONE) 5 MG tablet; Take 3 tablets (15 mg) by mouth daily for 4 days, THEN 2 tablets (10 mg) daily for 4 days, THEN 1 tablet (5 mg) daily for 4 days.  - Hepatic panel (Albumin, ALT, AST, Bili, Alk Phos, TP); Future  - apixaban ANTICOAGULANT (ELIQUIS) 5 MG tablet; Take 1 tablet (5 mg) by mouth 2 times daily  - Basic metabolic panel  (Ca, Cl, CO2, Creat, Gluc, K, Na, BUN)  - CBC with platelets  - Hepatic panel (Albumin, ALT, AST, Bili, Alk Phos, TP)    Acute respiratory failure with hypoxia (H) -still needing oxygen.  Recheck in 1 month.  May need to see pulmonary long-term  - Basic metabolic panel  (Ca, Cl, CO2, Creat, Gluc, K, Na, BUN); Future  - CBC with platelets; Future  - Hepatic panel (Albumin, ALT, AST, Bili, Alk Phos, TP); Future  - Basic metabolic panel  (Ca, Cl, CO2, Creat, Gluc, K, Na, BUN)  - CBC with platelets  - Hepatic panel (Albumin, ALT, AST, Bili, Alk Phos, TP)    Severe protein-calorie malnutrition (H) -appetite is improving.    Adjustment disorder with anxious mood -less anxious.  Citalopram working well.  Would continue until he is more clearly fully recovered    Aortic thrombus (H) -continue on the blood thinner for at least 3 months.  Recommend he see hematology to determine long-term plan for anticoagulation.  Advised to schedule once he secures insurance at the first of the year  - Basic metabolic panel  (Ca, Cl, CO2, Creat, Gluc, K, Na,  "BUN); Future  - CBC with platelets; Future  - Oncology/Hematology Adult Referral; Future  - Hepatic panel (Albumin, ALT, AST, Bili, Alk Phos, TP); Future  - Basic metabolic panel  (Ca, Cl, CO2, Creat, Gluc, K, Na, BUN)  - CBC with platelets  - Hepatic panel (Albumin, ALT, AST, Bili, Alk Phos, TP)      52 minutes spent on the date of the encounter doing chart review, review of test results, patient visit, documentation and discussion with family        BMI:   Estimated body mass index is 26.47 kg/m  as calculated from the following:    Height as of 10/1/21: 1.753 m (5' 9\").    Weight as of this encounter: 81.3 kg (179 lb 4 oz).       Patient Instructions   COVID pneumonia:  1. Plan to decrease prednisone - starting tomorrow  11/11, take prednisone 15 mg daily x 4 days, then 10 mg daily x 4 days, then 5 mg daily x 4 days then stop  2. You can stop the antibiotic Sulfa (bactrim) once you are taking prednisone 10 mg and lower  3. Keep using the oxygen 1-2 L as you are doing  4. Keep checking the oxygen levels.  Normal should be 93% or greater  5. Discuss portable oxygen with oxygen company   6. Discussed how to wean oxygen and determine when you don't need it anymore  7. Follow-up with Dr. Munoz in ~ 1 month     Blood thinner:  1. Continue medication  2 See the hematologist once you secure insurance      No follow-ups on file.    Nereida Munoz, [facility]    Marcello Capone is a 56 year old who presents for the following health issues  accompanied by his spouse.    HPI     ADV:handle  PHI: handle  Shingles: Check insurance first  Covid-19 vaccine: never have it need consent - Declined  Flu shot: Declined  PHQ9/GAD7: handle today         New pt to Dr. Munoz  Establish Care: Today      Feeling pretty good today       Review Sig on the prednisone:       Medication Followup of albuterol (PROAIR HFA/PROVENTIL HFA/VENTOLIN HFA) 108 (90 Base) MCG/ACT inhaler    Taking Medication as " prescribed: yes    Side Effects:  None    Medication Helping Symptoms:  yes        Hospital Follow-up Visit:    Hospital/Nursing Home/IP Rehab Facility: [facility]  Date of Admission: 10/1/21  Date of Discharge: 11/1/21  Reason(s) for Admission: COVID       Was your hospitalization related to COVID-19? YES   How are you feeling today? Much better  In the past 24 hours have you had shortness of breath when speaking, walking, or climbing stairs? My breathing issues have improved  Do you have a cough? I don't have a cough  When is the last time you had a fever greater than 100? Before hospitalization  Are you having any other symptoms? Fatigue   Do you have any other stressors you would like to discuss with your provider? No           PHQ Assesment Total Score(s) 11/10/2021   PHQ-2 Score 0   Some recent data might be hidden       No flowsheet data found.  No flowsheet data found.    Was the patient in the ICU or did the patient experience delirium during hospitalization?  Yes     Mini-Cog Total Score 11/10/2021   Mini Cog Total Score 5   Some recent data might be hidden             Problems taking medications regularly:  None  Medication changes since discharge: None  Problems adhering to non-medication therapy:  None    Summary of hospitalization:  [facility] discharge summary reviewed  Diagnostic Tests/Treatments reviewed.  Follow up needed: gomez  Other Healthcare Providers Involved in Patient s Care:         None  Update since discharge: improved.       Post Discharge Medication Reconciliation: discharge medications reconciled and changed, per note/orders.  Plan of care communicated with patient and family                  COVID-pneumonia   --Extensive ICU and hospitalization.  Was never intubated but required high flow nasal cannula and BiPAP through much of his hospital stay.  --using 1-2 L of oxygen continuously;  2 if active, 1.5 if resting  --feels breathing is  improving  --is using incentive spirometer - increased from 1 L from 500 mL a week ago  --no cough  --using albuterol inhaler 3 x day  --he and wife were not aware of prednisone taper, was just taking 20 mg daily since hospital discharge   --We discussed vaccination and he is open to it.  He would like to recover a bit more before getting it, as he understands he may feel feverish or rundown afterwards.  He wants to get a bit stronger and recognizes that he has some natural immunity for short period of time.  --His son was also severely ill with Covid and is now in a nursing home recuperating      Infrarenal Aortic Thrombus with thromboembolic obstruction of right tibial-peroneal trunk    Right foot pain and diminished RLE pulses noted 10/11/2021. CTA abdomen and pelvis revealed infrarenal aortic wall adherent clot, and apparent thromboembolic obstruction of right tibial-peroneal trunk with distal reconstitution of tibial and peroneal arteries.  Findings were discussed with vascular surgery [facility], who advised that COVID-related arterial thrombectomies frequently reclot, so surgery was not recommended, and that increased heparin resistance was seen in these patients, so anticoagulation with argatroban was recommended.      Argatroban infusion given 10/11 - 10/16/2021, after which the patient was transitioned to apixaban, starting at 10 mg p.o. twice daily for the first week (first dose 10/17/2021), then dose was decreased to 5 mg daily.    - Clinically improved perfusion of right foot.  - Discharged on apixaban, unclear how long to continue but would consider at least 3-6 months  --is still having slight pain and numbness of right great toe      Anxiety    Patient had been having severe subjective dyspnea only partially and briefly relieved by the use of morphine IV and lorazepam IV.  Morphine use discontinued prior to hospital stay.  Using less lorazepam at discharge  -Treated with lorazepam 0.5 mg orally. every 4  hours as needed for anxiety, which he is currently primarily taking as an at bedtime dose.  -Initiated citalopram 20 mg every day on 10/13/2021, knowing that this will take some time to reach effect. Patient seems improved with this.  Discharged on citalopram.  --feels citalopram is working well and anxiety is well controlled    Severe malnutrition: Patient declined placement of NG feeding tube.  Was on TPN which was discontinued several days prior to discharge  --appetite has improved    Social: Pt declined ARU, TCU and Home Care services at this time due to private pay costs involved. Pt is self pay-currently has no medical insurance    Review of Systems   Constitutional, HEENT, cardiovascular, pulmonary, GI, , musculoskeletal, neuro, skin, endocrine and psych systems are negative, except as otherwise noted.      Objective    /74   Pulse 104   Temp 97.9  F (36.6  C) (Tympanic)   Resp 16   Wt 81.3 kg (179 lb 4 oz)   SpO2 96%   BMI 26.47 kg/m    Body mass index is 26.47 kg/m .  Physical Exam   GENERAL APPEARANCE: alert, no distress and wearing oxygen  RESP: Crackles at bilateral bases, left greater than right  CV: Mild tachycardia, regular rhythm, no murmurs.  1/4 pulses bilateral DP  SKIN: Embolic changes to the right great toe that appear chronic and not acute.  Slightly decreased cap refill of the right toes  PSYCH: mentation appears normal and affect normal/bright    Results for orders placed or performed in visit on 11/10/21 (from the past 24 hour(s))   Basic metabolic panel  (Ca, Cl, CO2, Creat, Gluc, K, Na, BUN)   Result Value Ref Range    Sodium 137 133 - 144 mmol/L    Potassium 4.4 3.4 - 5.3 mmol/L    Chloride 105 94 - 109 mmol/L    Carbon Dioxide (CO2)      Anion Gap      Urea Nitrogen      Creatinine      Calcium      Glucose      GFR Estimate     CBC with platelets   Result Value Ref Range    WBC Count 15.2 (H) 4.0 - 11.0 10e3/uL    RBC Count 4.61 4.40 - 5.90 10e6/uL    Hemoglobin 13.7 13.3  - 17.7 g/dL    Hematocrit 41.3 40.0 - 53.0 %    MCV 90 78 - 100 fL    MCH 29.7 26.5 - 33.0 pg    MCHC 33.2 31.5 - 36.5 g/dL    RDW 14.0 10.0 - 15.0 %    Platelet Count 413 150 - 450 10e3/uL

## 2021-11-10 ENCOUNTER — OFFICE VISIT (OUTPATIENT)
Dept: FAMILY MEDICINE | Facility: CLINIC | Age: 56
End: 2021-11-10
Payer: OTHER GOVERNMENT

## 2021-11-10 VITALS
BODY MASS INDEX: 26.47 KG/M2 | TEMPERATURE: 97.9 F | OXYGEN SATURATION: 96 % | RESPIRATION RATE: 16 BRPM | DIASTOLIC BLOOD PRESSURE: 74 MMHG | HEART RATE: 104 BPM | WEIGHT: 179.25 LBS | SYSTOLIC BLOOD PRESSURE: 122 MMHG

## 2021-11-10 DIAGNOSIS — E43 SEVERE PROTEIN-CALORIE MALNUTRITION (H): ICD-10-CM

## 2021-11-10 DIAGNOSIS — J12.82 PNEUMONIA DUE TO 2019 NOVEL CORONAVIRUS: Primary | ICD-10-CM

## 2021-11-10 DIAGNOSIS — U07.1 PNEUMONIA DUE TO 2019 NOVEL CORONAVIRUS: Primary | ICD-10-CM

## 2021-11-10 DIAGNOSIS — J96.01 ACUTE RESPIRATORY FAILURE WITH HYPOXIA (H): ICD-10-CM

## 2021-11-10 DIAGNOSIS — F43.22 ADJUSTMENT DISORDER WITH ANXIOUS MOOD: ICD-10-CM

## 2021-11-10 DIAGNOSIS — I74.10 AORTIC THROMBUS (H): ICD-10-CM

## 2021-11-10 LAB
ALBUMIN SERPL-MCNC: 2.7 G/DL (ref 3.4–5)
ALP SERPL-CCNC: 88 U/L (ref 40–150)
ALT SERPL W P-5'-P-CCNC: 79 U/L (ref 0–70)
ANION GAP SERPL CALCULATED.3IONS-SCNC: 5 MMOL/L (ref 3–14)
AST SERPL W P-5'-P-CCNC: 21 U/L (ref 0–45)
BILIRUB DIRECT SERPL-MCNC: 0.1 MG/DL (ref 0–0.2)
BILIRUB SERPL-MCNC: 0.3 MG/DL (ref 0.2–1.3)
BUN SERPL-MCNC: 19 MG/DL (ref 7–30)
CALCIUM SERPL-MCNC: 9.7 MG/DL (ref 8.5–10.1)
CHLORIDE BLD-SCNC: 105 MMOL/L (ref 94–109)
CO2 SERPL-SCNC: 27 MMOL/L (ref 20–32)
CREAT SERPL-MCNC: 0.89 MG/DL (ref 0.66–1.25)
ERYTHROCYTE [DISTWIDTH] IN BLOOD BY AUTOMATED COUNT: 14 % (ref 10–15)
GFR SERPL CREATININE-BSD FRML MDRD: >90 ML/MIN/1.73M2
GLUCOSE BLD-MCNC: 100 MG/DL (ref 70–99)
HCT VFR BLD AUTO: 41.3 % (ref 40–53)
HGB BLD-MCNC: 13.7 G/DL (ref 13.3–17.7)
MCH RBC QN AUTO: 29.7 PG (ref 26.5–33)
MCHC RBC AUTO-ENTMCNC: 33.2 G/DL (ref 31.5–36.5)
MCV RBC AUTO: 90 FL (ref 78–100)
PLATELET # BLD AUTO: 413 10E3/UL (ref 150–450)
POTASSIUM BLD-SCNC: 4.4 MMOL/L (ref 3.4–5.3)
PROT SERPL-MCNC: 7.2 G/DL (ref 6.8–8.8)
RBC # BLD AUTO: 4.61 10E6/UL (ref 4.4–5.9)
SODIUM SERPL-SCNC: 137 MMOL/L (ref 133–144)
WBC # BLD AUTO: 15.2 10E3/UL (ref 4–11)

## 2021-11-10 PROCEDURE — 99204 OFFICE O/P NEW MOD 45 MIN: CPT | Performed by: INTERNAL MEDICINE

## 2021-11-10 PROCEDURE — 82248 BILIRUBIN DIRECT: CPT | Performed by: INTERNAL MEDICINE

## 2021-11-10 PROCEDURE — 80053 COMPREHEN METABOLIC PANEL: CPT | Performed by: INTERNAL MEDICINE

## 2021-11-10 PROCEDURE — 85027 COMPLETE CBC AUTOMATED: CPT | Performed by: INTERNAL MEDICINE

## 2021-11-10 PROCEDURE — 36415 COLL VENOUS BLD VENIPUNCTURE: CPT | Performed by: INTERNAL MEDICINE

## 2021-11-10 RX ORDER — PREDNISONE 5 MG/1
TABLET ORAL
Qty: 24 TABLET | Refills: 0 | Status: SHIPPED | OUTPATIENT
Start: 2021-11-10 | End: 2021-11-22

## 2021-11-10 RX ORDER — ALBUTEROL SULFATE 90 UG/1
2 AEROSOL, METERED RESPIRATORY (INHALATION) 4 TIMES DAILY
Qty: 36 G | Refills: 4 | Status: SHIPPED | OUTPATIENT
Start: 2021-11-10 | End: 2022-07-21

## 2021-11-10 ASSESSMENT — ANXIETY QUESTIONNAIRES
5. BEING SO RESTLESS THAT IT IS HARD TO SIT STILL: NOT AT ALL
7. FEELING AFRAID AS IF SOMETHING AWFUL MIGHT HAPPEN: NOT AT ALL
IF YOU CHECKED OFF ANY PROBLEMS ON THIS QUESTIONNAIRE, HOW DIFFICULT HAVE THESE PROBLEMS MADE IT FOR YOU TO DO YOUR WORK, TAKE CARE OF THINGS AT HOME, OR GET ALONG WITH OTHER PEOPLE: NOT DIFFICULT AT ALL
3. WORRYING TOO MUCH ABOUT DIFFERENT THINGS: NOT AT ALL
2. NOT BEING ABLE TO STOP OR CONTROL WORRYING: NOT AT ALL
GAD7 TOTAL SCORE: 0
1. FEELING NERVOUS, ANXIOUS, OR ON EDGE: NOT AT ALL
6. BECOMING EASILY ANNOYED OR IRRITABLE: NOT AT ALL

## 2021-11-10 ASSESSMENT — PATIENT HEALTH QUESTIONNAIRE - PHQ9: 5. POOR APPETITE OR OVEREATING: NOT AT ALL

## 2021-11-10 NOTE — PATIENT INSTRUCTIONS
COVID pneumonia:  1. Plan to decrease prednisone - starting tomorrow  11/11, take prednisone 15 mg daily x 4 days, then 10 mg daily x 4 days, then 5 mg daily x 4 days then stop  2. You can stop the antibiotic Sulfa (bactrim) once you are taking prednisone 10 mg and lower  3. Keep using the oxygen 1-2 L as you are doing  4. Keep checking the oxygen levels.  Normal should be 93% or greater  5. Discuss portable oxygen with oxygen company   6. Discussed how to wean oxygen and determine when you don't need it anymore  7. Follow-up with Dr. Munoz in ~ 1 month     Blood thinner:  1. Continue medication  2 See the hematologist once you secure insurance

## 2021-11-10 NOTE — NURSING NOTE
Place pt on the 2L oxygen tank - old verbal order done by Dr. Munoz still good.  Nilsa Pastrana CMA ([facility])   (aka: Laxmi Pastrana)

## 2021-11-11 ASSESSMENT — PATIENT HEALTH QUESTIONNAIRE - PHQ9: SUM OF ALL RESPONSES TO PHQ QUESTIONS 1-9: 1

## 2021-11-11 ASSESSMENT — ANXIETY QUESTIONNAIRES: GAD7 TOTAL SCORE: 0

## 2021-11-11 NOTE — RESULT ENCOUNTER NOTE
The liver enzymes have significantly improved from 10 days ago and are nearly normalized.  No specific recheck is needed.  Kidney function, electrolytes, nonfasting blood sugar are normal.  The white blood cell count is very mildly elevated which goes along with the steroids.  Steroids can cause an elevated white blood cell count

## 2021-12-02 ENCOUNTER — MYC MEDICAL ADVICE (OUTPATIENT)
Dept: FAMILY MEDICINE | Facility: CLINIC | Age: 56
End: 2021-12-02
Payer: COMMERCIAL

## 2021-12-09 ENCOUNTER — OFFICE VISIT (OUTPATIENT)
Dept: FAMILY MEDICINE | Facility: CLINIC | Age: 56
End: 2021-12-09
Payer: OTHER GOVERNMENT

## 2021-12-09 VITALS
SYSTOLIC BLOOD PRESSURE: 120 MMHG | OXYGEN SATURATION: 94 % | RESPIRATION RATE: 16 BRPM | DIASTOLIC BLOOD PRESSURE: 80 MMHG | HEART RATE: 107 BPM | TEMPERATURE: 96.5 F | WEIGHT: 189 LBS | BODY MASS INDEX: 27.91 KG/M2

## 2021-12-09 DIAGNOSIS — I74.10 AORTIC THROMBUS (H): ICD-10-CM

## 2021-12-09 DIAGNOSIS — U07.1 PNEUMONIA DUE TO 2019 NOVEL CORONAVIRUS: Primary | ICD-10-CM

## 2021-12-09 DIAGNOSIS — I10 BENIGN ESSENTIAL HYPERTENSION: ICD-10-CM

## 2021-12-09 DIAGNOSIS — Z23 HIGH PRIORITY FOR 2019-NCOV VACCINE: ICD-10-CM

## 2021-12-09 DIAGNOSIS — J12.82 PNEUMONIA DUE TO 2019 NOVEL CORONAVIRUS: Primary | ICD-10-CM

## 2021-12-09 DIAGNOSIS — J96.01 ACUTE RESPIRATORY FAILURE WITH HYPOXIA (H): ICD-10-CM

## 2021-12-09 PROCEDURE — 99214 OFFICE O/P EST MOD 30 MIN: CPT | Performed by: INTERNAL MEDICINE

## 2021-12-09 PROCEDURE — 0001A COVID-19,PF,PFIZER (12+ YRS): CPT | Performed by: INTERNAL MEDICINE

## 2021-12-09 PROCEDURE — 91300 COVID-19,PF,PFIZER (12+ YRS): CPT | Performed by: INTERNAL MEDICINE

## 2021-12-09 RX ORDER — LISINOPRIL 10 MG/1
10 TABLET ORAL DAILY
Qty: 30 TABLET | Refills: 1 | Status: SHIPPED | OUTPATIENT
Start: 2021-12-09 | End: 2022-03-28

## 2021-12-09 RX ORDER — CITALOPRAM HYDROBROMIDE 20 MG/1
20 TABLET ORAL DAILY
Qty: 30 TABLET | Refills: 1 | Status: CANCELLED | OUTPATIENT
Start: 2021-12-09

## 2021-12-09 NOTE — PROGRESS NOTES
"  Assessment & Plan     Pneumonia due to 2019 novel coronavirus - continue meds.  Restart oxygen 1-2 L with exertion, RA at rest.  Exertional hypoxia appears to be the cause of worsened symptoms.  Lungs clear, so do not think that more steroids are indicated.  If symptoms are not improving in 1-2 weeks, would get CT and consider steroids.  Needs to get PFT and see pulm in 2022 when he has insurance  - Adult Pulmonary Medicine Referral; Future  - apixaban ANTICOAGULANT (ELIQUIS) 5 MG tablet; Take 1 tablet (5 mg) by mouth 2 times daily  - General PFT Lab (Please always keep checked); Future  - Pulmonary Function Test; Future  - 6 minute walk test; Future    Acute respiratory failure with hypoxia (H)  - Adult Pulmonary Medicine Referral; Future    Aortic thrombus (H) - has follow-up with Hematology  - Adult Pulmonary Medicine Referral; Future    Benign essential hypertension  - stable, refill provided  - lisinopril (ZESTRIL) 10 MG tablet; Take 1 tablet (10 mg) by mouth daily    High priority for 2019-nCoV vaccine - patient is grateful to get vaccinated today.  He has convinced many non-vaccinated friends to get vaccinated so they don't go through what he went through  - COVID-19,PF,PFIZER (12+ Yrs PURPLE LABEL)             BMI:   Estimated body mass index is 27.91 kg/m  as calculated from the following:    Height as of 10/1/21: 1.753 m (5' 9\").    Weight as of this encounter: 85.7 kg (189 lb).       Patient Instructions   Respiratory Failure:  1. Follow-up follow-up with Pulmonary, - make appointment for 2022 - booking out ~ 1 month or so  2. Recommend using oxygen with exertion 1-2 L  3. You need to have breathing tests done prior to Pulmonary Visit.  Please call the Pulmonary lab at [phone] to schedule      Anxiety:  1. Ok to stay off the citalopram      No follow-ups on file.    Nereida Munoz DO  [facility]    Marcello Capone is a 56 year old who presents for the following health " issues     History of Present Illness   He exercises with enough effort to increase his heart rate 9 or less minutes per day.  He exercises with enough effort to increase his heart rate 3 or less days per week.   He is taking medications regularly.       Flu shot: Will wait  Shingles: Check insurance first!  Covid vaccine: Today      Covid Pneumonia follow up from 11/10/21 -   --Today feeling better but last week start to having some SOB, only when walking or moving. Was 1750 on [place]; the last week 1250  --felt more short of breath after exertion; with burning in chest  --oxygen levels are dropping to low 80s walking to mailbox, but will come up within a min or two; he later reports it got down into the 70s with more significant exertion  --he has not been using oxygen for ~ 2 weeks  --Last visit he had been doing an incorrect prednisone taper and I provided him with an alternative one.  We also stopped the Bactrim for PJP prophylaxis  --steroids were complete ~ 1 week ago; the shortness of breath started after stopping steroids  --he has a mild non-productive cough; reports throat feels dry (has wood burning stove to heat house, uses humidifiers)  --no chest pain   --We discussed seeing pulmonary last visit  --using albuterol inhaler occ but doesn't seem to help as much as it did before      The modified Job Scale for assessing the intensity of dyspnea or fatigue  0 Nothing at all   0.5 Very, very slight (just noticeable)   1 Very slight   2 Slight (light)   3 Moderate   4 Somewhat severe   5 Severe (heavy)   6     7 Very severe   8     9     10 Very, very severe (maximal)         Thrombus:  --He has follow-up with vascular in February    Anxiety:   --Had significant anxiety while hospitalized and citalopram was started.  At our last visit he had noted improving anxiety, but was not quite ready to stop the citalopram.  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  Is not able to work at this time due to dyspnea.      Walking test - patient dropped to 87% after 1 minute of walking.  Took him 3  Min to get oxygen levels to 90%      Current Outpatient Medications   Medication Sig Dispense Refill     acetaminophen (TYLENOL) 500 MG tablet Take 500-1,000 mg by mouth every 6 hours as needed for mild pain, fever or pain       albuterol (PROAIR HFA/PROVENTIL HFA/VENTOLIN HFA) 108 (90 Base) MCG/ACT inhaler Inhale 2 puffs into the lungs 4 times daily 36 g 4     apixaban ANTICOAGULANT (ELIQUIS) 5 MG tablet Take 1 tablet (5 mg) by mouth 2 times daily 60 tablet 1     citalopram (CELEXA) 20 MG tablet Take 1 tablet (20 mg) by mouth daily 30 tablet 1     lisinopril (ZESTRIL) 10 MG tablet Take 1 tablet (10 mg) by mouth daily 30 tablet 1     magic mouthwash suspension, diphenhydrAMINE, lidocaine, aluminum-magnesium & simethicone, (FIRST-MOUTHWASH BLM) compounding kit Swish and swallow 10 mLs in mouth every 6 hours as needed for mouth sores 237 mL 1     Omeprazole (PRILOSEC PO) Take 20 mg by mouth every morning Over the counter       sulfamethoxazole-trimethoprim (BACTRIM) 400-80 MG tablet Take 1 tablet by mouth daily (Patient not taking: Reported on 12/9/2021) 30 tablet 0         Review of Systems   Constitutional, HEENT, cardiovascular, pulmonary, gi and gu systems are negative, except as otherwise noted.      Objective    /80   Pulse 107   Temp (!) 96.5  F (35.8  C) (Tympanic)   Resp 16   Wt 85.7 kg (189 lb)   SpO2 94%   BMI 27.91 kg/m    Body mass index is 27.91 kg/m .  Physical Exam   GENERAL APPEARANCE: alert and no distress  RESP: lungs clear to auscultation - no rales, rhonchi or wheezes  CV: regular rates and rhythm, normal S1 S2, no S3 or S4 and no murmur, click or rub  LYMPHATICS: no leg edema

## 2021-12-09 NOTE — PATIENT INSTRUCTIONS
Respiratory Failure:  1. Follow-up follow-up with Pulmonary, - make appointment for 2022 - booking out ~ 1 month or so  2. Recommend using oxygen with exertion 1-2 L  3. You need to have breathing tests done prior to Pulmonary Visit.  Please call the Pulmonary lab at [phone] to schedule      Anxiety:  1. Ok to stay off the citalopram

## 2021-12-23 ENCOUNTER — MYC MEDICAL ADVICE (OUTPATIENT)
Dept: FAMILY MEDICINE | Facility: CLINIC | Age: 56
End: 2021-12-23
Payer: COMMERCIAL

## 2021-12-23 NOTE — LETTER
December 23, 2021      Liborio Barajas  [address] [facility] [address]        To Whom It May Concern:    Liborio Barajas  was seen on 12/09/2021.  Patient is not able to work at this time due to illness.  He will be having further evaluation and consultations.  He should be seen by his consultant in January 2022 or so.       Sincerely,        Nereida Munoz, DO

## 2021-12-24 ENCOUNTER — MYC MEDICAL ADVICE (OUTPATIENT)
Dept: FAMILY MEDICINE | Facility: CLINIC | Age: 56
End: 2021-12-24
Payer: COMMERCIAL

## 2021-12-24 NOTE — TELEPHONE ENCOUNTER
Printed and signed stating he is off currently until seen by specialist likely next month.  Sincerely,  Jorge Luis Larsen MD

## 2022-02-11 ENCOUNTER — TELEPHONE (OUTPATIENT)
Dept: FAMILY MEDICINE | Facility: CLINIC | Age: 57
End: 2022-02-11
Payer: COMMERCIAL

## 2022-02-11 ENCOUNTER — TELEPHONE (OUTPATIENT)
Dept: HEMATOLOGY | Facility: CLINIC | Age: 57
End: 2022-02-11
Payer: COMMERCIAL

## 2022-02-11 NOTE — TELEPHONE ENCOUNTER
----- Message from OMAIRA Vasquez sent at 2/11/2022  2:19 PM CST -----  Hi Dr Munoz,    A patient of yours, Liborio Barajas, was [facility] to [facility] for ongoing guidance related to anticoagulation due to an Infrarenal Aortic Thrombus with thromboembolic obstruction of right tibial-peroneal trunk related to COVID diagnosis.  He is scheduled 2/14 at 2pm.    I am the  in that clinic and in talking with family, they have a new health plan as of 2/1 that has us out of network. [employer] has requested the following from be submitted so they verify coverage for him to be seen at our clinic.    Can you complete the following form on Liborio's behalf and return to [facility]?    [email or web address]    Thanks!    OMAIRA Vasquez

## 2022-02-11 NOTE — TELEPHONE ENCOUNTER
[facility]  Brief Social Work Note    Liborio Barajas is a 56 year old male with a   Infrarenal Aortic Thrombus with thromboembolic obstruction of right tibial-peroneal trunk related to COVID diagnosis. He was referred to [facility] due to a need AC duration guidance and is scheduled for 2/14/22 at 2pm.    Liborio's wife, Chikis, contacted the  today re: insurance coverage  Writer spoke with Chikis in follow-up ([phone]).  She stated she contacted  at [phone], ext [phone]) and was told that [facility] needs to go to  provider portal website to locate out of network form due to pre-existing condition in order for [employer] to be able to bill under the COVID relief act.  This just needs to be on file, not necessarily go through a process of approval, per what Chikis was told.    Writer attempted to locate form, and contacted  for assistance.  Spoke with Loretta (direct number - [phone]) who was not familiar with this internally and also could not locate form on line.  She will escalate to a supervisor and call writer back with an update.    Writer will update Liborio's wife Chikis once coverage for Monday is confirmed.    Lexi Willis, CORBIN, OMAIRA, [facility]  Clinical   [facility]  Phone: [phone]    Update:  Loretta stated the following form must be completed and approved prior to pt obtaining services: [email or web address]    Writer has sent form to patient's PCP via [employer] and has updated patient's wife by phone.  She is aware form must be reviewed/approved before visit in order for it to be covered.  She is aware of 2pm appointment time on Monday.  Also sent her a copy for her records.  Lexi/OMAIRA

## 2022-02-11 NOTE — TELEPHONE ENCOUNTER
Form Placed in Barrie Cardoza.     [facility] on 2/11/2022 at 4:22 PM

## 2022-02-16 ENCOUNTER — TELEPHONE (OUTPATIENT)
Dept: FAMILY MEDICINE | Facility: CLINIC | Age: 57
End: 2022-02-16
Payer: COMMERCIAL

## 2022-02-16 NOTE — TELEPHONE ENCOUNTER
Vanessa at [facility] called requesting visit notes that were supposed to accompany form sent from dr victoria. Printed and faxed    Sj Browne RN

## 2022-02-16 NOTE — TELEPHONE ENCOUNTER
Form completed, signed, and faxed to  at [phone]. Copy of form sent to scan and copy placed in cabinet.

## 2022-03-28 ENCOUNTER — TELEPHONE (OUTPATIENT)
Dept: FAMILY MEDICINE | Facility: CLINIC | Age: 57
End: 2022-03-28

## 2022-03-28 ENCOUNTER — PRE VISIT (OUTPATIENT)
Dept: UROLOGY | Facility: CLINIC | Age: 57
End: 2022-03-28

## 2022-03-28 ENCOUNTER — OFFICE VISIT (OUTPATIENT)
Dept: FAMILY MEDICINE | Facility: CLINIC | Age: 57
End: 2022-03-28
Payer: COMMERCIAL

## 2022-03-28 VITALS
HEART RATE: 95 BPM | BODY MASS INDEX: 30.98 KG/M2 | DIASTOLIC BLOOD PRESSURE: 100 MMHG | RESPIRATION RATE: 20 BRPM | TEMPERATURE: 97.3 F | OXYGEN SATURATION: 98 % | HEIGHT: 68 IN | SYSTOLIC BLOOD PRESSURE: 138 MMHG | WEIGHT: 204.4 LBS

## 2022-03-28 DIAGNOSIS — N43.3 HYDROCELE IN ADULT: ICD-10-CM

## 2022-03-28 DIAGNOSIS — I10 BENIGN ESSENTIAL HYPERTENSION: Primary | ICD-10-CM

## 2022-03-28 DIAGNOSIS — N43.3 HYDROCELE IN ADULT: Primary | ICD-10-CM

## 2022-03-28 DIAGNOSIS — J12.82 PNEUMONIA DUE TO 2019 NOVEL CORONAVIRUS: ICD-10-CM

## 2022-03-28 DIAGNOSIS — U07.1 PNEUMONIA DUE TO 2019 NOVEL CORONAVIRUS: ICD-10-CM

## 2022-03-28 PROCEDURE — 99214 OFFICE O/P EST MOD 30 MIN: CPT | Performed by: NURSE PRACTITIONER

## 2022-03-28 RX ORDER — LISINOPRIL 10 MG/1
10 TABLET ORAL DAILY
Qty: 30 TABLET | Refills: 1 | Status: SHIPPED | OUTPATIENT
Start: 2022-03-28 | End: 2022-06-08

## 2022-03-28 ASSESSMENT — PAIN SCALES - GENERAL: PAINLEVEL: NO PAIN (0)

## 2022-03-28 NOTE — TELEPHONE ENCOUNTER
Reason for Call:  Other UROLOGY REFERRAL     Detailed comments: [facility] is booking out into July for office visits. Patient would like a referral to go to the [facility] location at [address] [place] [address]     Phone Number Patient can be reached at: Home number on file [phone] (home)    Best Time: any    Can we leave a detailed message on this number? YES    Call taken on 3/28/2022 at 4:17 PM by Maru Dumont

## 2022-03-28 NOTE — PATIENT INSTRUCTIONS
Lifestyle changes that can lower blood pressure include:  Limiting sodium in the diet.  Goal of <2.3 grams (2300 mg) daily.  Avoiding salty and prepared foods and not adding salt at the table can help.   Regular moderate exercise at least 150 minutes per week (30 minutes per day 5 days per week) plus muscle strengthening exercise at least 2 days per week.   Weight loss can help even if not dramatic or down to normal BMI range.  DASH type diet can actually lower blood pressure. You can find multiple resources for this on the internet.  Cutting back on alcohol can help for women drinking more than a drink per day and men more than 2 drinks per day on average.   Quitting smoking can help reduce your risk of heart attack or stroke.    A good resource for lifestyle changes is https://www.cardiosmart.org/topics/healthy-living    Recheck BP in 2 weeks to make sure your dose of lisinopril is appropriate.    Keep follow up with hematology.

## 2022-03-28 NOTE — PROGRESS NOTES
"  Assessment & Plan     Benign essential hypertension    - lisinopril (ZESTRIL) 10 MG tablet; Take 1 tablet (10 mg) by mouth daily  - prescription ran out this past Friday. Pt occasionally checks BP at home and notes it is typically normal when he is regularly taking his lisinopril, 120s/80s.   - encouraged to check BP at home more regularly and to take lisinopril as prescribed  - Follow up in 2 weeks for BP check - pt has hematology appointment around this time so we discussed OK to have BP rechecked at that appointment and to follow up in clinic if that BP is also elevated.     Pneumonia due to 2019 novel coronavirus  Aortic thrombus  - has been off oxygen for a couple of months now and feeling well, checks PO2 occasionally at home and notes it is normal  - apixaban ANTICOAGULANT (ELIQUIS) 5 MG tablet; Take 1 tablet (5 mg) by mouth 2 times daily  - refilled and directed pt to take 1 tab 2 times daily as he had previously only been taking 1 tab daily  - encouraged to follow up on aortic thrombus treatment with hematology as planned     Hydrocele in adult    - Adult Urology Referral; Future  - pt has had known right testicular hydrocele since 2016. Saw urology in 2016 - no surgery recommended at this time but instructed to follow up for possible surgery if hydrocele became more bothersome  - pt reports hydrocele is not painful but is recently more bothersome so he would like to have this re-evaluated    BMI:   Estimated body mass index is 30.85 kg/m  as calculated from the following:    Height as of this encounter: 1.734 m (5' 8.25\").    Weight as of this encounter: 92.7 kg (204 lb 6.4 oz).       Patient Instructions   Lifestyle changes that can lower blood pressure include:  Limiting sodium in the diet.  Goal of <2.3 grams (2300 mg) daily.  Avoiding salty and prepared foods and not adding salt at the table can help.   Regular moderate exercise at least 150 minutes per week (30 minutes per day 5 days per week) plus " muscle strengthening exercise at least 2 days per week.   Weight loss can help even if not dramatic or down to normal BMI range.  DASH type diet can actually lower blood pressure. You can find multiple resources for this on the internet.  Cutting back on alcohol can help for women drinking more than a drink per day and men more than 2 drinks per day on average.   Quitting smoking can help reduce your risk of heart attack or stroke.    A good resource for lifestyle changes is https://www.cardiosmart.org/topics/healthy-living    Recheck BP in 2 weeks to make sure your dose of lisinopril is appropriate.    Keep follow up with hematology.      Return in about 2 weeks (around 4/11/2022) for BP Recheck.      Marcello Capone is a 56 year old who presents for the following health issues. Presenting for refill of lisinopril and eliquis. Also requesting referral to urology for chronic right hydrocele.     History of Present Illness       Reason for visit:  Follow up        Chief Complaint   Patient presents with     RECHECK     Follow up from covid infection from October 2021, last visit was 12/9/21.  No longer on oxygen.  No concerns.         Review of Systems   Constitutional, HEENT, cardiovascular, pulmonary, gi and gu systems are negative, except as otherwise noted.      Objective    There were no vitals taken for this visit.  There is no height or weight on file to calculate BMI.  Physical Exam   GENERAL: healthy, alert and no distress  EYES: Eyes grossly normal to inspection, PERRL and conjunctivae and sclerae normal  NECK: no adenopathy, no asymmetry, masses, or scars and thyroid normal to palpation  RESP: lungs clear to auscultation - no rales, rhonchi or wheezes  CV: regular rate and rhythm, normal S1 S2, no S3 or S4, no murmur, click or rub, no peripheral edema and peripheral pulses strong  ABDOMEN: soft, nontender, no hepatosplenomegaly, no masses and bowel sounds normal   (male): hydrocele present right  MS:  no gross musculoskeletal defects noted, no edema, circulation, motion, sensation and temperature intact, 2+ pedal pulses   SKIN: no suspicious lesions or rashes  NEURO: Normal strength and tone, mentation intact and speech normal  PSYCH: mentation appears normal, affect normal/bright        Adelaida Gonzalez DNP-FNP Student

## 2022-03-28 NOTE — TELEPHONE ENCOUNTER
MEDICAL RECORDS REQUEST   [facility]  Urology Clinic  [address] [place] [address]  PHONE: [phone]  Fax: [phone]        FUTURE VISIT INFORMATION                                                   Liborio Barajas, : 1965 scheduled for future visit at [facility]    APPOINTMENT INFORMATION:    Date: 2022    Provider:  Rachana Vaca PA  Reason for Visit/Diagnosis: Hydrocele in adult [N43.3]  REFERRAL INFORMATION:    Referring provider:  Tiera Campos CNP    Referring providers clinic:  [facility]    RECORDS REQUESTED FOR VISIT                                                     NOTES  STATUS/DETAILS   OFFICE NOTE from referring provider  yes, 2022 -- Tiera Campos APRN CNP in [facility]   MEDICATION LIST  yes     PRE-VISIT CHECKLIST      Record collection complete Yes   Appointment appropriately scheduled           (right time/right provider) Yes   Joint diagnostic appointment coordinated correctly          (ensure right order & amount of time) Yes   MyChart activation Yes   Questionnaire complete If no, please explain pending

## 2022-03-29 ENCOUNTER — PRE VISIT (OUTPATIENT)
Dept: UROLOGY | Facility: CLINIC | Age: 57
End: 2022-03-29
Payer: COMMERCIAL

## 2022-03-29 NOTE — CONFIDENTIAL NOTE
Reason for visit: cosnult     Relevant information: known right hydrocele    Records/imaging/labs/orders: in epic    Pt called: n/a    At Rooming: virtual

## 2022-03-30 NOTE — TELEPHONE ENCOUNTER
Dr Munoz,    Please see pt's telephone call re: referral request.  I have pended a referral for your consideration.    Gabrielle Dobson RN

## 2022-03-31 ENCOUNTER — TELEPHONE (OUTPATIENT)
Dept: FAMILY MEDICINE | Facility: CLINIC | Age: 57
End: 2022-03-31

## 2022-03-31 ENCOUNTER — VIRTUAL VISIT (OUTPATIENT)
Dept: UROLOGY | Facility: CLINIC | Age: 57
End: 2022-03-31
Attending: NURSE PRACTITIONER
Payer: COMMERCIAL

## 2022-03-31 DIAGNOSIS — N43.3 HYDROCELE IN ADULT: ICD-10-CM

## 2022-03-31 PROCEDURE — 99203 OFFICE O/P NEW LOW 30 MIN: CPT | Mod: 95 | Performed by: PHYSICIAN ASSISTANT

## 2022-03-31 NOTE — LETTER
3/31/2022       RE: Liborio Barajas  [address] [facility] [address]     Dear Colleague,    Thank you for referring your patient, Liborio Barajas, to the [facility] [place] at [facility]. Please see a copy of my visit note below.    Liborio is a 56 year old who is being evaluated via a billable video visit.      How would you like to obtain your AVS? MyChart  If the video visit is dropped, the invitation should be resent by: Text to cell phone: [phone]  Will anyone else be joining your video visit? No      Video Start Time: 10:34 AM  Video-Visit Details    Type of service:  Video Visit    Video End Time:10:40 AM    Originating Location (pt. Location): Home    Distant Location (provider location):  [facility] [place]     Platform used for Video Visit: [employer]          Chief Complaint:   Right hydrocele          History of Present Illness:   Liborio Barajas is a 56 year old male with a history of hypertension, aortic thrombus, who presents for evaluation of right hydrocele.  Patient reports right scrotal swelling and right hydrocele has been present since at least 2016, and he was considered for right hydrocelectomy at the time, but this was not pursued to insurance reasons.  Since that time the patient reports the swelling on the right scrotum has been progressively increasing in size, and he reports it is now the size of a baseball.  He denies any significant pain with the swelling, but reports due to the size of the swelling it is becoming annoying and bothersome and is rubbing on his clothing.  He denies any skin rashes.  No left scrotal swelling or pain.      Patient is s/p left epididymal cystectomy in ~2011.  His most recent testicular US from [facility] in 2016 showing a large right hydrocele, along with a 4x7 mm cyst on the left epididymis with an additional smaller cyst inferiorly, and a stable 4 mm cyst in the  right epididymis.           Past Medical History:     Patient Active Problem List   Diagnosis     Acute respiratory failure with hypoxia (H)     Pneumonia due to 2019 novel coronavirus     Severe protein-calorie malnutrition (H)     Adjustment disorder with anxious mood     Aortic thrombus (H)            Past Surgical History:     Past Surgical History:   Procedure Laterality Date     COLONOSCOPY              Medications     Current Outpatient Medications   Medication     acetaminophen (TYLENOL) 500 MG tablet     albuterol (PROAIR HFA/PROVENTIL HFA/VENTOLIN HFA) 108 (90 Base) MCG/ACT inhaler     apixaban ANTICOAGULANT (ELIQUIS) 5 MG tablet     lisinopril (ZESTRIL) 10 MG tablet     Omeprazole (PRILOSEC PO)     No current facility-administered medications for this visit.            Family History:     Family History   Problem Relation Age of Onset     Breast Cancer Mother      Hypertension Mother      Heart Disease Father      Coronary Artery Disease Father      Gout Father      Cerebrovascular Disease Father      Hypertension Father      Gout Maternal Grandmother      Bone Cancer Paternal Grandmother      Cancer Sister             Social History:     Social History     Socioeconomic History     Marital status:      Spouse name: Not on file     Number of children: Not on file     Years of education: Not on file     Highest education level: Not on file   Occupational History     Not on file   Tobacco Use     Smoking status: Former Smoker     Packs/day: 0.00     Years: 0.00     Pack years: 0.00     Types: Cigarettes     Smokeless tobacco: Former User     Tobacco comment: Stop about 5 years ago   Substance and Sexual Activity     Alcohol use: Not Currently     Comment: Sometimes     Drug use: Never     Sexual activity: Yes     Partners: Female   Other Topics Concern     Parent/sibling w/ CABG, MI or angioplasty before 65F 55M? Not Asked   Social History Narrative     Not on file     Social Determinants of Health      Financial Resource Strain: Not on file   Food Insecurity: Not on file   Transportation Needs: Not on file   Physical Activity: Not on file   Stress: Not on file   Social Connections: Not on file   Intimate Partner Violence: Not on file   Housing Stability: Not on file            Allergies:   Nka [no known allergies]         Review of Systems:  From intake questionnaire   Negative 14 system review except as noted on HPI, nurse's note.         Physical Exam:   Patient is a 56 year old  male    General Appearance Adult: Alert, no acute distress, oriented  Lungs: no respiratory distress, or pursed lip breathing  Heart: No obvious jugular venous distension present  Skin: no suspicious lesions or rashes  Neuro: Alert, oriented, speech and mentation normal  Further examination is deferred due to the nature of our visit.        Labs and Pathology:    I personally reviewed all applicable laboratory data and went over findings with patient  Significant for:    CBC RESULTS:  Recent Labs   Lab Test 11/10/21  1210 10/30/21  0543 10/29/21  0607 10/28/21  0532   WBC 15.2* 10.9 8.9 6.9   HGB 13.7 12.5* 12.9* 13.6    425 393 402        BMP RESULTS:  Recent Labs   Lab Test 11/10/21  1210 11/01/21  0519 10/31/21  0822 10/30/21  0742 10/30/21  0543 10/29/21  0805 10/29/21  0607    138  --   --  136  --  136   POTASSIUM 4.4 4.3  --   --  4.2  --  4.1   CHLORIDE 105 104  --   --  102  --  102   CO2 27 27  --   --  28  --  27   ANIONGAP 5 7  --   --  6  --  7   * 82 102* 113* 110*   < > 111*   BUN 19 33*  --   --  29  --  26   CR 0.89 0.78  --   --  0.69  --  0.68   GFRESTIMATED >90 >90  --   --  >90  --  >90   JANET 9.7 9.1  --   --  8.7  --  9.1    < > = values in this interval not displayed.       UA RESULTS:   No results for input(s): SG, URINEPH, NITRITE, RBCU, WBCU in the last 69765 hours.    PSA RESULTS  No results found for: PSA      Imaging:    I personally reviewed all applicable imaging and went over  findings with patient.  Significant for:    Testicular US (3/2/2016):  HISTORY:   Spermatocele.     Comparison:   Ultrasound 06/07/2011.     Findings:   The right testis measures 2.8 x 4 x 4.2 cm.  The left testis measures 1.6 x 3.4 x 3.9 cm.   The left testicle is somewhat heterogeneous with slight increased in echogenicity but this is not significantly changed since 06/07/2011.   A 4 mm cyst is seen in the head of the right epididymis.  This is stable since 06/07/2011.   There is a 4 x 7 mm cyst seen in the mid left epididymis. A 2nd cyst is seen along the lateral upper aspect of the left testis.  I think that this is more likely within the epididymis rather than testicular.  It measures 4 x 7 mm.  The previous 3 cm spermatocele seen in 2011 is no longer present.   Large right hydrocele is present.   Color spectral Doppler shows blood flow in each testicle.     Impression:   1. No evidence of testicular mass, torsion, or inflammation.   2. The previous 3 cm spermatocele seen in the left on the ultrasound of 06/07/2011 is no longer present.  There is a 4 x 7 mm cyst in the region of the head of the left epididymis with an additional smaller cyst further inferiorly.   3. Stable 4 mm cyst in the head of the right epididymis.   4. Large right hydrocele.            Assessment and Plan:     Assessment: 56 year old male with a history of known large right hydrocele demonstrated on testicular US from 2016, now with progressive worsening of right scrotal swelling.  Patient without significant pain, but he is bothered due to the progressive size of the right hydrocele.  A physical exam was unable to be performed due to the nature of our virtual visit, but we will plan to update a testicular US and refer to Dr. Russo for consideration of right hydrocelectomy.  Patient in agreement with plan.     Plan:  - Schedule testicular US.   - Schedule in person office visit with Dr. Russo for consideration of right hydrocelectomy.        ELBA Hermosillo  Department of Urology

## 2022-03-31 NOTE — PATIENT INSTRUCTIONS
UROLOGY CLINIC VISIT PATIENT INSTRUCTIONS    - Schedule testicular US.   - Schedule in person office visit with Dr. Russo for consideration of right hydrocelectomy.     If you have any issues, questions or concerns in the meantime, do not hesitate to contact us at [phone] or via [employer].     It was a pleasure meeting with you today.  Thank you for allowing me and my team the privilege of caring for you today.  YOU are the reason we are here, and I truly hope we provided you with the excellent service you deserve.  Please let us know if there is anything else we can do for you so that we can be sure you are leaving completely satisfied with your care experience.    Rachana Vaca PA-C  Department of Urology

## 2022-03-31 NOTE — TELEPHONE ENCOUNTER
Patient Quality Outreach    Patient is due for the following:   Hypertension -  Hypertension follow-up visit  Physical  - Due after never done    NEXT STEPS:   Schedule a yearly physical    Type of outreach:    Sent [employer] message.      Questions for provider review:    None     Sushila Pate MA

## 2022-03-31 NOTE — PROGRESS NOTES
Chief Complaint:   Right hydrocele          History of Present Illness:   Liborio Barajas is a 56 year old male with a history of hypertension, aortic thrombus, who presents for evaluation of right hydrocele.  Patient reports right scrotal swelling and right hydrocele has been present since at least 2016, and he was considered for right hydrocelectomy at the time, but this was not pursued to insurance reasons.  Since that time the patient reports the swelling on the right scrotum has been progressively increasing in size, and he reports it is now the size of a baseball.  He denies any significant pain with the swelling, but reports due to the size of the swelling it is becoming annoying and bothersome and is rubbing on his clothing.  He denies any skin rashes.  No left scrotal swelling or pain.      Patient is s/p left epididymal cystectomy in ~2011.  His most recent testicular US from [facility] in 2016 showing a large right hydrocele, along with a 4x7 mm cyst on the left epididymis with an additional smaller cyst inferiorly, and a stable 4 mm cyst in the right epididymis.           Past Medical History:     Patient Active Problem List   Diagnosis     Acute respiratory failure with hypoxia (H)     Pneumonia due to 2019 novel coronavirus     Severe protein-calorie malnutrition (H)     Adjustment disorder with anxious mood     Aortic thrombus (H)            Past Surgical History:     Past Surgical History:   Procedure Laterality Date     COLONOSCOPY              Medications     Current Outpatient Medications   Medication     acetaminophen (TYLENOL) 500 MG tablet     albuterol (PROAIR HFA/PROVENTIL HFA/VENTOLIN HFA) 108 (90 Base) MCG/ACT inhaler     apixaban ANTICOAGULANT (ELIQUIS) 5 MG tablet     lisinopril (ZESTRIL) 10 MG tablet     Omeprazole (PRILOSEC PO)     No current facility-administered medications for this visit.            Family History:     Family History   Problem Relation Age of Onset     Breast Cancer  Mother      Hypertension Mother      Heart Disease Father      Coronary Artery Disease Father      Gout Father      Cerebrovascular Disease Father      Hypertension Father      Gout Maternal Grandmother      Bone Cancer Paternal Grandmother      Cancer Sister             Social History:     Social History     Socioeconomic History     Marital status:      Spouse name: Not on file     Number of children: Not on file     Years of education: Not on file     Highest education level: Not on file   Occupational History     Not on file   Tobacco Use     Smoking status: Former Smoker     Packs/day: 0.00     Years: 0.00     Pack years: 0.00     Types: Cigarettes     Smokeless tobacco: Former User     Tobacco comment: Stop about 5 years ago   Substance and Sexual Activity     Alcohol use: Not Currently     Comment: Sometimes     Drug use: Never     Sexual activity: Yes     Partners: Female   Other Topics Concern     Parent/sibling w/ CABG, MI or angioplasty before 65F 55M? Not Asked   Social History Narrative     Not on file     Social Determinants of Health     Financial Resource Strain: Not on file   Food Insecurity: Not on file   Transportation Needs: Not on file   Physical Activity: Not on file   Stress: Not on file   Social Connections: Not on file   Intimate Partner Violence: Not on file   Housing Stability: Not on file            Allergies:   Nka [no known allergies]         Review of Systems:  From intake questionnaire   Negative 14 system review except as noted on HPI, nurse's note.         Physical Exam:   Patient is a 56 year old  male    General Appearance Adult: Alert, no acute distress, oriented  Lungs: no respiratory distress, or pursed lip breathing  Heart: No obvious jugular venous distension present  Skin: no suspicious lesions or rashes  Neuro: Alert, oriented, speech and mentation normal  Further examination is deferred due to the nature of our visit.        Labs and Pathology:    I personally  reviewed all applicable laboratory data and went over findings with patient  Significant for:    CBC RESULTS:  Recent Labs   Lab Test 11/10/21  1210 10/30/21  0543 10/29/21  0607 10/28/21  0532   WBC 15.2* 10.9 8.9 6.9   HGB 13.7 12.5* 12.9* 13.6    425 393 402        BMP RESULTS:  Recent Labs   Lab Test 11/10/21  1210 11/01/21  0519 10/31/21  0822 10/30/21  0742 10/30/21  0543 10/29/21  0805 10/29/21  0607    138  --   --  136  --  136   POTASSIUM 4.4 4.3  --   --  4.2  --  4.1   CHLORIDE 105 104  --   --  102  --  102   CO2 27 27  --   --  28  --  27   ANIONGAP 5 7  --   --  6  --  7   * 82 102* 113* 110*   < > 111*   BUN 19 33*  --   --  29  --  26   CR 0.89 0.78  --   --  0.69  --  0.68   GFRESTIMATED >90 >90  --   --  >90  --  >90   JANET 9.7 9.1  --   --  8.7  --  9.1    < > = values in this interval not displayed.       UA RESULTS:   No results for input(s): SG, URINEPH, NITRITE, RBCU, WBCU in the last 79164 hours.    PSA RESULTS  No results found for: PSA      Imaging:    I personally reviewed all applicable imaging and went over findings with patient.  Significant for:    Testicular US (3/2/2016):  HISTORY:   Spermatocele.     Comparison:   Ultrasound 06/07/2011.     Findings:   The right testis measures 2.8 x 4 x 4.2 cm.  The left testis measures 1.6 x 3.4 x 3.9 cm.   The left testicle is somewhat heterogeneous with slight increased in echogenicity but this is not significantly changed since 06/07/2011.   A 4 mm cyst is seen in the head of the right epididymis.  This is stable since 06/07/2011.   There is a 4 x 7 mm cyst seen in the mid left epididymis. A 2nd cyst is seen along the lateral upper aspect of the left testis.  I think that this is more likely within the epididymis rather than testicular.  It measures 4 x 7 mm.  The previous 3 cm spermatocele seen in 2011 is no longer present.   Large right hydrocele is present.   Color spectral Doppler shows blood flow in each testicle.      Impression:   1. No evidence of testicular mass, torsion, or inflammation.   2. The previous 3 cm spermatocele seen in the left on the ultrasound of 06/07/2011 is no longer present.  There is a 4 x 7 mm cyst in the region of the head of the left epididymis with an additional smaller cyst further inferiorly.   3. Stable 4 mm cyst in the head of the right epididymis.   4. Large right hydrocele.            Assessment and Plan:     Assessment: 56 year old male with a history of known large right hydrocele demonstrated on testicular US from 2016, now with progressive worsening of right scrotal swelling.  Patient without significant pain, but he is bothered due to the progressive size of the right hydrocele.  A physical exam was unable to be performed due to the nature of our virtual visit, but we will plan to update a testicular US and refer to Dr. Russo for consideration of right hydrocelectomy.  Patient in agreement with plan.     Plan:  - Schedule testicular US.   - Schedule in person office visit with Dr. Russo for consideration of right hydrocelectomy.       ELBA Hermosillo  Department of Urology

## 2022-03-31 NOTE — PROGRESS NOTES
Liborio is a 56 year old who is being evaluated via a billable video visit.      How would you like to obtain your AVS? [employer]  If the video visit is dropped, the invitation should be resent by: Text to cell phone: [phone]  Will anyone else be joining your video visit? No      Video Start Time: 10:34 AM  Video-Visit Details    Type of service:  Video Visit    Video End Time:10:40 AM    Originating Location (pt. Location): Home    Distant Location (provider location):  [facility] UROLOGY [facility]     Platform used for Video Visit: [employer]

## 2022-04-08 ENCOUNTER — HOSPITAL ENCOUNTER (OUTPATIENT)
Dept: ULTRASOUND IMAGING | Facility: CLINIC | Age: 57
Discharge: HOME | End: 2022-04-08
Attending: PHYSICIAN ASSISTANT | Admitting: PHYSICIAN ASSISTANT
Payer: COMMERCIAL

## 2022-04-08 ENCOUNTER — TELEPHONE (OUTPATIENT)
Dept: UROLOGY | Facility: CLINIC | Age: 57
End: 2022-04-08

## 2022-04-08 DIAGNOSIS — N43.3 HYDROCELE IN ADULT: ICD-10-CM

## 2022-04-08 PROCEDURE — 76870 US EXAM SCROTUM: CPT | Mod: 26 | Performed by: RADIOLOGY

## 2022-04-08 PROCEDURE — 93976 VASCULAR STUDY: CPT

## 2022-04-19 ENCOUNTER — OFFICE VISIT (OUTPATIENT)
Dept: HEMATOLOGY | Facility: CLINIC | Age: 57
End: 2022-04-19
Attending: INTERNAL MEDICINE
Payer: COMMERCIAL

## 2022-04-19 VITALS
WEIGHT: 210.3 LBS | DIASTOLIC BLOOD PRESSURE: 58 MMHG | TEMPERATURE: 97.6 F | HEART RATE: 76 BPM | HEIGHT: 68 IN | BODY MASS INDEX: 31.87 KG/M2 | OXYGEN SATURATION: 97 % | RESPIRATION RATE: 16 BRPM | SYSTOLIC BLOOD PRESSURE: 125 MMHG

## 2022-04-19 DIAGNOSIS — I74.10 AORTIC THROMBUS (H): ICD-10-CM

## 2022-04-19 PROCEDURE — G0463 HOSPITAL OUTPT CLINIC VISIT: HCPCS

## 2022-04-19 PROCEDURE — 99204 OFFICE O/P NEW MOD 45 MIN: CPT | Performed by: PHYSICIAN ASSISTANT

## 2022-04-19 ASSESSMENT — PAIN SCALES - GENERAL: PAINLEVEL: NO PAIN (0)

## 2022-04-19 NOTE — Clinical Note
2022       RE: Liborio Barajas  [address] [facility] [address]     Dear Colleague,    Thank you for referring your patient, Liborio Barajas, to the [facility] at [facility]. Please see a copy of my visit note below.        [facility]  [address] [place] [id number]  Main: [phone], Fax: [phone]    Patient seen at: [facility] at [address] [place] [address]    Outpatient Visit Note:    Patient: Liborio Barajas  MRN: [id number]  : 1965  JORGE: 2022    Reason for visit:  Infrarenal aortic thrombus with thromboembolic obstruction of right tibial-peroneal trunk.     HPI:  Liborio is a 56 year old male who was hospitalized from 10/1/2021 to 2021 for severe COVID-19 infection with respiratory failure who was also found to have intrarenal aortic thrombus with thromboembolic obstruction of right tibial-peroneal trunk at the time of his admission, currently on apixaban, referred by Dr. Nereida Munoz for consultation in regard to duration of anticoagulation therapy.     Dated back to 10/1/2021, he presented to the emergency department with fever, myalgias, headaches, shortness of breath, chest pain and diarrhea since 2021. At the time, he was not vaccinated for COVID-19. On 10/1/2021, his shortness of breath worsened and his wife and son apparently was tested positive for COVID-19 on that day. Thus he presented to the emergency department for evaluation. There a D-Dimer test was done and it was elevated at 2.11. He required 6L of oxygen while at the emergency department. He was admitted to the ICU for further care.     CT chest on 10/1/2021 showed extensive bilateral ground-glass and patchy regions of dense consolidation involving all lobes of both lungs worrisome for multifocal pneumonia. No evidence of pulmonary  "embolism.     Then on 10/11/2021 (Hospitalization Day #11), CTA of abdomen, pelvis with bilateral leg runoff was  done due to right great toe pain. This CTA showed infrarenal aortic wall adhered clot and apparent thromboembolic obstruction of right tibial-peroneal trunk with distal reconstitution of tibial and peroneal arteries. Because of this, he was placed on argatroban infusion. He eventually discharged on apixaban on 11/1/2021. He has remained on apixaban since. About one month ago, he decreased his apixaban dose from 5 mg PO BID to 5 mg PO Qday dosing as he was apparently running low on supply.     ROS:  He reports that his respiratory status is about 80% recovered. He continues to experience some right great toe numbness and pain at times. Denies any significant right leg or foot pain. Denies any epistaxis. No oral mucosal bleeding. Denies any hematuria or blood in stools.     Medication:  Current Outpatient Medications   Medication     apixaban ANTICOAGULANT (ELIQUIS) 5 MG tablet     lisinopril (ZESTRIL) 10 MG tablet     Omeprazole (PRILOSEC PO)     acetaminophen (TYLENOL) 500 MG tablet     albuterol (PROAIR HFA/PROVENTIL HFA/VENTOLIN HFA) 108 (90 Base) MCG/ACT inhaler     No current facility-administered medications for this visit.     Allergies:  Allergies   Allergen Reactions     Nka [No Known Allergies]      PmHx:  No past medical history on file.    Social History:  Deferred.     Family History:  No family history of arterial thrombosis or venous thrombosis.   Father has a history of abdominal aortic aneurysm.     Objective:  Vitals: /58   Pulse 76   Temp 97.6  F (36.4  C) (Oral)   Resp 16   Ht 1.734 m (5' 8.25\")   Wt 95.4 kg (210 lb 4.8 oz)   SpO2 97%   BMI 31.74 kg/m    Exam:   Right leg: No swelling noted. No necrotic tissue noted. Dorsalis pedis is palpable at 1+.     Labs:  Component      Latest Ref Rng & Units 11/10/2021   WBC      4.0 - 11.0 10e3/uL 15.2 (H)   RBC Count      4.40 - " 5.90 10e6/uL 4.61   Hemoglobin      13.3 - 17.7 g/dL 13.7   Hematocrit      40.0 - 53.0 % 41.3   MCV      78 - 100 fL 90   MCH      26.5 - 33.0 pg 29.7   MCHC      31.5 - 36.5 g/dL 33.2   RDW      10.0 - 15.0 % 14.0   Platelet Count      150 - 450 10e3/uL 413   Bilirubin Total      0.2 - 1.3 mg/dL 0.3   Bilirubin Direct      0.0 - 0.2 mg/dL 0.1   Protein Total      6.8 - 8.8 g/dL 7.2   Albumin      3.4 - 5.0 g/dL 2.7 (L)   Alkaline Phosphatase      40 - 150 U/L 88   AST      0 - 45 U/L 21   ALT      0 - 70 U/L 79 (H)     Component      Latest Ref Rng & Units 11/10/2021   Sodium      133 - 144 mmol/L 137   Potassium      3.4 - 5.3 mmol/L 4.4   Chloride      94 - 109 mmol/L 105   Carbon Dioxide      20 - 32 mmol/L 27   Anion Gap      3 - 14 mmol/L 5   Urea Nitrogen      7 - 30 mg/dL 19   Creatinine      0.66 - 1.25 mg/dL 0.89   Calcium      8.5 - 10.1 mg/dL 9.7   Glucose      70 - 99 mg/dL 100 (H)   GFR Estimate      >60 mL/min/1.73m2 >90       Imaging:  Reviewed and are as described above.     Assessment:  In summary, Liborio is a 56 year old male with a history of hypertension and GERD, who was hospitalized from 10/1/2021 to 11/1/2021 with severe COVID-19 pneumonia who also had an intrarenal aortic thrombosis as well as occluded tibioperoneal trunk on the right 11 days into his hospitalization, currently on apixaban, referred by Dr. Munoz for consultation in regard to anticoagulation therapy duration.     His infrarenal aortic thrombosis and tibioperoneal trunk occlusion were provoked by severe COVID-19 infection at the time. He has no completed 6 months of anticoagulation therapy.     Diagnosis:    Severe COVID-19 infection provoked infrarenal aortic thrombosis and right tibioperoneal trunk occlusion.     Plan:  Since his aortic and right tibioperoneal trunk occlusion was a provoked event and that he has recovered from COVID-19 (although continues to have some residual symptoms), I do not feel that he necessarily  need to stay on anticoagulation therapy. I will discontinue his apixaban today. However, I do encourage him to start taking a daily aspirin at 81 mg PO Qday.     I will obtain a repeat CTA of the abd/pelvis with right leg runoff to re-evaluate his thrombus and set as a new baseline for future references.     I will contact him once the repeat CTA result is back but otherwise, I have no further plans to see this patient back on a routine basis.     Thank you for letting us to participate in this patient's care.      Case discussed with Dr. Alen Mendieta, staff hematologist. He agrees with the above plan and recommendation.     55 minutes spent on the date of the encounter doing chart review, history and exam, documentation and further activities per the note.    Time IN: 11:08am  Time OUT: 11:42am       Avery Carrington PA-C, BALBIRS  Physician Assistant  [facility].                   Vitals completed along with medication and allergy review by Gaby Ritchie CMA.        Again, thank you for allowing me to participate in the care of your patient.      Sincerely,    Avery Carrington PA-C

## 2022-04-19 NOTE — LETTER
[facility]  [address] [place] [address] [address], [place] [address]  Main: [phone], Fax: [phone]    [facility] at: [facility] at [address] [place] [address]    Outpatient Visit Note:    Patient: Liborio Barajas  MRN: [id number]  : 1965  JORGE: 2022    Reason for visit:  Infrarenal aortic thrombus with thromboembolic obstruction of right tibial-peroneal trunk.     HPI:  Liborio is a 56 year old male who was hospitalized from 10/1/2021 to 2021 for severe COVID-19 infection with respiratory failure who was also found to have intrarenal aortic thrombus with thromboembolic obstruction of right tibial-peroneal trunk at the time of his admission, currently on apixaban, referred by Dr. Nereida Munoz for consultation in regard to duration of anticoagulation therapy.     Dated back to 10/1/2021, he presented to the emergency department with fever, myalgias, headaches, shortness of breath, chest pain and diarrhea since 2021. At the time, he was not vaccinated for COVID-19. On 10/1/2021, his shortness of breath worsened and his wife and son apparently was tested positive for COVID-19 on that day. Thus he presented to the emergency department for evaluation. There a D-Dimer test was done and it was elevated at 2.11. He required 6L of oxygen while at the emergency department. He was admitted to the ICU for further care.     CT chest on 10/1/2021 showed extensive bilateral ground-glass and patchy regions of dense consolidation involving all lobes of both lungs worrisome for multifocal pneumonia. No evidence of pulmonary embolism.     Then on 10/11/2021 (Hospitalization Day #11), CTA of abdomen, pelvis with bilateral leg runoff was  done due to right great toe pain. This CTA showed infrarenal aortic wall adhered clot and apparent thromboembolic obstruction of right tibial-peroneal trunk with distal reconstitution of tibial and peroneal  "arteries. Because of this, he was placed on argatroban infusion. He eventually discharged on apixaban on 11/1/2021. He has remained on apixaban since. About one month ago, he decreased his apixaban dose from 5 mg PO BID to 5 mg PO Qday dosing as he was apparently running low on supply.     ROS:  He reports that his respiratory status is about 80% recovered. He continues to experience some right great toe numbness and pain at times. Denies any significant right leg or foot pain. Denies any epistaxis. No oral mucosal bleeding. Denies any hematuria or blood in stools.     Medication:  Current Outpatient Medications   Medication     apixaban ANTICOAGULANT (ELIQUIS) 5 MG tablet     lisinopril (ZESTRIL) 10 MG tablet     Omeprazole (PRILOSEC PO)     acetaminophen (TYLENOL) 500 MG tablet     albuterol (PROAIR HFA/PROVENTIL HFA/VENTOLIN HFA) 108 (90 Base) MCG/ACT inhaler     No current facility-administered medications for this visit.     Allergies:  Allergies   Allergen Reactions     Nka [No Known Allergies]      PmHx:  No past medical history on file.    Social History:  Deferred.     Family History:  No family history of arterial thrombosis or venous thrombosis.   Father has a history of abdominal aortic aneurysm.     Objective:  Vitals: /58   Pulse 76   Temp 97.6  F (36.4  C) (Oral)   Resp 16   Ht 1.734 m (5' 8.25\")   Wt 95.4 kg (210 lb 4.8 oz)   SpO2 97%   BMI 31.74 kg/m    Exam:   Right leg: No swelling noted. No necrotic tissue noted. Dorsalis pedis is palpable at 1+.     Labs:  Component      Latest Ref Rng & Units 11/10/2021   WBC      4.0 - 11.0 10e3/uL 15.2 (H)   RBC Count      4.40 - 5.90 10e6/uL 4.61   Hemoglobin      13.3 - 17.7 g/dL 13.7   Hematocrit      40.0 - 53.0 % 41.3   MCV      78 - 100 fL 90   MCH      26.5 - 33.0 pg 29.7   MCHC      31.5 - 36.5 g/dL 33.2   RDW      10.0 - 15.0 % 14.0   Platelet Count      150 - 450 10e3/uL 413   Bilirubin Total      0.2 - 1.3 mg/dL 0.3   Bilirubin " Direct      0.0 - 0.2 mg/dL 0.1   Protein Total      6.8 - 8.8 g/dL 7.2   Albumin      3.4 - 5.0 g/dL 2.7 (L)   Alkaline Phosphatase      40 - 150 U/L 88   AST      0 - 45 U/L 21   ALT      0 - 70 U/L 79 (H)     Component      Latest Ref Rng & Units 11/10/2021   Sodium      133 - 144 mmol/L 137   Potassium      3.4 - 5.3 mmol/L 4.4   Chloride      94 - 109 mmol/L 105   Carbon Dioxide      20 - 32 mmol/L 27   Anion Gap      3 - 14 mmol/L 5   Urea Nitrogen      7 - 30 mg/dL 19   Creatinine      0.66 - 1.25 mg/dL 0.89   Calcium      8.5 - 10.1 mg/dL 9.7   Glucose      70 - 99 mg/dL 100 (H)   GFR Estimate      >60 mL/min/1.73m2 >90       Imaging:  Reviewed and are as described above.     Assessment:  In summary, Liborio is a 56 year old male with a history of hypertension and GERD, who was hospitalized from 10/1/2021 to 11/1/2021 with severe COVID-19 pneumonia who also had an intrarenal aortic thrombosis as well as occluded tibioperoneal trunk on the right 11 days into his hospitalization, currently on apixaban, referred by Dr. Munoz for consultation in regard to anticoagulation therapy duration.     His infrarenal aortic thrombosis and tibioperoneal trunk occlusion were provoked by severe COVID-19 infection at the time. He has no completed 6 months of anticoagulation therapy.     Diagnosis:    Severe COVID-19 infection provoked infrarenal aortic thrombosis and right tibioperoneal trunk occlusion.     Plan:  Since his aortic and right tibioperoneal trunk occlusion was a provoked event and that he has recovered from COVID-19 (although continues to have some residual symptoms), I do not feel that he necessarily need to stay on anticoagulation therapy. I will discontinue his apixaban today. However, I do encourage him to start taking a daily aspirin at 81 mg PO Qday.     I will obtain a repeat CTA of the abd/pelvis with right leg runoff to re-evaluate his thrombus and set as a new baseline for future references.     I will  contact him once the repeat CTA result is back but otherwise, I have no further plans to see this patient back on a routine basis.     Thank you for letting us to participate in this patient's care.      Case discussed with Dr. Alen Mendieta, staff hematologist. He agrees with the above plan and recommendation.     55 minutes spent on the date of the encounter doing chart review, history and exam, documentation and further activities per the note.    Time IN: 11:08am  Time OUT: 11:42am       Avery Carrington PA-C, MPAS  Physician Assistant  [facility].         Vitals completed along with medication and allergy review by Gaby Ritchie CMA.

## 2022-04-19 NOTE — PATIENT INSTRUCTIONS
[facility]  [facility]  [facility], [place] [address]  Main: [phone], Fax: [phone]    It was a pleasure seeing you today.  Thank you for allowing us to be involved in your care. Please let us know if there is anything else we can do for you, so that we can be sure you are leaving completely satisfied with your care experience.      Please stop your Eliquis, you can restart your Aspirin. Please call us with any bleeding issues that you may have.    We would like you to repeat your imaging, you are scheduled at the [facility] on April 26th at 0840am with a 0825 check in. Drink extra fluids the day before this appointment. Please wear comfortable clothes to this appointment and avoid zippers and snaps.  Avery Carrington PA-C will call you once these results are back.  .    Wear compression stockings if you have swelling in your leg. Take them off while you are sleeping. You may need to get new stockings every 3-6 months with regular wear.    Patient Resources:   For additional information, please see the following web links:  www.stoptheclot.org, www.clotconnect.org.    Call the [facility]  at [phone].     -If surgeries or procedures are planned (for holding instructions).     -If off anticoagulation, please call during high risk times (long-distance travel, broken bones or trauma, immobilization, surgery, pregnancy, or taking estrogen).     -Any new symptoms of DVT (deep vein thrombosis) or PE (pulmonary embolism)    -pain     -swelling     -redness    -warmth    -shortness of breath    -chest pain    -coughing up blood    You do not need to schedule a follow up but we are here if you should need us.    Your nurse clinician is Ivette Patel RN, [phone] .   If they are unavailable and you have immediate concerns, please call [phone] and ask for a nurse.

## 2022-04-19 NOTE — PROGRESS NOTES
[facility]  [address] [place] [address] [address], [place] [address]  Main: [phone], Fax: [phone]    Patient seen at: [facility] at [address] [place] [address]    Outpatient Visit Note:    Patient: Liborio Barajas  MRN: [id number]  : 1965  JORGE: 2022    Reason for visit:  Infrarenal aortic thrombus with thromboembolic obstruction of right tibial-peroneal trunk.     HPI:  Liborio is a 56 year old male who was hospitalized from 10/1/2021 to 2021 for severe COVID-19 infection with respiratory failure who was also found to have intrarenal aortic thrombus with thromboembolic obstruction of right tibial-peroneal trunk at the time of his admission, currently on apixaban, referred by Dr. Nereida Munoz for consultation in regard to duration of anticoagulation therapy.     Dated back to 10/1/2021, he presented to the emergency department with fever, myalgias, headaches, shortness of breath, chest pain and diarrhea since 2021. At the time, he was not vaccinated for COVID-19. On 10/1/2021, his shortness of breath worsened and his wife and son apparently was tested positive for COVID-19 on that day. Thus he presented to the emergency department for evaluation. There a D-Dimer test was done and it was elevated at 2.11. He required 6L of oxygen while at the emergency department. He was admitted to the ICU for further care.     CT chest on 10/1/2021 showed extensive bilateral ground-glass and patchy regions of dense consolidation involving all lobes of both lungs worrisome for multifocal pneumonia. No evidence of pulmonary embolism.     Then on 10/11/2021 (Hospitalization Day #11), CTA of abdomen, pelvis with bilateral leg runoff was  done due to right great toe pain. This CTA showed infrarenal aortic wall adhered clot and apparent thromboembolic obstruction of right tibial-peroneal trunk with distal reconstitution of tibial and peroneal  "arteries. Because of this, he was placed on argatroban infusion. He eventually discharged on apixaban on 11/1/2021. He has remained on apixaban since. About one month ago, he decreased his apixaban dose from 5 mg PO BID to 5 mg PO Qday dosing as he was apparently running low on supply.     ROS:  He reports that his respiratory status is about 80% recovered. He continues to experience some right great toe numbness and pain at times. Denies any significant right leg or foot pain. Denies any epistaxis. No oral mucosal bleeding. Denies any hematuria or blood in stools.     Medication:  Current Outpatient Medications   Medication     apixaban ANTICOAGULANT (ELIQUIS) 5 MG tablet     lisinopril (ZESTRIL) 10 MG tablet     Omeprazole (PRILOSEC PO)     acetaminophen (TYLENOL) 500 MG tablet     albuterol (PROAIR HFA/PROVENTIL HFA/VENTOLIN HFA) 108 (90 Base) MCG/ACT inhaler     No current facility-administered medications for this visit.     Allergies:  Allergies   Allergen Reactions     Nka [No Known Allergies]      PmHx:  No past medical history on file.    Social History:  Deferred.     Family History:  No family history of arterial thrombosis or venous thrombosis.   Father has a history of abdominal aortic aneurysm.     Objective:  Vitals: /58   Pulse 76   Temp 97.6  F (36.4  C) (Oral)   Resp 16   Ht 1.734 m (5' 8.25\")   Wt 95.4 kg (210 lb 4.8 oz)   SpO2 97%   BMI 31.74 kg/m    Exam:   Right leg: No swelling noted. No necrotic tissue noted. Dorsalis pedis is palpable at 1+.     Labs:  Component      Latest Ref Rng & Units 11/10/2021   WBC      4.0 - 11.0 10e3/uL 15.2 (H)   RBC Count      4.40 - 5.90 10e6/uL 4.61   Hemoglobin      13.3 - 17.7 g/dL 13.7   Hematocrit      40.0 - 53.0 % 41.3   MCV      78 - 100 fL 90   MCH      26.5 - 33.0 pg 29.7   MCHC      31.5 - 36.5 g/dL 33.2   RDW      10.0 - 15.0 % 14.0   Platelet Count      150 - 450 10e3/uL 413   Bilirubin Total      0.2 - 1.3 mg/dL 0.3   Bilirubin " Direct      0.0 - 0.2 mg/dL 0.1   Protein Total      6.8 - 8.8 g/dL 7.2   Albumin      3.4 - 5.0 g/dL 2.7 (L)   Alkaline Phosphatase      40 - 150 U/L 88   AST      0 - 45 U/L 21   ALT      0 - 70 U/L 79 (H)     Component      Latest Ref Rng & Units 11/10/2021   Sodium      133 - 144 mmol/L 137   Potassium      3.4 - 5.3 mmol/L 4.4   Chloride      94 - 109 mmol/L 105   Carbon Dioxide      20 - 32 mmol/L 27   Anion Gap      3 - 14 mmol/L 5   Urea Nitrogen      7 - 30 mg/dL 19   Creatinine      0.66 - 1.25 mg/dL 0.89   Calcium      8.5 - 10.1 mg/dL 9.7   Glucose      70 - 99 mg/dL 100 (H)   GFR Estimate      >60 mL/min/1.73m2 >90       Imaging:  Reviewed and are as described above.     Assessment:  In summary, Liborio is a 56 year old male with a history of hypertension and GERD, who was hospitalized from 10/1/2021 to 11/1/2021 with severe COVID-19 pneumonia who also had an intrarenal aortic thrombosis as well as occluded tibioperoneal trunk on the right 11 days into his hospitalization, currently on apixaban, referred by Dr. Munoz for consultation in regard to anticoagulation therapy duration.     His infrarenal aortic thrombosis and tibioperoneal trunk occlusion were provoked by severe COVID-19 infection at the time. He has no completed 6 months of anticoagulation therapy.     Diagnosis:    Severe COVID-19 infection provoked infrarenal aortic thrombosis and right tibioperoneal trunk occlusion.     Plan:  Since his aortic and right tibioperoneal trunk occlusion was a provoked event and that he has recovered from COVID-19 (although continues to have some residual symptoms), I do not feel that he necessarily need to stay on anticoagulation therapy. I will discontinue his apixaban today. However, I do encourage him to start taking a daily aspirin at 81 mg PO Qday.     I will obtain a repeat CTA of the abd/pelvis with right leg runoff to re-evaluate his thrombus and set as a new baseline for future references.     I will  contact him once the repeat CTA result is back but otherwise, I have no further plans to see this patient back on a routine basis.     Thank you for letting us to participate in this patient's care.      Case discussed with Dr. Alen Mendieta, staff hematologist. He agrees with the above plan and recommendation.     55 minutes spent on the date of the encounter doing chart review, history and exam, documentation and further activities per the note.    Time IN: 11:08am  Time OUT: 11:42am       Avery Carrington PA-C, MPAS  Physician Assistant  [facility].

## 2022-04-26 ENCOUNTER — PRE VISIT (OUTPATIENT)
Dept: UROLOGY | Facility: CLINIC | Age: 57
End: 2022-04-26
Payer: COMMERCIAL

## 2022-04-26 ENCOUNTER — HOSPITAL ENCOUNTER (OUTPATIENT)
Dept: CT IMAGING | Facility: CLINIC | Age: 57
Discharge: HOME | End: 2022-04-26
Attending: PHYSICIAN ASSISTANT | Admitting: PHYSICIAN ASSISTANT
Payer: COMMERCIAL

## 2022-04-26 DIAGNOSIS — I74.10 AORTIC THROMBUS (H): ICD-10-CM

## 2022-04-26 PROCEDURE — 250N000011 HC RX IP 250 OP 636: Performed by: PHYSICIAN ASSISTANT

## 2022-04-26 PROCEDURE — 75635 CT ANGIO ABDOMINAL ARTERIES: CPT

## 2022-04-26 PROCEDURE — 250N000009 HC RX 250: Performed by: PHYSICIAN ASSISTANT

## 2022-04-26 RX ORDER — IOPAMIDOL 755 MG/ML
100 INJECTION, SOLUTION INTRAVASCULAR ONCE
Status: COMPLETED | OUTPATIENT
Start: 2022-04-26 | End: 2022-04-26

## 2022-04-26 RX ADMIN — SODIUM CHLORIDE 100 ML: 9 INJECTION, SOLUTION INTRAVENOUS at 08:48

## 2022-04-26 RX ADMIN — IOPAMIDOL 100 ML: 755 INJECTION, SOLUTION INTRAVENOUS at 08:48

## 2022-04-26 NOTE — TELEPHONE ENCOUNTER
Reason for visit: Follow up     Relevant information: discuss right hydrocelectomy    Records/imaging/labs/orders: in EPIC    Pt called: no    At Rooming: normal

## 2022-05-26 ENCOUNTER — OFFICE VISIT (OUTPATIENT)
Dept: UROLOGY | Facility: CLINIC | Age: 57
End: 2022-05-26

## 2022-05-26 ENCOUNTER — OFFICE VISIT (OUTPATIENT)
Dept: UROLOGY | Facility: CLINIC | Age: 57
End: 2022-05-26
Payer: COMMERCIAL

## 2022-05-26 VITALS
DIASTOLIC BLOOD PRESSURE: 88 MMHG | SYSTOLIC BLOOD PRESSURE: 132 MMHG | WEIGHT: 204 LBS | HEIGHT: 69 IN | HEART RATE: 94 BPM | BODY MASS INDEX: 30.21 KG/M2

## 2022-05-26 DIAGNOSIS — L72.0 EPIDERMAL INCLUSION CYST: ICD-10-CM

## 2022-05-26 DIAGNOSIS — N43.2 OTHER HYDROCELE: Primary | ICD-10-CM

## 2022-05-26 PROCEDURE — 99213 OFFICE O/P EST LOW 20 MIN: CPT | Performed by: UROLOGY

## 2022-05-26 PROCEDURE — 99207 PR NO CHARGE NURSE ONLY: CPT

## 2022-05-26 RX ORDER — CEFAZOLIN SODIUM 2 G/50ML
2 SOLUTION INTRAVENOUS
Status: CANCELLED | OUTPATIENT
Start: 2022-05-26

## 2022-05-26 RX ORDER — CEFAZOLIN SODIUM 2 G/50ML
2 SOLUTION INTRAVENOUS SEE ADMIN INSTRUCTIONS
Status: CANCELLED | OUTPATIENT
Start: 2022-05-26

## 2022-05-26 ASSESSMENT — PAIN SCALES - GENERAL: PAINLEVEL: NO PAIN (0)

## 2022-05-26 NOTE — PROGRESS NOTES
"HPI:  Liborio Barajas is a 56 year old male being seen for evaluation of right hydrocele.      Longstanding simple right hydrocele. That was diagnosed back in 2016 on scrotal ultrasound.  He wanted this repaired years ago but lost his insurance, and now would like this fixed.  He had repeat 4/8/22 scrotal ultrasound.    History of left spermatocele repair.  History of left testis being smaller, and is somewhat sensitive to palpation.    He also points out a small bump on the left hemiscrotum that he would like removed- this appears to be an epidermal inclusion cyst.    Current Outpatient Medications   Medication     Aspirin 81 MG CAPS     lisinopril (ZESTRIL) 10 MG tablet     Omeprazole (PRILOSEC PO)     acetaminophen (TYLENOL) 500 MG tablet     albuterol (PROAIR HFA/PROVENTIL HFA/VENTOLIN HFA) 108 (90 Base) MCG/ACT inhaler     No current facility-administered medications for this visit.      History of covid hospitalization Sept 2021. ICU.  No cardiac or lung issues otherwise.  History of HTN    Exam:  Physical Exam  /88   Pulse 94   Ht 1.753 m (5' 9\")   Wt 92.5 kg (204 lb)   BMI 30.13 kg/m      General: Alert, oriented, nad.  Pleasant and conversant.  Eyes: anicteric, EOMI.  Pulse: regular  Resps: normal, non-labored.  Abdomen:  Nondistended.  Neurological - no tremors  Skin - no discoloration/ lesions noted   exam   Phallus normal   Large softball sized right hydrocele. Right testis not palpable.  Left testis feels atrophic but otherwise normal.  He has a 4mm epidermal inclusion cyst on the left hemiscrotum he would also like removed.    GUALBERTO deferred.     Review of Imaging:  The following imaging exams were independently viewed and interpreted by me and discussed with patient:  Reviewed 2016 scrotal ultrasound   Impression:   1. No evidence of testicular mass, torsion, or inflammation.   2. The previous 3 cm spermatocele seen in the left on the ultrasound of 06/07/2011 is no longer present.  There is a " 4 x 7 mm cyst in the region of the head of the left epididymis with an additional smaller cyst further inferiorly.   3. Stable 4 mm cyst in the head of the right epididymis.   4. Large right hydrocele.         Reviewed 2022 scrotal ultrasound.                                                     IMPRESSION:   Moderate right hydrocele and mildly atrophic left testicle, overall  similar to 10/11/2021. No evidence of torsion or acute epididymal  Orchitis.      2011 scrotal ultrasound 6/9/11  1.  Normal tests.   2.  3.1 x 2.9 x 2.5 cm left epididymal head cyst.  A 4 mm right epididymal   head cyst is also demonstrated.     3.  Small right hydrocele.           Review of Labs:  The following labs were reviewed by me and discussed with the patient:  N/A     Assessment & Plan   1. Right hydrocele.  2. Left scrotal epidermal inclusion cyst.    Schedulle surgery for both.  Discussed risks, benefits, and alternatives.  Discussed risks of hydrocele repair including: hematocele, recurrence, testis pain, damage to surrounding structures such as the vas deferens causing fertility problems, possible atrophy or loss of the testicle if arteries are injured.     Surgery orders placed.    Tal Russo MD  [facility] [place]      ==========================  Additional Coding Information:    Problems:  3 -- one stable chronic illness    Data Reviewed  Scrotal ultrasound x 3.    Tests ordered: N/A     Level of risk:  3 -- low risk (e.g., OTC medication or observation, minor surgery without risks)    Time spent:  20 minutes spent on the date of the encounter doing chart review, history and exam, documentation and further activities per the note

## 2022-05-26 NOTE — NURSING NOTE
"Chief Complaint   Patient presents with     Follow Up     Discuss hydrocelectomy       Height 1.753 m (5' 9\"), weight 92.5 kg (204 lb). Body mass index is 30.13 kg/m .    Patient Active Problem List   Diagnosis     Acute respiratory failure with hypoxia (H)     Pneumonia due to 2019 novel coronavirus     Severe protein-calorie malnutrition (H)     Adjustment disorder with anxious mood     Aortic thrombus (H)       Allergies   Allergen Reactions     Nka [No Known Allergies]        Current Outpatient Medications   Medication Sig Dispense Refill     Aspirin 81 MG CAPS Take 81 mg by mouth daily Stopping apixaban and start aspirin at 81 mg PO Qday as of 4/19/2022.       lisinopril (ZESTRIL) 10 MG tablet Take 1 tablet (10 mg) by mouth daily 30 tablet 1     Omeprazole (PRILOSEC PO) Take 20 mg by mouth every morning Over the counter       acetaminophen (TYLENOL) 500 MG tablet Take 500-1,000 mg by mouth every 6 hours as needed for mild pain, fever or pain (Patient not taking: Reported on 4/19/2022)       albuterol (PROAIR HFA/PROVENTIL HFA/VENTOLIN HFA) 108 (90 Base) MCG/ACT inhaler Inhale 2 puffs into the lungs 4 times daily (Patient not taking: No sig reported) 36 g 4       Social History     Tobacco Use     Smoking status: Former Smoker     Packs/day: 0.00     Years: 0.00     Pack years: 0.00     Types: Cigarettes     Smokeless tobacco: Former User     Tobacco comment: Stop about 5 years ago   Substance Use Topics     Alcohol use: Not Currently     Comment: Sometimes     Drug use: Never       Tye Han EMT  5/26/2022  7:09 AM  "

## 2022-05-26 NOTE — CONFIDENTIAL NOTE
Pre Op Teaching Flowsheet       Pre and Post op Patient Education  Relevant Diagnosis:  hydrocele  Surgical procedure:  Hydrocele repair  Teaching Topic:  Pre and post op teaching  Person Involved in teaching: Yes    Motivation Level:  Asks Questions: Yes  Eager to Learn: Yes  Cooperative: Yes  Receptive (willing/able to accept information):  Yes    Patient demonstrates understanding of the following:  Date of surgery:  Will call  Location of surgery:  [facility] - 5th Floor  History and Physical and any other testing necessary prior to surgery: Yes  Required time line for completion of History and Physical and any pre-op testing: Yes    Patient demonstrates understanding of the following:  Pre-op bowel prep:  N/A  Pre-op showering/scrub information with PCMX Soap: Yes  Blood thinner medications discussed and when to stop (if applicable):  No, explain  Discussed no visitor's at this time due to increase Covid-19 cases and how we need to make sure everyone stays safe.    Infection Prevention:   Patient demonstrates understanding of the following:  Surgical procedure site care taught: Yes  Signs and symptoms of infection taught: Yes      Post-op follow-up:  Discussed how to contact the hospital, nurse, and clinic scheduling staff if necessary. (See packet information)    Instructional materials used/given/mailed:  [facility] Packet, post op teaching sheet, Map, Soap, and with the arrival/location information to come closer to the surgery date.    Surgical instructions packet given to patient in office:  N/A    Follow up: Discussed arranging for someone to drive you home. ( No public transportation)  Someone needed to stay the first twenty hours after surgery: Yes     referral: none      home:   spouse     Care Giver:  spouse    PCP:  Alexander

## 2022-05-26 NOTE — LETTER
"5/26/2022       RE: Liborio Barajas  [address] [facility] [address]     Dear Colleague,    Thank you for referring your patient, Liborio Barajas, to the [facility] [place] at [facility]. Please see a copy of my visit note below.    HPI:  Liborio Barajas is a 56 year old male being seen for evaluation of right hydrocele.      Longstanding simple right hydrocele. That was diagnosed back in 2016 on scrotal ultrasound.  He wanted this repaired years ago but lost his insurance, and now would like this fixed.  He had repeat 4/8/22 scrotal ultrasound.    History of left spermatocele repair.  History of left testis being smaller, and is somewhat sensitive to palpation.    He also points out a small bump on the left hemiscrotum that he would like removed- this appears to be an epidermal inclusion cyst.    Current Outpatient Medications   Medication     Aspirin 81 MG CAPS     lisinopril (ZESTRIL) 10 MG tablet     Omeprazole (PRILOSEC PO)     acetaminophen (TYLENOL) 500 MG tablet     albuterol (PROAIR HFA/PROVENTIL HFA/VENTOLIN HFA) 108 (90 Base) MCG/ACT inhaler     No current facility-administered medications for this visit.      History of covid hospitalization Sept 2021. ICU.  No cardiac or lung issues otherwise.  History of HTN    Exam:  Physical Exam  /88   Pulse 94   Ht 1.753 m (5' 9\")   Wt 92.5 kg (204 lb)   BMI 30.13 kg/m      General: Alert, oriented, nad.  Pleasant and conversant.  Eyes: anicteric, EOMI.  Pulse: regular  Resps: normal, non-labored.  Abdomen:  Nondistended.  Neurological - no tremors  Skin - no discoloration/ lesions noted   exam   Phallus normal   Large softball sized right hydrocele. Right testis not palpable.  Left testis feels atrophic but otherwise normal.  He has a 4mm epidermal inclusion cyst on the left hemiscrotum he would also like removed.    GUALBERTO deferred.     Review of Imaging:  The following imaging exams " were independently viewed and interpreted by me and discussed with patient:  Reviewed 2016 scrotal ultrasound   Impression:   1. No evidence of testicular mass, torsion, or inflammation.   2. The previous 3 cm spermatocele seen in the left on the ultrasound of 06/07/2011 is no longer present.  There is a 4 x 7 mm cyst in the region of the head of the left epididymis with an additional smaller cyst further inferiorly.   3. Stable 4 mm cyst in the head of the right epididymis.   4. Large right hydrocele.         Reviewed 2022 scrotal ultrasound.                                                     IMPRESSION:   Moderate right hydrocele and mildly atrophic left testicle, overall  similar to 10/11/2021. No evidence of torsion or acute epididymal  Orchitis.      2011 scrotal ultrasound 6/9/11  1.  Normal tests.   2.  3.1 x 2.9 x 2.5 cm left epididymal head cyst.  A 4 mm right epididymal   head cyst is also demonstrated.     3.  Small right hydrocele.           Review of Labs:  The following labs were reviewed by me and discussed with the patient:  N/A     Assessment & Plan   1. Right hydrocele.  2. Left scrotal epidermal inclusion cyst.    Schedulle surgery for both.  Discussed risks, benefits, and alternatives.  Discussed risks of hydrocele repair including: hematocele, recurrence, testis pain, damage to surrounding structures such as the vas deferens causing fertility problems, possible atrophy or loss of the testicle if arteries are injured.     Surgery orders placed.    Tal Russo MD  [facility]OLOG[facility] [place]      ==========================  Additional Coding Information:    Problems:  3 -- one stable chronic illness    Data Reviewed  Scrotal ultrasound x 3.    Tests ordered: N/A     Level of risk:  3 -- low risk (e.g., OTC medication or observation, minor surgery without risks)    Time spent:  20 minutes spent on the date of the encounter doing chart review, history and exam, documentation and  further activities per the note

## 2022-05-27 ENCOUNTER — TELEPHONE (OUTPATIENT)
Dept: UROLOGY | Facility: CLINIC | Age: 57
End: 2022-05-27
Payer: COMMERCIAL

## 2022-05-27 ENCOUNTER — HOSPITAL ENCOUNTER (OUTPATIENT)
Facility: AMBULATORY SURGERY CENTER | Age: 57
End: 2022-05-27
Attending: UROLOGY
Payer: COMMERCIAL

## 2022-05-27 DIAGNOSIS — N43.2 OTHER HYDROCELE: ICD-10-CM

## 2022-05-27 DIAGNOSIS — L72.0 EPIDERMAL INCLUSION CYST: ICD-10-CM

## 2022-05-27 NOTE — TELEPHONE ENCOUNTER
Patient is scheduled for procedure with Dr. Russo     Spoke with: Vandana tripp daughter      Date of Surgery: Tuesday August 16, 2022     Location: [facility] OR      Informed patient they will need an adult : Yes     Pre-op: Yes      H&P: Patient to schedule with PCP      Pre-procedure COVID-19 Test: Friday August 12, 2022 at [facility]     Post-op: Thursday September 01, 2022    Additional imaging/appointments:     Additional comments: Would like a sooner date, on the wait list     Surgery packet: Sent via mail 5/27/22    Patient is aware that surgery time is tentative to change and to expect a call 3-1 business days from Pre Admission Nursing for instructions and arrival time

## 2022-07-21 ENCOUNTER — OFFICE VISIT (OUTPATIENT)
Dept: FAMILY MEDICINE | Facility: CLINIC | Age: 57
End: 2022-07-21
Payer: COMMERCIAL

## 2022-07-21 VITALS
HEIGHT: 69 IN | DIASTOLIC BLOOD PRESSURE: 82 MMHG | OXYGEN SATURATION: 98 % | RESPIRATION RATE: 16 BRPM | BODY MASS INDEX: 30.36 KG/M2 | SYSTOLIC BLOOD PRESSURE: 124 MMHG | HEART RATE: 62 BPM | TEMPERATURE: 98 F | WEIGHT: 205 LBS

## 2022-07-21 DIAGNOSIS — I10 BENIGN ESSENTIAL HYPERTENSION: ICD-10-CM

## 2022-07-21 DIAGNOSIS — U07.1 PNEUMONIA DUE TO 2019 NOVEL CORONAVIRUS: ICD-10-CM

## 2022-07-21 DIAGNOSIS — Z23 NEED FOR VACCINATION: ICD-10-CM

## 2022-07-21 DIAGNOSIS — N43.2 OTHER HYDROCELE: ICD-10-CM

## 2022-07-21 DIAGNOSIS — J12.82 PNEUMONIA DUE TO 2019 NOVEL CORONAVIRUS: ICD-10-CM

## 2022-07-21 DIAGNOSIS — Z01.818 PREOP GENERAL PHYSICAL EXAM: Primary | ICD-10-CM

## 2022-07-21 DIAGNOSIS — I74.10 AORTIC THROMBUS (H): ICD-10-CM

## 2022-07-21 PROBLEM — J96.01 ACUTE RESPIRATORY FAILURE WITH HYPOXIA (H): Status: RESOLVED | Noted: 2021-10-01 | Resolved: 2022-07-21

## 2022-07-21 PROBLEM — E43 SEVERE PROTEIN-CALORIE MALNUTRITION (H): Status: RESOLVED | Noted: 2021-11-10 | Resolved: 2022-07-21

## 2022-07-21 LAB
ANION GAP SERPL CALCULATED.3IONS-SCNC: 5 MMOL/L (ref 3–14)
BUN SERPL-MCNC: 26 MG/DL (ref 7–30)
CALCIUM SERPL-MCNC: 9.2 MG/DL (ref 8.5–10.1)
CHLORIDE BLD-SCNC: 106 MMOL/L (ref 94–109)
CO2 SERPL-SCNC: 26 MMOL/L (ref 20–32)
CREAT SERPL-MCNC: 1.09 MG/DL (ref 0.66–1.25)
GFR SERPL CREATININE-BSD FRML MDRD: 78 ML/MIN/1.73M2
GLUCOSE BLD-MCNC: 96 MG/DL (ref 70–99)
POTASSIUM BLD-SCNC: 4.1 MMOL/L (ref 3.4–5.3)
SODIUM SERPL-SCNC: 137 MMOL/L (ref 133–144)

## 2022-07-21 PROCEDURE — 90471 IMMUNIZATION ADMIN: CPT | Performed by: INTERNAL MEDICINE

## 2022-07-21 PROCEDURE — 99214 OFFICE O/P EST MOD 30 MIN: CPT | Mod: 25 | Performed by: INTERNAL MEDICINE

## 2022-07-21 PROCEDURE — 90750 HZV VACC RECOMBINANT IM: CPT | Performed by: INTERNAL MEDICINE

## 2022-07-21 PROCEDURE — 36415 COLL VENOUS BLD VENIPUNCTURE: CPT | Performed by: INTERNAL MEDICINE

## 2022-07-21 PROCEDURE — 80048 BASIC METABOLIC PNL TOTAL CA: CPT | Performed by: INTERNAL MEDICINE

## 2022-07-21 RX ORDER — LISINOPRIL 10 MG/1
10 TABLET ORAL DAILY
Qty: 90 TABLET | Refills: 1 | Status: SHIPPED | OUTPATIENT
Start: 2022-07-21 | End: 2023-08-08

## 2022-07-21 RX ORDER — ALBUTEROL SULFATE 90 UG/1
2 AEROSOL, METERED RESPIRATORY (INHALATION) 4 TIMES DAILY
Qty: 36 G | Refills: 4 | Status: SHIPPED | OUTPATIENT
Start: 2022-07-21 | End: 2023-07-26

## 2022-07-21 NOTE — PROGRESS NOTES
[facility]  [address] [facility] [address]  Phone: [phone]  Primary Provider: Sneha Macario  Pre-op Performing Provider: SNEHA MACARIO      PREOPERATIVE EVALUATION:  Today's date: 7/21/2022    Liborio Barajas is a 57 year old male who presents for a preoperative evaluation.    Surgical Information:  Surgery/Procedure: hydrocelectomy  Surgery Location: [facility]  Surgeon: Dr. GERTRUDE Russo  Surgery Date: 08/16/22  Time of Surgery: tbd  Where patient plans to recover: At home with family  Fax number for surgical facility: Note does not need to be faxed, will be available electronically in Epic.    Type of Anesthesia Anticipated: General    Assessment & Plan     The proposed surgical procedure is considered INTERMEDIATE risk.    Problem List Items Addressed This Visit     Aortic thrombus (H)    Other hydrocele    Pneumonia due to 2019 novel coronavirus    Relevant Medications    albuterol (PROAIR HFA/PROVENTIL HFA/VENTOLIN HFA) 108 (90 Base) MCG/ACT inhaler      Other Visit Diagnoses     Preop general physical exam    -  Primary    Benign essential hypertension        Relevant Medications    lisinopril (ZESTRIL) 10 MG tablet    Other Relevant Orders    Basic metabolic panel  (Ca, Cl, CO2, Creat, Gluc, K, Na, BUN) (Completed)    Need for vaccination        Relevant Orders    SHINGRIX [[id number]] (Completed)          Risks and Recommendations:  The patient has the following additional risks and recommendations for perioperative complications:   - No identified additional risk factors other than previously addressed    Medication Instructions:  Patient is to take all scheduled medications on the day of surgery EXCEPT for modifications listed below:   - aspirin: Discontinue aspirin 7-10 days prior to procedure to reduce bleeding risk. It should be resumed postoperatively.    - ACE/ARB: May be continued on the day of surgery.     RECOMMENDATION:  APPROVAL GIVEN to proceed  with proposed procedure, without further diagnostic evaluation.                      Subjective     HPI related to upcoming procedure: Longstanding simple right hydrocele. That was diagnosed back in 2016 on scrotal ultrasound.  He wanted this repaired years ago but lost his insurance, and now would like this fixed.    Preop Questions 7/14/2022   1. Have you ever had a heart attack or stroke? No   2. Have you ever had surgery on your heart or blood vessels, such as a stent placement, a coronary artery bypass, or surgery on an artery in your head, neck, heart, or legs? No   3. Do you have chest pain with activity? No   4. Do you have a history of  heart failure? No   5. Do you currently have a cold, bronchitis or symptoms of other infection? No   6. Do you have a cough, shortness of breath, or wheezing? No   7. Do you or anyone in your family have previous history of blood clots? YES - personal history of blood clot while hospitalized with COVID   8. Do you or does anyone in your family have a serious bleeding problem such as prolonged bleeding following surgeries or cuts? UNKNOWN -    9. Have you ever had problems with anemia or been told to take iron pills? No   10. Have you had any abnormal blood loss such as black, tarry or bloody stools? No    11. Have you ever had a blood transfusion? No   12. Are you willing to have a blood transfusion if it is medically needed before, during, or after your surgery? NO -    13. Have you or any of your relatives ever had problems with anesthesia? No   14. Do you have sleep apnea, excessive snoring or daytime drowsiness? No    15. Do you have any artifical heart valves or other implanted medical devices like a pacemaker, defibrillator, or continuous glucose monitor? No   16. Do you have artificial joints? No   17. Are you allergic to latex? No       Health Care Directive:  Patient does not have a Health Care Directive or Living Will: Discussed advance care planning with patient;  however, patient declined at this time.    Preoperative Review of :   reviewed - no record of controlled substances prescribed.           Covid:  --feels near 100% back to normal;  Feels more short of breath with exertion at times, but close to normal  --incentive spirometer 2500  --uses albuterol inhaler a few times/week, somewhat helpful  --not significantly limited in activity due to shortness of breath     Hypertension:   --blood pressure is well controlled    Review of Systems  CONSTITUTIONAL: NEGATIVE for fever, chills, change in weight  INTEGUMENTARY/SKIN: NEGATIVE for worrisome rashes, moles or lesions  EYES: NEGATIVE for vision changes or irritation  ENT/MOUTH: NEGATIVE for ear, mouth and throat problems  RESP: NEGATIVE for significant cough or SOB  CV: NEGATIVE for chest pain, palpitations or peripheral edema  GI: NEGATIVE for nausea, abdominal pain, heartburn, or change in bowel habits  : NEGATIVE for frequency, dysuria, or hematuria  MUSCULOSKELETAL: NEGATIVE for significant arthralgias or myalgia  NEURO: NEGATIVE for weakness, dizziness or paresthesias  ENDOCRINE: NEGATIVE for temperature intolerance, skin/hair changes  HEME: NEGATIVE for bleeding problems  PSYCHIATRIC: NEGATIVE for changes in mood or affect    Patient Active Problem List    Diagnosis Date Noted     Other hydrocele 05/27/2022     Priority: Medium     Added automatically from request for surgery [id number]       Epidermal inclusion cyst 05/27/2022     Priority: Medium     Added automatically from request for surgery [id number]       Adjustment disorder with anxious mood 11/10/2021     Priority: Medium     Aortic thrombus (H) 11/10/2021     Priority: Medium     with thromboembolic obstruction of right tibial-peroneal trunk    Right foot pain and diminished RLE pulses noted 10/11/2021. CTA abdomen and pelvis revealed infrarenal aortic wall adherent clot, and apparent thromboembolic obstruction of right tibial-peroneal trunk with distal  "reconstitution of tibial and peroneal arteries.  Findings were discussed with vascular surgery [facility], who advised that COVID-related arterial thrombectomies frequently reclot, so surgery was not recommended, and that increased heparin resistance was seen in these patients, so anticoagulation with argatroban was recommended.     Saw Hematology 4/2022 \"Since his aortic and right tibioperoneal trunk occlusion was a provoked event and that he has recovered from COVID-19 (although continues to have some residual symptoms), I do not feel that he necessarily need to stay on anticoagulation therapy. I will discontinue his apixaban today. However, I do encourage him to start taking a daily aspirin at 81 mg PO Qday.      I will obtain a repeat CTA of the abd/pelvis with right leg runoff to re-evaluate his thrombus and set as a new baseline for future references. \"       Pneumonia due to 2019 novel coronavirus 10/01/2021     Priority: Medium     11/2021 - Extensive ICU and hospitalization.  Was never intubated but required high flow nasal cannula and BiPAP through much of his hospital stay.  Had arterial thrombus, on short term anticoagulation, since completed (Right foot pain and diminished RLE pulses noted 10/11/2021. CTA abdomen and pelvis revealed infrarenal aortic wall adherent clot, and apparent thromboembolic obstruction of right tibial-peroneal trunk with distal reconstitution of tibial and peroneal arteries)        No past medical history on file.  Past Surgical History:   Procedure Laterality Date     COLONOSCOPY       Current Outpatient Medications   Medication Sig Dispense Refill     albuterol (PROAIR HFA/PROVENTIL HFA/VENTOLIN HFA) 108 (90 Base) MCG/ACT inhaler Inhale 2 puffs into the lungs 4 times daily 36 g 4     Aspirin 81 MG CAPS Take 81 mg by mouth daily Stopping apixaban and start aspirin at 81 mg PO Qday as of 4/19/2022.       lisinopril (ZESTRIL) 10 MG tablet Take 1 tablet (10 mg) by mouth daily 90 tablet 1 " "    Omeprazole (PRILOSEC PO) Take 20 mg by mouth every morning Over the counter         Allergies   Allergen Reactions     Nka [No Known Allergies]         Social History     Tobacco Use     Smoking status: Former Smoker     Packs/day: 0.00     Years: 0.00     Pack years: 0.00     Types: Cigarettes     Smokeless tobacco: Former User     Tobacco comment: Stop about 5 years ago   Substance Use Topics     Alcohol use: Not Currently     Comment: Sometimes     Family History   Problem Relation Age of Onset     Breast Cancer Mother      Hypertension Mother      Heart Disease Father      Coronary Artery Disease Father      Gout Father      Cerebrovascular Disease Father      Hypertension Father      Gout Maternal Grandmother      Bone Cancer Paternal Grandmother      Cancer Sister      History   Drug Use Unknown         Objective     /82 (BP Location: Right arm, Patient Position: Sitting, Cuff Size: Adult Large)   Pulse 62   Temp 98  F (36.7  C) (Tympanic)   Resp 16   Ht 1.753 m (5' 9\")   Wt 93 kg (205 lb)   SpO2 98%   BMI 30.27 kg/m      Physical Exam    GENERAL APPEARANCE: healthy, alert and no distress     EYES: EOMI,  PERRL     HENT: ear canals and TM's normal and nose and mouth without ulcers or lesions     NECK: no adenopathy, no asymmetry, masses, or scars and thyroid normal to palpation     RESP: lungs clear to auscultation - no rales, rhonchi or wheezes     CV: regular rates and rhythm, normal S1 S2, no S3 or S4 and no murmur, click or rub     ABDOMEN:  soft, nontender, no HSM or masses and bowel sounds normal     MS: extremities normal- no gross deformities noted, no evidence of inflammation in joints, FROM in all extremities.     SKIN: no suspicious lesions or rashes     NEURO: Normal strength and tone, sensory exam grossly normal, mentation intact and speech normal     PSYCH: mentation appears normal. and affect normal/bright     LYMPHATICS: No cervical adenopathy    Recent Labs   Lab Test " 11/10/21  1210 11/01/21  0519 10/30/21  0543 10/25/21  0637 10/25/21  0521   HGB 13.7  --  12.5*   < >  --      --  425   < >  --    INR  --  1.12  --   --  1.26*    138 136   < >  --    POTASSIUM 4.4 4.3 4.2   < >  --    CR 0.89 0.78 0.69   < >  --     < > = values in this interval not displayed.        Diagnostics:  Recent Results (from the past 24 hour(s))   Basic metabolic panel  (Ca, Cl, CO2, Creat, Gluc, K, Na, BUN)    Collection Time: 07/21/22 11:20 AM   Result Value Ref Range    Sodium 137 133 - 144 mmol/L    Potassium 4.1 3.4 - 5.3 mmol/L    Chloride 106 94 - 109 mmol/L    Carbon Dioxide (CO2) 26 20 - 32 mmol/L    Anion Gap 5 3 - 14 mmol/L    Urea Nitrogen 26 7 - 30 mg/dL    Creatinine 1.09 0.66 - 1.25 mg/dL    Calcium 9.2 8.5 - 10.1 mg/dL    Glucose 96 70 - 99 mg/dL    GFR Estimate 78 >60 mL/min/1.73m2      No EKG required, no history of coronary heart disease, significant arrhythmia, peripheral arterial disease or other structural heart disease.    Revised Cardiac Risk Index (RCRI):  The patient has the following serious cardiovascular risks for perioperative complications:   - No serious cardiac risks = 0 points     RCRI Interpretation: 0 points: Class I (very low risk - 0.4% complication rate)           Signed Electronically by: Nereida Munoz DO  Copy of this evaluation report is provided to requesting physician.

## 2022-07-21 NOTE — H&P (VIEW-ONLY)
[facility]  [address] [address]  Phone: [phone]  Primary Provider: Sneha Macario  Pre-op Performing Provider: SNEHA MACARIO      PREOPERATIVE EVALUATION:  Today's date: 7/21/2022    Liborio Barajas is a 57 year old male who presents for a preoperative evaluation.    Surgical Information:  Surgery/Procedure: hydrocelectomy  Surgery Location: [facility]  Surgeon: Dr. GERTRUDE Russo  Surgery Date: 08/16/22  Time of Surgery: tbd  Where patient plans to recover: At home with family  Fax number for surgical facility: Note does not need to be faxed, will be available electronically in Epic.    Type of Anesthesia Anticipated: General    Assessment & Plan     The proposed surgical procedure is considered INTERMEDIATE risk.    Problem List Items Addressed This Visit     Aortic thrombus (H)    Other hydrocele    Pneumonia due to 2019 novel coronavirus    Relevant Medications    albuterol (PROAIR HFA/PROVENTIL HFA/VENTOLIN HFA) 108 (90 Base) MCG/ACT inhaler      Other Visit Diagnoses     Preop general physical exam    -  Primary    Benign essential hypertension        Relevant Medications    lisinopril (ZESTRIL) 10 MG tablet    Other Relevant Orders    Basic metabolic panel  (Ca, Cl, CO2, Creat, Gluc, K, Na, BUN) (Completed)    Need for vaccination        Relevant Orders    SHINGRIX [[id number]] (Completed)          Risks and Recommendations:  The patient has the following additional risks and recommendations for perioperative complications:   - No identified additional risk factors other than previously addressed    Medication Instructions:  Patient is to take all scheduled medications on the day of surgery EXCEPT for modifications listed below:   - aspirin: Discontinue aspirin 7-10 days prior to procedure to reduce bleeding risk. It should be resumed postoperatively.    - ACE/ARB: May be continued on the day of surgery.     RECOMMENDATION:  APPROVAL GIVEN to proceed  with proposed procedure, without further diagnostic evaluation.                      Subjective     HPI related to upcoming procedure: Longstanding simple right hydrocele. That was diagnosed back in 2016 on scrotal ultrasound.  He wanted this repaired years ago but lost his insurance, and now would like this fixed.    Preop Questions 7/14/2022   1. Have you ever had a heart attack or stroke? No   2. Have you ever had surgery on your heart or blood vessels, such as a stent placement, a coronary artery bypass, or surgery on an artery in your head, neck, heart, or legs? No   3. Do you have chest pain with activity? No   4. Do you have a history of  heart failure? No   5. Do you currently have a cold, bronchitis or symptoms of other infection? No   6. Do you have a cough, shortness of breath, or wheezing? No   7. Do you or anyone in your family have previous history of blood clots? YES - personal history of blood clot while hospitalized with COVID   8. Do you or does anyone in your family have a serious bleeding problem such as prolonged bleeding following surgeries or cuts? UNKNOWN -    9. Have you ever had problems with anemia or been told to take iron pills? No   10. Have you had any abnormal blood loss such as black, tarry or bloody stools? No    11. Have you ever had a blood transfusion? No   12. Are you willing to have a blood transfusion if it is medically needed before, during, or after your surgery? NO -    13. Have you or any of your relatives ever had problems with anesthesia? No   14. Do you have sleep apnea, excessive snoring or daytime drowsiness? No    15. Do you have any artifical heart valves or other implanted medical devices like a pacemaker, defibrillator, or continuous glucose monitor? No   16. Do you have artificial joints? No   17. Are you allergic to latex? No       Health Care Directive:  Patient does not have a Health Care Directive or Living Will: Discussed advance care planning with patient;  however, patient declined at this time.    Preoperative Review of :   reviewed - no record of controlled substances prescribed.           Covid:  --feels near 100% back to normal;  Feels more short of breath with exertion at times, but close to normal  --incentive spirometer 2500  --uses albuterol inhaler a few times/week, somewhat helpful  --not significantly limited in activity due to shortness of breath     Hypertension:   --blood pressure is well controlled    Review of Systems  CONSTITUTIONAL: NEGATIVE for fever, chills, change in weight  INTEGUMENTARY/SKIN: NEGATIVE for worrisome rashes, moles or lesions  EYES: NEGATIVE for vision changes or irritation  ENT/MOUTH: NEGATIVE for ear, mouth and throat problems  RESP: NEGATIVE for significant cough or SOB  CV: NEGATIVE for chest pain, palpitations or peripheral edema  GI: NEGATIVE for nausea, abdominal pain, heartburn, or change in bowel habits  : NEGATIVE for frequency, dysuria, or hematuria  MUSCULOSKELETAL: NEGATIVE for significant arthralgias or myalgia  NEURO: NEGATIVE for weakness, dizziness or paresthesias  ENDOCRINE: NEGATIVE for temperature intolerance, skin/hair changes  HEME: NEGATIVE for bleeding problems  PSYCHIATRIC: NEGATIVE for changes in mood or affect    Patient Active Problem List    Diagnosis Date Noted     Other hydrocele 05/27/2022     Priority: Medium     Added automatically from request for surgery [id number]       Epidermal inclusion cyst 05/27/2022     Priority: Medium     Added automatically from request for surgery [id number]       Adjustment disorder with anxious mood 11/10/2021     Priority: Medium     Aortic thrombus (H) 11/10/2021     Priority: Medium     with thromboembolic obstruction of right tibial-peroneal trunk    Right foot pain and diminished RLE pulses noted 10/11/2021. CTA abdomen and pelvis revealed infrarenal aortic wall adherent clot, and apparent thromboembolic obstruction of right tibial-peroneal trunk with distal  "reconstitution of tibial and peroneal arteries.  Findings were discussed with vascular surgery [facility], who advised that COVID-related arterial thrombectomies frequently reclot, so surgery was not recommended, and that increased heparin resistance was seen in these patients, so anticoagulation with argatroban was recommended.     Saw Hematology 4/2022 \"Since his aortic and right tibioperoneal trunk occlusion was a provoked event and that he has recovered from COVID-19 (although continues to have some residual symptoms), I do not feel that he necessarily need to stay on anticoagulation therapy. I will discontinue his apixaban today. However, I do encourage him to start taking a daily aspirin at 81 mg PO Qday.      I will obtain a repeat CTA of the abd/pelvis with right leg runoff to re-evaluate his thrombus and set as a new baseline for future references. \"       Pneumonia due to 2019 novel coronavirus 10/01/2021     Priority: Medium     11/2021 - Extensive ICU and hospitalization.  Was never intubated but required high flow nasal cannula and BiPAP through much of his hospital stay.  Had arterial thrombus, on short term anticoagulation, since completed (Right foot pain and diminished RLE pulses noted 10/11/2021. CTA abdomen and pelvis revealed infrarenal aortic wall adherent clot, and apparent thromboembolic obstruction of right tibial-peroneal trunk with distal reconstitution of tibial and peroneal arteries)        No past medical history on file.  Past Surgical History:   Procedure Laterality Date     COLONOSCOPY       Current Outpatient Medications   Medication Sig Dispense Refill     albuterol (PROAIR HFA/PROVENTIL HFA/VENTOLIN HFA) 108 (90 Base) MCG/ACT inhaler Inhale 2 puffs into the lungs 4 times daily 36 g 4     Aspirin 81 MG CAPS Take 81 mg by mouth daily Stopping apixaban and start aspirin at 81 mg PO Qday as of 4/19/2022.       lisinopril (ZESTRIL) 10 MG tablet Take 1 tablet (10 mg) by mouth daily 90 tablet 1 " "    Omeprazole (PRILOSEC PO) Take 20 mg by mouth every morning Over the counter         Allergies   Allergen Reactions     Nka [No Known Allergies]         Social History     Tobacco Use     Smoking status: Former Smoker     Packs/day: 0.00     Years: 0.00     Pack years: 0.00     Types: Cigarettes     Smokeless tobacco: Former User     Tobacco comment: Stop about 5 years ago   Substance Use Topics     Alcohol use: Not Currently     Comment: Sometimes     Family History   Problem Relation Age of Onset     Breast Cancer Mother      Hypertension Mother      Heart Disease Father      Coronary Artery Disease Father      Gout Father      Cerebrovascular Disease Father      Hypertension Father      Gout Maternal Grandmother      Bone Cancer Paternal Grandmother      Cancer Sister      History   Drug Use Unknown         Objective     /82 (BP Location: Right arm, Patient Position: Sitting, Cuff Size: Adult Large)   Pulse 62   Temp 98  F (36.7  C) (Tympanic)   Resp 16   Ht 1.753 m (5' 9\")   Wt 93 kg (205 lb)   SpO2 98%   BMI 30.27 kg/m      Physical Exam    GENERAL APPEARANCE: healthy, alert and no distress     EYES: EOMI,  PERRL     HENT: ear canals and TM's normal and nose and mouth without ulcers or lesions     NECK: no adenopathy, no asymmetry, masses, or scars and thyroid normal to palpation     RESP: lungs clear to auscultation - no rales, rhonchi or wheezes     CV: regular rates and rhythm, normal S1 S2, no S3 or S4 and no murmur, click or rub     ABDOMEN:  soft, nontender, no HSM or masses and bowel sounds normal     MS: extremities normal- no gross deformities noted, no evidence of inflammation in joints, FROM in all extremities.     SKIN: no suspicious lesions or rashes     NEURO: Normal strength and tone, sensory exam grossly normal, mentation intact and speech normal     PSYCH: mentation appears normal. and affect normal/bright     LYMPHATICS: No cervical adenopathy    Recent Labs   Lab Test " 11/10/21  1210 11/01/21  0519 10/30/21  0543 10/25/21  0637 10/25/21  0521   HGB 13.7  --  12.5*   < >  --      --  425   < >  --    INR  --  1.12  --   --  1.26*    138 136   < >  --    POTASSIUM 4.4 4.3 4.2   < >  --    CR 0.89 0.78 0.69   < >  --     < > = values in this interval not displayed.        Diagnostics:  Recent Results (from the past 24 hour(s))   Basic metabolic panel  (Ca, Cl, CO2, Creat, Gluc, K, Na, BUN)    Collection Time: 07/21/22 11:20 AM   Result Value Ref Range    Sodium 137 133 - 144 mmol/L    Potassium 4.1 3.4 - 5.3 mmol/L    Chloride 106 94 - 109 mmol/L    Carbon Dioxide (CO2) 26 20 - 32 mmol/L    Anion Gap 5 3 - 14 mmol/L    Urea Nitrogen 26 7 - 30 mg/dL    Creatinine 1.09 0.66 - 1.25 mg/dL    Calcium 9.2 8.5 - 10.1 mg/dL    Glucose 96 70 - 99 mg/dL    GFR Estimate 78 >60 mL/min/1.73m2      No EKG required, no history of coronary heart disease, significant arrhythmia, peripheral arterial disease or other structural heart disease.    Revised Cardiac Risk Index (RCRI):  The patient has the following serious cardiovascular risks for perioperative complications:   - No serious cardiac risks = 0 points     RCRI Interpretation: 0 points: Class I (very low risk - 0.4% complication rate)           Signed Electronically by: Nereida Munoz DO  Copy of this evaluation report is provided to requesting physician.

## 2022-07-21 NOTE — PATIENT INSTRUCTIONS
Stop aspirin 1 week prior to surgery  Blood work today  Continue lisinopril as normal up through surgery    Health Care Maintenance:  Recommend COVID booster, shingles vaccine      Preparing for Your Surgery  Getting started  A nurse will call you to review your health history and instructions. They will give you an arrival time based on your scheduled surgery time. Please be ready to share:  Your doctor's clinic name and phone number  Your medical, surgical and anesthesia history  A list of allergies and sensitivities  A list of medicines, including herbal treatments and over-the-counter drugs  Whether the patient has a legal guardian (ask how to send us the papers in advance)  Please tell us if you're pregnant--or if there's any chance you might be pregnant. Some surgeries may injure a fetus (unborn baby), so they require a pregnancy test. Surgeries that are safe for a fetus don't always need a test, and you can choose whether to have one.   If you have a child who's having surgery, please ask for a copy of Preparing for Your Child's Surgery.    Preparing for surgery  Within 30 days of surgery: Have a pre-op exam (sometimes called an H&P, or History and Physical). This can be done at a clinic or pre-operative center.  If you're having a , you may not need this exam. Talk to your care team.  At your pre-op exam, talk to your care team about all medicines you take. If you need to stop any medicines before surgery, ask when to start taking them again.  We do this for your safety. Many medicines can make you bleed too much during surgery. Some change how well surgery (anesthesia) drugs work.  Call your insurance company to let them know you're having surgery. (If you don't have insurance, call [phone].)  Call your clinic if there's any change in your health. This includes signs of a cold or flu (sore throat, runny nose, cough, rash, fever). It also includes a scrape or scratch near the surgery site.  If  you have questions on the day of surgery, call your hospital or surgery center.  COVID testing  You may need to be tested for COVID-19 before having surgery. If so, we will give you instructions.  Eating and drinking guidelines  For your safety: Unless your surgeon tells you otherwise, follow the guidelines below.  Eat and drink as usual until 8 hours before surgery. After that, no food or milk.  Drink clear liquids until 2 hours before surgery. These are liquids you can see through, like water, Gatorade and Propel Water. You may also have black coffee and tea (no cream or milk).  Nothing by mouth within 2 hours of surgery. This includes gum, candy and breath mints.  If you drink alcohol: Stop drinking it the night before surgery.  If your care team tells you to take medicine on the morning of surgery, it's okay to take it with a sip of water.  Preventing infection  Shower or bathe the night before and morning of your surgery. Follow the instructions your clinic gave you. (If no instructions, use regular soap.)  Don't shave or clip hair near your surgery site. We'll remove the hair if needed.  Don't smoke or vape the morning of surgery. You may chew nicotine gum up to 2 hours before surgery. A nicotine patch is okay.  Note: Some surgeries require you to completely quit smoking and nicotine. Check with your surgeon.  Your care team will make every effort to keep you safe from infection. We will:  Clean our hands often with soap and water (or an alcohol-based hand rub).  Clean the skin at your surgery site with a special soap that kills germs.  Give you a special gown to keep you warm. (Cold raises the risk of infection.)  Wear special hair covers, masks, gowns and gloves during surgery.  Give antibiotic medicine, if prescribed. Not all surgeries need antibiotics.  What to bring on the day of surgery  Photo ID and insurance card  Copy of your health care directive, if you have one  Glasses and hearing aides (bring  cases)  You can't wear contacts during surgery  Inhaler and eye drops, if you use them (tell us about these when you arrive)  CPAP machine or breathing device, if you use them  A few personal items, if spending the night  If you have . . .  A pacemaker, ICD (cardiac defibrillator) or other implant: Bring the ID card.  An implanted stimulator: Bring the remote control.  A legal guardian: Bring a copy of the certified (court-stamped) guardianship papers.  Please remove any jewelry, including body piercings. Leave jewelry and other valuables at home.  If you're going home the day of surgery  You must have a responsible adult drive you home. They should stay with you overnight as well.  If you don't have someone to stay with you, and you aren't safe to go home alone, we may keep you overnight. Insurance often won't pay for this.  After surgery  If it's hard to control your pain or you need more pain medicine, please call your surgeon's office.  Questions?   If you have any questions for your care team, list them here: _________________________________________________________________________________________________________________________________________________________________________ ____________________________________ ____________________________________ ____________________________________  For informational purposes only. Not to replace the advice of your health care provider. Copyright   2003, 2019 [employer]. All rights reserved. Clinically reviewed by Jeri Dobson MD. [employer] 142639 - REV 07/21.

## 2022-07-26 ENCOUNTER — TELEPHONE (OUTPATIENT)
Dept: UROLOGY | Facility: CLINIC | Age: 57
End: 2022-07-26

## 2022-07-26 NOTE — TELEPHONE ENCOUNTER
Patient is rescheduled with Dr. Russo for a sooner date on Tuesday 8/2/22.     Issa Choe on 7/26/2022 at 11:18 AM

## 2022-07-29 ENCOUNTER — LAB (OUTPATIENT)
Dept: LAB | Facility: CLINIC | Age: 57
End: 2022-07-29
Payer: COMMERCIAL

## 2022-07-29 DIAGNOSIS — Z20.822 ENCOUNTER FOR LABORATORY TESTING FOR COVID-19 VIRUS: ICD-10-CM

## 2022-07-29 PROCEDURE — U0005 INFEC AGEN DETEC AMPLI PROBE: HCPCS

## 2022-07-29 PROCEDURE — U0003 INFECTIOUS AGENT DETECTION BY NUCLEIC ACID (DNA OR RNA); SEVERE ACUTE RESPIRATORY SYNDROME CORONAVIRUS 2 (SARS-COV-2) (CORONAVIRUS DISEASE [COVID-19]), AMPLIFIED PROBE TECHNIQUE, MAKING USE OF HIGH THROUGHPUT TECHNOLOGIES AS DESCRIBED BY CMS-2020-01-R: HCPCS

## 2022-07-30 LAB — SARS-COV-2 RNA RESP QL NAA+PROBE: NEGATIVE

## 2022-08-02 ENCOUNTER — ANESTHESIA EVENT (OUTPATIENT)
Dept: SURGERY | Facility: CLINIC | Age: 57
End: 2022-08-02
Payer: COMMERCIAL

## 2022-08-02 ENCOUNTER — HOSPITAL ENCOUNTER (OUTPATIENT)
Facility: CLINIC | Age: 57
Discharge: HOME | End: 2022-08-02
Attending: UROLOGY | Admitting: UROLOGY
Payer: COMMERCIAL

## 2022-08-02 ENCOUNTER — ANESTHESIA (OUTPATIENT)
Dept: SURGERY | Facility: CLINIC | Age: 57
End: 2022-08-02
Payer: COMMERCIAL

## 2022-08-02 VITALS
DIASTOLIC BLOOD PRESSURE: 84 MMHG | OXYGEN SATURATION: 95 % | BODY MASS INDEX: 30.33 KG/M2 | SYSTOLIC BLOOD PRESSURE: 140 MMHG | RESPIRATION RATE: 18 BRPM | WEIGHT: 204.81 LBS | HEIGHT: 69 IN | HEART RATE: 92 BPM | TEMPERATURE: 97.5 F

## 2022-08-02 DIAGNOSIS — N43.2 OTHER HYDROCELE: ICD-10-CM

## 2022-08-02 DIAGNOSIS — L72.0 EPIDERMAL INCLUSION CYST: Primary | ICD-10-CM

## 2022-08-02 LAB — GLUCOSE BLDC GLUCOMTR-MCNC: 93 MG/DL (ref 70–99)

## 2022-08-02 PROCEDURE — 360N000075 HC SURGERY LEVEL 2, PER MIN: Performed by: UROLOGY

## 2022-08-02 PROCEDURE — 250N000009 HC RX 250: Performed by: NURSE ANESTHETIST, CERTIFIED REGISTERED

## 2022-08-02 PROCEDURE — 11420 EXC H-F-NK-SP B9+MARG 0.5/<: CPT | Mod: GC | Performed by: UROLOGY

## 2022-08-02 PROCEDURE — 250N000011 HC RX IP 250 OP 636: Performed by: NURSE ANESTHETIST, CERTIFIED REGISTERED

## 2022-08-02 PROCEDURE — 272N000001 HC OR GENERAL SUPPLY STERILE: Performed by: UROLOGY

## 2022-08-02 PROCEDURE — 370N000017 HC ANESTHESIA TECHNICAL FEE, PER MIN: Performed by: UROLOGY

## 2022-08-02 PROCEDURE — 710N000010 HC RECOVERY PHASE 1, LEVEL 2, PER MIN: Performed by: UROLOGY

## 2022-08-02 PROCEDURE — 250N000011 HC RX IP 250 OP 636: Performed by: UROLOGY

## 2022-08-02 PROCEDURE — 55040 REMOVAL OF HYDROCELE: CPT | Mod: GC | Performed by: UROLOGY

## 2022-08-02 PROCEDURE — 250N000025 HC SEVOFLURANE, PER MIN: Performed by: UROLOGY

## 2022-08-02 PROCEDURE — 258N000003 HC RX IP 258 OP 636: Performed by: NURSE ANESTHETIST, CERTIFIED REGISTERED

## 2022-08-02 PROCEDURE — 250N000009 HC RX 250: Performed by: UROLOGY

## 2022-08-02 PROCEDURE — 250N000009 HC RX 250: Performed by: STUDENT IN AN ORGANIZED HEALTH CARE EDUCATION/TRAINING PROGRAM

## 2022-08-02 PROCEDURE — 710N000012 HC RECOVERY PHASE 2, PER MINUTE: Performed by: UROLOGY

## 2022-08-02 PROCEDURE — 250N000013 HC RX MED GY IP 250 OP 250 PS 637: Performed by: STUDENT IN AN ORGANIZED HEALTH CARE EDUCATION/TRAINING PROGRAM

## 2022-08-02 PROCEDURE — 250N000011 HC RX IP 250 OP 636: Performed by: STUDENT IN AN ORGANIZED HEALTH CARE EDUCATION/TRAINING PROGRAM

## 2022-08-02 PROCEDURE — 82962 GLUCOSE BLOOD TEST: CPT

## 2022-08-02 PROCEDURE — 999N000141 HC STATISTIC PRE-PROCEDURE NURSING ASSESSMENT: Performed by: UROLOGY

## 2022-08-02 RX ORDER — ONDANSETRON 4 MG/1
4 TABLET, ORALLY DISINTEGRATING ORAL EVERY 30 MIN PRN
Status: DISCONTINUED | OUTPATIENT
Start: 2022-08-02 | End: 2022-08-02 | Stop reason: HOSPADM

## 2022-08-02 RX ORDER — PROPOFOL 10 MG/ML
INJECTION, EMULSION INTRAVENOUS PRN
Status: DISCONTINUED | OUTPATIENT
Start: 2022-08-02 | End: 2022-08-02

## 2022-08-02 RX ORDER — CEFAZOLIN SODIUM/WATER 2 G/20 ML
2 SYRINGE (ML) INTRAVENOUS
Status: COMPLETED | OUTPATIENT
Start: 2022-08-02 | End: 2022-08-02

## 2022-08-02 RX ORDER — BUPIVACAINE HYDROCHLORIDE 5 MG/ML
INJECTION, SOLUTION PERINEURAL PRN
Status: DISCONTINUED | OUTPATIENT
Start: 2022-08-02 | End: 2022-08-02 | Stop reason: HOSPADM

## 2022-08-02 RX ORDER — NALOXONE HYDROCHLORIDE 0.4 MG/ML
0.2 INJECTION, SOLUTION INTRAMUSCULAR; INTRAVENOUS; SUBCUTANEOUS
Status: DISCONTINUED | OUTPATIENT
Start: 2022-08-02 | End: 2022-08-02 | Stop reason: HOSPADM

## 2022-08-02 RX ORDER — HALOPERIDOL 5 MG/ML
1 INJECTION INTRAMUSCULAR
Status: DISCONTINUED | OUTPATIENT
Start: 2022-08-02 | End: 2022-08-02 | Stop reason: HOSPADM

## 2022-08-02 RX ORDER — ONDANSETRON 2 MG/ML
INJECTION INTRAMUSCULAR; INTRAVENOUS PRN
Status: DISCONTINUED | OUTPATIENT
Start: 2022-08-02 | End: 2022-08-02

## 2022-08-02 RX ORDER — FENTANYL CITRATE-0.9 % NACL/PF 10 MCG/ML
PLASTIC BAG, INJECTION (ML) INTRAVENOUS PRN
Status: DISCONTINUED | OUTPATIENT
Start: 2022-08-02 | End: 2022-08-02

## 2022-08-02 RX ORDER — NALOXONE HYDROCHLORIDE 0.4 MG/ML
0.4 INJECTION, SOLUTION INTRAMUSCULAR; INTRAVENOUS; SUBCUTANEOUS
Status: DISCONTINUED | OUTPATIENT
Start: 2022-08-02 | End: 2022-08-02 | Stop reason: HOSPADM

## 2022-08-02 RX ORDER — SODIUM CHLORIDE, SODIUM LACTATE, POTASSIUM CHLORIDE, CALCIUM CHLORIDE 600; 310; 30; 20 MG/100ML; MG/100ML; MG/100ML; MG/100ML
INJECTION, SOLUTION INTRAVENOUS CONTINUOUS PRN
Status: DISCONTINUED | OUTPATIENT
Start: 2022-08-02 | End: 2022-08-02

## 2022-08-02 RX ORDER — FENTANYL CITRATE 50 UG/ML
25 INJECTION, SOLUTION INTRAMUSCULAR; INTRAVENOUS EVERY 5 MIN PRN
Status: DISCONTINUED | OUTPATIENT
Start: 2022-08-02 | End: 2022-08-02 | Stop reason: HOSPADM

## 2022-08-02 RX ORDER — DEXAMETHASONE SODIUM PHOSPHATE 4 MG/ML
INJECTION, SOLUTION INTRA-ARTICULAR; INTRALESIONAL; INTRAMUSCULAR; INTRAVENOUS; SOFT TISSUE PRN
Status: DISCONTINUED | OUTPATIENT
Start: 2022-08-02 | End: 2022-08-02

## 2022-08-02 RX ORDER — IPRATROPIUM BROMIDE AND ALBUTEROL SULFATE 2.5; .5 MG/3ML; MG/3ML
3 SOLUTION RESPIRATORY (INHALATION) ONCE
Status: COMPLETED | OUTPATIENT
Start: 2022-08-02 | End: 2022-08-02

## 2022-08-02 RX ORDER — OXYCODONE HYDROCHLORIDE 5 MG/1
5 TABLET ORAL EVERY 6 HOURS PRN
Qty: 10 TABLET | Refills: 0 | Status: SHIPPED | OUTPATIENT
Start: 2022-08-02 | End: 2022-08-05

## 2022-08-02 RX ORDER — HYDRALAZINE HYDROCHLORIDE 20 MG/ML
2.5-5 INJECTION INTRAMUSCULAR; INTRAVENOUS EVERY 10 MIN PRN
Status: DISCONTINUED | OUTPATIENT
Start: 2022-08-02 | End: 2022-08-02 | Stop reason: HOSPADM

## 2022-08-02 RX ORDER — FENTANYL CITRATE 50 UG/ML
INJECTION, SOLUTION INTRAMUSCULAR; INTRAVENOUS PRN
Status: DISCONTINUED | OUTPATIENT
Start: 2022-08-02 | End: 2022-08-02

## 2022-08-02 RX ORDER — SODIUM CHLORIDE, SODIUM LACTATE, POTASSIUM CHLORIDE, CALCIUM CHLORIDE 600; 310; 30; 20 MG/100ML; MG/100ML; MG/100ML; MG/100ML
INJECTION, SOLUTION INTRAVENOUS CONTINUOUS
Status: DISCONTINUED | OUTPATIENT
Start: 2022-08-02 | End: 2022-08-02 | Stop reason: HOSPADM

## 2022-08-02 RX ORDER — HYDROMORPHONE HYDROCHLORIDE 1 MG/ML
0.4 INJECTION, SOLUTION INTRAMUSCULAR; INTRAVENOUS; SUBCUTANEOUS EVERY 5 MIN PRN
Status: DISCONTINUED | OUTPATIENT
Start: 2022-08-02 | End: 2022-08-02 | Stop reason: HOSPADM

## 2022-08-02 RX ORDER — FENTANYL CITRATE 50 UG/ML
25 INJECTION, SOLUTION INTRAMUSCULAR; INTRAVENOUS
Status: DISCONTINUED | OUTPATIENT
Start: 2022-08-02 | End: 2022-08-02 | Stop reason: HOSPADM

## 2022-08-02 RX ORDER — CEFAZOLIN SODIUM/WATER 2 G/20 ML
2 SYRINGE (ML) INTRAVENOUS SEE ADMIN INSTRUCTIONS
Status: DISCONTINUED | OUTPATIENT
Start: 2022-08-02 | End: 2022-08-02 | Stop reason: HOSPADM

## 2022-08-02 RX ORDER — ONDANSETRON 2 MG/ML
4 INJECTION INTRAMUSCULAR; INTRAVENOUS EVERY 30 MIN PRN
Status: DISCONTINUED | OUTPATIENT
Start: 2022-08-02 | End: 2022-08-02 | Stop reason: HOSPADM

## 2022-08-02 RX ORDER — LIDOCAINE HYDROCHLORIDE 20 MG/ML
INJECTION, SOLUTION INFILTRATION; PERINEURAL PRN
Status: DISCONTINUED | OUTPATIENT
Start: 2022-08-02 | End: 2022-08-02

## 2022-08-02 RX ORDER — OXYCODONE HYDROCHLORIDE 5 MG/1
5 TABLET ORAL EVERY 4 HOURS PRN
Status: DISCONTINUED | OUTPATIENT
Start: 2022-08-02 | End: 2022-08-02 | Stop reason: HOSPADM

## 2022-08-02 RX ADMIN — HYDROMORPHONE HYDROCHLORIDE 0.4 MG: 1 INJECTION, SOLUTION INTRAMUSCULAR; INTRAVENOUS; SUBCUTANEOUS at 18:04

## 2022-08-02 RX ADMIN — OXYCODONE HYDROCHLORIDE 5 MG: 5 TABLET ORAL at 17:54

## 2022-08-02 RX ADMIN — FENTANYL CITRATE 25 MCG: 50 INJECTION, SOLUTION INTRAMUSCULAR; INTRAVENOUS at 17:30

## 2022-08-02 RX ADMIN — Medication 200 MCG: at 15:54

## 2022-08-02 RX ADMIN — IPRATROPIUM BROMIDE AND ALBUTEROL SULFATE 3 ML: .5; 3 SOLUTION RESPIRATORY (INHALATION) at 15:36

## 2022-08-02 RX ADMIN — ONDANSETRON 4 MG: 2 INJECTION INTRAMUSCULAR; INTRAVENOUS at 16:49

## 2022-08-02 RX ADMIN — PROPOFOL 60 MG: 10 INJECTION, EMULSION INTRAVENOUS at 15:58

## 2022-08-02 RX ADMIN — MIDAZOLAM 2 MG: 1 INJECTION INTRAMUSCULAR; INTRAVENOUS at 15:43

## 2022-08-02 RX ADMIN — FENTANYL CITRATE 100 MCG: 50 INJECTION, SOLUTION INTRAMUSCULAR; INTRAVENOUS at 15:53

## 2022-08-02 RX ADMIN — HYDROMORPHONE HYDROCHLORIDE 0.4 MG: 1 INJECTION, SOLUTION INTRAMUSCULAR; INTRAVENOUS; SUBCUTANEOUS at 17:46

## 2022-08-02 RX ADMIN — SODIUM CHLORIDE, POTASSIUM CHLORIDE, SODIUM LACTATE AND CALCIUM CHLORIDE: 600; 310; 30; 20 INJECTION, SOLUTION INTRAVENOUS at 15:43

## 2022-08-02 RX ADMIN — LIDOCAINE HYDROCHLORIDE 100 MG: 20 INJECTION, SOLUTION INFILTRATION; PERINEURAL at 15:53

## 2022-08-02 RX ADMIN — PROPOFOL 250 MG: 10 INJECTION, EMULSION INTRAVENOUS at 15:55

## 2022-08-02 RX ADMIN — Medication 2 G: at 15:46

## 2022-08-02 RX ADMIN — Medication 200 MCG: at 16:10

## 2022-08-02 RX ADMIN — DEXAMETHASONE SODIUM PHOSPHATE 6 MG: 4 INJECTION, SOLUTION INTRAMUSCULAR; INTRAVENOUS at 16:12

## 2022-08-02 RX ADMIN — HYDROMORPHONE HYDROCHLORIDE 0.4 MG: 1 INJECTION, SOLUTION INTRAMUSCULAR; INTRAVENOUS; SUBCUTANEOUS at 17:52

## 2022-08-02 NOTE — OP NOTE
OPERATIVE NOTE    PREOPERATIVE DIAGNOSIS: Right hydrocele, left scrotal epidermal inclusion cyst   POSTOPERATIVE DIAGNOSIS: same    PROCEDURES PERFORMED:   1. Right hydrocelectomy  2. Scrotal cyst excision     STAFF SURGEON: Tal Russo MD, present for entire case.   RESIDENT(S): Oriana Lyons MD    ANESTHESIA: MAC  ESTIMATED BLOOD LOSS: 5 mL.   IV FLUIDS: see dictated anesthesia record  COMPLICATIONS: None.   SPECIMEN: None   Drain: 10F KURT drain right scrotum.    SIGNIFICANT FINDINGS:   Moderate right hydrocele, 0.5cm sebaceous cyst on left hemiscrotum    BRIEF OPERATIVE INDICATIONS: Liborio Barajas is a 57 year old year old male with a moderate right hydrocele, first diagnosed in 2016 on scrotal ultrasound, with repeat ultrasound on 4/28/22. Interested in surgical management. Also with small epidermal inclusion cyst on left hemiscrotum, requesting excision of this while in OR. The patient was counseled on the alternatives, risks, and benefits and elected to proceed with the above stated procedure.    Procedure in Detail: After informed consent was obtained, the patient was taken to the operating room and placed supine. Sequential compression boots were applied, preoperative antibiotics were administered, and the genitals were prepped and draped in the supine position. A timeout was performed with the operating room staff.    We began with the epidermal inclusion cyst excision. A 0.5cm cyst was identified on the left hemiscrotum and sharply excised in an elliptical fashion. The cyst was removed in its entirety and the skin was closed with 3-0 chromic sutures.     We then proceeded to the hydrocelectomy. The case was begun by marking a midline raphe incision immediately inferior to the penoscrotal junction. The skin was infiltrated with local anesthetic and incised sharply and opened full-thickness exposing the right testicle and tunica vaginalis. The tunica and muscle layers were opened and there was found to be  a large right hydrocele. The hydrocele was partially drained, then the testicle was delivered from the scrotum. The hydrocele was carefully freed from the surrounding layers of tissue with cautery and a moist sponge, cleanly exposing the parietal layer of the tunica vaginalis. The hydrocele was then fully drained. The sack was excised with electrocautery and the edges were everted behind the testis and sutured to each other using a running intermittently locking 3-0 chromic suture in Jaboulay fashion. The testis, epididymis and spermatic cord were inspected and found to be intact without any evidence of injury.    Hemostatis was achieved and the incision was closed in layers. Dartos muscle was re-approximated using 2 layers of running 3-0 Vicryl. The skin was closed using 4-0 monocryl in a running horizontal mattress fashion. Local anesthetic was utilized around the incisions and for a cord block. Bacitracin, fluffs, and mesh underwear were applied. The patient was awoken from anesthesia without complication and transported to recovery in stable condition.     Post-op plan:   - follow up in clinic as scheduled on 9/1/22 for incision check   - Drain removal at  Home in 24 hrs if low outputs.

## 2022-08-02 NOTE — ANESTHESIA POSTPROCEDURE EVALUATION
Patient: Liborio Barajas    Procedure: Procedure(s):  HYDROCELECTOMY, SCROTAL APPROACH RIGHT, Remove scrotal skin cyst, left scrotum       Anesthesia Type:  General    Note:     Postop Pain Control: Uneventful            Sign Out: Well controlled pain   PONV: No   Neuro/Psych: Uneventful            Sign Out: Acceptable/Baseline neuro status   Airway/Respiratory: Uneventful            Sign Out: Acceptable/Baseline resp. status   CV/Hemodynamics: Uneventful            Sign Out: Acceptable CV status; No obvious hypovolemia; No obvious fluid overload   Other NRE: NONE   DID A NON-ROUTINE EVENT OCCUR? No           Last vitals:  Vitals Value Taken Time   /74 08/02/22 1830   Temp 36.4  C (97.6  F) 08/02/22 1816   Pulse 92 08/02/22 1830   Resp 20 08/02/22 1827   SpO2 98 % 08/02/22 1836   Vitals shown include unvalidated device data.    Electronically Signed By: Jacki Pabon MD  August 2, 2022  6:38 PM

## 2022-08-02 NOTE — ANESTHESIA PREPROCEDURE EVALUATION
Anesthesia Pre-Procedure Evaluation    Patient: Liborio Barajas   MRN: [id number] : 1965        Procedure : Procedure(s):  HYDROCELECTOMY, SCROTAL APPROACH RIGHT, Remove scrotal skin cyst, left scrotum          Past Medical History:   Diagnosis Date     Hypertension      Infection due to 2019 novel coronavirus       Past Surgical History:   Procedure Laterality Date     COLONOSCOPY        Allergies   Allergen Reactions     Nka [No Known Allergies]       Social History     Tobacco Use     Smoking status: Former Smoker     Packs/day: 0.00     Years: 0.00     Pack years: 0.00     Types: Cigarettes     Smokeless tobacco: Former User     Tobacco comment: Stop about 5 years ago   Substance Use Topics     Alcohol use: Not Currently     Comment: Sometimes      Wt Readings from Last 1 Encounters:   22 93 kg (205 lb)        Anesthesia Evaluation   Pt has had prior anesthetic.         ROS/MED HX  ENT/Pulmonary: Comment: Hospitalized for COVID pneumonia 10/2021 for one month, placed on bipap and HFNC, never intubated      Neurologic:  - neg neurologic ROS     Cardiovascular:     (+) hypertension-----    METS/Exercise Tolerance:     Hematologic: Comments: Intrarenal aortic thrombus, treated with apixaban for several months, now only on aspirin      Musculoskeletal:  - neg musculoskeletal ROS     GI/Hepatic:     (+) GERD,     Renal/Genitourinary:  - neg Renal ROS     Endo:  - neg endo ROS     Psychiatric/Substance Use:  - neg psychiatric ROS     Infectious Disease:  - neg infectious disease ROS     Malignancy:  - neg malignancy ROS     Other:            Physical Exam    Airway  airway exam normal      Mallampati: II   TM distance: > 3 FB   Neck ROM: full   Mouth opening: > 3 cm    Respiratory Devices and Support         Dental  no notable dental history         Cardiovascular   cardiovascular exam normal          Pulmonary   pulmonary exam normal                OUTSIDE LABS:  CBC:   Lab Results   Component Value Date     WBC 15.2 (H) 11/10/2021    WBC 10.9 10/30/2021    HGB 13.7 11/10/2021    HGB 12.5 (L) 10/30/2021    HCT 41.3 11/10/2021    HCT 37.4 (L) 10/30/2021     11/10/2021     10/30/2021     BMP:   Lab Results   Component Value Date     07/21/2022     11/10/2021    POTASSIUM 4.1 07/21/2022    POTASSIUM 4.4 11/10/2021    CHLORIDE 106 07/21/2022    CHLORIDE 105 11/10/2021    CO2 26 07/21/2022    CO2 27 11/10/2021    BUN 26 07/21/2022    BUN 19 11/10/2021    CR 1.09 07/21/2022    CR 0.89 11/10/2021    GLC 96 07/21/2022     (H) 11/10/2021     COAGS:   Lab Results   Component Value Date    PTT 59 (H) 10/16/2021    INR 1.12 11/01/2021    FIBR 417 10/11/2021     POC: No results found for: BGM, HCG, HCGS  HEPATIC:   Lab Results   Component Value Date    ALBUMIN 2.7 (L) 11/10/2021    PROTTOTAL 7.2 11/10/2021    ALT 79 (H) 11/10/2021    AST 21 11/10/2021    ALKPHOS 88 11/10/2021    BILITOTAL 0.3 11/10/2021     OTHER:   Lab Results   Component Value Date    LACT 1.4 10/29/2021    JANET 9.2 07/21/2022    PHOS 3.8 11/01/2021    MAG 2.5 (H) 11/01/2021    CRP 15.7 (H) 10/21/2021       Anesthesia Plan    ASA Status:  3   NPO Status:  NPO Appropriate    Anesthesia Type: General.     - Airway: LMA   Induction: Intravenous.   Maintenance: Inhalation.   Techniques and Equipment:     - Lines/Monitors: BIS     Consents    Anesthesia Plan(s) and associated risks, benefits, and realistic alternatives discussed. Questions answered and patient/representative(s) expressed understanding.    - Discussed:     - Discussed with:  Patient      - Extended Intubation/Ventilatory Support Discussed: No.      - Patient is DNR/DNI Status: No    Use of blood products discussed: No .     Postoperative Care    Pain management: IV analgesics, Oral pain medications.   PONV prophylaxis: Ondansetron (or other 5HT-3), Dexamethasone or Solumedrol     Comments:                Myriam Forbes MD

## 2022-08-02 NOTE — DISCHARGE INSTRUCTIONS

## 2022-08-02 NOTE — BRIEF OP NOTE
[facility]    Brief Operative Note    Pre-operative diagnosis: Other hydrocele [N43.2]  Epidermal inclusion cyst [L72.0]  Post-operative diagnosis Same as pre-operative diagnosis    Procedure: Procedure(s):  HYDROCELECTOMY, SCROTAL APPROACH RIGHT, Remove scrotal skin cyst, left scrotum  Surgeon: Surgeon(s) and Role:     * Tal Russo MD - Primary   * Oriana Lyons MD - Assistant   Anesthesia: General   Estimated Blood Loss: 5 mL from 8/2/2022  3:47 PM to 8/2/2022  5:19 PM      Drains:  10Fr bulb suction drain   Specimens: * No specimens in log *  Findings:   Large right hydrocele, sac opened, excised, and oversewn; small (0.5cm) epidermal inclusion cyst, excised.  Complications: None.  Implants: * No implants in log *

## 2022-08-02 NOTE — INTERVAL H&P NOTE
"I have reviewed the surgical (or preoperative) H&P that is linked to this encounter, and examined the patient. There are no significant changes    Clinical Conditions Present on Arrival:  Clinically Significant Risk Factors Present on Admission                 # Obesity: Estimated body mass index is 30.23 kg/m  as calculated from the following:    Height as of this encounter: 1.753 m (5' 9.02\").    Weight as of this encounter: 92.9 kg (204 lb 12.9 oz).       "

## 2022-08-02 NOTE — ANESTHESIA PROCEDURE NOTES
Airway       Patient location during procedure: OR  Staff -        Anesthesiologist:  Jacki Farris MD       CRNA: Jyothi Buckner APRN CRNA       Performed By: CRNA  Consent for Airway        Urgency: elective  Indications and Patient Condition       Indications for airway management: dori-procedural       Induction type:intravenous       Mask difficulty assessment: 2 - vent by mask + OA or adjuvant +/- NMBA    Final Airway Details       Final airway type: supraglottic airway    Supraglottic Airway Details        Type: LMA       Brand: Air-Q       LMA size: 4.5       Airway Seal Pressure (cm H2O): 15    Post intubation assessment        Placement verified by: capnometry, equal breath sounds and chest rise        Number of attempts at approach: 1       Number of other approaches attempted: 0       Secured with: pink tape       Ease of procedure: easy       Dentition: Intact and Unchanged

## 2022-08-02 NOTE — ANESTHESIA CARE TRANSFER NOTE
Patient: Liborio Barajas    Procedure: Procedure(s):  HYDROCELECTOMY, SCROTAL APPROACH RIGHT, Remove scrotal skin cyst, left scrotum       Diagnosis: Other hydrocele [N43.2]  Epidermal inclusion cyst [L72.0]  Diagnosis Additional Information: No value filed.    Anesthesia Type:   General     Note:    Oropharynx: oropharynx clear of all foreign objects and spontaneously breathing  Level of Consciousness: awake  Oxygen Supplementation: face mask  Level of Supplemental Oxygen (L/min / FiO2): 8  Independent Airway: airway patency satisfactory and stable  Dentition: dentition unchanged  Vital Signs Stable: post-procedure vital signs reviewed and stable  Report to RN Given: handoff report given  Patient transferred to: PACU    Handoff Report: Identifed the Patient, Identified the Reponsible Provider, Reviewed the pertinent medical history, Discussed the surgical course, Reviewed Intra-OP anesthesia mangement and issues during anesthesia, Set expectations for post-procedure period and Allowed opportunity for questions and acknowledgement of understanding      Vitals:  Vitals Value Taken Time   BP     Temp     Pulse     Resp 23 08/02/22 1724   SpO2 96 % 08/02/22 1724   Vitals shown include unvalidated device data.    Electronically Signed By: EZEKIEL Jesus CRNA  August 2, 2022  5:25 PM

## 2022-08-03 ENCOUNTER — TELEPHONE (OUTPATIENT)
Dept: UROLOGY | Facility: CLINIC | Age: 57
End: 2022-08-03

## 2022-08-03 NOTE — ANESTHESIA POSTPROCEDURE EVALUATION
Patient: Liborio Barajas    Procedure: Procedure(s):  HYDROCELECTOMY, SCROTAL APPROACH RIGHT, Remove scrotal skin cyst, left scrotum       Anesthesia Type:  General    Note:  Anesthesia Post Evaluation    Last vitals:  Vitals Value Taken Time   /74 08/02/22 1830   Temp 36.4  C (97.6  F) 08/02/22 1816   Pulse 92 08/02/22 1830   Resp 20 08/02/22 1830   SpO2 98 % 08/02/22 1830       Electronically Signed By: Jacki Pabon MD  August 2, 2022  7:16 PM

## 2022-08-03 NOTE — TELEPHONE ENCOUNTER
[facility] Call Center    Phone Message    May a detailed message be left on voicemail: yes     Reason for Call: The patients wife called wondering if the 9/1/22 post op appointment needs to be moved up? She says he had surgery yesterday and is supposed to follow up in 2 weeks. She says the original surgery date was 8/16/22 so she thinks it was missed to get moved. She asks the patient be contacted via [employer]. Thank you.    Action Taken: Message routed to:  [facility] ([facility] Urology    Travel Screening: Not Applicable

## 2022-08-05 NOTE — TELEPHONE ENCOUNTER
Spoke to pt. Pt reports noting water pockets on the penis towards the tip, on the sides. Just noted this morning. Drain tubes were removed yesterday. Pt confirmed there was very minimal drainage noted prior to removing tubes. Denies increased pain. No changes in ability to void. Water pockets do not have blood noted in them. Not open and draining. There are about 2, nickel size. No fever. Able to eat and drink ok. Having BM ok. No nausea or vomiting. Pt agrees to take pictures and will send via [employer].     Myrna Cool, CAMELIA MSN

## 2022-08-05 NOTE — TELEPHONE ENCOUNTER
Tal Russo MD Bratsch, Myrna LOREDO RN    I reviewed his pictures.   This just looks like normal surgical swelling.  Best advice is just rest, scrotal support, ice off and on, elevate scrotum at night if possible, OTC analgesics recommended for pain as needed.  This does not look like infection.  This is just some minor swelling related to the overall swelling in the area of surgery.       Thank You   TYSON     Attempted to call pt. Left detailed message to call clinic back at [phone].   Also sending [employer] message.     Myrna Cool, RN MSN

## 2022-08-05 NOTE — TELEPHONE ENCOUNTER
Call center called with pt on the line accepting new post op manny. Writer scheduled pt.     Pt having symptoms currently: fluid retention, drain tube out yesterday, surg on 8/2/2022. Writer called triage nurse to assist.

## 2022-08-11 NOTE — TELEPHONE ENCOUNTER
Spoke to pt. Pt reports that swelling has improved. No other questions or concerns noted.     Myrna Cool RN MSN

## 2022-09-01 ENCOUNTER — MYC MEDICAL ADVICE (OUTPATIENT)
Dept: FAMILY MEDICINE | Facility: CLINIC | Age: 57
End: 2022-09-01

## 2022-09-01 NOTE — TELEPHONE ENCOUNTER
"Called pt. States sx started Monday. Runny nose, slight congestion, chest sore-? From coughing. Deep cough with green sputum. No wheezing. Denies headache, CP, SOA. Sleeping good, appetite good. Voiding without problem.   Pt states I feel \"way better\" than yesterday. Wife had same sx over 1 week ago & still has cough. Home covid test negative yesterday.   Pt taking tylenol, night time cough suppr, cough drops. Using albuterol inhaler occasionally.  Drinking fluids, water, tea.  No appts avail. Could test for covid again tomorrow or Saturday. Told UC available or would need to make virtual appt. Pt told if chest pain, SOA would need to go to ER. Pt verbalized understanding. Will watch, thinks has chest cold. If sx change/worsen will go to .    Gabrielle Dobson RN    "

## 2022-11-19 ENCOUNTER — HEALTH MAINTENANCE LETTER (OUTPATIENT)
Age: 57
End: 2022-11-19

## 2022-12-13 ENCOUNTER — E-VISIT (OUTPATIENT)
Dept: FAMILY MEDICINE | Facility: CLINIC | Age: 57
End: 2022-12-13
Payer: COMMERCIAL

## 2022-12-13 ENCOUNTER — MYC MEDICAL ADVICE (OUTPATIENT)
Dept: FAMILY MEDICINE | Facility: CLINIC | Age: 57
End: 2022-12-13

## 2022-12-13 DIAGNOSIS — U07.1 INFECTION DUE TO 2019 NOVEL CORONAVIRUS: Primary | ICD-10-CM

## 2022-12-13 PROCEDURE — 99421 OL DIG E/M SVC 5-10 MIN: CPT | Mod: CS | Performed by: INTERNAL MEDICINE

## 2022-12-13 NOTE — PATIENT INSTRUCTIONS
COVID-19 Outpatient Treatments  Your care team can help you find the best treatments for COVID-19. Talk to a health care provider or refer to the FDA medicine fact sheets below.    Important: You can't have Paxlovid or molnupiravir if you're starting the medicine more than 5 days after your symptoms have started.  Paxlovid: https://www.fda.gov/media/673954/download  Molnupiravir (Lagevrio): https://www.fda.gov/media/353904/download  Paxlovid (nimatrelvir and ritonavir)  How it works  Two medicines (nirmatrelvir and ritonavir) are taken together. They stop the virus from growing. Less amount of virus is easier for your body to fight.  Benefits  Lowers risk of a hospital stay or death from COVID-19.  How to take    Medicine comes in a daily container with both medicine tablets. Take by mouth twice daily (once in the morning, once at night) for 5 days.    The number of tablets to take varies by patient.    Don't chew or break capsules. Swallow whole.  When to take  Take as soon as possible after positive COVID-19 test result, and within 5 days of your first symptoms.  Who can take it  Patients must be 12 years or older, weigh at least 88 pounds, and have tested positive for COVID-19. Paxlovid is the preferred treatment for pregnant patients.  Possible side effects  Can cause altered sense of taste, diarrhea (loose, watery stools), high blood pressure, muscle aches.  Medicine conflicts    Some medicines may conflict with Paxlovid and may cause serious side effects.    Tell your care team about all the medicines you take, including prescription and over-the-counter medicines, vitamins, and herbal supplements.    Your care team will review your medicines to make sure that you can safely take Paxlovid.  Cautions    Paxlovid is not advised for patients with severe kidney or liver disease. If you have kidney or liver problems, the dose may need to be changed.    If you're pregnant or breastfeeding, talk to your care team  about your options.    If you take hormonal birth control (such as the Pill), then you or your partner should also use a non-hormonal form of birth control (such as a condom). Keep doing this for 1 menstrual cycle after your last dose of Paxlovid.  Molnupiravir (Lagevrio)  How it works  Stops the virus from growing. Less amount of virus is easier for your body to fight.  Benefits  Lowers risk of a hospital stay or death from COVID-19.  How to take  Take 4 capsules by mouth every 12 hours (4 in the morning and 4 at night) for 5 days. Don't chew or break capsules. Swallow whole.  When to take  Take as soon as possible after positive COVID-19 test result, and within 5 days of your first symptoms.  Who can take it  Patients must be 18 years or older and have tested positive for COVID-19.  Possible side effects  Diarrhea (loose, watery stools), nausea (feeling sick to your stomach), dizziness, headaches.  Medicine conflicts  Right now, there are no known conflicts with other drugs. But tell your care team about all medicines you take.  Cautions    This medicine is not advised for patients who are pregnant.    If you are someone who could become pregnant, use trusted birth control until 4 days after your last dose of molnupiravir.    If your partner could become pregnant, you should use trusted birth control until 3 months after your last dose of molnupiravir.  For informational purposes only. Not to replace the advice of your health care provider. Copyright   2022 [facility]. All rights reserved. Clinically reviewed by Rachel Dewey, PharmD, BCACP. [employer] [id number] - REV 12/22.    Instructions for Patients      What are the symptoms of COVID-19?  Symptoms can include fever, cough, shortness of breath, chills, headache, muscle pain sore throat, fatigue, runny or stuffy nose, and loss of taste and smell. Other less common symptoms include nausea, vomiting, or diarrhea (watery stools).    Know when to call  911. Emergency warning signs include:    Trouble breathing or shortness of breath    Pain or pressure in the chest that doesn't go away    Feeling confused like you haven't felt before, or not being able to wake up    Bluish-colored lips or face    How can I take care of myself?  4. Get lots of rest. Drink extra fluids (unless a doctor has told you not to).  5. Take Tylenol (acetaminophen) for fever or pain. If you have liver or kidney problems, ask your family doctor if it's okay to take Tylenol   Adults:   650 mg (two 325 mg pills or tablets) every 4 to 6 hours, or...   1,000 mg (two 500 mg pills or tablets) every 8 hours as needed.  Note: Don't take more than 3,000 mg in one day. Acetaminophen is found in many medicines (both prescribed and over the counter). Read all labels to be sure you don't take too much.  For children, check the Tylenol bottle for the right dose. The dose is based on the child's age or weight.  6. Take over the counter medicines for your symptoms as needed. Talk to your pharmacist.  7. If you have other health problems (like cancer, heart failure, an organ transplant, or severe kidney disease): Call your specialty clinic if you don't feel better in the next 2 days.    Where can I get more information?    [facility] COVID-19 Resource Hub: www.[employer].org/covid19/     CDC Quarantine & Isolation: https://www.cdc.gov/coronavirus/2019-ncov/your-health/quarantine-isolation.html     CDC - What to Do If You're Sick: https://www.cdc.gov/coronavirus/2019-ncov/if-you-are-sick/index.html

## 2022-12-22 ENCOUNTER — E-VISIT (OUTPATIENT)
Dept: FAMILY MEDICINE | Facility: CLINIC | Age: 57
End: 2022-12-22
Payer: COMMERCIAL

## 2022-12-22 DIAGNOSIS — B96.89 ACUTE BACTERIAL SINUSITIS: Primary | ICD-10-CM

## 2022-12-22 DIAGNOSIS — J01.90 ACUTE BACTERIAL SINUSITIS: Primary | ICD-10-CM

## 2022-12-22 PROCEDURE — 99421 OL DIG E/M SVC 5-10 MIN: CPT | Performed by: INTERNAL MEDICINE

## 2022-12-22 NOTE — PATIENT INSTRUCTIONS
Sinusitis (Antibiotic Treatment)    The sinuses are air-filled spaces within the bones of the face. They connect to the inside of the nose. Sinusitis is an inflammation of the tissue that lines the sinuses. Sinusitis can occur during a cold. It can also happen due to allergies to pollens and other particles in the air. Sinusitis can cause symptoms of sinus congestion and a feeling of fullness. A sinus infection causes fever, headache, and facial pain. There is often green or yellow fluid draining from the nose or into the back of the throat (post-nasal drip). You have been given antibiotics to treat this condition.   Home care    Take the full course of antibiotics as instructed. Don't stop taking them, even when you feel better.    Drink plenty of water, hot tea, and other liquids as directed by the healthcare provider. This may help thin nasal mucus. It also may help your sinuses drain fluids.    Heat may help soothe painful areas of your face. Use a towel soaked in hot water. Or,  the shower and direct the warm spray onto your face. Using a vaporizer along with a menthol rub at night may also help soothe symptoms.     An expectorant with guaifenesin may help thin nasal mucus and help your sinuses drain fluids. Talk with your provider or pharmacists before taking an over-the-counter (OTC) medicine if you have any questions about it or its side effects..    You can use an OTC decongestant, unless a similar medicine was prescribed to you. Nasal sprays work the fastest. Use one that contains phenylephrine or oxymetazoline. First blow your nose gently. Then use the spray. Don't use these medicines more often than directed on the label. If you do, your symptoms may get worse. You may also take pills that contain pseudoephedrine. Don t use products that combine multiple medicines. This is because side effects may be increased. Read labels. You can also ask the pharmacist for help. (People with high blood  pressure should not use decongestants. They can raise blood pressure.) Talk with your provider or pharmacist if you have any questions about the medicine..    OTC antihistamines may help if allergies contributed to your sinusitis. Talk with your provider or pharmacist if you have any questions about the medicine..    Don't use nasal rinses or irrigation during an acute sinus infection, unless your healthcare provider tells you to. Rinsing may spread the infection to other areas in your sinuses.    Use acetaminophen or ibuprofen to control pain, unless another pain medicine was prescribed to you. If you have chronic liver or kidney disease or ever had a stomach ulcer, talk with your healthcare provider before using these medicines. Never give aspirin to anyone under age 18 who is ill with a fever. It may cause severe liver damage.    Don't smoke. This can make symptoms worse.    Follow-up care  Follow up with your healthcare provider, or as advised.   When to seek medical advice  Call your healthcare provider if any of these occur:     Facial pain or headache that gets worse    Stiff neck    Unusual drowsiness or confusion    Swelling of your forehead or eyelids    Symptoms don't go away in 10 days    Vision problems, such as blurred or double vision    Fever of 100.4 F (38 C) or higher, or as directed by your healthcare provider  Call 911  Call 911 if any of these occur:     Seizure    Trouble breathing    Feeling dizzy or faint    Fingernails, skin or lips look blue, purple , or gray  Prevention  Here are steps you can take to help prevent an infection:     Keep good hand washing habits.    Don t have close contact with people who have sore throats, colds, or other upper respiratory infections.    Don t smoke, and stay away from secondhand smoke.    Stay up to date with of your vaccines.  [employer] last reviewed this educational content on 12/1/2019 2000-2021 [employer]. All rights reserved. This  information is not intended as a substitute for professional medical care. Always follow your healthcare professional's instructions.        Dear Liborio Barajas    After reviewing your responses, I've been able to diagnose you with Acute bacterial sinusitis.      Based on your responses and diagnosis, I have prescribed   Orders Placed This Encounter     amoxicillin-clavulanate (AUGMENTIN) 875-125 MG tablet    to treat your symptoms. I have sent this to your pharmacy.?     It is also important to stay well hydrated, get lots of rest and take over-the-counter decongestants,?tylenol?or ibuprofen if you?are able to?take those medications per your primary care provider to help relieve discomfort.?     It is important that you take?all of?your prescribed medication even if your symptoms are improving after a few doses.? Taking?all of?your medicine helps prevent the symptoms from returning.?     If your symptoms worsen, you develop severe headache, vomiting, high fever (>102), or are not improving in 7 days, please contact your primary care provider for an appointment or visit any of our convenient Walk-in Care or Urgent Care Centers to be seen which can be found on our website?here.?     Thanks again for choosing?us?as your health care partner,?   ?  Nereida Munoz, DO?

## 2023-07-26 ENCOUNTER — MYC MEDICAL ADVICE (OUTPATIENT)
Dept: FAMILY MEDICINE | Facility: CLINIC | Age: 58
End: 2023-07-26
Payer: COMMERCIAL

## 2023-07-26 DIAGNOSIS — U07.1 PNEUMONIA DUE TO 2019 NOVEL CORONAVIRUS: ICD-10-CM

## 2023-07-26 DIAGNOSIS — J12.82 PNEUMONIA DUE TO 2019 NOVEL CORONAVIRUS: ICD-10-CM

## 2023-07-26 RX ORDER — ALBUTEROL SULFATE 90 UG/1
2 AEROSOL, METERED RESPIRATORY (INHALATION) 4 TIMES DAILY
Qty: 36 G | Refills: 1 | Status: SHIPPED | OUTPATIENT
Start: 2023-07-26 | End: 2023-08-08

## 2023-08-01 ASSESSMENT — ENCOUNTER SYMPTOMS
MYALGIAS: 0
CHILLS: 0
HEMATOCHEZIA: 0
HEADACHES: 0
WEAKNESS: 0
ARTHRALGIAS: 0
NAUSEA: 0
NERVOUS/ANXIOUS: 0
PARESTHESIAS: 0
FREQUENCY: 0
DYSURIA: 0
SHORTNESS OF BREATH: 0
HEARTBURN: 0
DIZZINESS: 0
COUGH: 0
DIARRHEA: 0
SORE THROAT: 0
EYE PAIN: 0
CONSTIPATION: 0
FEVER: 0
PALPITATIONS: 0
JOINT SWELLING: 0
HEMATURIA: 0
ABDOMINAL PAIN: 0

## 2023-08-08 ENCOUNTER — OFFICE VISIT (OUTPATIENT)
Dept: FAMILY MEDICINE | Facility: CLINIC | Age: 58
End: 2023-08-08
Payer: COMMERCIAL

## 2023-08-08 VITALS
BODY MASS INDEX: 29.61 KG/M2 | SYSTOLIC BLOOD PRESSURE: 134 MMHG | WEIGHT: 199.9 LBS | TEMPERATURE: 98.5 F | OXYGEN SATURATION: 96 % | HEART RATE: 90 BPM | DIASTOLIC BLOOD PRESSURE: 94 MMHG | RESPIRATION RATE: 16 BRPM | HEIGHT: 69 IN

## 2023-08-08 DIAGNOSIS — Z00.00 ROUTINE GENERAL MEDICAL EXAMINATION AT A HEALTH CARE FACILITY: Primary | ICD-10-CM

## 2023-08-08 DIAGNOSIS — I10 BENIGN ESSENTIAL HYPERTENSION: ICD-10-CM

## 2023-08-08 DIAGNOSIS — Z13.6 CARDIOVASCULAR SCREENING; LDL GOAL LESS THAN 100: ICD-10-CM

## 2023-08-08 DIAGNOSIS — Z86.718 HISTORY OF ARTERIAL THROMBOSIS: ICD-10-CM

## 2023-08-08 DIAGNOSIS — L82.0 INFLAMED SEBORRHEIC KERATOSIS: ICD-10-CM

## 2023-08-08 DIAGNOSIS — U09.9 LONG COVID: ICD-10-CM

## 2023-08-08 LAB
ANION GAP SERPL CALCULATED.3IONS-SCNC: 11 MMOL/L (ref 7–15)
BUN SERPL-MCNC: 23.3 MG/DL (ref 8–23)
CALCIUM SERPL-MCNC: 10.1 MG/DL (ref 8.8–10.2)
CHLORIDE SERPL-SCNC: 104 MMOL/L (ref 98–107)
CHOLEST SERPL-MCNC: 231 MG/DL
CREAT SERPL-MCNC: 1.35 MG/DL (ref 0.67–1.17)
DEPRECATED HCO3 PLAS-SCNC: 24 MMOL/L (ref 22–29)
GFR SERPL CREATININE-BSD FRML MDRD: 60 ML/MIN/1.73M2
GLUCOSE SERPL-MCNC: 89 MG/DL (ref 70–99)
HDLC SERPL-MCNC: 61 MG/DL
LDLC SERPL CALC-MCNC: 144 MG/DL
NONHDLC SERPL-MCNC: 170 MG/DL
POTASSIUM SERPL-SCNC: 4.2 MMOL/L (ref 3.4–5.3)
SODIUM SERPL-SCNC: 139 MMOL/L (ref 136–145)
TRIGL SERPL-MCNC: 128 MG/DL

## 2023-08-08 PROCEDURE — 80061 LIPID PANEL: CPT | Performed by: INTERNAL MEDICINE

## 2023-08-08 PROCEDURE — 80048 BASIC METABOLIC PNL TOTAL CA: CPT | Performed by: INTERNAL MEDICINE

## 2023-08-08 PROCEDURE — 17110 DESTRUCTION B9 LES UP TO 14: CPT | Performed by: INTERNAL MEDICINE

## 2023-08-08 PROCEDURE — 99214 OFFICE O/P EST MOD 30 MIN: CPT | Mod: 25 | Performed by: INTERNAL MEDICINE

## 2023-08-08 PROCEDURE — 36415 COLL VENOUS BLD VENIPUNCTURE: CPT | Performed by: INTERNAL MEDICINE

## 2023-08-08 PROCEDURE — 99396 PREV VISIT EST AGE 40-64: CPT | Mod: 25 | Performed by: INTERNAL MEDICINE

## 2023-08-08 RX ORDER — LISINOPRIL 10 MG/1
10 TABLET ORAL DAILY
Qty: 90 TABLET | Refills: 3 | Status: SHIPPED | OUTPATIENT
Start: 2023-08-08 | End: 2024-08-16

## 2023-08-08 RX ORDER — ALBUTEROL SULFATE 90 UG/1
2 AEROSOL, METERED RESPIRATORY (INHALATION) 4 TIMES DAILY
Qty: 36 G | Refills: 11 | Status: SHIPPED | OUTPATIENT
Start: 2023-08-08

## 2023-08-08 ASSESSMENT — ENCOUNTER SYMPTOMS
MYALGIAS: 0
ABDOMINAL PAIN: 0
SHORTNESS OF BREATH: 0
CHILLS: 0
DYSURIA: 0
PALPITATIONS: 0
HEMATURIA: 0
FEVER: 0
FREQUENCY: 0
COUGH: 0
DIARRHEA: 0
HEARTBURN: 0
HEADACHES: 0
ARTHRALGIAS: 0
WEAKNESS: 0
NAUSEA: 0
EYE PAIN: 0
CONSTIPATION: 0
NERVOUS/ANXIOUS: 0
SORE THROAT: 0
HEMATOCHEZIA: 0
DIZZINESS: 0
PARESTHESIAS: 0
JOINT SWELLING: 0

## 2023-08-08 ASSESSMENT — PAIN SCALES - GENERAL: PAINLEVEL: NO PAIN (0)

## 2023-08-08 NOTE — PROGRESS NOTES
"SUBJECTIVE:   CC: Liborio is an 58 year old who presents for preventative health visit.       8/8/2023     7:55 AM   Additional Questions   Roomed by coleman lee   Accompanied by self         8/8/2023     7:55 AM   Patient Reported Additional Medications   Patient reports taking the following new medications none       Healthy Habits:     Getting at least 3 servings of Calcium per day:  Yes    Bi-annual eye exam:  NO    Dental care twice a year:  NO    Sleep apnea or symptoms of sleep apnea:  None    Diet:  Regular (no restrictions)    Frequency of exercise:  1 day/week    Duration of exercise:  Less than 15 minutes    Taking medications regularly:  Yes    Medication side effects:  Not applicable      Chief Complaint   Patient presents with    Physical    Hypertension    Health Maintenance     Due for covid shingles   Dont want shots      Long COVID  --noted increase in shortness of breath, mild, on the days with poor air quality this summer  --uses albuterol inhaler 1-2 x day or most day since COVID  --is not using out of habit  --shortness of breath is main symptoms that prompts albuterol use  --it does help improve side effects   --no chest pain  --no leg swelling  --declines to change therapy    Derm:  --gets itchy skin hives intermittent  --also has SK and wonders if there is topical treatment to cure this  --saw Derm years ago,. Records not available    Hypertension Follow-up    Do you check your blood pressure regularly outside of the clinic? No   Are you following a low salt diet? Yes  Are your blood pressures ever more than 140 on the top number (systolic) OR more than 90 on the bottom number (diastolic), for example 140/90? No  He self decreased lisinopril because he heard 'bad t hings about it\"  Last took lisinopril 3 weeks ago; is using Super Beet Juice to help improve blood pressure       Social History     Tobacco Use    Smoking status: Former     Packs/day: 0.00     Years: 0.00     Pack years: 0.00     " Types: Cigarettes    Smokeless tobacco: Former    Tobacco comments:     Stop about 5 years ago   Substance Use Topics    Alcohol use: Not Currently     Comment: Sometimes             8/1/2023     9:10 AM   Alcohol Use   Prescreen: >3 drinks/day or >7 drinks/week? No         Last PSA: No results found for: PSA    Reviewed orders with patient. Reviewed health maintenance and updated orders accordingly - Yes  Current Outpatient Medications   Medication Sig Dispense Refill    albuterol (PROAIR HFA/PROVENTIL HFA/VENTOLIN HFA) 108 (90 Base) MCG/ACT inhaler Inhale 2 puffs into the lungs 4 times daily 36 g 1    Aspirin 81 MG CAPS Take 81 mg by mouth daily Stopping apixaban and start aspirin at 81 mg PO Qday as of 4/19/2022.      lisinopril (ZESTRIL) 10 MG tablet Take 1 tablet (10 mg) by mouth daily 90 tablet 1    Omeprazole (PRILOSEC PO) Take 20 mg by mouth every morning Over the counter         Reviewed and updated as needed this visit by clinical staff    Allergies  Meds  Problems             Reviewed and updated as needed this visit by Provider      Problems                Review of Systems   Constitutional:  Negative for chills and fever.   HENT:  Negative for congestion, ear pain, hearing loss and sore throat.    Eyes:  Negative for pain and visual disturbance.   Respiratory:  Negative for cough and shortness of breath.    Cardiovascular:  Negative for chest pain, palpitations and peripheral edema.   Gastrointestinal:  Negative for abdominal pain, constipation, diarrhea, heartburn, hematochezia and nausea.   Genitourinary:  Negative for dysuria, frequency, genital sores, hematuria, impotence, penile discharge and urgency.   Musculoskeletal:  Negative for arthralgias, joint swelling and myalgias.   Skin:  Negative for rash.   Neurological:  Negative for dizziness, weakness, headaches and paresthesias.   Psychiatric/Behavioral:  Negative for mood changes. The patient is not nervous/anxious.          OBJECTIVE:   BP  "(!) 134/94 (BP Location: Right arm, Patient Position: Sitting, Cuff Size: Adult Regular)   Pulse 90   Temp 98.5  F (36.9  C) (Tympanic)   Resp 16   Ht 1.74 m (5' 8.5\")   Wt 90.7 kg (199 lb 14.4 oz)   SpO2 96%   BMI 29.95 kg/m      Physical Exam  GENERAL: healthy, alert and no distress  EYES: Eyes grossly normal to inspection, PERRL and conjunctivae and sclerae normal  HENT: ear canals and TM's normal, nose and mouth without ulcers or lesions  NECK: no adenopathy, no asymmetry, masses, or scars and thyroid normal to palpation  RESP: lungs clear to auscultation - no rales, rhonchi or wheezes  CV: regular rate and rhythm, normal S1 S2, no S3 or S4, no murmur, click or rub, no peripheral edema and peripheral pulses strong  ABDOMEN: soft, nontender, no hepatosplenomegaly, no masses and bowel sounds normal  MS: no gross musculoskeletal defects noted, no edema  SKIN: Several inflamed SK on the back.  Liquid nitrogen was used on 4 of the lesions in 3 freeze-thaw cycles.  Patient tolerated well  NEURO: Normal strength and tone, mentation intact and speech normal  PSYCH: mentation appears normal, affect normal/bright        ASSESSMENT/PLAN:   (Z00.00) Routine general medical examination at a health care facility  (primary encounter diagnosis)  Comment:   Plan:     (I10) Benign essential hypertension  Comment: Discussed nature of hypertension.  Patient agreeable to resuming the lisinopril.  Plan: lisinopril (ZESTRIL) 10 MG tablet, OFFICE/OUTPT        VISIT,EST,LEVL IV, Basic metabolic panel  (Ca,         Cl, CO2, Creat, Gluc, K, Na, BUN)            (U09.9) Long COVID  Comment: Discussed further diagnostic testing with PFT to further quantify if he has objective lung disease from COVID or if he has more subjective shortness of breath.  Discussed controller inhaler such as inhaled corticosteroid but he declines for now   Plan: albuterol (PROAIR HFA/PROVENTIL HFA/VENTOLIN         HFA) 108 (90 Base) MCG/ACT inhaler,         " OFFICE/OUTPT VISIT,EST,LEVL IV, General PFT Lab        (Please always keep checked), Pulmonary         Function Test            (Z13.6) CARDIOVASCULAR SCREENING; LDL GOAL LESS THAN 100  Comment:   Plan: OFFICE/OUTPT VISIT,EST,LEVL IV, Lipid panel         reflex to direct LDL Non-fasting            (L82.0) Inflamed seborrheic keratosis  Comment:   Plan: DESTRUCT BENIGN LESION, UP TO 14            (Z86.718) History of arterial thrombosis  Comment:  on aspirin by recommendation of hematology  Plan:     Patient has been advised of split billing requirements and indicates understanding: Yes      COUNSELING:   Reviewed preventive health counseling, as reflected in patient instructions        He reports that he has quit smoking. His smoking use included cigarettes. He has quit using smokeless tobacco.            Nereida Munoz,   [facility]

## 2023-08-08 NOTE — PATIENT INSTRUCTIONS
Hypertension  Recommend to resume lisinopril 10 mg once daily  Blood work today    Derm  Can use over the counter hydrocortisone twice daily x 1 week for irritation on hives    Long COVID  1. You need to have breathing tests done.  Please call the Pulmonary lab at [phone] to schedule  Refill albuterol inhaler;  we discussed starting controller inhaler but you declined for now      Preventive Health Recommendations  Male Ages 50 - 64    Yearly exam:             See your health care provider every year in order to  o   Review health changes.   o   Discuss preventive care.    o   Review your medicines if your doctor has prescribed any.   Have a cholesterol test every 5 years, or more frequently if you are at risk for high cholesterol/heart disease.   Have a diabetes test (fasting glucose) every three years. If you are at risk for diabetes, you should have this test more often.   Have a colonoscopy at age 50, or have a yearly FIT test (stool test). These exams will check for colon cancer.    Talk with your health care provider about whether or not a prostate cancer screening test (PSA) is right for you.  You should be tested each year for STDs (sexually transmitted diseases), if you re at risk.     Shots: Get a flu shot each year. Get a tetanus shot every 10 years.     Nutrition:  Eat at least 5 servings of fruits and vegetables daily.   Eat whole-grain bread, whole-wheat pasta and brown rice instead of white grains and rice.   Get adequate Calcium and Vitamin D.     Lifestyle  Exercise for at least 150 minutes a week (30 minutes a day, 5 days a week). This will help you control your weight and prevent disease.   Limit alcohol to one drink per day.   No smoking.   Wear sunscreen to prevent skin cancer.   See your dentist every six months for an exam and cleaning.   See your eye doctor every 1 to 2 years.

## 2023-08-09 DIAGNOSIS — I10 BENIGN ESSENTIAL HYPERTENSION: Primary | ICD-10-CM

## 2023-08-09 DIAGNOSIS — E78.5 HYPERLIPIDEMIA LDL GOAL <100: ICD-10-CM

## 2023-08-09 NOTE — RESULT ENCOUNTER NOTE
Electrolytes and blood sugar are normal.  The kidney function is very slightly low compared to previous values.  This could be related to inadequate water intake.  We will recheck in 2 weeks, hydrate well prior to lab.    Cholesterol is moderately elevated.  Your risk score is calculated as below for cardiovascular disease.  It is elevated.  We typically consider starting a cholesterol-lowering medication 1 year 10-year risk score is 7.5% or greater.  Yours is 10.7%.      I recommend a CT calcium score to see if there is plaque that has deposited in the coronary arteries.  Schedule a virtual or face-to-face follow-up with me after the CT calcium score to discuss in more detail and to consider cholesterol-lowering medication.    The 10-year ASCVD risk score (Sanket GALLO, et al., 2019) is: 10.7%    Values used to calculate the score:      Age: 58 years      Sex: Male      Is Non- : No      Diabetic: No      Tobacco smoker: No      Systolic Blood Pressure: 134 mmHg      Is BP treated: Yes      HDL Cholesterol: 61 mg/dL      Total Cholesterol: 231 mg/dL

## 2023-08-15 ENCOUNTER — TELEPHONE (OUTPATIENT)
Dept: FAMILY MEDICINE | Facility: CLINIC | Age: 58
End: 2023-08-15
Payer: COMMERCIAL

## 2023-08-15 NOTE — TELEPHONE ENCOUNTER
Patient Quality Outreach    Patient is due for the following:   Hypertension -  BP check    Next Steps:   Schedule a nurse only visit for BP     Type of outreach:    Sent letter.    Next Steps:  Reach out within 90 days via Letter.    Max number of attempts reached: Yes. Will try again in 90 days if patient still on fail list.    Questions for provider review:    None           Sushila Pate MA  Chart routed to .

## 2023-08-23 ENCOUNTER — HOSPITAL ENCOUNTER (OUTPATIENT)
Dept: RESPIRATORY THERAPY | Facility: CLINIC | Age: 58
Discharge: HOME | End: 2023-08-23
Attending: INTERNAL MEDICINE | Admitting: INTERNAL MEDICINE
Payer: COMMERCIAL

## 2023-08-23 ENCOUNTER — LAB (OUTPATIENT)
Dept: LAB | Facility: CLINIC | Age: 58
End: 2023-08-23
Payer: COMMERCIAL

## 2023-08-23 DIAGNOSIS — U09.9 LONG COVID: ICD-10-CM

## 2023-08-23 DIAGNOSIS — I10 BENIGN ESSENTIAL HYPERTENSION: ICD-10-CM

## 2023-08-23 LAB
ANION GAP SERPL CALCULATED.3IONS-SCNC: 9 MMOL/L (ref 7–15)
BUN SERPL-MCNC: 25.8 MG/DL (ref 8–23)
CALCIUM SERPL-MCNC: 9.4 MG/DL (ref 8.8–10.2)
CHLORIDE SERPL-SCNC: 104 MMOL/L (ref 98–107)
CREAT SERPL-MCNC: 1.23 MG/DL (ref 0.67–1.17)
DEPRECATED HCO3 PLAS-SCNC: 26 MMOL/L (ref 22–29)
DLCOUNC-%PRED-PRE: 103 %
DLCOUNC-PRE: 27.3 ML/MIN/MMHG
DLCOUNC-PRED: 26.41 ML/MIN/MMHG
ERV-%PRED-PRE: 74 %
ERV-PRE: 0.93 L
ERV-PRED: 1.24 L
EXPTIME-PRE: 5.7 SEC
FEF2575-%PRED-PRE: 168 %
FEF2575-PRE: 4.6 L/SEC
FEF2575-PRED: 2.73 L/SEC
FEFMAX-%PRED-PRE: 106 %
FEFMAX-PRE: 9.44 L/SEC
FEFMAX-PRED: 8.89 L/SEC
FEV1-%PRED-PRE: 96 %
FEV1-PRE: 3.08 L
FEV1FEV6-PRE: 90 %
FEV1FEV6-PRED: 79 %
FEV1FVC-PRE: 91 %
FEV1FVC-PRED: 79 %
FEV1SVC-PRE: 89 %
FEV1SVC-PRED: 66 %
FIFMAX-PRE: 7.39 L/SEC
FRCPLETH-%PRED-PRE: 62 %
FRCPLETH-PRE: 2.16 L
FRCPLETH-PRED: 3.44 L
FVC-%PRED-PRE: 83 %
FVC-PRE: 3.39 L
FVC-PRED: 4.06 L
GFR SERPL CREATININE-BSD FRML MDRD: 67 ML/MIN/1.73M2
GLUCOSE SERPL-MCNC: 92 MG/DL (ref 70–99)
IC-%PRED-PRE: 70 %
IC-PRE: 2.45 L
IC-PRED: 3.5 L
POTASSIUM SERPL-SCNC: 4 MMOL/L (ref 3.4–5.3)
RVPLETH-%PRED-PRE: 55 %
RVPLETH-PRE: 1.16 L
RVPLETH-PRED: 2.1 L
SODIUM SERPL-SCNC: 139 MMOL/L (ref 136–145)
TLCPLETH-%PRED-PRE: 67 %
TLCPLETH-PRE: 4.61 L
TLCPLETH-PRED: 6.86 L
VA-%PRED-PRE: 72 %
VA-PRE: 4.5 L
VC-%PRED-PRE: 71 %
VC-PRE: 3.45 L
VC-PRED: 4.83 L

## 2023-08-23 PROCEDURE — 94375 RESPIRATORY FLOW VOLUME LOOP: CPT

## 2023-08-23 PROCEDURE — 80048 BASIC METABOLIC PNL TOTAL CA: CPT | Performed by: INTERNAL MEDICINE

## 2023-08-23 PROCEDURE — 94729 DIFFUSING CAPACITY: CPT

## 2023-08-23 PROCEDURE — 36415 COLL VENOUS BLD VENIPUNCTURE: CPT | Performed by: INTERNAL MEDICINE

## 2023-08-23 PROCEDURE — 94726 PLETHYSMOGRAPHY LUNG VOLUMES: CPT

## 2023-08-28 ENCOUNTER — HOSPITAL ENCOUNTER (OUTPATIENT)
Dept: CT IMAGING | Facility: CLINIC | Age: 58
Discharge: HOME | End: 2023-08-28
Attending: INTERNAL MEDICINE | Admitting: INTERNAL MEDICINE
Payer: COMMERCIAL

## 2023-08-28 DIAGNOSIS — E78.5 HYPERLIPIDEMIA LDL GOAL <100: ICD-10-CM

## 2023-08-28 PROCEDURE — 75571 CT HRT W/O DYE W/CA TEST: CPT | Mod: 26 | Performed by: INTERNAL MEDICINE

## 2023-08-28 PROCEDURE — 75571 CT HRT W/O DYE W/CA TEST: CPT

## 2023-08-28 NOTE — RESULT ENCOUNTER NOTE
There is a small lung nodule.  Follow-up would be determined based on smoking history.  How many years did he smoke, when did he quit?  On average, how much did he smoke?  Smoking history is incomplete in his chart.

## 2023-08-30 PROBLEM — R91.8 PULMONARY NODULES: Status: ACTIVE | Noted: 2023-08-30

## 2023-08-30 NOTE — RESULT ENCOUNTER NOTE
Noted smoking history.  He has emphysema seen in the lung tissue.  I recommend follow-up CT scan for the lung nodule in 1 year.

## 2023-09-04 NOTE — RESULT ENCOUNTER NOTE
Recommend appointment and we can discuss further - if he has symptoms, if he needs further testing, etc

## 2023-12-26 ENCOUNTER — TELEPHONE (OUTPATIENT)
Dept: FAMILY MEDICINE | Facility: CLINIC | Age: 58
End: 2023-12-26
Payer: COMMERCIAL

## 2024-04-30 ENCOUNTER — TELEPHONE (OUTPATIENT)
Dept: FAMILY MEDICINE | Facility: CLINIC | Age: 59
End: 2024-04-30
Payer: COMMERCIAL

## 2024-08-14 DIAGNOSIS — I10 BENIGN ESSENTIAL HYPERTENSION: ICD-10-CM

## 2024-08-14 RX ORDER — LISINOPRIL 10 MG/1
10 TABLET ORAL DAILY
Qty: 90 TABLET | Refills: 3 | OUTPATIENT
Start: 2024-08-14

## 2024-08-14 NOTE — TELEPHONE ENCOUNTER
Overdue for needed care. Please call to schedule in person clinic appointment for annual exam. Once appt is scheduled, route back to the pool.    Julie Behrendt RN

## 2024-08-14 NOTE — TELEPHONE ENCOUNTER
Preventative scheduled with PCP 10/30. Pt requesting fill to get to appt    Vanessa Gilbert on 8/14/2024 at 1:54 PM

## 2024-08-16 ENCOUNTER — MYC REFILL (OUTPATIENT)
Dept: FAMILY MEDICINE | Facility: CLINIC | Age: 59
End: 2024-08-16
Payer: COMMERCIAL

## 2024-08-16 DIAGNOSIS — I10 BENIGN ESSENTIAL HYPERTENSION: ICD-10-CM

## 2024-08-16 RX ORDER — LISINOPRIL 10 MG/1
10 TABLET ORAL DAILY
Qty: 90 TABLET | Refills: 0 | Status: SHIPPED | OUTPATIENT
Start: 2024-08-16

## 2024-08-16 NOTE — TELEPHONE ENCOUNTER
Pending Prescriptions:                       Disp   Refills    lisinopril (ZESTRIL) 10 MG tablet          90 tab*0        Sig: Take 1 tablet (10 mg) by mouth daily    Routing refill request to provider for review/approval because:  BP not in goal range.    BP Readings from Last 3 Encounters:   08/08/23 (!) 134/94   08/02/22 (!) 140/84   07/21/22 124/82     Korin Sahu RN  [facility]

## 2024-09-05 NOTE — PHARMACY-ANTICOAGULATION SERVICE
Argatroban Dosing Note:    Patient initiated on Argatroban drip per recommendation of vascular surgery.  Initiated at 1mcg/kg/min at 2349 on  10/11.   Goal aPTT 44-88 sec.   Follow up aPTT at 0234=49 seconds which is within goal.  Will recheck aPTT in 2 hours.    Lila Boyle, PharmD     Niacinamide Pregnancy And Lactation Text: These medications are considered safe during pregnancy. Benzoyl Peroxide Pregnancy And Lactation Text: This medication is Pregnancy Category C. It is unknown if benzoyl peroxide is excreted in breast milk. Adbry Pregnancy And Lactation Text: It is unknown if this medication will adversely affect pregnancy or breast feeding. Gabapentin Counseling: I discussed with the patient the risks of gabapentin including but not limited to dizziness, somnolence, fatigue and ataxia. Solaraze Pregnancy And Lactation Text: This medication is Pregnancy Category B and is considered safe. There is some data to suggest avoiding during the third trimester. It is unknown if this medication is excreted in breast milk. Rhofade Pregnancy And Lactation Text: This medication has not been assigned a Pregnancy Risk Category. It is unknown if the medication is excreted in breast milk. Mirvaso Counseling: Mirvaso is a topical medication which can decrease superficial blood flow where applied. Side effects are uncommon and include stinging, redness and allergic reactions. Methotrexate Pregnancy And Lactation Text: This medication is Pregnancy Category X and is known to cause fetal harm. This medication is excreted in breast milk. Griseofulvin Pregnancy And Lactation Text: This medication is Pregnancy Category X and is known to cause serious birth defects. It is unknown if this medication is excreted in breast milk but breast feeding should be avoided. Valtrex Counseling: I discussed with the patient the risks of valacyclovir including but not limited to kidney damage, nausea, vomiting and severe allergy.  The patient understands that if the infection seems to be worsening or is not improving, they are to call. Topical Sulfur Applications Counseling: Topical Sulfur Counseling: Patient counseled that this medication may cause skin irritation or allergic reactions.  In the event of skin irritation, the patient was advised to reduce the amount of the drug applied or use it less frequently.   The patient verbalized understanding of the proper use and possible adverse effects of topical sulfur application.  All of the patient's questions and concerns were addressed. Finasteride Female Counseling: Finasteride Counseling:  I discussed with the patient the risks of use of finasteride including but not limited to decreased libido and sexual dysfunction. Explained the teratogenic nature of the medication and stressed the importance of not getting pregnant during treatment. All of the patient's questions and concerns were addressed. Rinvoq Counseling: I discussed with the patient the risks of Rinvoq therapy including but not limited to upper respiratory tract infections, shingles, cold sores, bronchitis, nausea, cough, fever, acne, and headache. Live vaccines should be avoided.  This medication has been linked to serious infections; higher rate of mortality; malignancy and lymphoproliferative disorders; major adverse cardiovascular events; thrombosis; thrombocytopenia, anemia, and neutropenia; lipid elevations; liver enzyme elevations; and gastrointestinal perforations. Stelara Pregnancy And Lactation Text: This medication is Pregnancy Category B and is considered safe during pregnancy. It is unknown if this medication is excreted in breast milk. Oxybutynin Pregnancy And Lactation Text: This medication is Pregnancy Category B and is considered safe during pregnancy. It is unknown if it is excreted in breast milk. Bexarotene Pregnancy And Lactation Text: This medication is Pregnancy Category X and should not be given to women who are pregnant or may become pregnant. This medication should not be used if you are breast feeding. Azithromycin Pregnancy And Lactation Text: This medication is considered safe during pregnancy and is also secreted in breast milk. Erythromycin Counseling:  I discussed with the patient the risks of erythromycin including but not limited to GI upset, allergic reaction, drug rash, diarrhea, increase in liver enzymes, and yeast infections. Libtayo Counseling- I discussed with the patient the risks of Libtayo including but not limited to nausea, vomiting, diarrhea, and bone or muscle pain.  The patient verbalized understanding of the proper use and possible adverse effects of Libtayo.  All of the patient's questions and concerns were addressed. Elidel Pregnancy And Lactation Text: This medication is Pregnancy Category C. It is unknown if this medication is excreted in breast milk. Carac Counseling:  I discussed with the patient the risks of Carac including but not limited to erythema, scaling, itching, weeping, crusting, and pain. Rituxan Counseling:  I discussed with the patient the risks of Rituxan infusions. Side effects can include infusion reactions, severe drug rashes including mucocutaneous reactions, reactivation of latent hepatitis and other infections and rarely progressive multifocal leukoencephalopathy.  All of the patient's questions and concerns were addressed. Nsaids Counseling: NSAID Counseling: I discussed with the patient that NSAIDs should be taken with food. Prolonged use of NSAIDs can result in the development of stomach ulcers.  Patient advised to stop taking NSAIDs if abdominal pain occurs.  The patient verbalized understanding of the proper use and possible adverse effects of NSAIDs.  All of the patient's questions and concerns were addressed. Rifampin Pregnancy And Lactation Text: This medication is Pregnancy Category C and it isn't know if it is safe during pregnancy. It is also excreted in breast milk and should not be used if you are breast feeding. Solaraze Counseling:  I discussed with the patient the risks of Solaraze including but not limited to erythema, scaling, itching, weeping, crusting, and pain. Gabapentin Pregnancy And Lactation Text: This medication is Pregnancy Category C and isn't considered safe during pregnancy. It is excreted in breast milk. Doxepin Counseling:  Patient advised that the medication is sedating and not to drive a car after taking this medication. Patient informed of potential adverse effects including but not limited to dry mouth, urinary retention, and blurry vision.  The patient verbalized understanding of the proper use and possible adverse effects of doxepin.  All of the patient's questions and concerns were addressed. Prednisone Counseling:  I discussed with the patient the risks of prolonged use of prednisone including but not limited to weight gain, insomnia, osteoporosis, mood changes, diabetes, susceptibility to infection, glaucoma and high blood pressure.  In cases where prednisone use is prolonged, patients should be monitored with blood pressure checks, serum glucose levels and an eye exam.  Additionally, the patient may need to be placed on GI prophylaxis, PCP prophylaxis, and calcium and vitamin D supplementation and/or a bisphosphonate.  The patient verbalized understanding of the proper use and the possible adverse effects of prednisone.  All of the patient's questions and concerns were addressed. Adbry Counseling: I discussed with the patient the risks of tralokinumab including but not limited to eye infection and irritation, cold sores, injection site reactions, worsening of asthma, allergic reactions and increased risk of parasitic infection.  Live vaccines should be avoided while taking tralokinumab. The patient understands that monitoring is required and they must alert us or the primary physician if symptoms of infection or other concerning signs are noted. Valtrex Pregnancy And Lactation Text: this medication is Pregnancy Category B and is considered safe during pregnancy. This medication is not directly found in breast milk but it's metabolite acyclovir is present. Finasteride Pregnancy And Lactation Text: This medication is absolutely contraindicated during pregnancy. It is unknown if it is excreted in breast milk. Propranolol Counseling:  I discussed with the patient the risks of propranolol including but not limited to low heart rate, low blood pressure, low blood sugar, restlessness and increased cold sensitivity. They should call the office if they experience any of these side effects. Taltz Counseling: I discussed with the patient the risks of ixekizumab including but not limited to immunosuppression, serious infections, worsening of inflammatory bowel disease and drug reactions.  The patient understands that monitoring is required including a PPD at baseline and must alert us or the primary physician if symptoms of infection or other concerning signs are noted. Rinvoq Pregnancy And Lactation Text: Based on animal studies, Rinvoq may cause embryo-fetal harm when administered to pregnant women.  The medication should not be used in pregnancy.  Breastfeeding is not recommended during treatment and for 6 days after the last dose. Arava Counseling:  Patient counseled regarding adverse effects of Arava including but not limited to nausea, vomiting, abnormalities in liver function tests. Patients may develop mouth sores, rash, diarrhea, and abnormalities in blood counts. The patient understands that monitoring is required including LFTs and blood counts.  There is a rare possibility of scarring of the liver and lung problems that can occur when taking methotrexate. Persistent nausea, loss of appetite, pale stools, dark urine, cough, and shortness of breath should be reported immediately. Patient advised to discontinue Arava treatment and consult with a physician prior to attempting conception. The patient will have to undergo a treatment to eliminate Arava from the body prior to conception. Itraconazole Counseling:  I discussed with the patient the risks of itraconazole including but not limited to liver damage, nausea/vomiting, neuropathy, and severe allergy.  The patient understands that this medication is best absorbed when taken with acidic beverages such as non-diet cola or ginger ale.  The patient understands that monitoring is required including baseline LFTs and repeat LFTs at intervals.  The patient understands that they are to contact us or the primary physician if concerning signs are noted. Detail Level: Detailed Eucrisa Counseling: Patient may experience a mild burning sensation during topical application. Eucrisa is not approved in children less than 3 months of age. Humira Counseling:  I discussed with the patient the risks of adalimumab including but not limited to myelosuppression, immunosuppression, autoimmune hepatitis, demyelinating diseases, lymphoma, and serious infections.  The patient understands that monitoring is required including a PPD at baseline and must alert us or the primary physician if symptoms of infection or other concerning signs are noted. Isotretinoin Counseling: Patient should get monthly blood tests, not donate blood, not drive at night if vision affected, not share medication, and not undergo elective surgery for 6 months after tx completed. Side effects reviewed, pt to contact office should one occur. Rituxan Pregnancy And Lactation Text: This medication is Pregnancy Category C and it isn't know if it is safe during pregnancy. It is unknown if this medication is excreted in breast milk but similar antibodies are known to be excreted. Erythromycin Pregnancy And Lactation Text: This medication is Pregnancy Category B and is considered safe during pregnancy. It is also excreted in breast milk. Nsaids Pregnancy And Lactation Text: These medications are considered safe up to 30 weeks gestation. It is excreted in breast milk. Carac Pregnancy And Lactation Text: This medication is Pregnancy Category X and contraindicated in pregnancy and in women who may become pregnant. It is unknown if this medication is excreted in breast milk. Bactrim Counseling:  I discussed with the patient the risks of sulfa antibiotics including but not limited to GI upset, allergic reaction, drug rash, diarrhea, dizziness, photosensitivity, and yeast infections.  Rarely, more serious reactions can occur including but not limited to aplastic anemia, agranulocytosis, methemoglobinemia, blood dyscrasias, liver or kidney failure, lung infiltrates or desquamative/blistering drug rashes. Libtayo Pregnancy And Lactation Text: This medication is contraindicated in pregnancy and when breast feeding. Doxepin Pregnancy And Lactation Text: This medication is Pregnancy Category C and it isn't known if it is safe during pregnancy. It is also excreted in breast milk and breast feeding isn't recommended. Sarecycline Counseling: Patient advised regarding possible photosensitivity and discoloration of the teeth, skin, lips, tongue and gums.  Patient instructed to avoid sunlight, if possible.  When exposed to sunlight, patients should wear protective clothing, sunglasses, and sunscreen.  The patient was instructed to call the office immediately if the following severe adverse effects occur:  hearing changes, easy bruising/bleeding, severe headache, or vision changes.  The patient verbalized understanding of the proper use and possible adverse effects of sarecycline.  All of the patient's questions and concerns were addressed. Glycopyrrolate Counseling:  I discussed with the patient the risks of glycopyrrolate including but not limited to skin rash, drowsiness, dry mouth, difficulty urinating, and blurred vision. Prednisone Pregnancy And Lactation Text: This medication is Pregnancy Category C and it isn't know if it is safe during pregnancy. This medication is excreted in breast milk. Dupixent Counseling: I discussed with the patient the risks of dupilumab including but not limited to eye inflammation and irritation, cold sores, injection site reactions, allergic reactions and increased risk of parasitic infection. The patient understands that monitoring is required and they must alert us or the primary physician if symptoms of infection or other concerning signs are noted. Topical Ketoconazole Counseling: Patient counseled that this medication may cause skin irritation or allergic reactions.  In the event of skin irritation, the patient was advised to reduce the amount of the drug applied or use it less frequently.   The patient verbalized understanding of the proper use and possible adverse effects of ketoconazole.  All of the patient's questions and concerns were addressed. Cellcept Counseling:  I discussed with the patient the risks of mycophenolate mofetil including but not limited to infection/immunosuppression, GI upset, hypokalemia, hypercholesterolemia, bone marrow suppression, lymphoproliferative disorders, malignancy, GI ulceration/bleed/perforation, colitis, interstitial lung disease, kidney failure, progressive multifocal leukoencephalopathy, and birth defects.  The patient understands that monitoring is required including a baseline creatinine and regular CBC testing. In addition, patient must alert us immediately if symptoms of infection or other concerning signs are noted. Taltz Pregnancy And Lactation Text: The risk during pregnancy and breastfeeding is uncertain with this medication. Sotyktu Counseling:  I discussed the most common side effects of Sotyktu including: common cold, sore throat, sinus infections, cold sores, canker sores, folliculitis, and acne.  I also discussed more serious side effects of Sotyktu including but not limited to: serious allergic reactions; increased risk for infections such as TB; cancers such as lymphomas; rhabdomyolysis and elevated CPK; and elevated triglycerides and liver enzymes.  Itraconazole Pregnancy And Lactation Text: This medication is Pregnancy Category C and it isn't know if it is safe during pregnancy. It is also excreted in breast milk. Arava Pregnancy And Lactation Text: This medication is Pregnancy Category X and is absolutely contraindicated during pregnancy. It is unknown if it is excreted in breast milk. Opzelura Counseling:  I discussed with the patient the risks of Opzelura including but not limited to nasopharngitis, bronchitis, ear infection, eosinophila, hives, diarrhea, folliculitis, tonsillitis, and rhinorrhea.  Taken orally, this medication has been linked to serious infections; higher rate of mortality; malignancy and lymphoproliferative disorders; major adverse cardiovascular events; thrombosis; thrombocytopenia, anemia, and neutropenia; and lipid elevations. Birth Control Pills Counseling: Birth Control Pill Counseling: I discussed with the patient the potential side effects of OCPs including but not limited to increased risk of stroke, heart attack, thrombophlebitis, deep venous thrombosis, hepatic adenomas, breast changes, GI upset, headaches, and depression.  The patient verbalized understanding of the proper use and possible adverse effects of OCPs. All of the patient's questions and concerns were addressed. Eucrisa Pregnancy And Lactation Text: This medication has not been assigned a Pregnancy Risk Category but animal studies failed to show danger with the topical medication. It is unknown if the medication is excreted in breast milk. Simponi Counseling:  I discussed with the patient the risks of golimumab including but not limited to myelosuppression, immunosuppression, autoimmune hepatitis, demyelinating diseases, lymphoma, and serious infections.  The patient understands that monitoring is required including a PPD at baseline and must alert us or the primary physician if symptoms of infection or other concerning signs are noted. Propranolol Pregnancy And Lactation Text: This medication is Pregnancy Category C and it isn't known if it is safe during pregnancy. It is excreted in breast milk. Ivermectin Pregnancy And Lactation Text: This medication is Pregnancy Category C and it isn't known if it is safe during pregnancy. It is also excreted in breast milk. Isotretinoin Pregnancy And Lactation Text: This medication is Pregnancy Category X and is considered extremely dangerous during pregnancy. It is unknown if it is excreted in breast milk. Use Enhanced Medication Counseling?: No Siliq Counseling:  I discussed with the patient the risks of Siliq including but not limited to new or worsening depression, suicidal thoughts and behavior, immunosuppression, malignancy, posterior leukoencephalopathy syndrome, and serious infections.  The patient understands that monitoring is required including a PPD at baseline and must alert us or the primary physician if symptoms of infection or other concerning signs are noted. There is also a special program designed to monitor depression which is required with Siliq. Olanzapine Counseling- I discussed with the patient the common side effects of olanzapine including but are not limited to: lack of energy, dry mouth, increased appetite, sleepiness, tremor, constipation, dizziness, changes in behavior, or restlessness.  Explained that teenagers are more likely to experience headaches, abdominal pain, pain in the arms or legs, tiredness, and sleepiness.  Serious side effects include but are not limited: increased risk of death in elderly patients who are confused, have memory loss, or dementia-related psychosis; hyperglycemia; increased cholesterol and triglycerides; and weight gain. Sarecycline Pregnancy And Lactation Text: This medication is Pregnancy Category D and not consider safe during pregnancy. It is also excreted in breast milk. Bactrim Pregnancy And Lactation Text: This medication is Pregnancy Category D and is known to cause fetal risk.  It is also excreted in breast milk. Metronidazole Counseling:  I discussed with the patient the risks of metronidazole including but not limited to seizures, nausea/vomiting, a metallic taste in the mouth, nausea/vomiting and severe allergy. Odomzo Counseling- I discussed with the patient the risks of Odomzo including but not limited to nausea, vomiting, diarrhea, constipation, weight loss, changes in the sense of taste, decreased appetite, muscle spasms, and hair loss.  The patient verbalized understanding of the proper use and possible adverse effects of Odomzo.  All of the patient's questions and concerns were addressed. Hydroxyzine Counseling: Patient advised that the medication is sedating and not to drive a car after taking this medication.  Patient informed of potential adverse effects including but not limited to dry mouth, urinary retention, and blurry vision.  The patient verbalized understanding of the proper use and possible adverse effects of hydroxyzine.  All of the patient's questions and concerns were addressed. Calcipotriene Counseling:  I discussed with the patient the risks of calcipotriene including but not limited to erythema, scaling, itching, and irritation. Glycopyrrolate Pregnancy And Lactation Text: This medication is Pregnancy Category B and is considered safe during pregnancy. It is unknown if it is excreted breast milk. Cellcept Pregnancy And Lactation Text: This medication is Pregnancy Category D and isn't considered safe during pregnancy. It is unknown if this medication is excreted in breast milk. Sotyktu Pregnancy And Lactation Text: There is insufficient data to evaluate whether or not Sotyktu is safe to use during pregnancy.   It is not known if Sotyktu passes into breast milk and whether or not it is safe to use when breastfeeding.   Tremfya Counseling: I discussed with the patient the risks of guselkumab including but not limited to immunosuppression, serious infections, and drug reactions.  The patient understands that monitoring is required including a PPD at baseline and must alert us or the primary physician if symptoms of infection or other concerning signs are noted. Birth Control Pills Pregnancy And Lactation Text: This medication should be avoided if pregnant and for the first 30 days post-partum. Clofazimine Counseling:  I discussed with the patient the risks of clofazimine including but not limited to skin and eye pigmentation, liver damage, nausea/vomiting, gastrointestinal bleeding and allergy. Opzelura Pregnancy And Lactation Text: There is insufficient data to evaluate drug-associated risk for major birth defects, miscarriage, or other adverse maternal or fetal outcomes.  There is a pregnancy registry that monitors pregnancy outcomes in pregnant persons exposed to the medication during pregnancy.  It is unknown if this medication is excreted in breast milk.  Do not breastfeed during treatment and for about 4 weeks after the last dose. Ketoconazole Counseling:   Patient counseled regarding improving absorption with orange juice.  Adverse effects include but are not limited to breast enlargement, headache, diarrhea, nausea, upset stomach, liver function test abnormalities, taste disturbance, and stomach pain.  There is a rare possibility of liver failure that can occur when taking ketoconazole. The patient understands that monitoring of LFTs may be required, especially at baseline. The patient verbalized understanding of the proper use and possible adverse effects of ketoconazole.  All of the patient's questions and concerns were addressed. SSKI Counseling:  I discussed with the patient the risks of SSKI including but not limited to thyroid abnormalities, metallic taste, GI upset, fever, headache, acne, arthralgias, paraesthesias, lymphadenopathy, easy bleeding, arrhythmias, and allergic reaction. Cibinqo Counseling: I discussed with the patient the risks of Cibinqo therapy including but not limited to common cold, nausea, headache, cold sores, increased blood CPK levels, dizziness, UTIs, fatigue, acne, and vomitting. Live vaccines should be avoided.  This medication has been linked to serious infections; higher rate of mortality; malignancy and lymphoproliferative disorders; major adverse cardiovascular events; thrombosis; thrombocytopenia and lymphopenia; lipid elevations; and retinal detachment. Ivermectin Counseling:  Patient instructed to take medication on an empty stomach with a full glass of water.  Patient informed of potential adverse effects including but not limited to nausea, diarrhea, dizziness, itching, and swelling of the extremities or lymph nodes.  The patient verbalized understanding of the proper use and possible adverse effects of ivermectin.  All of the patient's questions and concerns were addressed. Hydroquinone Counseling:  Patient advised that medication may result in skin irritation, lightening (hypopigmentation), dryness, and burning.  In the event of skin irritation, the patient was advised to reduce the amount of the drug applied or use it less frequently.  Rarely, spots that are treated with hydroquinone can become darker (pseudoochronosis).  Should this occur, patient instructed to stop medication and call the office. The patient verbalized understanding of the proper use and possible adverse effects of hydroquinone.  All of the patient's questions and concerns were addressed. High Dose Vitamin A Counseling: Side effects reviewed, pt to contact office should one occur. Olanzapine Pregnancy And Lactation Text: This medication is pregnancy category C.   There are no adequate and well controlled trials with olanzapine in pregnant females.  Olanzapine should be used during pregnancy only if the potential benefit justifies the potential risk to the fetus.   In a study in lactating healthy women, olanzapine was excreted in breast milk.  It is recommended that women taking olanzapine should not breast feed. Cephalexin Counseling: I counseled the patient regarding use of cephalexin as an antibiotic for prophylactic and/or therapeutic purposes. Cephalexin (commonly prescribed under brand name Keflex) is a cephalosporin antibiotic which is active against numerous classes of bacteria, including most skin bacteria. Side effects may include nausea, diarrhea, gastrointestinal upset, rash, hives, yeast infections, and in rare cases, hepatitis, kidney disease, seizures, fever, confusion, neurologic symptoms, and others. Patients with severe allergies to penicillin medications are cautioned that there is about a 10% incidence of cross-reactivity with cephalosporins. When possible, patients with penicillin allergies should use alternatives to cephalosporins for antibiotic therapy. Hyrimoz Counseling:  I discussed with the patient the risks of adalimumab including but not limited to myelosuppression, immunosuppression, autoimmune hepatitis, demyelinating diseases, lymphoma, and serious infections.  The patient understands that monitoring is required including a PPD at baseline and must alert us or the primary physician if symptoms of infection or other concerning signs are noted. Metronidazole Pregnancy And Lactation Text: This medication is Pregnancy Category B and considered safe during pregnancy.  It is also excreted in breast milk. Tetracycline Counseling: Patient counseled regarding possible photosensitivity and increased risk for sunburn.  Patient instructed to avoid sunlight, if possible.  When exposed to sunlight, patients should wear protective clothing, sunglasses, and sunscreen.  The patient was instructed to call the office immediately if the following severe adverse effects occur:  hearing changes, easy bruising/bleeding, severe headache, or vision changes.  The patient verbalized understanding of the proper use and possible adverse effects of tetracycline.  All of the patient's questions and concerns were addressed. Patient understands to avoid pregnancy while on therapy due to potential birth defects. Calcipotriene Pregnancy And Lactation Text: This medication has not been proven safe during pregnancy. It is unknown if this medication is excreted in breast milk. Cosentyx Counseling:  I discussed with the patient the risks of Cosentyx including but not limited to worsening of Crohn's disease, immunosuppression, allergic reactions and infections.  The patient understands that monitoring is required including a PPD at baseline and must alert us or the primary physician if symptoms of infection or other concerning signs are noted. Cyclophosphamide Counseling:  I discussed with the patient the risks of cyclophosphamide including but not limited to hair loss, hormonal abnormalities, decreased fertility, abdominal pain, diarrhea, nausea and vomiting, bone marrow suppression and infection. The patient understands that monitoring is required while taking this medication. Hydroxychloroquine Counseling:  I discussed with the patient that a baseline ophthalmologic exam is needed at the start of therapy and every year thereafter while on therapy. A CBC may also be warranted for monitoring.  The side effects of this medication were discussed with the patient, including but not limited to agranulocytosis, aplastic anemia, seizures, rashes, retinopathy, and liver toxicity. Patient instructed to call the office should any adverse effect occur.  The patient verbalized understanding of the proper use and possible adverse effects of Plaquenil.  All the patient's questions and concerns were addressed. Picato Counseling:  I discussed with the patient the risks of Picato including but not limited to erythema, scaling, itching, weeping, crusting, and pain. Topical Clindamycin Counseling: Patient counseled that this medication may cause skin irritation or allergic reactions.  In the event of skin irritation, the patient was advised to reduce the amount of the drug applied or use it less frequently.   The patient verbalized understanding of the proper use and possible adverse effects of clindamycin.  All of the patient's questions and concerns were addressed. Xeljanz Counseling: I discussed with the patient the risks of Xeljanz therapy including increased risk of infection, liver issues, headache, diarrhea, or cold symptoms. Live vaccines should be avoided. They were instructed to call if they have any problems. Spironolactone Counseling: Patient advised regarding risks of diarrhea, abdominal pain, hyperkalemia, birth defects (for female patients), liver toxicity and renal toxicity. The patient may need blood work to monitor liver and kidney function and potassium levels while on therapy. The patient verbalized understanding of the proper use and possible adverse effects of spironolactone.  All of the patient's questions and concerns were addressed. Cibinqo Pregnancy And Lactation Text: It is unknown if this medication will adversely affect pregnancy or breast feeding.  You should not take this medication if you are currently pregnant or planning a pregnancy or while breastfeeding. Skyrizi Counseling: I discussed with the patient the risks of risankizumab-rzaa including but not limited to immunosuppression, and serious infections.  The patient understands that monitoring is required including a PPD at baseline and must alert us or the primary physician if symptoms of infection or other concerning signs are noted. Ketoconazole Pregnancy And Lactation Text: This medication is Pregnancy Category C and it isn't know if it is safe during pregnancy. It is also excreted in breast milk and breast feeding isn't recommended. Winlevi Pregnancy And Lactation Text: This medication is considered safe during pregnancy and breastfeeding. Sski Pregnancy And Lactation Text: This medication is Pregnancy Category D and isn't considered safe during pregnancy. It is excreted in breast milk. High Dose Vitamin A Pregnancy And Lactation Text: High dose vitamin A therapy is contraindicated during pregnancy and breast feeding. Oral Minoxidil Counseling- I discussed with the patient the risks of oral minoxidil including but not limited to shortness of breath, swelling of the feet or ankles, dizziness, lightheadedness, unwanted hair growth and allergic reaction.  The patient verbalized understanding of the proper use and possible adverse effects of oral minoxidil.  All of the patient's questions and concerns were addressed. 5-Fu Counseling: 5-Fluorouracil Counseling:  I discussed with the patient the risks of 5-fluorouracil including but not limited to erythema, scaling, itching, weeping, crusting, and pain. Cephalexin Pregnancy And Lactation Text: This medication is Pregnancy Category B and considered safe during pregnancy.  It is also excreted in breast milk but can be used safely for shorter doses. Minocycline Counseling: Patient advised regarding possible photosensitivity and discoloration of the teeth, skin, lips, tongue and gums.  Patient instructed to avoid sunlight, if possible.  When exposed to sunlight, patients should wear protective clothing, sunglasses, and sunscreen.  The patient was instructed to call the office immediately if the following severe adverse effects occur:  hearing changes, easy bruising/bleeding, severe headache, or vision changes.  The patient verbalized understanding of the proper use and possible adverse effects of minocycline.  All of the patient's questions and concerns were addressed. Hydroxychloroquine Pregnancy And Lactation Text: This medication has been shown to cause fetal harm but it isn't assigned a Pregnancy Risk Category. There are small amounts excreted in breast milk. Cimzia Pregnancy And Lactation Text: This medication crosses the placenta but can be considered safe in certain situations. Cimzia may be excreted in breast milk. Xeldiyaz Pregnancy And Lactation Text: This medication is Pregnancy Category D and is not considered safe during pregnancy.  The risk during breast feeding is also uncertain. Tazorac Pregnancy And Lactation Text: This medication is not safe during pregnancy. It is unknown if this medication is excreted in breast milk. Colchicine Counseling:  Patient counseled regarding adverse effects including but not limited to stomach upset (nausea, vomiting, stomach pain, or diarrhea).  Patient instructed to limit alcohol consumption while taking this medication.  Colchicine may reduce blood counts especially with prolonged use.  The patient understands that monitoring of kidney function and blood counts may be required, especially at baseline. The patient verbalized understanding of the proper use and possible adverse effects of colchicine.  All of the patient's questions and concerns were addressed. Cyclophosphamide Pregnancy And Lactation Text: This medication is Pregnancy Category D and it isn't considered safe during pregnancy. This medication is excreted in breast milk. Spironolactone Pregnancy And Lactation Text: This medication can cause feminization of the male fetus and should be avoided during pregnancy. The active metabolite is also found in breast milk. Thalidomide Counseling: I discussed with the patient the risks of thalidomide including but not limited to birth defects, anxiety, weakness, chest pain, dizziness, cough and severe allergy. Winlevi Counseling:  I discussed with the patient the risks of topical clascoterone including but not limited to erythema, scaling, itching, and stinging. Patient voiced their understanding. Zyclara Counseling:  I discussed with the patient the risks of imiquimod including but not limited to erythema, scaling, itching, weeping, crusting, and pain.  Patient understands that the inflammatory response to imiquimod is variable from person to person and was educated regarded proper titration schedule.  If flu-like symptoms develop, patient knows to discontinue the medication and contact us. Xolair Counseling:  Patient informed of potential adverse effects including but not limited to fever, muscle aches, rash and allergic reactions.  The patient verbalized understanding of the proper use and possible adverse effects of Xolair.  All of the patient's questions and concerns were addressed. Dutasteride Male Counseling: Dutasteride Counseling:  I discussed with the patient the risks of use of dutasteride including but not limited to decreased libido, decreased ejaculate volume, and gynecomastia. Women who can become pregnant should not handle medication.  All of the patient's questions and concerns were addressed. Terbinafine Counseling: Patient counseling regarding adverse effects of terbinafine including but not limited to headache, diarrhea, rash, upset stomach, liver function test abnormalities, itching, taste/smell disturbance, nausea, abdominal pain, and flatulence.  There is a rare possibility of liver failure that can occur when taking terbinafine.  The patient understands that a baseline LFT and kidney function test may be required. The patient verbalized understanding of the proper use and possible adverse effects of terbinafine.  All of the patient's questions and concerns were addressed. Litfulo Counseling: Litfulo (Ritlecitinib) Counseling: I discussed with the patient the risks of Litfulo therapy including but not limited to headache, upper respiratory infections, nausea, diarrhea, acne, and urticaria. Live vaccines should be avoided.  This medication has been linked to serious infections; higher rate of mortality; malignancy and lymphoproliferative disorders; major adverse cardiovascular events; thrombosis; decreases in lymphocytes and platelets; liver enzyme elevations; and CPK elevations. Albendazole Counseling:  I discussed with the patient the risks of albendazole including but not limited to cytopenia, kidney damage, nausea/vomiting and severe allergy.  The patient understands that this medication is being used in an off-label manner. Imiquimod Counseling:  I discussed with the patient the risks of imiquimod including but not limited to erythema, scaling, itching, weeping, crusting, and pain.  Patient understands that the inflammatory response to imiquimod is variable from person to person and was educated regarded proper titration schedule.  If flu-like symptoms develop, patient knows to discontinue the medication and contact us. Ilumya Counseling: I discussed with the patient the risks of tildrakizumab including but not limited to immunosuppression, malignancy, posterior leukoencephalopathy syndrome, and serious infections.  The patient understands that monitoring is required including a PPD at baseline and must alert us or the primary physician if symptoms of infection or other concerning signs are noted. Oral Minoxidil Pregnancy And Lactation Text: This medication should only be used when clearly needed if you are pregnant, attempting to become pregnant or breast feeding. Clindamycin Counseling: I counseled the patient regarding use of clindamycin as an antibiotic for prophylactic and/or therapeutic purposes. Clindamycin is active against numerous classes of bacteria, including skin bacteria. Side effects may include nausea, diarrhea, gastrointestinal upset, rash, hives, yeast infections, and in rare cases, colitis. Low Dose Naltrexone Counseling- I discussed with the patient the potential risks and side effects of low dose naltrexone including but not limited to: more vivid dreams, headaches, nausea, vomiting, abdominal pain, fatigue, dizziness, and anxiety. Azelaic Acid Counseling: Patient counseled that medicine may cause skin irritation and to avoid applying near the eyes.  In the event of skin irritation, the patient was advised to reduce the amount of the drug applied or use it less frequently.   The patient verbalized understanding of the proper use and possible adverse effects of azelaic acid.  All of the patient's questions and concerns were addressed. Protopic Counseling: Patient may experience a mild burning sensation during topical application. Protopic is not approved in children less than 2 years of age. There have been case reports of hematologic and skin malignancies in patients using topical calcineurin inhibitors although causality is questionable. Cimzia Counseling:  I discussed with the patient the risks of Cimzia including but not limited to immunosuppression, allergic reactions and infections.  The patient understands that monitoring is required including a PPD at baseline and must alert us or the primary physician if symptoms of infection or other concerning signs are noted. Tazorac Counseling:  Patient advised that medication is irritating and drying.  Patient may need to apply sparingly and wash off after an hour before eventually leaving it on overnight.  The patient verbalized understanding of the proper use and possible adverse effects of tazorac.  All of the patient's questions and concerns were addressed. Cyclosporine Counseling:  I discussed with the patient the risks of cyclosporine including but not limited to hypertension, gingival hyperplasia,myelosuppression, immunosuppression, liver damage, kidney damage, neurotoxicity, lymphoma, and serious infections. The patient understands that monitoring is required including baseline blood pressure, CBC, CMP, lipid panel and uric acid, and then 1-2 times monthly CMP and blood pressure. Fluconazole Counseling:  Patient counseled regarding adverse effects of fluconazole including but not limited to headache, diarrhea, nausea, upset stomach, liver function test abnormalities, taste disturbance, and stomach pain.  There is a rare possibility of liver failure that can occur when taking fluconazole.  The patient understands that monitoring of LFTs and kidney function test may be required, especially at baseline. The patient verbalized understanding of the proper use and possible adverse effects of fluconazole.  All of the patient's questions and concerns were addressed. Xolair Pregnancy And Lactation Text: This medication is Pregnancy Category B and is considered safe during pregnancy. This medication is excreted in breast milk. Terbinafine Pregnancy And Lactation Text: This medication is Pregnancy Category B and is considered safe during pregnancy. It is also excreted in breast milk and breast feeding isn't recommended. Dutasteride Female Counseling: Dutasteride Counseling:  I discussed with the patient the risks of use of dutasteride including but not limited to decreased libido and sexual dysfunction. Explained the teratogenic nature of the medication and stressed the importance of not getting pregnant during treatment. All of the patient's questions and concerns were addressed. Spevigo Counseling: I discussed with the patient the risks of Spevigo including but not limited to fatigue, nasuea, vomiting, headache, pruritus, urinary tract infection, an infusion related reactions.  The patient understands that monitoring is required including screening for tuberculosis at baseline and yearly screening thereafter while continuing Spevigo therapy. They should contact us if symptoms of infection or other concerning signs are noted. Litfulo Pregnancy And Lactation Text: There is insufficient data to evaluate whether or not Litfulo is safe to use during pregnancy.  Breastfeeding is not recommended during treatment. Drysol Counseling:  I discussed with the patient the risks of drysol/aluminum chloride including but not limited to skin rash, itching, irritation, burning. Quinolones Counseling:  I discussed with the patient the risks of fluoroquinolones including but not limited to GI upset, allergic reaction, drug rash, diarrhea, dizziness, photosensitivity, yeast infections, liver function test abnormalities, tendonitis/tendon rupture. Otezla Counseling: The side effects of Otezla were discussed with the patient, including but not limited to worsening or new depression, weight loss, diarrhea, nausea, upper respiratory tract infection, and headache. Patient instructed to call the office should any adverse effect occur.  The patient verbalized understanding of the proper use and possible adverse effects of Otezla.  All the patient's questions and concerns were addressed. Clindamycin Pregnancy And Lactation Text: This medication can be used in pregnancy if certain situations. Clindamycin is also present in breast milk. Acitretin Counseling:  I discussed with the patient the risks of acitretin including but not limited to hair loss, dry lips/skin/eyes, liver damage, hyperlipidemia, depression/suicidal ideation, photosensitivity.  Serious rare side effects can include but are not limited to pancreatitis, pseudotumor cerebri, bony changes, clot formation/stroke/heart attack.  Patient understands that alcohol is contraindicated since it can result in liver toxicity and significantly prolong the elimination of the drug by many years. Low Dose Naltrexone Pregnancy And Lactation Text: Naltrexone is pregnancy category C.  There have been no adequate and well-controlled studies in pregnant women.  It should be used in pregnancy only if the potential benefit justifies the potential risk to the fetus.   Limited data indicates that naltrexone is minimally excreted into breastmilk. Azathioprine Counseling:  I discussed with the patient the risks of azathioprine including but not limited to myelosuppression, immunosuppression, hepatotoxicity, lymphoma, and infections.  The patient understands that monitoring is required including baseline LFTs, Creatinine, possible TPMP genotyping and weekly CBCs for the first month and then every 2 weeks thereafter.  The patient verbalized understanding of the proper use and possible adverse effects of azathioprine.  All of the patient's questions and concerns were addressed. Bimzelx Pregnancy And Lactation Text: This medication crosses the placenta and the safety is uncertain during pregnancy. It is unknown if this medication is present in breast milk. Hydroxyzine Pregnancy And Lactation Text: This medication is not safe during pregnancy and should not be taken. It is also excreted in breast milk and breast feeding isn't recommended. Azelaic Acid Pregnancy And Lactation Text: This medication is considered safe during pregnancy and breast feeding. Tranexamic Acid Counseling:  Patient advised of the small risk of bleeding problems with tranexamic acid. They were also instructed to call if they developed any nausea, vomiting or diarrhea. All of the patient's questions and concerns were addressed. Wartpeel Counseling:  I discussed with the patient the risks of Wartpeel including but not limited to erythema, scaling, itching, weeping, crusting, and pain. Dapsone Counseling: I discussed with the patient the risks of dapsone including but not limited to hemolytic anemia, agranulocytosis, rashes, methemoglobinemia, kidney failure, peripheral neuropathy, headaches, GI upset, and liver toxicity.  Patients who start dapsone require monitoring including baseline LFTs and weekly CBCs for the first month, then every month thereafter.  The patient verbalized understanding of the proper use and possible adverse effects of dapsone.  All of the patient's questions and concerns were addressed. Protopic Pregnancy And Lactation Text: This medication is Pregnancy Category C. It is unknown if this medication is excreted in breast milk when applied topically. Spevigo Pregnancy And Lactation Text: The risk during pregnancy and breastfeeding is uncertain with this medication. This medication does cross the placenta. It is unknown if this medication is found in breast milk. Olumiant Counseling: I discussed with the patient the risks of Olumiant therapy including but not limited to upper respiratory tract infections, shingles, cold sores, and nausea. Live vaccines should be avoided.  This medication has been linked to serious infections; higher rate of mortality; malignancy and lymphoproliferative disorders; major adverse cardiovascular events; thrombosis; gastrointestinal perforations; neutropenia; lymphopenia; anemia; liver enzyme elevations; and lipid elevations. Dutasteride Pregnancy And Lactation Text: This medication is absolutely contraindicated in women during pregnancy and breast feeding. Feminization of male fetuses is possible if taking while pregnant. Minoxidil Counseling: Minoxidil is a topical medication which can increase blood flow where it is applied. It is uncertain how this medication increases hair growth. Side effects are uncommon and include stinging and allergic reactions. Otezla Pregnancy And Lactation Text: This medication is Pregnancy Category C and it isn't known if it is safe during pregnancy. It is unknown if it is excreted in breast milk. Acitretin Pregnancy And Lactation Text: This medication is Pregnancy Category X and should not be given to women who are pregnant or may become pregnant in the future. This medication is excreted in breast milk. Infliximab Counseling:  I discussed with the patient the risks of infliximab including but not limited to myelosuppression, immunosuppression, autoimmune hepatitis, demyelinating diseases, lymphoma, and serious infections.  The patient understands that monitoring is required including a PPD at baseline and must alert us or the primary physician if symptoms of infection or other concerning signs are noted. Doxycycline Counseling:  Patient counseled regarding possible photosensitivity and increased risk for sunburn.  Patient instructed to avoid sunlight, if possible.  When exposed to sunlight, patients should wear protective clothing, sunglasses, and sunscreen.  The patient was instructed to call the office immediately if the following severe adverse effects occur:  hearing changes, easy bruising/bleeding, severe headache, or vision changes.  The patient verbalized understanding of the proper use and possible adverse effects of doxycycline.  All of the patient's questions and concerns were addressed. Niacinamide Counseling: I recommended taking niacin or niacinamide, also know as vitamin B3, twice daily. Recent evidence suggests that taking vitamin B3 (500 mg twice daily) can reduce the risk of actinic keratoses and non-melanoma skin cancers. Side effects of vitamin B3 include flushing and headache. Erivedge Counseling- I discussed with the patient the risks of Erivedge including but not limited to nausea, vomiting, diarrhea, constipation, weight loss, changes in the sense of taste, decreased appetite, muscle spasms, and hair loss.  The patient verbalized understanding of the proper use and possible adverse effects of Erivedge.  All of the patient's questions and concerns were addressed. Dupixent Pregnancy And Lactation Text: This medication likely crosses the placenta but the risk for the fetus is uncertain. This medication is excreted in breast milk. Opioid Counseling: I discussed with the patient the potential side effects of opioids including but not limited to addiction, altered mental status, and depression. I stressed avoiding alcohol, benzodiazepines, muscle relaxants and sleep aids unless specifically okayed by a physician. The patient verbalized understanding of the proper use and possible adverse effects of opioids. All of the patient's questions and concerns were addressed. They were instructed to flush the remaining pills down the toilet if they did not need them for pain. Benzoyl Peroxide Counseling: Patient counseled that medicine may cause skin irritation and bleach clothing.  In the event of skin irritation, the patient was advised to reduce the amount of the drug applied or use it less frequently.   The patient verbalized understanding of the proper use and possible adverse effects of benzoyl peroxide.  All of the patient's questions and concerns were addressed. Methotrexate Counseling:  Patient counseled regarding adverse effects of methotrexate including but not limited to nausea, vomiting, abnormalities in liver function tests. Patients may develop mouth sores, rash, diarrhea, and abnormalities in blood counts. The patient understands that monitoring is required including LFT's and blood counts.  There is a rare possibility of scarring of the liver and lung problems that can occur when taking methotrexate. Persistent nausea, loss of appetite, pale stools, dark urine, cough, and shortness of breath should be reported immediately. Patient advised to discontinue methotrexate treatment at least three months before attempting to become pregnant.  I discussed the need for folate supplements while taking methotrexate.  These supplements can decrease side effects during methotrexate treatment. The patient verbalized understanding of the proper use and possible adverse effects of methotrexate.  All of the patient's questions and concerns were addressed. Cimetidine Counseling:  I discussed with the patient the risks of Cimetidine including but not limited to gynecomastia, headache, diarrhea, nausea, drowsiness, arrhythmias, pancreatitis, skin rashes, psychosis, bone marrow suppression and kidney toxicity. Rhofade Counseling: Rhofade is a topical medication which can decrease superficial blood flow where applied. Side effects are uncommon and include stinging, redness and allergic reactions. Bimzelx Counseling:  I discussed with the patient the risks of Bimzelx including but not limited to depression, immunosuppression, allergic reactions and infections.  The patient understands that monitoring is required including a PPD at baseline and must alert us or the primary physician if symptoms of infection or other concerning signs are noted. Topical Sulfur Applications Pregnancy And Lactation Text: This medication is considered safe during pregnancy and breast feeding secondary to limited systemic absorption. Dapsone Pregnancy And Lactation Text: This medication is Pregnancy Category C and is not considered safe during pregnancy or breast feeding. Topical Retinoid counseling:  Patient advised to apply a pea-sized amount only at bedtime and wait 30 minutes after washing their face before applying.  If too drying, patient may add a non-comedogenic moisturizer. The patient verbalized understanding of the proper use and possible adverse effects of retinoids.  All of the patient's questions and concerns were addressed. Olumiant Pregnancy And Lactation Text: Based on animal studies, Olumiant may cause embryo-fetal harm when administered to pregnant women.  The medication should not be used in pregnancy.  Breastfeeding is not recommended during treatment. Stelara Counseling:  I discussed with the patient the risks of ustekinumab including but not limited to immunosuppression, malignancy, posterior leukoencephalopathy syndrome, and serious infections.  The patient understands that monitoring is required including a PPD at baseline and must alert us or the primary physician if symptoms of infection or other concerning signs are noted. Tranexamic Acid Pregnancy And Lactation Text: It is unknown if this medication is safe during pregnancy or breast feeding. Finasteride Male Counseling: Finasteride Counseling:  I discussed with the patient the risks of use of finasteride including but not limited to decreased libido, decreased ejaculate volume, gynecomastia, and depression. Women should not handle medication.  All of the patient's questions and concerns were addressed. Griseofulvin Counseling:  I discussed with the patient the risks of griseofulvin including but not limited to photosensitivity, cytopenia, liver damage, nausea/vomiting and severe allergy.  The patient understands that this medication is best absorbed when taken with a fatty meal (e.g., ice cream or french fries). Oxybutynin Counseling:  I discussed with the patient the risks of oxybutynin including but not limited to skin rash, drowsiness, dry mouth, difficulty urinating, and blurred vision. Bexarotene Counseling:  I discussed with the patient the risks of bexarotene including but not limited to hair loss, dry lips/skin/eyes, liver abnormalities, hyperlipidemia, pancreatitis, depression/suicidal ideation, photosensitivity, drug rash/allergic reactions, hypothyroidism, anemia, leukopenia, infection, cataracts, and teratogenicity.  Patient understands that they will need regular blood tests to check lipid profile, liver function tests, white blood cell count, thyroid function tests and pregnancy test if applicable. Elidel Counseling: Patient may experience a mild burning sensation during topical application. Elidel is not approved in children less than 2 years of age. There have been case reports of hematologic and skin malignancies in patients using topical calcineurin inhibitors although causality is questionable. Azithromycin Counseling:  I discussed with the patient the risks of azithromycin including but not limited to GI upset, allergic reaction, drug rash, diarrhea, and yeast infections. Enbrel Counseling:  I discussed with the patient the risks of etanercept including but not limited to myelosuppression, immunosuppression, autoimmune hepatitis, demyelinating diseases, lymphoma, and infections.  The patient understands that monitoring is required including a PPD at baseline and must alert us or the primary physician if symptoms of infection or other concerning signs are noted. Opioid Pregnancy And Lactation Text: These medications can lead to premature delivery and should be avoided during pregnancy. These medications are also present in breast milk in small amounts. Doxycycline Pregnancy And Lactation Text: This medication is Pregnancy Category D and not consider safe during pregnancy. It is also excreted in breast milk but is considered safe for shorter treatment courses. Rifampin Counseling: I discussed with the patient the risks of rifampin including but not limited to liver damage, kidney damage, red-orange body fluids, nausea/vomiting and severe allergy.

## 2024-09-12 ENCOUNTER — TELEPHONE (OUTPATIENT)
Dept: FAMILY MEDICINE | Facility: CLINIC | Age: 59
End: 2024-09-12
Payer: COMMERCIAL

## 2024-09-12 NOTE — TELEPHONE ENCOUNTER
Patient Quality Outreach    Patient is due for the following:   Hypertension -  Hypertension follow-up visit  Physical Preventive Adult Physical    Next Steps:   Patient has upcoming appointment, these items will be addressed at that time.    Type of outreach:    Chart review performed, no outreach needed.    Next Steps:  Reach out within 90 days via Letter.    Max number of attempts reached: Yes. Will try again in 90 days if patient still on fail list.    Questions for provider review:    None           Sushila Pate MA  Chart routed to .

## 2024-10-25 SDOH — HEALTH STABILITY: PHYSICAL HEALTH: ON AVERAGE, HOW MANY DAYS PER WEEK DO YOU ENGAGE IN MODERATE TO STRENUOUS EXERCISE (LIKE A BRISK WALK)?: 4 DAYS

## 2024-10-25 SDOH — HEALTH STABILITY: PHYSICAL HEALTH: ON AVERAGE, HOW MANY MINUTES DO YOU ENGAGE IN EXERCISE AT THIS LEVEL?: PATIENT DECLINED

## 2024-10-25 ASSESSMENT — SOCIAL DETERMINANTS OF HEALTH (SDOH): HOW OFTEN DO YOU GET TOGETHER WITH FRIENDS OR RELATIVES?: TWICE A WEEK

## 2024-10-30 ENCOUNTER — OFFICE VISIT (OUTPATIENT)
Dept: FAMILY MEDICINE | Facility: CLINIC | Age: 59
End: 2024-10-30
Payer: COMMERCIAL

## 2024-10-30 VITALS
SYSTOLIC BLOOD PRESSURE: 114 MMHG | DIASTOLIC BLOOD PRESSURE: 86 MMHG | OXYGEN SATURATION: 99 % | BODY MASS INDEX: 29.56 KG/M2 | WEIGHT: 199.6 LBS | HEART RATE: 64 BPM | TEMPERATURE: 96.9 F | HEIGHT: 69 IN | RESPIRATION RATE: 12 BRPM

## 2024-10-30 DIAGNOSIS — Z11.59 NEED FOR HEPATITIS C SCREENING TEST: ICD-10-CM

## 2024-10-30 DIAGNOSIS — U09.9 LONG COVID: ICD-10-CM

## 2024-10-30 DIAGNOSIS — Z00.00 ROUTINE GENERAL MEDICAL EXAMINATION AT A HEALTH CARE FACILITY: Primary | ICD-10-CM

## 2024-10-30 DIAGNOSIS — I10 BENIGN ESSENTIAL HYPERTENSION: ICD-10-CM

## 2024-10-30 DIAGNOSIS — R91.8 PULMONARY NODULES: ICD-10-CM

## 2024-10-30 DIAGNOSIS — E78.5 HYPERLIPIDEMIA LDL GOAL <70: ICD-10-CM

## 2024-10-30 DIAGNOSIS — L29.9 PRURITIC DISORDER: ICD-10-CM

## 2024-10-30 PROBLEM — L72.0 EPIDERMAL INCLUSION CYST: Status: RESOLVED | Noted: 2022-05-27 | Resolved: 2024-10-30

## 2024-10-30 PROBLEM — N43.2 OTHER HYDROCELE: Status: RESOLVED | Noted: 2022-05-27 | Resolved: 2024-10-30

## 2024-10-30 LAB
ANION GAP SERPL CALCULATED.3IONS-SCNC: 7 MMOL/L (ref 7–15)
BUN SERPL-MCNC: 19.4 MG/DL (ref 8–23)
CALCIUM SERPL-MCNC: 9 MG/DL (ref 8.8–10.4)
CHLORIDE SERPL-SCNC: 104 MMOL/L (ref 98–107)
CHOLEST SERPL-MCNC: 219 MG/DL
CREAT SERPL-MCNC: 1.08 MG/DL (ref 0.67–1.17)
EGFRCR SERPLBLD CKD-EPI 2021: 78 ML/MIN/1.73M2
FASTING STATUS PATIENT QL REPORTED: YES
FASTING STATUS PATIENT QL REPORTED: YES
GLUCOSE SERPL-MCNC: 97 MG/DL (ref 70–99)
HCO3 SERPL-SCNC: 27 MMOL/L (ref 22–29)
HCV AB SERPL QL IA: NONREACTIVE
HDLC SERPL-MCNC: 59 MG/DL
LDLC SERPL CALC-MCNC: 142 MG/DL
NONHDLC SERPL-MCNC: 160 MG/DL
POTASSIUM SERPL-SCNC: 4.1 MMOL/L (ref 3.4–5.3)
SODIUM SERPL-SCNC: 138 MMOL/L (ref 135–145)
TRIGL SERPL-MCNC: 90 MG/DL

## 2024-10-30 PROCEDURE — 80061 LIPID PANEL: CPT | Performed by: INTERNAL MEDICINE

## 2024-10-30 PROCEDURE — 36415 COLL VENOUS BLD VENIPUNCTURE: CPT | Performed by: INTERNAL MEDICINE

## 2024-10-30 PROCEDURE — 99214 OFFICE O/P EST MOD 30 MIN: CPT | Mod: 25 | Performed by: INTERNAL MEDICINE

## 2024-10-30 PROCEDURE — 99396 PREV VISIT EST AGE 40-64: CPT | Performed by: INTERNAL MEDICINE

## 2024-10-30 PROCEDURE — 86803 HEPATITIS C AB TEST: CPT | Performed by: INTERNAL MEDICINE

## 2024-10-30 PROCEDURE — 80048 BASIC METABOLIC PNL TOTAL CA: CPT | Performed by: INTERNAL MEDICINE

## 2024-10-30 RX ORDER — LISINOPRIL 10 MG/1
10 TABLET ORAL DAILY
Qty: 90 TABLET | Refills: 3 | Status: SHIPPED | OUTPATIENT
Start: 2024-10-30

## 2024-10-30 RX ORDER — ALBUTEROL SULFATE 90 UG/1
2 INHALANT RESPIRATORY (INHALATION) 4 TIMES DAILY
Qty: 36 G | Refills: 11 | Status: SHIPPED | OUTPATIENT
Start: 2024-10-30

## 2024-10-30 RX ORDER — ROSUVASTATIN CALCIUM 10 MG/1
10 TABLET, COATED ORAL DAILY
Qty: 90 TABLET | Refills: 3 | Status: SHIPPED | OUTPATIENT
Start: 2024-10-30

## 2024-10-30 ASSESSMENT — PAIN SCALES - GENERAL: PAINLEVEL_OUTOF10: MILD PAIN (2)

## 2024-10-30 NOTE — PROGRESS NOTES
"Preventive Care Visit  [facility]  Nereida Munoz DO, Internal Medicine  Oct 30, 2024      Assessment & Plan     Routine general medical examination at a health care facility     Pulmonary nodules  Due for follow-up   - CT Chest w Contrast; Future    Hyperlipidemia LDL goal <70  Long discussion about cholesterol and his elevated CT calcium score.  He is agreeable to starting medication.  Recheck cholesterol in ~ 2 months  - Lipid panel reflex to direct LDL Fasting; Future  - rosuvastatin (CRESTOR) 10 MG tablet; Take 1 tablet (10 mg) by mouth daily.  - Lipid panel reflex to direct LDL Fasting; Future    Long COVID   - stable, refill provided. Declines covid booster  - albuterol (PROAIR HFA/PROVENTIL HFA/VENTOLIN HFA) 108 (90 Base) MCG/ACT inhaler; Inhale 2 puffs into the lungs 4 times daily.    Benign essential hypertension   - stable, refill provided  - lisinopril (ZESTRIL) 10 MG tablet; Take 1 tablet (10 mg) by mouth daily.  - Basic metabolic panel  (Ca, Cl, CO2, Creat, Gluc, K, Na, BUN); Future    Pruritic disorder  See AVS below  - Adult Dermatology  Referral; Future    Need for hepatitis C screening test  - Hepatitis C Screen Reflex to HCV RNA Quant and Genotype; Future    Patient has been advised of split billing requirements and indicates understanding: Yes        BMI  Estimated body mass index is 29.23 kg/m  as calculated from the following:    Height as of this encounter: 1.76 m (5' 9.29\").    Weight as of this encounter: 90.5 kg (199 lb 9.6 oz).   Weight management plan: Discussed healthy diet and exercise guidelines    Counseling  Appropriate preventive services were addressed with this patient via screening, questionnaire, or discussion as appropriate for fall prevention, nutrition, physical activity, Tobacco-use cessation, social engagement, weight loss and cognition.  Checklist reviewing preventive services available has been given to the patient.  Reviewed " patient's diet, addressing concerns and/or questions.       Hyperlipidemia  Recommend to start rosuvastatin 10 mg once daily  Lab today  Schedule follow-up fasting lab in ~ 2 months  Discussed possible side effects of muscle cramps    Lung Nodule  Recommend follow-up CT scan.  Radiology test was ordered.  Please call [phone] to schedule.    Long COVID  Refill of inhaler  Consider adding formal exercise to your daily life.  Goal is 30 min of moderate to high intensity exercise 4 days/week    Derm  You can try taking a Zyrtec every day  Next step would be to see Derm  Continue using gentle soap like Dove, Cetaphil.  Use gentle emollient lotion like Gold Blankenship, CeraVe    Health Care Maintenance  Please fill out a healthcare directive    Marcello Capone is a 59 year old, presenting for the following:  Hypertension, Lipids, Physical, Health Maintenance (No shot today ), and Recheck Medication (albuterol (PROAIR HFA/PROVENTIL HFA/VENTOLIN HFA) 108 (90 Base) MCG/ACT inhaler)        10/30/2024     7:00 AM   Additional Questions   Roomed by coleman   Accompanied by self         10/30/2024     7:00 AM   Patient Reported Additional Medications   Patient reports taking the following new medications none          HPI      Chief Complaint   Patient presents with    Hypertension    Lipids    Physical    Health Maintenance     No shot today     Recheck Medication     albuterol (PROAIR HFA/PROVENTIL HFA/VENTOLIN HFA) 108 (90 Base) MCG/ACT inhaler     Long COVID - 11/2021 COVID hospitalization  --noted increase in shortness of breath, mild, on the days with poor air quality  --uses albuterol inhaler 1-2 x day on most day since COVID (report in 2023)  --recently, he reports using inhaler 3-4 x week  --no flare ups - periods where he needs to use inhaler more often  --is not using out of habit  --shortness of breath is main symptoms that prompts albuterol use  --it does help improve side effects   --no chest pain  --no leg  swelling  --declines to change therapy - add controller inhaler, or further work-up with PFT, pulm eval at our discussion last year      Derm  --he finds his skin is very sensitive - after having his blood pressure taken today, his arm is red spotty rash and itchy  --he will use topical cortisone a few times/week  --uses mild soap and doesn't bathe daily because hot water makes him itch;  doesn't use cannabis  --has tried different dietary interventions w/out succuss  --uses cetaphil  --he stopped lisinopril last year, and no change to his skin symptoms     Hyperlipidemia Follow-Up    Are you regularly taking any medication or supplement to lower your cholesterol?   No  Are you having muscle aches or other side effects that you think could be caused by your cholesterol lowering medication?  No    Hypertension Follow-up    Do you check your blood pressure regularly outside of the clinic? Yes  sometimes  Are you following a low salt diet? No  Are your blood pressures ever more than 140 on the top number (systolic) OR more than 90 on the bottom number (diastolic), for example 140/90? No      Medication Followup of albuterol (PROAIR HFA/PROVENTIL HFA/VENTOLIN HFA) 108 (90 Base) MCG/ACT inhaler   Taking Medication as prescribed: yes  Side Effects:  None  Medication Helping Symptoms:  yes  Walks 3-5 miles/day active working, but no formal exercise  When he walks hills, is more winded    Health Care Directive  Patient does not have a Health Care Directive: Discussed advance care planning with patient; information given to patient to review.      10/25/2024   General Health   How would you rate your overall physical health? Good   Feel stress (tense, anxious, or unable to sleep) Not at all            10/25/2024   Nutrition   Three or more servings of calcium each day? Yes   Diet: Regular (no restrictions)   How many servings of fruit and vegetables per day? (!) 0-1   How many sweetened beverages each day? 0-1             10/25/2024   Exercise   Days per week of moderate/strenous exercise 4 days   Average minutes spent exercising at this level Patient declined            10/25/2024   Social Factors   Frequency of gathering with friends or relatives Twice a week   Worry food won't last until get money to buy more No   Food not last or not have enough money for food? No   Do you have housing? (Housing is defined as stable permanent housing and does not include staying ouside in a car, in a tent, in an abandoned building, in an overnight shelter, or couch-surfing.) Yes   Are you worried about losing your housing? No   Lack of transportation? No   Unable to get utilities (heat,electricity)? No            10/25/2024   Fall Risk   Fallen 2 or more times in the past year? No    Trouble with walking or balance? No        Patient-reported          10/25/2024   Dental   Dentist two times every year? Yes            10/25/2024   TB Screening   Were you born outside of the [place]? No            Today's PHQ-2 Score:       10/30/2024     7:00 AM   PHQ-2 (  [employer])   Q1: Little interest or pleasure in doing things 0    Q2: Feeling down, depressed or hopeless 0    PHQ-2 Score 0    Q1: Little interest or pleasure in doing things Not at all   Q2: Feeling down, depressed or hopeless Not at all   PHQ-2 Score 0       Patient-reported           10/25/2024   Substance Use   Alcohol more than 3/day or more than 7/wk No   Do you use any other substances recreationally? No        Social History     Tobacco Use    Smoking status: Former     Current packs/day: 0.00     Average packs/day: 0.7 packs/day for 23.6 years (16.5 ttl pk-yrs)     Types: Cigarettes     Start date: 2000     Quit date: 2023     Years since quittin.2     Passive exposure: Past    Smokeless tobacco: Former    Tobacco comments:     Stop about 5 years ago   Vaping Use    Vaping status: Never Used   Substance Use Topics    Alcohol use: Not Currently     Comment: Sometimes    Drug  "use: Never             10/25/2024   One time HIV Screening   Previous HIV test? No          10/25/2024   STI Screening   New sexual partner(s) since last STI/HIV test? No      Last PSA: No results found for: \"PSA\"  ASCVD Risk   The 10-year ASCVD risk score (Sanket GALLO, et al., 2019) is: 8.8%    Values used to calculate the score:      Age: 59 years      Sex: Male      Is Non- : No      Diabetic: No      Tobacco smoker: No      Systolic Blood Pressure: 114 mmHg      Is BP treated: Yes      HDL Cholesterol: 61 mg/dL      Total Cholesterol: 231 mg/dL           Reviewed and updated as needed this visit by Provider                    Current Outpatient Medications   Medication Sig Dispense Refill    albuterol (PROAIR HFA/PROVENTIL HFA/VENTOLIN HFA) 108 (90 Base) MCG/ACT inhaler Inhale 2 puffs into the lungs 4 times daily 36 g 11    Aspirin 81 MG CAPS Take 81 mg by mouth daily Stopping apixaban and start aspirin at 81 mg PO Qday as of 4/19/2022.      lisinopril (ZESTRIL) 10 MG tablet Take 1 tablet (10 mg) by mouth daily 90 tablet 0    Omeprazole (PRILOSEC PO) Take 20 mg by mouth every morning Over the counter           Review of Systems  Constitutional, neuro, ENT, endocrine, pulmonary, cardiac, gastrointestinal, genitourinary, musculoskeletal, integument and psychiatric systems are negative, except as otherwise noted.     Objective    Exam  /86 (BP Location: Left arm, Patient Position: Sitting, Cuff Size: Adult Regular)   Pulse 64   Temp 96.9  F (36.1  C) (Tympanic)   Resp 12   Ht 1.76 m (5' 9.29\")   Wt 90.5 kg (199 lb 9.6 oz)   SpO2 99%   BMI 29.23 kg/m     Estimated body mass index is 29.23 kg/m  as calculated from the following:    Height as of this encounter: 1.76 m (5' 9.29\").    Weight as of this encounter: 90.5 kg (199 lb 9.6 oz).    Physical Exam  GENERAL: alert and no distress  EYES: Eyes grossly normal to inspection, PERRL and conjunctivae and sclerae normal  HENT: " ear canals and TM's normal, nose and mouth without ulcers or lesions  NECK: no adenopathy, no asymmetry, masses, or scars  RESP: lungs clear to auscultation - no rales, rhonchi or wheezes  CV: regular rate and rhythm, normal S1 S2, no S3 or S4, no murmur, click or rub, no peripheral edema  ABDOMEN: soft, nontender, no hepatosplenomegaly, no masses and bowel sounds normal  MS: no gross musculoskeletal defects noted, no edema  SKIN: no suspicious lesions or rashes  NEURO: Normal strength and tone, mentation intact and speech normal  PSYCH: mentation appears normal, affect normal/bright        Signed Electronically by: Nereida Munoz DO

## 2024-10-30 NOTE — LETTER
November 4, 2024      Liborio Barajas  [address] [facility] [address]        Dear ,    We are writing to inform you of your test results.    Cholesterol is similar to last check 1 year ago.  Kidney function, electrolytes, blood sugar are normal.  Continue plan of care discussed at the time of visit.  Routine screening for hepatitis C is negative    Resulted Orders   Lipid panel reflex to direct LDL Fasting   Result Value Ref Range    Cholesterol 219 (H) <200 mg/dL    Triglycerides 90 <150 mg/dL    Direct Measure HDL 59 >=40 mg/dL    LDL Cholesterol Calculated 142 (H) <100 mg/dL    Non HDL Cholesterol 160 (H) <130 mg/dL    Patient Fasting > 8hrs? Yes       Comment:      Fasting since 1930 on 10/29/2024  Fasting since 1930 on 10/29/2024  Fasting since 1930 on 10/29/2024    Narrative    Cholesterol  Desirable: < 200 mg/dL  Borderline High: 200 - 239 mg/dL  High: >= 240 mg/dL    Triglycerides  Normal: < 150 mg/dL  Borderline High: 150 - 199 mg/dL  High: 200-499 mg/dL  Very High: >= 500 mg/dL    Direct Measure HDL  Female: >= 50 mg/dL   Male: >= 40 mg/dL    LDL Cholesterol  Desirable: < 100 mg/dL  Above Desirable: 100 - 129 mg/dL   Borderline High: 130 - 159 mg/dL   High:  160 - 189 mg/dL   Very High: >= 190 mg/dL    Non HDL Cholesterol  Desirable: < 130 mg/dL  Above Desirable: 130 - 159 mg/dL  Borderline High: 160 - 189 mg/dL  High: 190 - 219 mg/dL  Very High: >= 220 mg/dL   Basic metabolic panel  (Ca, Cl, CO2, Creat, Gluc, K, Na, BUN)   Result Value Ref Range    Sodium 138 135 - 145 mmol/L    Potassium 4.1 3.4 - 5.3 mmol/L    Chloride 104 98 - 107 mmol/L    Carbon Dioxide (CO2) 27 22 - 29 mmol/L    Anion Gap 7 7 - 15 mmol/L    Urea Nitrogen 19.4 8.0 - 23.0 mg/dL    Creatinine 1.08 0.67 - 1.17 mg/dL    GFR Estimate 78 >60 mL/min/1.73m2      Comment:      eGFR calculated using 2021 CKD-EPI equation.    Calcium 9.0 8.8 - 10.4 mg/dL      Comment:      Reference intervals for this test were updated on  7/16/2024 to reflect our healthy population more accurately. There may be differences in the flagging of prior results with similar values performed with this method. Those prior results can be interpreted in the context of the updated reference intervals.    Glucose 97 70 - 99 mg/dL    Patient Fasting > 8hrs? Yes       Comment:      Fasting since 1930 on 10/29/2024   Hepatitis C Screen Reflex to HCV RNA Quant and Genotype   Result Value Ref Range    Hepatitis C Antibody Nonreactive Nonreactive      Comment:      A nonreactive screening test result does not exclude the possibility of exposure to or infection with HCV. Nonreactive screening test results in individuals with prior exposure to HCV may be due to antibody levels below the limit of detection of this assay or lack of reactivity to the HCV antigens used in this assay. Patients with recent HCV infections (<3 months from time of exposure) may have false-negative HCV antibody results due to the time needed for seroconversion (average of 8 to 9 weeks).       If you have any questions or concerns, please call the clinic at the number listed above.       Sincerely,      Nereida Munoz, DO

## 2024-10-30 NOTE — PATIENT INSTRUCTIONS
Hyperlipidemia  Recommend to start rosuvastatin 10 mg once daily  Lab today  Schedule follow-up fasting lab in ~ 2 months  Discussed possible side effects of muscle cramps    Lung Nodule  Recommend follow-up CT scan.  Radiology test was ordered.  Please call [phone] to schedule.    Long COVID  Refill of inhaler  Consider adding formal exercise to your daily life.  Goal is 30 min of moderate to high intensity exercise 4 days/week    Derm  You can try taking a Zyrtec every day  Next step would be to see Derm  Continue using gentle soap like Dove, Cetaphil.  Use gentle emollient lotion like [place], CeraVe    Health Care Maintenance  Please fill out a healthcare directive    Patient Education   Preventive Care Advice   This is general advice given by our system to help you stay healthy. However, your care team may have specific advice just for you. Please talk to your care team about your preventive care needs.  Nutrition  Eat 5 or more servings of fruits and vegetables each day.  Try wheat bread, brown rice and whole grain pasta (instead of white bread, rice, and pasta).  Get enough calcium and vitamin D. Check the label on foods and aim for 100% of the RDA (recommended daily allowance).  Lifestyle  Exercise at least 150 minutes each week  (30 minutes a day, 5 days a week).  Do muscle strengthening activities 2 days a week. These help control your weight and prevent disease.  No smoking.  Wear sunscreen to prevent skin cancer.  Have a dental exam and cleaning every 6 months.  Yearly exams  See your health care team every year to talk about:  Any changes in your health.  Any medicines your care team has prescribed.  Preventive care, family planning, and ways to prevent chronic diseases.  Shots (vaccines)   HPV shots (up to age 26), if you've never had them before.  Hepatitis B shots (up to age 59), if you've never had them before.  COVID-19 shot: Get this shot when it's due.  Flu shot: Get a flu shot every  year.  Tetanus shot: Get a tetanus shot every 10 years.  Pneumococcal, hepatitis A, and RSV shots: Ask your care team if you need these based on your risk.  Shingles shot (for age 50 and up)  General health tests  Diabetes screening:  Starting at age 35, Get screened for diabetes at least every 3 years.  If you are younger than age 35, ask your care team if you should be screened for diabetes.  Cholesterol test: At age 39, start having a cholesterol test every 5 years, or more often if advised.  Bone density scan (DEXA): At age 50, ask your care team if you should have this scan for osteoporosis (brittle bones).  Hepatitis C: Get tested at least once in your life.  STIs (sexually transmitted infections)  Before age 24: Ask your care team if you should be screened for STIs.  After age 24: Get screened for STIs if you're at risk. You are at risk for STIs (including HIV) if:  You are sexually active with more than one person.  You don't use condoms every time.  You or a partner was diagnosed with a sexually transmitted infection.  If you are at risk for HIV, ask about PrEP medicine to prevent HIV.  Get tested for HIV at least once in your life, whether you are at risk for HIV or not.  Cancer screening tests  Cervical cancer screening: If you have a cervix, begin getting regular cervical cancer screening tests starting at age 21.  Breast cancer scan (mammogram): If you've ever had breasts, begin having regular mammograms starting at age 40. This is a scan to check for breast cancer.  Colon cancer screening: It is important to start screening for colon cancer at age 45.  Have a colonoscopy test every 10 years (or more often if you're at risk) Or, ask your provider about stool tests like a FIT test every year or Cologuard test every 3 years.  To learn more about your testing options, visit:   .  For help making a decision, visit:   https://bit.ly/wf07072.  Prostate cancer screening test: If you have a prostate, ask your  care team if a prostate cancer screening test (PSA) at age 55 is right for you.  Lung cancer screening: If you are a current or former smoker ages 50 to 80, ask your care team if ongoing lung cancer screenings are right for you.  For informational purposes only. Not to replace the advice of your health care provider. Copyright   2023 [facility]. All rights reserved. Clinically reviewed by the [facility]. Recommind 229286 - REV 01/24.

## 2024-11-08 ENCOUNTER — MYC MEDICAL ADVICE (OUTPATIENT)
Dept: FAMILY MEDICINE | Facility: CLINIC | Age: 59
End: 2024-11-08
Payer: COMMERCIAL

## 2024-11-08 NOTE — TELEPHONE ENCOUNTER
Faxed referral to [facility] at [phone].    Renea Morales on 11/8/2024 at 9:59 AM

## 2024-11-22 ENCOUNTER — HOSPITAL ENCOUNTER (OUTPATIENT)
Dept: CT IMAGING | Facility: CLINIC | Age: 59
Discharge: HOME | End: 2024-11-22
Attending: INTERNAL MEDICINE | Admitting: INTERNAL MEDICINE
Payer: COMMERCIAL

## 2024-11-22 DIAGNOSIS — R91.8 PULMONARY NODULES: ICD-10-CM

## 2024-11-22 PROCEDURE — 71260 CT THORAX DX C+: CPT

## 2024-11-22 PROCEDURE — 250N000011 HC RX IP 250 OP 636: Performed by: INTERNAL MEDICINE

## 2024-11-22 PROCEDURE — 250N000009 HC RX 250: Performed by: INTERNAL MEDICINE

## 2024-11-22 RX ORDER — IOPAMIDOL 755 MG/ML
97 INJECTION, SOLUTION INTRAVASCULAR ONCE
Status: COMPLETED | OUTPATIENT
Start: 2024-11-22 | End: 2024-11-22

## 2024-11-22 RX ADMIN — IOPAMIDOL 97 ML: 755 INJECTION, SOLUTION INTRAVENOUS at 15:53

## 2024-11-22 RX ADMIN — SODIUM CHLORIDE 64 ML: 9 INJECTION, SOLUTION INTRAVENOUS at 15:53

## 2024-11-25 PROBLEM — R91.8 PULMONARY NODULES: Status: RESOLVED | Noted: 2023-08-30 | Resolved: 2024-11-25

## 2024-12-10 ENCOUNTER — TRANSFERRED RECORDS (OUTPATIENT)
Dept: HEALTH INFORMATION MANAGEMENT | Facility: CLINIC | Age: 59
End: 2024-12-10
Payer: COMMERCIAL

## 2025-06-03 ENCOUNTER — TRANSFERRED RECORDS (OUTPATIENT)
Facility: HOSPITAL | Age: 60
End: 2025-06-03

## 2025-06-04 PROBLEM — D12.6 ADENOMATOUS POLYP OF COLON: Status: ACTIVE | Noted: 2025-06-04

## 2025-06-04 NOTE — PROCEDURES
[facility]   [address] [place] [address], [address], [place] [address]     Patient Name: Wang Barajas  Gender:  Male  Exam Date: 06/03/2025 Visit Number:  [id number]  Age: 59 Years YOB: 1965  Attending MD: Thao Lamb MD Medical Record#:  [id number]    Procedure: Colonoscopy   Indications: Previous adenomatous polyp(s)      Referring MD: Referral Self  Primary MD:      Nereida Munoz DO  Medications: Admitting Medications:   0.9% Normal Saline at TKO   Intra Procedure Medications:   Patient received monitored anesthesia care.     Complications: No immediate complications  ______________________________________________________________________________  Procedure:   An examination of the heart and lungs was performed and found to be within acceptable limits.  .  The patient was therefore deemed a reasonable candidate for endoscopy and sedation.   The risks and benefits of the procedure were explained to the patient.After obtaining informed consent, the patient received monitored anesthesia care and I passed the scope   without difficulty via the rectum to the ileum.  The appendiceal orifice and ic valve were identified.  The scope was retroflexed during the examination  The quality of the prep was excellent  (Miralax/Gatorade/2 tablets Bisacodyl/Magnesium Citrate).    This was a complete examination throughout the entire colon.    Findings:    Normal finding.  Location - ileum.  Terminal ileum was intubated up to 3 cms from the IC valve. Normal in endoscopic appearance.    Polyp location: transverse colon.  Quantity: 1.  Size: 21-25 mm.  Polyp shape:  flat lesion.         Maneuver: piecemeal polypectomy was performed with a cold snare.       Removal:  complete.  Retrieval: complete.  Bleeding: none.    Polyp location: descending colon.  Quantity: 1.  Size: 7 mm.  Polyp shape: sessile.         Maneuver: polypectomy was performed with a  cold snare.       Removal: complete.   Retrieval: complete.  Bleeding: none.    Diverticulosis.  Location: - descending colon - sigmoid.    Description:  mild.    Size:  small.    Quantity:  few.  No inflammation present.  Anal canal:  normal  Remainder of the exam is normal.    Impression:  Colorectal polyps  MD impression comments:  High-fiber low-fat diet    Preliminary Plan:  The patient and their physician will receive a copy of the pathology report as well as pathology-based recommendations for future screening or surveillance.  Pathology Results:  A: COLON, TRANSVERSE, POLYP:           1. Sessile serrated adenoma           2. Negative for overt dysplasia           3. Per the colonoscopy report:               a. Polyp size: 21-25 mm               b. Resection: Complete               c. Retrieval: Complete      B: COLON, DESCENDING, POLYP:           1. Normal colonic mucosa (clinically, 1 polyp)           2. Negative for serrated change, dysplasia, and malignancy      MICROSCOPIC  A: Performed   B: Performed     Electronically signed by: Dwayne Christie MD    Interpreted at [facility], [address] [place] [id number]    Orders    Instruction(s)/Education:  Instruction/Education Timeframe Assessment   Colon Polyps  K63.5   Diverticulosis/Diverticulitis  K63.5   Hemorrhoids  K63.5       Final Plan:  Repeat colonoscopy in 3 years.    We will attempt to contact you at appropriate intervals via [place] mail.  We may not be able to find you or contact you at that time, therefore you should know that the responsibility for following our recommendation rests with you.  If you don't hear from us at the time your procedure is due, please contact our office to schedule an appointment.  If your contact information should change, please contact our office so that we can update your record.      _Electronically signed by:___________________  Thao Lamb MD                 06/03/2025    cc: Nereida Munoz DO

## 2025-06-05 ENCOUNTER — PATIENT OUTREACH (OUTPATIENT)
Dept: GASTROENTEROLOGY | Facility: CLINIC | Age: 60
End: 2025-06-05
Payer: COMMERCIAL

## (undated) DEVICE — LINEN TOWEL PACK X5 5464

## (undated) DEVICE — SYR 10ML FINGER CONTROL W/O NDL 309695

## (undated) DEVICE — TUBING SUCTION MEDI-VAC SOFT 3/16"X20' N520A

## (undated) DEVICE — RX BACITRACIN OINTMENT 0.9G 1/32OZ CUR001109

## (undated) DEVICE — SUCTION MANIFOLD NEPTUNE 2 SYS 4 PORT 0702-020-000

## (undated) DEVICE — SU MONOCRYL 4-0 PS-2 18" UND Y496G

## (undated) DEVICE — SU ETHILON 2-0 FS 18" 664H

## (undated) DEVICE — DRSG KERLIX FLUFFS X5

## (undated) DEVICE — DRAIN JACKSON PRATT 10FR ROUND SU130-1321

## (undated) DEVICE — Device

## (undated) DEVICE — LINEN GOWN X4 5410

## (undated) DEVICE — NDL 18GA 1.5" 305196

## (undated) DEVICE — COVER CAMERA IN-LIGHT DISP LT-C02

## (undated) DEVICE — PREP CHLORAPREP 26ML TINTED HI-LITE ORANGE 930815

## (undated) DEVICE — SU CHROMIC 3-0 SH 27" G122H

## (undated) DEVICE — STRAP KNEE/BODY 31143004

## (undated) DEVICE — BLADE CLIPPER SGL USE 9680

## (undated) DEVICE — SYR EAR BULB 3OZ 0035830

## (undated) DEVICE — ESU GROUND PAD UNIVERSAL W/O CORD

## (undated) DEVICE — SU CHROMIC 3-0 PS-2 27" 1638H

## (undated) DEVICE — SOL NACL 0.9% IRRIG 1000ML BOTTLE 2F7124

## (undated) DEVICE — LINEN ORTHO PACK 5446

## (undated) DEVICE — DRAIN JACKSON PRATT RESERVOIR 100ML SU130-1305

## (undated) DEVICE — SU VICRYL 2-0 SH 27" J317H

## (undated) DEVICE — DRAPE LAP TRANSVERSE 29421

## (undated) DEVICE — GLOVE PROTEXIS W/NEU-THERA 7.5  2D73TE75

## (undated) RX ORDER — BUPIVACAINE HYDROCHLORIDE 5 MG/ML
INJECTION, SOLUTION EPIDURAL; INTRACAUDAL
Status: DISPENSED
Start: 2022-08-02

## (undated) RX ORDER — IPRATROPIUM BROMIDE AND ALBUTEROL SULFATE 2.5; .5 MG/3ML; MG/3ML
SOLUTION RESPIRATORY (INHALATION)
Status: DISPENSED
Start: 2022-08-02

## (undated) RX ORDER — FENTANYL CITRATE 50 UG/ML
INJECTION, SOLUTION INTRAMUSCULAR; INTRAVENOUS
Status: DISPENSED
Start: 2022-08-02

## (undated) RX ORDER — OXYCODONE HYDROCHLORIDE 5 MG/1
TABLET ORAL
Status: DISPENSED
Start: 2022-08-02

## (undated) RX ORDER — HYDROMORPHONE HCL IN WATER/PF 6 MG/30 ML
PATIENT CONTROLLED ANALGESIA SYRINGE INTRAVENOUS
Status: DISPENSED
Start: 2022-08-02

## (undated) RX ORDER — CEFAZOLIN SODIUM/WATER 2 G/20 ML
SYRINGE (ML) INTRAVENOUS
Status: DISPENSED
Start: 2022-08-02